# Patient Record
Sex: FEMALE | Race: WHITE | Employment: OTHER | URBAN - NONMETROPOLITAN AREA
[De-identification: names, ages, dates, MRNs, and addresses within clinical notes are randomized per-mention and may not be internally consistent; named-entity substitution may affect disease eponyms.]

---

## 2017-09-21 ENCOUNTER — OFFICE VISIT (OUTPATIENT)
Dept: FAMILY MEDICINE CLINIC | Age: 67
End: 2017-09-21
Payer: MEDICARE

## 2017-09-21 VITALS
BODY MASS INDEX: 36.19 KG/M2 | HEART RATE: 64 BPM | HEIGHT: 64 IN | DIASTOLIC BLOOD PRESSURE: 67 MMHG | RESPIRATION RATE: 16 BRPM | WEIGHT: 212 LBS | TEMPERATURE: 97.9 F | SYSTOLIC BLOOD PRESSURE: 118 MMHG

## 2017-09-21 DIAGNOSIS — E78.2 MIXED HYPERLIPIDEMIA: Chronic | ICD-10-CM

## 2017-09-21 DIAGNOSIS — E11.9 TYPE 2 DIABETES MELLITUS WITHOUT COMPLICATION, WITHOUT LONG-TERM CURRENT USE OF INSULIN (HCC): ICD-10-CM

## 2017-09-21 DIAGNOSIS — Z78.9 STATIN INTOLERANCE: Chronic | ICD-10-CM

## 2017-09-21 DIAGNOSIS — Z12.31 ENCOUNTER FOR SCREENING MAMMOGRAM FOR MALIGNANT NEOPLASM OF BREAST: ICD-10-CM

## 2017-09-21 DIAGNOSIS — I10 ESSENTIAL HYPERTENSION: Primary | Chronic | ICD-10-CM

## 2017-09-21 DIAGNOSIS — D69.6 THROMBOCYTOPENIA (HCC): ICD-10-CM

## 2017-09-21 DIAGNOSIS — D32.9 MENINGIOMA (HCC): ICD-10-CM

## 2017-09-21 LAB
GLUCOSE BLD-MCNC: 138 MG/DL
HBA1C MFR BLD: 6.9 %

## 2017-09-21 PROCEDURE — 83036 HEMOGLOBIN GLYCOSYLATED A1C: CPT | Performed by: FAMILY MEDICINE

## 2017-09-21 PROCEDURE — 99214 OFFICE O/P EST MOD 30 MIN: CPT | Performed by: FAMILY MEDICINE

## 2017-09-21 PROCEDURE — 82962 GLUCOSE BLOOD TEST: CPT | Performed by: FAMILY MEDICINE

## 2017-09-21 ASSESSMENT — PATIENT HEALTH QUESTIONNAIRE - PHQ9
2. FEELING DOWN, DEPRESSED OR HOPELESS: 0
1. LITTLE INTEREST OR PLEASURE IN DOING THINGS: 0
SUM OF ALL RESPONSES TO PHQ QUESTIONS 1-9: 0
SUM OF ALL RESPONSES TO PHQ9 QUESTIONS 1 & 2: 0

## 2017-09-22 LAB
ABSOLUTE BASO #: 0.1 K/UL (ref 0–0.1)
ABSOLUTE EOS #: 0 K/UL (ref 0.1–0.4)
ABSOLUTE LYMPH #: 1.2 K/UL (ref 0.8–5.2)
ABSOLUTE MONO #: 0.3 K/UL (ref 0.1–0.9)
ABSOLUTE NEUT #: 2.9 K/UL (ref 1.3–9.1)
ALBUMIN SERPL-MCNC: 4.3 G/DL (ref 3.2–5.3)
ALK PHOSPHATASE: 65 IU/L (ref 35–121)
ALT SERPL-CCNC: 42 IU/L (ref 5–59)
ANION GAP SERPL CALCULATED.3IONS-SCNC: 15 MMOL/L
AST SERPL-CCNC: 30 IU/L (ref 10–42)
BASOPHILS RELATIVE PERCENT: 1.3 %
BILIRUB SERPL-MCNC: 0.6 MG/DL (ref 0.2–1.3)
BUN BLDV-MCNC: 20 MG/DL (ref 10–20)
CALCIUM SERPL-MCNC: 10.2 MG/DL (ref 8.7–10.8)
CHLORIDE BLD-SCNC: 97 MMOL/L (ref 95–111)
CHOLESTEROL, TOTAL: 221 MG/DL
CHOLESTEROL/HDL RATIO: 5.5
CHOLESTEROL/HDL RATIO: NORMAL
CHOLESTEROL: 221 MG/DL
CO2: 31 MMOL/L (ref 21–32)
CREAT SERPL-MCNC: 0.9 MG/DL (ref 0.5–1.3)
CREATINE, URINE: 95.6 MG/DL
CREATININE, URINE: 95.6
EGFR AFRICAN AMERICAN: 75
EGFR IF NONAFRICAN AMERICAN: 62
EOSINOPHILS RELATIVE PERCENT: 0.7 %
GLUCOSE: 133 MG/DL (ref 70–100)
HCT VFR BLD CALC: 44.5 % (ref 36–48)
HDLC SERPL-MCNC: 40 MG/DL (ref 35–70)
HDLC SERPL-MCNC: 40 MG/DL (ref 40–60)
HEMOGLOBIN: 15.4 G/DL (ref 12–16)
LDL CHOLESTEROL CALCULATED: 144 MG/DL
LDL CHOLESTEROL CALCULATED: 144 MG/DL (ref 0–160)
LDL/HDL RATIO: 3.6
LYMPHOCYTE %: 25.7 %
MCH RBC QN AUTO: 31.9 PG (ref 27–34)
MCHC RBC AUTO-ENTMCNC: 34.6 G/DL (ref 31–36)
MCV RBC AUTO: 92.1 FL (ref 80–100)
MICROALBUMIN/CREAT 24H UR: <0.7 MG/DL
MICROALBUMIN/CREAT 24H UR: <0.7 MG/G{CREAT}
MICROALBUMIN/CREAT UR-RTO: NORMAL
MONOCYTES # BLD: 6.5 %
NEUTROPHILS RELATIVE PERCENT: 65.6 %
PDW BLD-RTO: 11.8 % (ref 10.8–14.8)
PLATELETS: 110 K/UL (ref 150–450)
POTASSIUM SERPL-SCNC: 3.8 MMOL/L (ref 3.5–5.4)
RBC: 4.83 M/UL (ref 4–5.5)
SODIUM BLD-SCNC: 139 MMOL/L (ref 134–147)
TOTAL PROTEIN: 7.3 G/DL (ref 5.8–8)
TRIGL SERPL-MCNC: 184 MG/DL
TRIGL SERPL-MCNC: 184 MG/DL
TSH SERPL DL<=0.05 MIU/L-ACNC: 1.28 UIU/ML (ref 0.4–4.4)
VLDLC SERPL CALC-MCNC: 37 MG/DL
VLDLC SERPL CALC-MCNC: 37 MG/DL
WBC: 4.5 K/UL (ref 3.7–10.8)

## 2017-09-25 RX ORDER — FLUTICASONE PROPIONATE 50 MCG
2 SPRAY, SUSPENSION (ML) NASAL DAILY
Qty: 3 BOTTLE | Refills: 3 | Status: SHIPPED | OUTPATIENT
Start: 2017-09-25 | End: 2018-10-11

## 2017-09-25 RX ORDER — LOSARTAN POTASSIUM AND HYDROCHLOROTHIAZIDE 25; 100 MG/1; MG/1
1 TABLET ORAL DAILY
Qty: 90 TABLET | Refills: 3 | Status: SHIPPED | OUTPATIENT
Start: 2017-09-25 | End: 2018-10-11 | Stop reason: SDUPTHER

## 2017-09-25 RX ORDER — CLONIDINE HYDROCHLORIDE 0.2 MG/1
0.2 TABLET ORAL 2 TIMES DAILY
Qty: 180 TABLET | Refills: 3 | Status: SHIPPED | OUTPATIENT
Start: 2017-09-25 | End: 2018-10-11 | Stop reason: SDUPTHER

## 2017-09-25 RX ORDER — TRIAMCINOLONE ACETONIDE 1 MG/G
CREAM TOPICAL
Qty: 3 TUBE | Refills: 3 | Status: SHIPPED | OUTPATIENT
Start: 2017-09-25 | End: 2019-10-15 | Stop reason: SDUPTHER

## 2017-09-26 ENCOUNTER — TELEPHONE (OUTPATIENT)
Dept: FAMILY MEDICINE CLINIC | Age: 67
End: 2017-09-26

## 2017-09-29 ENCOUNTER — HOSPITAL ENCOUNTER (OUTPATIENT)
Dept: WOMENS IMAGING | Age: 67
Discharge: HOME | End: 2017-09-29
Payer: MEDICARE

## 2017-09-29 DIAGNOSIS — Z12.31 ENCOUNTER FOR SCREENING MAMMOGRAM FOR MALIGNANT NEOPLASM OF BREAST: ICD-10-CM

## 2017-09-29 PROCEDURE — G0202 SCR MAMMO BI INCL CAD: HCPCS

## 2017-10-02 ENCOUNTER — TELEPHONE (OUTPATIENT)
Dept: FAMILY MEDICINE CLINIC | Age: 67
End: 2017-10-02

## 2018-04-12 ENCOUNTER — OFFICE VISIT (OUTPATIENT)
Dept: FAMILY MEDICINE CLINIC | Age: 68
End: 2018-04-12
Payer: MEDICARE

## 2018-04-12 VITALS
TEMPERATURE: 98.3 F | BODY MASS INDEX: 36.98 KG/M2 | HEART RATE: 84 BPM | DIASTOLIC BLOOD PRESSURE: 76 MMHG | RESPIRATION RATE: 20 BRPM | WEIGHT: 216.6 LBS | HEIGHT: 64 IN | SYSTOLIC BLOOD PRESSURE: 134 MMHG

## 2018-04-12 DIAGNOSIS — D69.6 THROMBOCYTOPENIA (HCC): ICD-10-CM

## 2018-04-12 DIAGNOSIS — E11.9 TYPE 2 DIABETES MELLITUS WITHOUT COMPLICATION, WITHOUT LONG-TERM CURRENT USE OF INSULIN (HCC): Primary | Chronic | ICD-10-CM

## 2018-04-12 DIAGNOSIS — E78.2 MIXED HYPERLIPIDEMIA: Chronic | ICD-10-CM

## 2018-04-12 DIAGNOSIS — I10 ESSENTIAL HYPERTENSION: Chronic | ICD-10-CM

## 2018-04-12 DIAGNOSIS — D32.9 MENINGIOMA (HCC): ICD-10-CM

## 2018-04-12 DIAGNOSIS — M15.9 PRIMARY OSTEOARTHRITIS INVOLVING MULTIPLE JOINTS: ICD-10-CM

## 2018-04-12 DIAGNOSIS — Z78.9 STATIN INTOLERANCE: Chronic | ICD-10-CM

## 2018-04-12 LAB — HBA1C MFR BLD: 6.8 %

## 2018-04-12 PROCEDURE — 4040F PNEUMOC VAC/ADMIN/RCVD: CPT | Performed by: FAMILY MEDICINE

## 2018-04-12 PROCEDURE — 1036F TOBACCO NON-USER: CPT | Performed by: FAMILY MEDICINE

## 2018-04-12 PROCEDURE — 1090F PRES/ABSN URINE INCON ASSESS: CPT | Performed by: FAMILY MEDICINE

## 2018-04-12 PROCEDURE — G8427 DOCREV CUR MEDS BY ELIG CLIN: HCPCS | Performed by: FAMILY MEDICINE

## 2018-04-12 PROCEDURE — 3014F SCREEN MAMMO DOC REV: CPT | Performed by: FAMILY MEDICINE

## 2018-04-12 PROCEDURE — 2022F DILAT RTA XM EVC RTNOPTHY: CPT | Performed by: FAMILY MEDICINE

## 2018-04-12 PROCEDURE — 83036 HEMOGLOBIN GLYCOSYLATED A1C: CPT | Performed by: FAMILY MEDICINE

## 2018-04-12 PROCEDURE — 3044F HG A1C LEVEL LT 7.0%: CPT | Performed by: FAMILY MEDICINE

## 2018-04-12 PROCEDURE — 99214 OFFICE O/P EST MOD 30 MIN: CPT | Performed by: FAMILY MEDICINE

## 2018-04-12 PROCEDURE — G8400 PT W/DXA NO RESULTS DOC: HCPCS | Performed by: FAMILY MEDICINE

## 2018-04-12 PROCEDURE — G8417 CALC BMI ABV UP PARAM F/U: HCPCS | Performed by: FAMILY MEDICINE

## 2018-04-12 PROCEDURE — 3017F COLORECTAL CA SCREEN DOC REV: CPT | Performed by: FAMILY MEDICINE

## 2018-04-12 PROCEDURE — 1123F ACP DISCUSS/DSCN MKR DOCD: CPT | Performed by: FAMILY MEDICINE

## 2018-09-17 ENCOUNTER — TELEPHONE (OUTPATIENT)
Dept: FAMILY MEDICINE CLINIC | Age: 68
End: 2018-09-17

## 2018-09-17 DIAGNOSIS — E11.9 TYPE 2 DIABETES MELLITUS WITHOUT COMPLICATION, WITHOUT LONG-TERM CURRENT USE OF INSULIN (HCC): Primary | Chronic | ICD-10-CM

## 2018-09-17 NOTE — TELEPHONE ENCOUNTER
Per HIPAA, left detailed message for fasting labs. New lab orders along with a letter was mailed to pt.

## 2018-09-17 NOTE — LETTER
[address]  [address] [address]  Phone: [phone]  Fax: [phone]          September 17, 2018    [facility] [address] [address]      Dear Gene Presume:    Jessika Kevin is requesting fasting labs to be completed prior to your follow up appointment scheduled on 10/11/18. New lab orders enclosed. If you have any questions or concerns, please don't hesitate to call.     Sincerely,        Erika GOLDSMITH CMA([facility])

## 2018-09-25 ENCOUNTER — CARE COORDINATION (OUTPATIENT)
Dept: CARE COORDINATION | Age: 68
End: 2018-09-25

## 2018-10-10 NOTE — PROGRESS NOTES
(CATAPRES) 0.2 MG tablet Take 1 tablet by mouth 2 times daily 180 tablet 3    triamcinolone (KENALOG) 0.1 % cream Apply topically 2 times daily to affected area sparingly. 3 Tube 3    cetirizine (ZYRTEC) 10 MG tablet Take 10 mg by mouth daily      Multiple Vitamins-Minerals (MULTIVITAMIN ADULT PO) Take 1 tablet by mouth daily      Red Yeast Rice 600 MG CAPS Take 2 capsules by mouth 2 times daily      Omega-3 Fatty Acids (FISH OIL) 1000 MG CAPS Take 2,000 mg by mouth daily      Coenzyme Q10 10 MG CAPS Take 1 capsule by mouth daily       No current facility-administered medications for this visit. Orders Placed This Encounter   Medications    losartan-hydrochlorothiazide (HYZAAR) 100-25 MG per tablet     Sig: Take 1 tablet by mouth daily     Dispense:  90 tablet     Refill:  3    Calcium Carb-Cholecalciferol (CALCIUM + D3) 600-200 MG-UNIT TABS tablet     Sig: Take 1 tablet by mouth 2 times daily     Dispense:  180 tablet     Refill:  3    omeprazole (PRILOSEC) 20 MG delayed release capsule     Sig: Take 1 capsule by mouth daily     Dispense:  90 capsule     Refill:  3    cloNIDine (CATAPRES) 0.2 MG tablet     Sig: Take 1 tablet by mouth 2 times daily     Dispense:  180 tablet     Refill:  3         All medications reviewed and reconciled, including OTC and herbal medications. Updated list given to patient. Patient Active Problem List    Diagnosis Date Noted    DM2 [facility]diabetes mellitus[facility] type 2) [facility]     Statin intolerance     GERD (gastroesophageal reflux disease)     Osteoarthritis     Allergic rhinitis     Angiolipoma of right kidney     Eczema     Hyperlipidemia     Obesity (BMI 30-39. 9)     Post-menopausal     Thrombocytopenia ([address]     Mixed hearing loss 09/17/2013    Meningioma ([facility] [address] 12/26/2012     Noted MRI 2012. Never did f/u MRI. Told she didn't need to. Has 0 sxs. Denies HA, vision changes, lateralizing numbness or weakness.        Hypertension 12/10/2012       Past Medical History:   Diagnosis Date    Allergic rhinitis     Angiolipoma of right kidney     DM2 (diabetes mellitus, type 2) (HCC)     Eczema     GERD (gastroesophageal reflux disease)     Hyperlipidemia     Hypertension     Meningioma ([address] 2012    Noted MRI . Never did f/u MRI. Told she didn't need to. Has 0 sxs. Denies HA, vision changes, lateralizing numbness or weakness.  Mixed hearing loss 2013    Obesity (BMI 30-39. 9)     Osteoarthritis     Post-menopausal     Statin intolerance        Past Surgical History:   Procedure Laterality Date     SECTION  1972     CHOLECYSTECTOMY  2004    COLONOSCOPY  2006    MYRINGOTOMY AND TYMPANOSTOMY TUBE PLACEMENT  2012    Dr Aris Garza GASTROINTESTINAL ENDOSCOPY  2006       Allergies   Allergen Reactions    Biaxin [Clarithromycin] Nausea Only    Doxycycline Other (See Comments)     GI side effects    Keflex [Cephalexin] Other (See Comments)     Tongue Swelling    Norvasc [Amlodipine Besylate] Other (See Comments)     Nasuea, hot flashes    Statins Other (See Comments)     Myalgias all    Zetia [Ezetimibe] Other (See Comments)     Chest pain    Zocor [Simvastatin] Other (See Comments)     Myalgias       Social History     Social History    Marital status:      Spouse name: N/A    Number of children: N/A    Years of education: N/A     Occupational History    Not on file. Social History Main Topics    Smoking status: Never Smoker    Smokeless tobacco: Never Used    Alcohol use No    Drug use: No    Sexual activity: Not on file     Other Topics Concern    Not on file     Social History Narrative    No narrative on file       Family History   Problem Relation Age of Onset    Heart Disease Mother    Dasia Arreaga Mother     Osteoporosis Mother     Other Father         Did not know her father.      Other Maternal Grandmother         blood clot, no clear hx surrounding    Heart 146 mEq/L Final    Potassium 10/10/2018 4.0  3.5 - 5.4 mEq/L Final    Chloride 10/10/2018 97  95 - 107 mEq/L Final    CO2 10/10/2018 29  19 - 31 mEq/L Final    Anion Gap 10/10/2018 16  10 - 19 mEq/L Final    Total Bilirubin 10/10/2018 0.3  <1.3 mg/dL Final    Alk Phosphatase 10/10/2018 67  30 - 146 U/L Final    AST 10/10/2018 24  9 - 40 U/L Final    ALT 10/10/2018 30  5 - 40 U/L Final    Total Protein 10/10/2018 7.3  6.1 - 8.3 g/dL Final    Alb 10/10/2018 4.4  3.5 - 5.2 g/dL Final    WBC 10/10/2018 6.3  3.7 - 10.8 K/ul Final    RBC 10/10/2018 4.38  4.00 - 5.50 M/ul Final    Hemoglobin 10/10/2018 14.4  12.0 - 16.0 g/dL Final    Hematocrit 10/10/2018 40.7  36.0 - 48.0 % Final    MCV 10/10/2018 92.9  80 - 100 fl Final    MCH 10/10/2018 32.9  27.0 - 34.0 pg Final    MCHC 10/10/2018 35.4  31.0 - 36.0 g/dl Final    RDW 10/10/2018 12.3  10.8 - 14.8 % Final    Platelets 85/14/0470 105* 150 - 450 K/ul Final    Absolute Neut # 10/10/2018 4.1  1.3 - 9.1 K/ul Final    Neutrophils % 10/10/2018 64.4  % Final    Absolute Lymph # 10/10/2018 1.7  0.8 - 5.2 K/ul Final    Lymphocyte % 10/10/2018 26.8  % Final    Absolute Mono # 10/10/2018 0.3  0.1 - 0.9 K/ul Final    Monocytes 10/10/2018 5.4  % Final    Absolute Eos # 10/10/2018 0.1  0.1 - 0.4 K/ul Final    Eosinophils % 10/10/2018 1.7  % Final    Absolute Baso # 10/10/2018 0.1  0.0 - 0.1 K/ul Final    Basophils % 10/10/2018 1.1  % Final    TSH 10/10/2018 2.41  0.40 - 4.10 uIU/mL Final       ASSESSMENT & PLAN  1. Type 2 diabetes mellitus without complication, without long-term current use of insulin (HCC)    At goal, con't diet control  con't diet changes  f/u 6 months    - POCT glycosylated hemoglobin (Hb A1C)  -  DIABETES FOOT EXAM    2. Essential hypertension    At goal  con't meds  Labs UTD    - losartan-hydrochlorothiazide (HYZAAR) 100-25 MG per tablet; Take 1 tablet by mouth daily  Dispense: 90 tablet;  Refill: 3  - cloNIDine (CATAPRES) 0.2 MG

## 2018-10-11 ENCOUNTER — OFFICE VISIT (OUTPATIENT)
Dept: FAMILY MEDICINE CLINIC | Age: 68
End: 2018-10-11
Payer: MEDICARE

## 2018-10-11 VITALS
HEIGHT: 64 IN | TEMPERATURE: 98 F | DIASTOLIC BLOOD PRESSURE: 79 MMHG | BODY MASS INDEX: 37.15 KG/M2 | SYSTOLIC BLOOD PRESSURE: 126 MMHG | HEART RATE: 71 BPM | RESPIRATION RATE: 18 BRPM | WEIGHT: 217.6 LBS

## 2018-10-11 DIAGNOSIS — Z78.9 STATIN INTOLERANCE: Chronic | ICD-10-CM

## 2018-10-11 DIAGNOSIS — E11.9 TYPE 2 DIABETES MELLITUS WITHOUT COMPLICATION, WITHOUT LONG-TERM CURRENT USE OF INSULIN (HCC): Primary | Chronic | ICD-10-CM

## 2018-10-11 DIAGNOSIS — K21.9 GASTROESOPHAGEAL REFLUX DISEASE, ESOPHAGITIS PRESENCE NOT SPECIFIED: Chronic | ICD-10-CM

## 2018-10-11 DIAGNOSIS — D69.6 THROMBOCYTOPENIA (HCC): ICD-10-CM

## 2018-10-11 DIAGNOSIS — Z12.31 ENCOUNTER FOR SCREENING MAMMOGRAM FOR MALIGNANT NEOPLASM OF BREAST: ICD-10-CM

## 2018-10-11 DIAGNOSIS — I10 ESSENTIAL HYPERTENSION: Chronic | ICD-10-CM

## 2018-10-11 DIAGNOSIS — E78.2 MIXED HYPERLIPIDEMIA: Chronic | ICD-10-CM

## 2018-10-11 LAB
ABSOLUTE BASO #: 0.1 K/UL (ref 0–0.1)
ABSOLUTE EOS #: 0.1 K/UL (ref 0.1–0.4)
ABSOLUTE LYMPH #: 1.7 K/UL (ref 0.8–5.2)
ABSOLUTE MONO #: 0.3 K/UL (ref 0.1–0.9)
ABSOLUTE NEUT #: 4.1 K/UL (ref 1.3–9.1)
ALBUMIN SERPL-MCNC: 4.4 G/DL (ref 3.5–5.2)
ALK PHOSPHATASE: 67 U/L (ref 30–146)
ALT SERPL-CCNC: 30 U/L (ref 5–40)
ANION GAP SERPL CALCULATED.3IONS-SCNC: 16 MEQ/L (ref 10–19)
AST SERPL-CCNC: 24 U/L (ref 9–40)
BASOPHILS RELATIVE PERCENT: 1.1 %
BILIRUB SERPL-MCNC: 0.3 MG/DL
BUN BLDV-MCNC: 18 MG/DL (ref 8–23)
CALCIUM SERPL-MCNC: 9.8 MG/DL (ref 8.5–10.5)
CHLORIDE BLD-SCNC: 97 MEQ/L (ref 95–107)
CHOLESTEROL/HDL RATIO: 7.5
CHOLESTEROL: 254 MG/DL
CO2: 29 MEQ/L (ref 19–31)
CREAT SERPL-MCNC: 0.8 MG/DL (ref 0.6–1.3)
CREATINE, URINE: 109.9 MG/DL (ref 28–217)
EGFR AFRICAN AMERICAN: 87.2 ML/MIN/1.73 M2
EGFR IF NONAFRICAN AMERICAN: 75.2 ML/MIN/1.73 M2
EOSINOPHILS RELATIVE PERCENT: 1.7 %
GLUCOSE: 150 MG/DL (ref 70–99)
HBA1C MFR BLD: 7 %
HCT VFR BLD CALC: 40.7 % (ref 36–48)
HDLC SERPL-MCNC: 33.9 MG/DL
HEMOGLOBIN: 14.4 G/DL (ref 12–16)
LDL CHOLESTEROL CALCULATED: 173 MG/DL
LDL/HDL RATIO: 5.1
LYMPHOCYTE %: 26.8 %
MCH RBC QN AUTO: 32.9 PG (ref 27–34)
MCHC RBC AUTO-ENTMCNC: 35.4 G/DL (ref 31–36)
MCV RBC AUTO: 92.9 FL (ref 80–100)
MICROALBUMIN/CREAT 24H UR: 4.5 MG/DL
MICROALBUMIN/CREAT UR-RTO: 41 MG/G
MONOCYTES # BLD: 5.4 %
NEUTROPHILS RELATIVE PERCENT: 64.4 %
PDW BLD-RTO: 12.3 % (ref 10.8–14.8)
PLATELETS: 105 K/UL (ref 150–450)
POTASSIUM SERPL-SCNC: 4 MEQ/L (ref 3.5–5.4)
RBC: 4.38 M/UL (ref 4–5.5)
SODIUM BLD-SCNC: 142 MEQ/L (ref 135–146)
TOTAL PROTEIN: 7.3 G/DL (ref 6.1–8.3)
TRIGL SERPL-MCNC: 234 MG/DL
TSH SERPL DL<=0.05 MIU/L-ACNC: 2.41 UIU/ML (ref 0.4–4.1)
VLDLC SERPL CALC-MCNC: 47 MG/DL
WBC: 6.3 K/UL (ref 3.7–10.8)

## 2018-10-11 PROCEDURE — 1123F ACP DISCUSS/DSCN MKR DOCD: CPT | Performed by: FAMILY MEDICINE

## 2018-10-11 PROCEDURE — G8427 DOCREV CUR MEDS BY ELIG CLIN: HCPCS | Performed by: FAMILY MEDICINE

## 2018-10-11 PROCEDURE — 2022F DILAT RTA XM EVC RTNOPTHY: CPT | Performed by: FAMILY MEDICINE

## 2018-10-11 PROCEDURE — 1101F PT FALLS ASSESS-DOCD LE1/YR: CPT | Performed by: FAMILY MEDICINE

## 2018-10-11 PROCEDURE — G8484 FLU IMMUNIZE NO ADMIN: HCPCS | Performed by: FAMILY MEDICINE

## 2018-10-11 PROCEDURE — 3017F COLORECTAL CA SCREEN DOC REV: CPT | Performed by: FAMILY MEDICINE

## 2018-10-11 PROCEDURE — 99214 OFFICE O/P EST MOD 30 MIN: CPT | Performed by: FAMILY MEDICINE

## 2018-10-11 PROCEDURE — G8400 PT W/DXA NO RESULTS DOC: HCPCS | Performed by: FAMILY MEDICINE

## 2018-10-11 PROCEDURE — 4040F PNEUMOC VAC/ADMIN/RCVD: CPT | Performed by: FAMILY MEDICINE

## 2018-10-11 PROCEDURE — G8417 CALC BMI ABV UP PARAM F/U: HCPCS | Performed by: FAMILY MEDICINE

## 2018-10-11 PROCEDURE — 83036 HEMOGLOBIN GLYCOSYLATED A1C: CPT | Performed by: FAMILY MEDICINE

## 2018-10-11 PROCEDURE — 1090F PRES/ABSN URINE INCON ASSESS: CPT | Performed by: FAMILY MEDICINE

## 2018-10-11 PROCEDURE — 1036F TOBACCO NON-USER: CPT | Performed by: FAMILY MEDICINE

## 2018-10-11 PROCEDURE — 3045F PR MOST RECENT HEMOGLOBIN A1C LEVEL 7.0-9.0%: CPT | Performed by: FAMILY MEDICINE

## 2018-10-11 RX ORDER — CLONIDINE HYDROCHLORIDE 0.2 MG/1
0.2 TABLET ORAL 2 TIMES DAILY
Qty: 180 TABLET | Refills: 3 | Status: SHIPPED | OUTPATIENT
Start: 2018-10-11 | End: 2019-10-15 | Stop reason: SDUPTHER

## 2018-10-11 RX ORDER — LOSARTAN POTASSIUM AND HYDROCHLOROTHIAZIDE 25; 100 MG/1; MG/1
1 TABLET ORAL DAILY
Qty: 90 TABLET | Refills: 3 | Status: SHIPPED | OUTPATIENT
Start: 2018-10-11 | End: 2019-10-15 | Stop reason: SDUPTHER

## 2018-10-11 RX ORDER — OMEPRAZOLE 20 MG/1
20 CAPSULE, DELAYED RELEASE ORAL DAILY
Qty: 90 CAPSULE | Refills: 3 | Status: SHIPPED | OUTPATIENT
Start: 2018-10-11 | End: 2019-10-15 | Stop reason: SDUPTHER

## 2018-10-11 ASSESSMENT — PATIENT HEALTH QUESTIONNAIRE - PHQ9
1. LITTLE INTEREST OR PLEASURE IN DOING THINGS: 0
2. FEELING DOWN, DEPRESSED OR HOPELESS: 0
SUM OF ALL RESPONSES TO PHQ QUESTIONS 1-9: 0
SUM OF ALL RESPONSES TO PHQ9 QUESTIONS 1 & 2: 0
SUM OF ALL RESPONSES TO PHQ QUESTIONS 1-9: 0

## 2019-01-21 ENCOUNTER — TELEPHONE (OUTPATIENT)
Dept: FAMILY MEDICINE CLINIC | Age: 69
End: 2019-01-21

## 2019-02-06 ENCOUNTER — TELEPHONE (OUTPATIENT)
Dept: FAMILY MEDICINE CLINIC | Age: 69
End: 2019-02-06

## 2019-04-08 LAB
CHOLESTEROL/HDL RATIO: 6.6 (ref 1–5)
CHOLESTEROL: 245 MG/DL (ref 150–200)
HDLC SERPL-MCNC: 37 MG/DL
LDL CHOLESTEROL CALCULATED: 169 MG/DL
LDL/HDL RATIO: 4.6
TRIGL SERPL-MCNC: 197 MG/DL (ref 27–150)
VLDLC SERPL CALC-MCNC: 39 MG/DL (ref 0–30)

## 2019-04-09 ENCOUNTER — TELEPHONE (OUTPATIENT)
Dept: FAMILY MEDICINE CLINIC | Age: 69
End: 2019-04-09

## 2019-04-09 ENCOUNTER — HOSPITAL ENCOUNTER (OUTPATIENT)
Dept: WOMENS IMAGING | Age: 69
Discharge: HOME | End: 2019-04-09
Payer: MEDICARE

## 2019-04-09 DIAGNOSIS — Z12.31 ENCOUNTER FOR SCREENING MAMMOGRAM FOR MALIGNANT NEOPLASM OF BREAST: ICD-10-CM

## 2019-04-09 PROCEDURE — 77063 BREAST TOMOSYNTHESIS BI: CPT

## 2019-04-09 NOTE — TELEPHONE ENCOUNTER
----- Message from Lilian Hancock, DO sent at 4/9/2019  4:43 PM EDT -----  Please let pt know that mammogram is normal, repeat 1 to 2 years. Let me know if questions, thanks!

## 2019-04-09 NOTE — TELEPHONE ENCOUNTER
----- Message from Mine Galloway, DO sent at 4/9/2019  6:02 AM EDT -----  Please let pt know that lipids are about the same, no better, no worse. Will discuss at f/u visit on 4/11. Let me know if questions, thanks!

## 2019-04-09 NOTE — TELEPHONE ENCOUNTER
Left detailed message per HIPAA. Informed patient to call office with any questions or concerns that she may have. DISCHARGE

## 2019-04-11 ENCOUNTER — OFFICE VISIT (OUTPATIENT)
Dept: FAMILY MEDICINE CLINIC | Age: 69
End: 2019-04-11
Payer: MEDICARE

## 2019-04-11 ENCOUNTER — NURSE ONLY (OUTPATIENT)
Dept: LAB | Age: 69
End: 2019-04-11

## 2019-04-11 VITALS
SYSTOLIC BLOOD PRESSURE: 130 MMHG | WEIGHT: 213 LBS | BODY MASS INDEX: 39.2 KG/M2 | TEMPERATURE: 98.3 F | RESPIRATION RATE: 18 BRPM | HEIGHT: 62 IN | DIASTOLIC BLOOD PRESSURE: 72 MMHG | HEART RATE: 80 BPM

## 2019-04-11 DIAGNOSIS — E11.9 TYPE 2 DIABETES MELLITUS WITHOUT COMPLICATION, WITHOUT LONG-TERM CURRENT USE OF INSULIN (HCC): Primary | ICD-10-CM

## 2019-04-11 DIAGNOSIS — Z78.9 STATIN INTOLERANCE: ICD-10-CM

## 2019-04-11 DIAGNOSIS — Z12.11 SCREENING FOR COLON CANCER: ICD-10-CM

## 2019-04-11 DIAGNOSIS — D69.6 THROMBOCYTOPENIA (HCC): ICD-10-CM

## 2019-04-11 DIAGNOSIS — E78.2 MIXED HYPERLIPIDEMIA: ICD-10-CM

## 2019-04-11 DIAGNOSIS — E11.9 TYPE 2 DIABETES MELLITUS WITHOUT COMPLICATION, WITHOUT LONG-TERM CURRENT USE OF INSULIN (HCC): ICD-10-CM

## 2019-04-11 DIAGNOSIS — K21.9 GASTROESOPHAGEAL REFLUX DISEASE, ESOPHAGITIS PRESENCE NOT SPECIFIED: ICD-10-CM

## 2019-04-11 DIAGNOSIS — I10 ESSENTIAL HYPERTENSION: ICD-10-CM

## 2019-04-11 DIAGNOSIS — Z23 NEED FOR VACCINATION: ICD-10-CM

## 2019-04-11 DIAGNOSIS — R80.9 MICROALBUMINURIA: ICD-10-CM

## 2019-04-11 LAB
CREATININE, URINE: 21.6 MG/DL
HBA1C MFR BLD: 6.9 %
MICROALBUMIN UR-MCNC: < 1.2 MG/DL
MICROALBUMIN/CREAT UR-RTO: 56 MG/G (ref 0–30)

## 2019-04-11 PROCEDURE — 4040F PNEUMOC VAC/ADMIN/RCVD: CPT | Performed by: FAMILY MEDICINE

## 2019-04-11 PROCEDURE — G8417 CALC BMI ABV UP PARAM F/U: HCPCS | Performed by: FAMILY MEDICINE

## 2019-04-11 PROCEDURE — 3044F HG A1C LEVEL LT 7.0%: CPT | Performed by: FAMILY MEDICINE

## 2019-04-11 PROCEDURE — 83036 HEMOGLOBIN GLYCOSYLATED A1C: CPT | Performed by: FAMILY MEDICINE

## 2019-04-11 PROCEDURE — G8400 PT W/DXA NO RESULTS DOC: HCPCS | Performed by: FAMILY MEDICINE

## 2019-04-11 PROCEDURE — 1123F ACP DISCUSS/DSCN MKR DOCD: CPT | Performed by: FAMILY MEDICINE

## 2019-04-11 PROCEDURE — 3017F COLORECTAL CA SCREEN DOC REV: CPT | Performed by: FAMILY MEDICINE

## 2019-04-11 PROCEDURE — G8427 DOCREV CUR MEDS BY ELIG CLIN: HCPCS | Performed by: FAMILY MEDICINE

## 2019-04-11 PROCEDURE — 2022F DILAT RTA XM EVC RTNOPTHY: CPT | Performed by: FAMILY MEDICINE

## 2019-04-11 PROCEDURE — 1090F PRES/ABSN URINE INCON ASSESS: CPT | Performed by: FAMILY MEDICINE

## 2019-04-11 PROCEDURE — 99214 OFFICE O/P EST MOD 30 MIN: CPT | Performed by: FAMILY MEDICINE

## 2019-04-11 PROCEDURE — 1036F TOBACCO NON-USER: CPT | Performed by: FAMILY MEDICINE

## 2019-04-11 ASSESSMENT — PATIENT HEALTH QUESTIONNAIRE - PHQ9
1. LITTLE INTEREST OR PLEASURE IN DOING THINGS: 0
2. FEELING DOWN, DEPRESSED OR HOPELESS: 0
SUM OF ALL RESPONSES TO PHQ QUESTIONS 1-9: 0
SUM OF ALL RESPONSES TO PHQ QUESTIONS 1-9: 0
SUM OF ALL RESPONSES TO PHQ9 QUESTIONS 1 & 2: 0

## 2019-04-11 NOTE — PROGRESS NOTES
10/10/2019    Potassium monitoring  10/10/2019    Creatinine monitoring  10/10/2019    Diabetic foot exam  10/11/2019    Lipid screen  04/08/2020    Breast cancer screen  04/09/2021

## 2019-04-11 NOTE — PROGRESS NOTES
Chief Complaint   Patient presents with    Follow-up     Chronic issues as noted below       History obtained from the patient. SUBJECTIVE:  Omari Waters is a 78 y.o. female that presents today for     -01. DM2 PRIOR VISIT: USed to be on metformin for preDM. Stopped a while ago. Labs done last year showed DM2. She was to f/u with me and she never came in. Here a year later. Labs still c/w DM. Controlled. No polyphagia, uria or dypsia. UPDATE PRIOR VISIT: diet controlled. a1c good. Working on wt loss etc.      UPDATE LAST VISIT: diet controlled yet. a1c in range. wts about the same    UPDATE TODAY: a1c stable. Diet controlled. wts about the same. + microal in OCT. Wanted to repeat today, but she couldn't void, order given    Wt Readings from Last 3 Encounters:   04/11/19 213 lb (96.6 kg)   10/11/18 217 lb 9.6 oz (98.7 kg)   04/12/18 216 lb 9.6 oz (98.2 kg)     Lab Results   Component Value Date    LABA1C 6.9 04/11/2019    LABA1C 7.0 10/11/2018    LABA1C 6.8 04/12/2018    LABA1C 6.9 09/21/2017    LABA1C 6.5 11/04/2016       -02. HTN:    HPI:    Taking meds as prescribed ?: yes  Tolerating well ?: yes  Side Effects ?: deneis  BP at home ?: <140/90  Working on TLCS ?: yes  Chest Pain/SOB/Palpitations? denies    BP Readings from Last 3 Encounters:   04/11/19 130/72   10/11/18 126/79   04/12/18 134/76       -03. HLD LAST VISIT: should be on statin, however intolerant of zocor as the last statin she took. States took other statins years ago, can't remember names, but they call caused myalgias. She refuses further statins. Lipids a little worse. Diet not as good. Plans to get this improved. UPDATE TODAY: no change in above. Making diet changes. Refuses statins d/t side effects with all of them       -04. Low platelets: chronic, no clear etiology and mild. Thought maybe d/t naproxen. Labs stable on f/u. Denies fevers, chills, night sweats or wt loss.       -05.  GERD:    HPI:    Taking meds as prescribed ?: yes  Tolerating well ?: yes  Side Effects ?: denies  Dysphagia ?: denies  Odynophagia ?: denies  Melena ?: denies  Working on TLCS ?: yes      Age/Gender Health Maintenance    Lipid -   Lab Results   Component Value Date    CHOL 245 (H) 04/08/2019    CHOL 254 (H) 10/10/2018    CHOL 221 09/22/2017     Lab Results   Component Value Date    TRIG 197 (H) 04/08/2019    TRIG 234 (H) 10/10/2018    TRIG 184 09/22/2017     Lab Results   Component Value Date    HDL 37 (L) 04/08/2019    HDL 33.9 (L) 10/10/2018    HDL 40 09/22/2017     Lab Results   Component Value Date    LDLCALC 169 (H) 04/08/2019    LDLCALC 173 (H) 10/10/2018    LDLCALC 144 09/22/2017       Colon Cancer Screening - Somewhere within the last 10 years, was not Sep[facility]. Awaiting records/due, ordered today (APR 2019)  Lung Cancer Screening (Age 54 to [de-identified] with 30 pack year hx, current smoker or quit within past 15 years) - never smoker. Tetanus - declines SEPT 2016/SEPT 2017/OCT 2018/APR 2019  Influenza Vaccine - Declines SEPT 2016/SEPT 2017/OCT 2018  Pneumonia Vaccine - Declines SEPT 2016/SEPT 2017/OCT 2018/APR 2019  Zostavax - Declines SEPT 2016/SEPT 2017/OCT 2018/APR 2019    Breast Cancer Screening - NEG FINA APR 2019  Osteoporosis Screening - declines SEPT 2016, wants to discuss again in 1 year.  We discussed today and declines SEPT 2017/OCT 2018/APR 2019    Diabetes Health Maintenance    A1C -   Lab Results   Component Value Date    LABA1C 6.9 04/11/2019    LABA1C 7.0 10/11/2018    LABA1C 6.8 04/12/2018    LABA1C 6.9 09/21/2017    LABA1C 6.5 11/04/2016       ACE/ARB - YES hyzaar  Eye - due, pt reminded  Foot - WNL OCT 2018  Microal/Cr - + OCT 2018  eGFR -   Component      Latest Ref Rng & Units 10/10/2018           7:50 AM   EGFR IF NonAfrican American      >59 mL/min/1.73 m2 75.2       Statin - statin intolerance/allergy      Current Outpatient Medications   Medication Sig Dispense Refill    losartan-hydrochlorothiazide (HYZAAR) 100-25 MG per tablet Take 1 tablet by mouth daily 90 tablet 3    Calcium Carb-Cholecalciferol (CALCIUM + D3) 600-200 MG-UNIT TABS tablet Take 1 tablet by mouth 2 times daily 180 tablet 3    omeprazole (PRILOSEC) 20 MG delayed release capsule Take 1 capsule by mouth daily 90 capsule 3    cloNIDine (CATAPRES) 0.2 MG tablet Take 1 tablet by mouth 2 times daily 180 tablet 3    triamcinolone (KENALOG) 0.1 % cream Apply topically 2 times daily to affected area sparingly. 3 Tube 3    cetirizine (ZYRTEC) 10 MG tablet Take 10 mg by mouth daily      Multiple Vitamins-Minerals (MULTIVITAMIN ADULT PO) Take 1 tablet by mouth daily      Red Yeast Rice 600 MG CAPS Take 2 capsules by mouth 2 times daily      Omega-3 Fatty Acids (FISH OIL) 1000 MG CAPS Take 2,000 mg by mouth daily      Coenzyme Q10 10 MG CAPS Take 1 capsule by mouth daily       No current facility-administered medications for this visit. No orders of the defined types were placed in this encounter. All medications reviewed and reconciled, including OTC and herbal medications. Updated list given to patient. Patient Active Problem List    Diagnosis Date Noted    DM2 (diabetes mellitus, type 2) ([facility] [address]     Statin intolerance     GERD (gastroesophageal reflux disease)     Osteoarthritis     Allergic rhinitis     Angiolipoma of right kidney     Eczema     Hyperlipidemia     Obesity (BMI 30-39. 9)     Post-menopausal     Thrombocytopenia ([address]     Mixed hearing loss 09/17/2013    [facility] 12/26/2012     Noted MRI 2012. Never did f/u MRI. Told she didn't need to. Has 0 sxs. Denies HA, vision changes, lateralizing numbness or weakness.  Hypertension 12/10/2012       Past Medical History:   Diagnosis Date    Allergic rhinitis     Angiolipoma of right kidney     DM2 (diabetes mellitus, type 2) ([address]     Eczema     GERD (gastroesophageal reflux disease)     Hyperlipidemia     Hypertension     Meningioma ([facility] [address] 12/26/2012    Noted MRI 2012.  Never did f/u MRI. Told she didn't need to. Has 0 sxs. Denies HA, vision changes, lateralizing numbness or weakness.  Mixed hearing loss 2013    Obesity (BMI 30-39. 9)     Osteoarthritis     Post-menopausal     Statin intolerance        Past Surgical History:   Procedure Laterality Date     SECTION  1972 1975    CHOLECYSTECTOMY  2004    COLONOSCOPY  2006    MYRINGOTOMY AND TYMPANOSTOMY TUBE PLACEMENT  2012    Dr Mortimer Pandy UPPER GASTROINTESTINAL ENDOSCOPY  2006       Allergies   Allergen Reactions    Biaxin [Clarithromycin] Nausea Only    Doxycycline Other (See Comments)     GI side effects    Keflex [Cephalexin] Other (See Comments)     Tongue Swelling    Norvasc [Amlodipine Besylate] Other (See Comments)     Nasuea, hot flashes    Statins Other (See Comments)     Myalgias all    Zetia [Ezetimibe] Other (See Comments)     Chest pain    Zocor [Simvastatin] Other (See Comments)     Myalgias       Social History     Socioeconomic History    Marital status:      Spouse name: Not on file    Number of children: Not on file    Years of education: Not on file    Highest education level: Not on file   Occupational History    Not on file   Social Needs    Financial resource strain: Not on file    Food insecurity:     Worry: Not on file     Inability: Not on file    Transportation needs:     Medical: Not on file     Non-medical: Not on file   Tobacco Use    Smoking status: Never Smoker    Smokeless tobacco: Never Used   Substance and Sexual Activity    Alcohol use: No    Drug use: No    Sexual activity: Not on file   Lifestyle    Physical activity:     Days per week: Not on file     Minutes per session: Not on file    Stress: Not on file   Relationships    Social connections:     Talks on phone: Not on file     Gets together: Not on file     Attends Taoist service: Not on file     Active member of club or organization: Not on file     Attends meetings of clubs or organizations: Not on file     Relationship status: Not on file    Intimate partner violence:     Fear of current or ex partner: Not on file     Emotionally abused: Not on file     Physically abused: Not on file     Forced sexual activity: Not on file   Other Topics Concern    Not on file   Social History Narrative    Not on file       Family History   Problem Relation Age of Onset    Heart Disease Mother    Ted Cee Mother     Osteoporosis Mother     Other Father         Did not know her father.  Other Maternal Grandmother         blood clot, no clear hx surrounding    Heart Attack Maternal Grandfather     Heart Attack Maternal Uncle 62         I have reviewed the patient's past medical history, past surgical history, allergies, medications, social and family history and I have made updates where appropriate. Review of Systems  Positive responses are highlighted in bold    Constitutional:  Fever, Chills, Night Sweats, Fatigue, Unexpected changes in weight  HENT:  Ear pain, Tinnitus, Nosebleeds, Trouble swallowing, Hearing loss, Sore throat  Cardiovascular:  Chest Pain, Palpitations, Orthopnea, Paroxysmal Nocturnal Dyspnea  Respiratory:  Cough, Wheezing, Shortness of breath, Chest tightness, Apnea  Gastrointestinal:  Nausea, Vomiting, Diarrhea, Constipation, Heartburn, Blood in stool  Genitourinary:  Difficulty or painful urination, Flank pain, Change in frequency, Urgency  Skin:  Color change, Rash, Itching, Wound  Musculoskeletal:  Joint pain, Back pain, Gait problems, Joint swelling, Myalgias  Neurological:  Dizziness, Headaches, Presyncope, Numbness, Seizures, Tremors  Endocrine:  Heat Intolerance, Cold Intolerance, Polydipsia, Polyphagia, Polyuria      PHYSICAL EXAM:  Vitals:    04/11/19 1400   BP: 130/72   Pulse: 80   Resp: 18   Temp: 98.3 °F (36.8 °C)   TempSrc: Oral   Weight: 213 lb (96.6 kg)   Height: 5' 2.25\" (1.581 m)     Body mass index is 38.65 kg/m².   Pain Score:   0 - No pain    VS Reviewed  General Appearance: A&O x 3, No acute distress,well developed and well- nourished  Head: normocephalic and atraumatic  Eyes: pupils equal, round, and reactive to light, extraocular eye movements intact, conjunctivae and eye lids without erythema  ENT: external ear and ear canal clear bilaterally, TMs intact and regular, nose without deformity, nasal mucosa and turbinates normal without polyps, oropharynx normal, dentition is normal for age  Neck: supple and non-tender without mass, no thyromegaly or thyroid nodules, no cervical lymphadenopathy  Pulmonary/Chest: clear to auscultation bilaterally- no wheezes, rales or rhonchi, normal air movement, no respiratory distress or retractions  Cardiovascular: S1 and S2 auscultated w/ RRR. No murmurs, rubs, clicks, or gallops, distal pulses intact. Abdomen: soft, non-tender, non-distended, bowl sounds physiologic,  no rebound or guarding, no masses or hernias noted. Liver and spleen without enlargement. Extremities: no cyanosis, clubbing or edema of the lower extremities. +2 PT/DP bilaterally. Musculoskeletal: No joint swelling or gross deformity   Skin: warm and dry, no rash or erythema      Results for POC orders placed in visit on 04/11/19   POCT glycosylated hemoglobin (Hb A1C)   Result Value Ref Range    Hemoglobin A1C 6.9 %       ASSESSMENT & PLAN  1. Type 2 diabetes mellitus without complication, without long-term current use of insulin (HCC)    At goal, con't diet control  con't diet changes  f/u 6 months    - POCT glycosylated hemoglobin (Hb A1C)  - Microalbumin / Creatinine Urine Ratio; Future    2. Microalbuminuria    Repeat microalb ordered  Await results    3. Essential hypertension    At goal  con't meds  Labs UTD    4. Mixed hyperlipidemia    Stable  Statin intolerant  Plans to work on diet and wt loss  Will repeat labs in 6 months to trend    5. Statin intolerance      6.  Thrombocytopenia (HCC)    Stable  Mild  Check yearly  W/u if sig low again    7. Gastroesophageal reflux disease, esophagitis presence not specified    Doing well  con't omeprazole    8. Screening for colon cancer    - [facility]) - Marylene Landry, MD, Gastroenterology, [facility] [email or web address]    9. Need for vaccination    Declines imms, aware of risks of not being immunized. Discussed again today      DISPOSITION    Return in about 6 months (around 10/11/2019) for Follow-up Diabetes, follow-up on chronic medical conditions, sooner as needed. Swati released without restrictions. Future Appointments   Date Time Provider Andrei Pedraza   10/15/2019  2:20 PM Charly Medina DO [facility] [address] received counseling on the following healthy behaviors: nutrition, exercise and medication adherence    Patient given educational materials on: See Attached    I have instructed Swati to complete a self tracking handout on Blood Pressures  and instructed them to bring it with them to her next appointment. Barriers to learning and self management: none    Discussed use, benefit, and side effects of prescribed medications. Barriers to medication compliance addressed. All patient questions answered. Pt voiced understanding.        Electronically signed by Charly Medina DO on 4/11/2019 at 2:39 PM

## 2019-04-11 NOTE — PATIENT INSTRUCTIONS
Patient Education        Type 2 Diabetes: Care Instructions  Your Care Instructions    Type 2 diabetes is a disease that develops when the body's tissues cannot use insulin properly. Over time, the pancreas cannot make enough insulin. Insulin is a hormone that helps the body's cells use sugar (glucose) for energy. It also helps the body store extra sugar in muscle, fat, and liver cells. Without insulin, the sugar cannot get into the cells to do its work. It stays in the blood instead. This can cause high blood sugar levels. A person has diabetes when the blood sugar stays too high too much of the time. Over time, diabetes can lead to diseases of the heart, blood vessels, nerves, kidneys, and eyes. You may be able to control your blood sugar by losing weight, eating a healthy diet, and getting daily exercise. You may also have to take insulin or other diabetes medicine. Follow-up care is a key part of your treatment and safety. Be sure to make and go to all appointments. Call your doctor if you are having problems. It's also a good idea to know your test results and keep a list of the medicines you take. How can you care for yourself at home? · Keep your blood sugar at a target level (which you set with your doctor). ? Eat a good diet that spreads carbohydrate throughout the day. Carbohydrate--the body's main source of fuel--affects blood sugar more than any other nutrient. Carbohydrate is in fruits, vegetables, milk, and yogurt. It also is in breads, cereals, vegetables such as potatoes and corn, and sugary foods such as candy and cakes. ? Aim for 30 minutes of exercise on most, preferably all, days of the week. Walking is a good choice. You also may want to do other activities, such as running, swimming, cycling, or playing tennis or team sports. If your doctor says it's okay, do muscle-strengthening exercises at least 2 times a week. ? Take your medicines exactly as prescribed.  Call your doctor if you think you are having a problem with your medicine. You will get more details on the specific medicines your doctor prescribes. · Check your blood sugar as often as your doctor recommends. It is important to keep track of any symptoms you have, such as low blood sugar. Also tell your doctor if you have any changes in your activities, diet, or insulin use. · Talk to your doctor before you start taking aspirin every day. Aspirin can help certain people lower their risk of a heart attack or stroke. But taking aspirin isn't right for everyone, because it can cause serious bleeding. · Do not smoke. If you need help quitting, talk to your doctor about stop-smoking programs and medicines. These can increase your chances of quitting for good. · Keep your cholesterol and blood pressure at normal levels. You may need to take one or more medicines to reach your goals. Take them exactly as directed. Do not stop or change a medicine without talking to your doctor first.  When should you call for help? Call 911 anytime you think you may need emergency care. For example, call if:    · You passed out (lost consciousness), or you suddenly become very sleepy or confused. (You may have very low blood sugar.)    Call your doctor now or seek immediate medical care if:    · Your blood sugar is 300 mg/dL or is higher than the level your doctor has set for you.     · You have symptoms of low blood sugar, such as:  ? Sweating. ? Feeling nervous, shaky, and weak. ? Extreme hunger and slight nausea. ? Dizziness and headache.  ? Blurred vision. ? Confusion.    Watch closely for changes in your health, and be sure to contact your doctor if:    · You often have problems controlling your blood sugar.     · You have symptoms of long-term diabetes problems, such as:  ? New vision changes. ? New pain, numbness, or tingling in your hands or feet. ? Skin problems. Where can you learn more? Go to https://chmeeeb.[facility]. org and sign in to your [employer] account. Enter C553 in the [employer] box to learn more about \"Type 2 Diabetes: Care Instructions. \"     If you do not have an account, please click on the \"Sign Up Now\" link. Current as of: July 25, 2018  Content Version: 11.9  © 6788-4008 [employer]. Care instructions adapted under license by [facility] ([facility]). If you have questions about a medical condition or this instruction, always ask your healthcare professional. [address] any warranty or liability for your use of this information.

## 2019-04-12 ENCOUNTER — TELEPHONE (OUTPATIENT)
Dept: FAMILY MEDICINE CLINIC | Age: 69
End: 2019-04-12

## 2019-04-12 NOTE — TELEPHONE ENCOUNTER
----- Message from Trini Bell DO sent at 4/12/2019  6:11 AM EDT -----  Please let pt know that labs show a very small amnt of protein yet. Since the rest of her labs are ok, we will con't to monitor this to ensure it doesn't worsen. Overall it's mild and stable. No changes needed at this time. Let me know if questions, thanks!

## 2019-04-29 ENCOUNTER — TELEPHONE (OUTPATIENT)
Dept: FAMILY MEDICINE CLINIC | Age: 69
End: 2019-04-29

## 2019-04-29 NOTE — TELEPHONE ENCOUNTER
Patient states she was in to see Dr Rubi Ortega on 4/11/19 and he referred her to Dr Mee Whitmore, however, she has still not heard anything about that appointment. She is asking for someone to please call her.

## 2019-05-14 ENCOUNTER — TELEPHONE (OUTPATIENT)
Dept: FAMILY MEDICINE CLINIC | Age: 69
End: 2019-05-14

## 2019-05-14 NOTE — TELEPHONE ENCOUNTER
Kenneth Kugel Dr [facility] [place] office sent the referral back. He is leaving the patient's insurance network. Referral was canceled. Patient notified of the above and will call our office once she finds out from her insurance who she can go to for the screening colonoscopy.

## 2019-05-16 NOTE — TELEPHONE ENCOUNTER
Has this been addressed? ? If so, please note and \"sign encounter\" so it does not remain open. Thank you!

## 2019-05-16 NOTE — TELEPHONE ENCOUNTER
Sherryle Siren           5/14/19 11:56 AM   Note      HARSH -      Dr Burkett Finders office sent the referral back. He is leaving the patient's insurance network.     Referral was canceled.     Patient notified of the above and will call our office once she finds out from her insurance who she can go to for the screening colonoscopy.

## 2019-08-08 ENCOUNTER — TELEPHONE (OUTPATIENT)
Dept: FAMILY MEDICINE CLINIC | Age: 69
End: 2019-08-08

## 2019-08-08 DIAGNOSIS — E11.9 TYPE 2 DIABETES MELLITUS WITHOUT COMPLICATION, WITHOUT LONG-TERM CURRENT USE OF INSULIN (HCC): Primary | Chronic | ICD-10-CM

## 2019-08-08 NOTE — TELEPHONE ENCOUNTER
Left detailed message on trish sorto per ramiro. Mailed fasting labs to pt's home address. HIV/REVIEW OF SYSTEMS/PHYSICAL EXAM/VITAL SIGNS( Pullset)/PAST MEDICAL/SURGICAL/SOCIAL HISTORY/HISTORY OF PRESENT ILLNESS

## 2019-10-14 LAB
ABSOLUTE BASO #: 0.1 X10E9/L (ref 0–0.9)
ABSOLUTE EOS #: 0.1 X10E9/L (ref 0–0.4)
ABSOLUTE LYMPH #: 1.3 X10E9/L (ref 1–3.5)
ABSOLUTE MONO #: 0.4 X10E9/L (ref 0–0.9)
ABSOLUTE NEUT #: 4.4 X10E9/L (ref 1.5–6.6)
ALBUMIN SERPL-MCNC: 4.6 G/DL (ref 3.2–5.3)
ALK PHOSPHATASE: 70 U/L (ref 39–130)
ALT SERPL-CCNC: 34 U/L (ref 0–31)
ANION GAP SERPL CALCULATED.3IONS-SCNC: 10 MMOL/L (ref 4–12)
AST SERPL-CCNC: 25 U/L (ref 0–41)
BASOPHILS RELATIVE PERCENT: 1.1 %
BILIRUB SERPL-MCNC: 0.6 MG/DL (ref 0.3–1.2)
BUN BLDV-MCNC: 15 MG/DL (ref 5–27)
CALCIUM SERPL-MCNC: 9.9 MG/DL (ref 8.5–10.5)
CHLORIDE BLD-SCNC: 96 MMOL/L (ref 98–109)
CHOLESTEROL/HDL RATIO: 6.2 (ref 1–5)
CHOLESTEROL: 235 MG/DL (ref 150–200)
CO2: 33 MMOL/L (ref 22–32)
CREAT SERPL-MCNC: 0.84 MG/DL (ref 0.4–1)
CREATINE, URINE: 62.03 MG/DL
EGFR AFRICAN AMERICAN: >60 ML/MIN/1.73SQ.M
EGFR IF NONAFRICAN AMERICAN: >60 ML/MIN/1.73SQ.M
EOSINOPHILS RELATIVE PERCENT: 2.4 %
GLUCOSE: 156 MG/DL (ref 65–99)
HCT VFR BLD CALC: 44.8 % (ref 35–47)
HDLC SERPL-MCNC: 38 MG/DL
HEMOGLOBIN: 15.4 G/DL (ref 11.7–16.1)
LDL CHOLESTEROL CALCULATED: 153 MG/DL
LYMPHOCYTE %: 20.1 %
MCH RBC QN AUTO: 32.2 PG (ref 27–35)
MCHC RBC AUTO-ENTMCNC: 34.3 G/DL (ref 31–36)
MCV RBC AUTO: 94 FL (ref 81–101)
MICROALBUMIN/CREAT 24H UR: <0.7 MG/DL (ref 0–1.9)
MICROALBUMIN/CREAT UR-RTO: <3.2 MG/G CREAT (ref 0–30)
MONOCYTES # BLD: 5.9 %
NEUTROPHILS RELATIVE PERCENT: 70.5 %
PDW BLD-RTO: 12.8 % (ref 11.5–14.7)
PLATELETS: 73 X10E9/L (ref 150–450)
PMV BLD AUTO: 13.7 FL (ref 7–12)
POTASSIUM SERPL-SCNC: 3.8 MMOL/L (ref 3.5–5)
RBC: 4.76 X10E12/L (ref 3.55–5.2)
SODIUM BLD-SCNC: 139 MMOL/L (ref 134–146)
TOTAL PROTEIN: 7.4 G/DL (ref 6–8)
TRIGL SERPL-MCNC: 220 MG/DL (ref 27–150)
TSH SERPL DL<=0.05 MIU/L-ACNC: 1.59 UIU/ML (ref 0.49–4.67)
VLDLC SERPL CALC-MCNC: 44 MG/DL (ref 0–30)
WBC: 6.3 X10E9/L (ref 4–11.8)

## 2019-10-15 ENCOUNTER — OFFICE VISIT (OUTPATIENT)
Dept: FAMILY MEDICINE CLINIC | Age: 69
End: 2019-10-15
Payer: MEDICARE

## 2019-10-15 VITALS
WEIGHT: 216.4 LBS | SYSTOLIC BLOOD PRESSURE: 136 MMHG | RESPIRATION RATE: 12 BRPM | BODY MASS INDEX: 38.34 KG/M2 | TEMPERATURE: 98.1 F | DIASTOLIC BLOOD PRESSURE: 74 MMHG | HEART RATE: 88 BPM | HEIGHT: 63 IN

## 2019-10-15 DIAGNOSIS — E78.2 MIXED HYPERLIPIDEMIA: ICD-10-CM

## 2019-10-15 DIAGNOSIS — K21.9 GASTROESOPHAGEAL REFLUX DISEASE, ESOPHAGITIS PRESENCE NOT SPECIFIED: Chronic | ICD-10-CM

## 2019-10-15 DIAGNOSIS — E11.9 TYPE 2 DIABETES MELLITUS WITHOUT COMPLICATION, WITHOUT LONG-TERM CURRENT USE OF INSULIN (HCC): Primary | ICD-10-CM

## 2019-10-15 DIAGNOSIS — D69.6 THROMBOCYTOPENIA (HCC): ICD-10-CM

## 2019-10-15 DIAGNOSIS — Z78.9 STATIN INTOLERANCE: ICD-10-CM

## 2019-10-15 DIAGNOSIS — I10 ESSENTIAL HYPERTENSION: ICD-10-CM

## 2019-10-15 DIAGNOSIS — M85.89 OSTEOPENIA OF MULTIPLE SITES: Primary | ICD-10-CM

## 2019-10-15 DIAGNOSIS — I10 ESSENTIAL HYPERTENSION: Chronic | ICD-10-CM

## 2019-10-15 DIAGNOSIS — K21.9 GASTROESOPHAGEAL REFLUX DISEASE, ESOPHAGITIS PRESENCE NOT SPECIFIED: ICD-10-CM

## 2019-10-15 DIAGNOSIS — E66.9 OBESITY (BMI 30-39.9): Chronic | ICD-10-CM

## 2019-10-15 DIAGNOSIS — L30.9 DERMATITIS: ICD-10-CM

## 2019-10-15 LAB — HBA1C MFR BLD: 7.1 %

## 2019-10-15 PROCEDURE — G8400 PT W/DXA NO RESULTS DOC: HCPCS | Performed by: FAMILY MEDICINE

## 2019-10-15 PROCEDURE — 99214 OFFICE O/P EST MOD 30 MIN: CPT | Performed by: FAMILY MEDICINE

## 2019-10-15 PROCEDURE — G8484 FLU IMMUNIZE NO ADMIN: HCPCS | Performed by: FAMILY MEDICINE

## 2019-10-15 PROCEDURE — 2022F DILAT RTA XM EVC RTNOPTHY: CPT | Performed by: FAMILY MEDICINE

## 2019-10-15 PROCEDURE — 1090F PRES/ABSN URINE INCON ASSESS: CPT | Performed by: FAMILY MEDICINE

## 2019-10-15 PROCEDURE — 3017F COLORECTAL CA SCREEN DOC REV: CPT | Performed by: FAMILY MEDICINE

## 2019-10-15 PROCEDURE — 1036F TOBACCO NON-USER: CPT | Performed by: FAMILY MEDICINE

## 2019-10-15 PROCEDURE — G8417 CALC BMI ABV UP PARAM F/U: HCPCS | Performed by: FAMILY MEDICINE

## 2019-10-15 PROCEDURE — 4040F PNEUMOC VAC/ADMIN/RCVD: CPT | Performed by: FAMILY MEDICINE

## 2019-10-15 PROCEDURE — 1123F ACP DISCUSS/DSCN MKR DOCD: CPT | Performed by: FAMILY MEDICINE

## 2019-10-15 PROCEDURE — G8427 DOCREV CUR MEDS BY ELIG CLIN: HCPCS | Performed by: FAMILY MEDICINE

## 2019-10-15 PROCEDURE — 83036 HEMOGLOBIN GLYCOSYLATED A1C: CPT | Performed by: FAMILY MEDICINE

## 2019-10-15 RX ORDER — CLONIDINE HYDROCHLORIDE 0.2 MG/1
0.2 TABLET ORAL 2 TIMES DAILY
Qty: 180 TABLET | Refills: 3 | Status: SHIPPED | OUTPATIENT
Start: 2019-10-15 | End: 2020-10-26 | Stop reason: SDUPTHER

## 2019-10-15 RX ORDER — TRIAMCINOLONE ACETONIDE 1 MG/G
CREAM TOPICAL
Qty: 3 TUBE | Refills: 3 | Status: CANCELLED | OUTPATIENT
Start: 2019-10-15

## 2019-10-15 RX ORDER — CLONIDINE HYDROCHLORIDE 0.2 MG/1
0.2 TABLET ORAL 2 TIMES DAILY
Qty: 180 TABLET | Refills: 3 | Status: CANCELLED | OUTPATIENT
Start: 2019-10-15

## 2019-10-15 RX ORDER — LOSARTAN POTASSIUM AND HYDROCHLOROTHIAZIDE 25; 100 MG/1; MG/1
1 TABLET ORAL DAILY
Qty: 90 TABLET | Refills: 3 | Status: CANCELLED | OUTPATIENT
Start: 2019-10-15

## 2019-10-15 RX ORDER — OMEPRAZOLE 20 MG/1
20 CAPSULE, DELAYED RELEASE ORAL DAILY
Qty: 90 CAPSULE | Refills: 3 | Status: CANCELLED | OUTPATIENT
Start: 2019-10-15

## 2019-10-15 RX ORDER — LOSARTAN POTASSIUM AND HYDROCHLOROTHIAZIDE 25; 100 MG/1; MG/1
1 TABLET ORAL DAILY
Qty: 90 TABLET | Refills: 3 | Status: SHIPPED | OUTPATIENT
Start: 2019-10-15 | End: 2020-10-26 | Stop reason: SDUPTHER

## 2019-10-15 RX ORDER — TRIAMCINOLONE ACETONIDE 1 MG/G
CREAM TOPICAL
Qty: 3 TUBE | Refills: 3 | Status: SHIPPED | OUTPATIENT
Start: 2019-10-15 | End: 2020-10-26 | Stop reason: SDUPTHER

## 2019-10-15 RX ORDER — OMEPRAZOLE 20 MG/1
20 CAPSULE, DELAYED RELEASE ORAL DAILY
Qty: 90 CAPSULE | Refills: 3 | Status: SHIPPED | OUTPATIENT
Start: 2019-10-15 | End: 2020-10-26 | Stop reason: SDUPTHER

## 2019-10-16 ENCOUNTER — TELEPHONE (OUTPATIENT)
Dept: FAMILY MEDICINE CLINIC | Age: 69
End: 2019-10-16

## 2019-11-19 ENCOUNTER — TELEPHONE (OUTPATIENT)
Dept: FAMILY MEDICINE CLINIC | Age: 69
End: 2019-11-19

## 2019-11-19 DIAGNOSIS — D69.6 THROMBOCYTOPENIA (HCC): Primary | ICD-10-CM

## 2019-11-19 LAB
ABSOLUTE BASO #: 0.1 X10E9/L (ref 0–0.9)
ABSOLUTE EOS #: 0.1 X10E9/L (ref 0–0.4)
ABSOLUTE LYMPH #: 1.2 X10E9/L (ref 1–3.5)
ABSOLUTE MONO #: 0.4 X10E9/L (ref 0–0.9)
ABSOLUTE NEUT #: 4.7 X10E9/L (ref 1.5–6.6)
BASOPHILS RELATIVE PERCENT: 1.1 %
EOSINOPHILS RELATIVE PERCENT: 2 %
HCT VFR BLD CALC: 43.5 % (ref 35–47)
HEMOGLOBIN: 15 G/DL (ref 11.7–16.1)
LYMPHOCYTE %: 18.6 %
MCH RBC QN AUTO: 32.6 PG (ref 27–35)
MCHC RBC AUTO-ENTMCNC: 34.5 G/DL (ref 31–36)
MCV RBC AUTO: 95 FL (ref 81–101)
MONOCYTES # BLD: 6.3 %
NEUTROPHILS RELATIVE PERCENT: 72 %
PDW BLD-RTO: 12.8 % (ref 11.5–14.7)
PLATELETS: 89 X10E9/L (ref 150–450)
PMV BLD AUTO: 12.6 FL (ref 7–12)
RBC: 4.6 X10E12/L (ref 3.55–5.2)
WBC: 6.6 X10E9/L (ref 4–11.8)

## 2019-12-20 ENCOUNTER — TELEPHONE (OUTPATIENT)
Dept: FAMILY MEDICINE CLINIC | Age: 69
End: 2019-12-20

## 2019-12-20 DIAGNOSIS — D69.6 THROMBOCYTOPENIA (HCC): Primary | ICD-10-CM

## 2019-12-20 LAB
ABSOLUTE BASO #: 0 X10E9/L (ref 0–0.9)
ABSOLUTE EOS #: 0.2 X10E9/L (ref 0–0.4)
ABSOLUTE LYMPH #: 1.8 X10E9/L (ref 1–3.5)
ABSOLUTE MONO #: 0.4 X10E9/L (ref 0–0.9)
ABSOLUTE NEUT #: 4.7 X10E9/L (ref 1.5–6.6)
BASOPHILS RELATIVE PERCENT: 0.2 %
EOSINOPHILS RELATIVE PERCENT: 2.1 %
HCT VFR BLD CALC: 44.1 % (ref 35–47)
HEMOGLOBIN: 15 G/DL (ref 11.7–16.1)
LYMPHOCYTE %: 25.3 %
MCH RBC QN AUTO: 32 PG (ref 27–35)
MCHC RBC AUTO-ENTMCNC: 34 G/DL (ref 31–36)
MCV RBC AUTO: 94 FL (ref 81–101)
MONOCYTES # BLD: 5.1 %
NEUTROPHILS RELATIVE PERCENT: 67.3 %
PDW BLD-RTO: 12.8 % (ref 11.5–14.7)
PLATELETS: 92 X10E9/L (ref 150–450)
PMV BLD AUTO: 13 FL (ref 7–12)
RBC: 4.68 X10E12/L (ref 3.55–5.2)
WBC: 7 X10E9/L (ref 4–11.8)

## 2020-08-25 ENCOUNTER — TELEPHONE (OUTPATIENT)
Dept: FAMILY MEDICINE CLINIC | Age: 70
End: 2020-08-25

## 2020-08-25 NOTE — TELEPHONE ENCOUNTER
Pt would like to know if there is any blood work that needs completed prior to appt.   Future Appointments   Date Time Provider Andrei Pedraza   10/20/2020 10:20 AM [id number] [facility]

## 2020-08-25 NOTE — TELEPHONE ENCOUNTER
Fasting     Diagnosis Orders   1. Essential hypertension  CBC Auto Differential    Comprehensive Metabolic Panel    Lipid Panel    Microalbumin / Creatinine Urine Ratio    TSH with Reflex   2.  Type 2 diabetes mellitus without complication, without long-term current use of insulin (HCC)  CBC Auto Differential    Comprehensive Metabolic Panel    Lipid Panel    Microalbumin / Creatinine Urine Ratio    TSH with Reflex

## 2020-10-25 LAB
ABSOLUTE BASO #: 0 X10E9/L (ref 0–0.2)
ABSOLUTE EOS #: 0.1 X10E9/L (ref 0–0.4)
ABSOLUTE LYMPH #: 1.3 X10E9/L (ref 1–3.5)
ABSOLUTE MONO #: 0 X10E9/L (ref 0–0.9)
ABSOLUTE NEUT #: 4.4 X10E9/L (ref 1.5–6.6)
ALBUMIN SERPL-MCNC: 4.3 G/DL (ref 3.2–5.3)
ALK PHOSPHATASE: 56 U/L (ref 39–130)
ALT SERPL-CCNC: 23 U/L (ref 0–31)
ANION GAP SERPL CALCULATED.3IONS-SCNC: 10 MMOL/L (ref 5–15)
AST SERPL-CCNC: 24 U/L (ref 0–41)
BASOPHILS RELATIVE PERCENT: 0.6 %
BILIRUB SERPL-MCNC: 0.4 MG/DL (ref 0.3–1.2)
BUN BLDV-MCNC: 12 MG/DL (ref 5–27)
CALCIUM SERPL-MCNC: 9.9 MG/DL (ref 8.5–10.5)
CHLORIDE BLD-SCNC: 103 MMOL/L (ref 98–109)
CHOLESTEROL/HDL RATIO: 5.1 (ref 1–5)
CHOLESTEROL: 215 MG/DL (ref 150–200)
CO2: 29 MMOL/L (ref 22–32)
CREAT SERPL-MCNC: 0.82 MG/DL (ref 0.4–1)
CREATINE, URINE: 148.36 MG/DL
EGFR AFRICAN AMERICAN: >60 ML/MIN/1.73SQ.M
EGFR IF NONAFRICAN AMERICAN: >60 ML/MIN/1.73SQ.M
EOSINOPHILS RELATIVE PERCENT: 1.2 %
GLUCOSE: 146 MG/DL (ref 65–99)
HCT VFR BLD CALC: 43.7 % (ref 35–47)
HDLC SERPL-MCNC: 42 MG/DL
HEMOGLOBIN: 14.7 G/DL (ref 11.7–15.5)
LDL CHOLESTEROL CALCULATED: 137 MG/DL
LDL/HDL RATIO: 3.3
LYMPHOCYTE %: 22.5 %
MCH RBC QN AUTO: 32.8 PG (ref 27–34)
MCHC RBC AUTO-ENTMCNC: 33.7 G/DL (ref 32–36)
MCV RBC AUTO: 97 FL (ref 80–100)
MICROALBUMIN/CREAT 24H UR: 2.3 MG/DL (ref 0–1.9)
MICROALBUMIN/CREAT UR-RTO: 15.5 MG/G CREAT (ref 0–30)
MONOCYTES # BLD: 0.4 %
NEUTROPHILS RELATIVE PERCENT: 75.3 %
PDW BLD-RTO: 13.6 % (ref 11.5–15)
PLATELETS: 69 X10E9/L (ref 150–450)
PMV BLD AUTO: 14.6 FL (ref 7–12)
POTASSIUM SERPL-SCNC: 3.8 MMOL/L (ref 3.5–5)
RBC # BLD: ABNORMAL 10*6/UL
RBC: 4.49 X10E12/L (ref 3.8–5.2)
SODIUM BLD-SCNC: 142 MMOL/L (ref 134–146)
TOTAL PROTEIN: 7.4 G/DL (ref 6–8)
TRIGL SERPL-MCNC: 182 MG/DL (ref 27–150)
TSH SERPL DL<=0.05 MIU/L-ACNC: 1.14 UIU/ML (ref 0.49–4.67)
VLDLC SERPL CALC-MCNC: 36 MG/DL (ref 0–30)
WBC: 5.9 X10E9/L (ref 4–11)

## 2020-10-25 NOTE — PROGRESS NOTES
Chief Complaint   Patient presents with    Medicare AWV    Follow-up     DM and chronic issues       History obtained from the patient. SUBJECTIVE:  Marley Kingsley is a 69 y.o. female that presents today for       -DM2 PRIOR VISIT: USed to be on metformin for preDM. Stopped a while ago. Labs done last year showed DM2. She was to f/u with me and she never came in. Here a year later. Labs still c/w DM. Controlled. No polyphagia, uria or dypsia. UPDATE PRIOR VISIT: diet controlled. a1c good. Working on wt loss etc.      UPDATE PRIOR VISIT: diet controlled yet. a1c in range. wts about the same    UPDATE PRIOR VISIT: a1c stable. Diet controlled. wts about the same. + microal in OCT. Wanted to repeat today, but she couldn't void, order given    UPDATE LAST VISIT:   a1c stable  Diet controlled  Wts about the same  Repeat neg microal, + before     UPDATE TODAY:   Diet controlled  a1c at goal   wts the same    Wt Readings from Last 3 Encounters:   10/26/20 217 lb (98.4 kg)   10/15/19 216 lb 6.4 oz (98.2 kg)   04/11/19 213 lb (96.6 kg)     Lab Results   Component Value Date    LABA1C 7.0 10/26/2020    LABA1C 7.1 10/15/2019    LABA1C 6.9 04/11/2019    LABA1C 7.0 10/11/2018    LABA1C 6.8 04/12/2018    LABA1C 6.9 09/21/2017    LABA1C 6.5 11/04/2016       -HTN:    HPI:    Taking meds as prescribed ?: yes  Tolerating well ?: yes  Side Effects ?: deneis  BP at home ?: <140/90  Working on TLCS ?: yes  Chest Pain/SOB/Palpitations? denies    BP Readings from Last 3 Encounters:   10/26/20 136/80   10/15/19 136/74   04/11/19 130/72         -HLD PRIOR VISIT: should be on statin, however intolerant of zocor as the last statin she took. States took other statins years ago, can't remember names, but they call caused myalgias. She refuses further statins. Lipids a little worse. Diet not as good. Plans to get this improved. UPDATE TODAY: no change in above. Making diet changes.  Refuses statins d/t side effects with all of them       -Low platelets PRIOR VISIT: chronic, no clear etiology and mild. Thought maybe d/t naproxen. Labs stable on f/u. Denies fevers, chills, night sweats or wt loss. UPDATE LAST VISIT:   Platelets a little lower than before  No bleeding or hematochezia. No petechia   No fevers, chills, night sweats or wt loss    UPDATE TODAY:   platelets lower than a year ago  Had ordered f/u labs last year to get done, she never did  No bleeding, bruising or hemarthrosis  Denies fevers, chills or nights sweats       -GERD:    HPI:    Taking meds as prescribed ?: yes  Tolerating well ?: yes  Side Effects ?: denies  Dysphagia ?: denies  Odynophagia ?: denies  Melena ?: denies  Working on TLCS ?: yes      -Colon Ca screening:  Due for colo  Wanted to see Attila Peraza, not covered  Declines getting done right now d/t covid  No s/s colon dz    Age/Gender Health Maintenance    Lipid -   Lab Results   Component Value Date    CHOL 215 (H) 10/23/2020    CHOL 235 (H) 10/14/2019    CHOL 245 (H) 04/08/2019     Lab Results   Component Value Date    TRIG 182 (H) 10/23/2020    TRIG 220 (H) 10/14/2019    TRIG 197 (H) 04/08/2019     Lab Results   Component Value Date    HDL 42 10/23/2020    HDL 38 (L) 10/14/2019    HDL 37 (L) 04/08/2019     Lab Results   Component Value Date    LDLCALC 137 (H) 10/23/2020    LDLCALC 153 (H) 10/14/2019    LDLCALC 169 (H) 04/08/2019       Colon Cancer Screening - Somewhere within the last 10 years, was not [facility]. Awaiting records/due, ordered today (APR 2019)/pt declines OCT 2020  Lung Cancer Screening (Age 54 to [de-identified] with 30 pack year hx, current smoker or quit within past 15 years) - never smoker.      Tetanus - declines SEPT 2016/SEPT 2017/OCT 2018/APR 2019/OCT 2019/OCT 2020  Influenza Vaccine - Declines SEPT 2016/SEPT 2017/OCT 2018/OCT 2019/OCT 2020  Pneumonia Vaccine - Declines SEPT 2016/SEPT 2017/OCT 2018/APR 2019/OCT 2019/OCT 2020  Shingrix - to get at pharmacy per medicare rules    Breast Cancer Screening - NEG FINA APR 2019, ordered OCT 2020  Osteoporosis Screening - declines SEPT 2016, wants to discuss again in 1 year. We discussed today and declines SEPT 2017/OCT 2018/APR 2019/OCT 2019/OCT 2020    Diabetes Health Maintenance    A1C -   Lab Results   Component Value Date    LABA1C 7.0 10/26/2020    LABA1C 7.1 10/15/2019    LABA1C 6.9 04/11/2019    LABA1C 7.0 10/11/2018    LABA1C 6.8 04/12/2018    LABA1C 6.9 09/21/2017    LABA1C 6.5 11/04/2016       ACE/ARB - YES hyzaar  Eye - due, pt reminded  Foot - WNL OCT 2020  Microal/Cr - + OCT 2018/APR 2019  NEG OCT 2019    Lab Results   Component Value Date    LABMICR < 1.20 04/11/2019    LABCREA 21.6 04/11/2019    MACRR 56 (H) 04/11/2019     Lab Results   Component Value Date    CREATINEUR 148.36 10/23/2020    MICROALBUR 2.3 (H) 10/23/2020    MALBCR 15.5 10/23/2020         eGFR -   No results found for: GFRESTIMATE  Lab Results   Component Value Date    LABGLOM 83 11/04/2016     No results found for: [facility]  Lab Results   Component Value Date    EGFRNONAA >60 10/23/2020     Lab Results   Component Value Date    EGFRAA >60 10/23/2020       Component      Latest Ref Rng & Units 10/14/2019           7:47 AM   EGFR IF NonAfrican American      >59 ml/min/1.73sq.m >60        Statin - statin intolerance/allergy      Current Outpatient Medications   Medication Sig Dispense Refill    Calcium Carb-Cholecalciferol (CALCIUM + D3) 600-200 MG-UNIT TABS tablet Take 1 tablet by mouth 2 times daily 180 tablet 3    losartan-hydrochlorothiazide (HYZAAR) 100-25 MG per tablet Take 1 tablet by mouth daily 90 tablet 3    omeprazole (PRILOSEC) 20 MG delayed release capsule Take 1 capsule by mouth daily 90 capsule 3    cloNIDine (CATAPRES) 0.2 MG tablet Take 1 tablet by mouth 2 times daily 180 tablet 3    triamcinolone (KENALOG) 0.1 % cream Apply topically 2 times daily to affected area sparingly.  3 Tube 3    cetirizine (ZYRTEC) 10 MG tablet Take 10 mg by mouth daily      Multiple Vitamins-Minerals (MULTIVITAMIN ADULT PO) Take 1 tablet by mouth daily      Red Yeast Rice 600 MG CAPS Take 2 capsules by mouth 2 times daily      Omega-3 Fatty Acids (FISH OIL) 1000 MG CAPS Take 2,000 mg by mouth daily      Coenzyme Q10 10 MG CAPS Take 1 capsule by mouth daily       No current facility-administered medications for this visit. No orders of the defined types were placed in this encounter. All medications reviewed and reconciled, including OTC and herbal medications. Updated list given to patient. Patient Active Problem List    Diagnosis Date Noted    DM2 [facility]diabetes mellitus[facility] 2) [facility]     Statin intolerance     GERD (gastroesophageal reflux disease)     Osteoarthritis     Allergic rhinitis     Angiolipoma of right kidney     Eczema     Hyperlipidemia     Obesity (BMI 30-39. 9)     Post-menopausal     Thrombocytopenia ([facility]     Mixed hearing loss 2013    [facility]     Noted MRI . Never did f/u MRI. Told she didn't need to. Has 0 sxs. Denies HA, vision changes, lateralizing numbness or weakness.  Hypertension 12/10/2012       Past Medical History:   Diagnosis Date    Allergic rhinitis     Angiolipoma of right kidney     DM2 (diabetes mellitus, type 2) ([facility]     Eczema     GERD (gastroesophageal reflux disease)     Hyperlipidemia     Hypertension     [facility]    Noted MRI . Never did f/u MRI. Told she didn't need to. Has 0 sxs. Denies HA, vision changes, lateralizing numbness or weakness.  Mixed hearing loss 2013    Obesity (BMI 30-39. 9)     Osteoarthritis     Post-menopausal     Statin intolerance        Past Surgical History:   Procedure Laterality Date     SECTION  1972     CHOLECYSTECTOMY  2004    COLONOSCOPY  2006    MYRINGOTOMY AND TYMPANOSTOMY TUBE PLACEMENT  2012    Dr Dustin Blake GASTROINTESTINAL ENDOSCOPY  2006       Allergies Allergen Reactions    Biaxin [Clarithromycin] Nausea Only    Doxycycline Other (See Comments)     GI side effects    Keflex [Cephalexin] Other (See Comments)     Tongue Swelling    Norvasc [Amlodipine Besylate] Other (See Comments)     Nasuea, hot flashes    Statins Other (See Comments)     Myalgias all    Zetia [Ezetimibe] Other (See Comments)     Chest pain    Zocor [Simvastatin] Other (See Comments)     Myalgias       Social History     Tobacco Use    Smoking status: Never Smoker    Smokeless tobacco: Never Used   Substance Use Topics    Alcohol use: No       Family History   Problem Relation Age of Onset    Heart Disease Mother     Lung Cancer Mother     Osteoporosis Mother     Other Father         Did not know her father.  Other Maternal Grandmother         blood clot, no clear hx surrounding    Heart Attack Maternal Grandfather     Heart Attack Maternal Uncle 62         I have reviewed the patient's past medical history, past surgical history, allergies, medications, social and family history and I have made updates where appropriate.       Review of Systems  Positive responses are highlighted in bold    Constitutional:  Fever, Chills, Night Sweats, Fatigue, Unexpected changes in weight  HENT:  Ear pain, Tinnitus, Nosebleeds, Trouble swallowing, Hearing loss, Sore throat  Cardiovascular:  Chest Pain, Palpitations, Orthopnea, Paroxysmal Nocturnal Dyspnea  Respiratory:  Cough, Wheezing, Shortness of breath, Chest tightness, Apnea  Gastrointestinal:  Nausea, Vomiting, Diarrhea, Constipation, Heartburn, Blood in stool  Genitourinary:  Difficulty or painful urination, Flank pain, Change in frequency, Urgency  Skin:  Color change, Rash, Itching, Wound  Musculoskeletal:  Joint pain, Back pain, Gait problems, Joint swelling, Myalgias  Neurological:  Dizziness, Headaches, Presyncope, Numbness, Seizures, Tremors  Endocrine:  Heat Intolerance, Cold Intolerance, Polydipsia, Polyphagia, Polyuria      PHYSICAL EXAM:  Vitals:    10/26/20 1421 10/26/20 1449   BP: (!) 146/80 136/80   Pulse: 87    Resp: 14    Temp: 98.2 °F (36.8 °C)    TempSrc: Oral    SpO2: 98%    Weight: 217 lb (98.4 kg)    Height: 5' 3\" (1.6 m)      Body mass index is 38.44 kg/m². Pain Score:   0 - No pain    VS Reviewed  General Appearance: A&O x 3, No acute distress,well developed and well- nourished  Head: normocephalic and atraumatic  Eyes: pupils equal, round, and reactive to light, extraocular eye movements intact, conjunctivae and eye lids without erythema  ENT: external ear and ear canal clear bilaterally, TMs intact and regular, nose without deformity, nasal mucosa and turbinates normal without polyps, oropharynx normal, dentition is normal for age  Neck: supple and non-tender without mass, no thyromegaly or thyroid nodules, no cervical lymphadenopathy  Pulmonary/Chest: clear to auscultation bilaterally- no wheezes, rales or rhonchi, normal air movement, no respiratory distress or retractions  Cardiovascular: S1 and S2 auscultated w/ RRR. No murmurs, rubs, clicks, or gallops, distal pulses intact. Abdomen: soft, non-tender, non-distended, bowl sounds physiologic,  no rebound or guarding, no masses or hernias noted. Liver and spleen without enlargement. Extremities: no cyanosis, clubbing or edema of the lower extremities. +2 PT/DP bilaterally. Musculoskeletal: No joint swelling or gross deformity   Skin: warm and dry, no rash or erythema  Foot: Bilat 2+DP/PT bilaterally. Skin warm, dry and intact. Sensation normal throughout to touch and with monofilament.        Results for POC orders placed in visit on 10/26/20   POCT glycosylated hemoglobin (Hb A1C)   Result Value Ref Range    Hemoglobin A1C 7.0 (H) 4.3 - 5.7 %       Lab Results   Component Value Date    WBC 5.9 10/23/2020    HGB 14.7 10/23/2020    HCT 43.7 10/23/2020    MCV 97 10/23/2020    PLT 69 (L) 10/23/2020       Results for Manuela Rodas (MRN [id number]) as of 10/25/2020 08:10   Ref. Range 11/4/2016 08:10 9/21/2017 09:48 10/10/2018 07:50 10/14/2019 07:47 11/18/2019 14:33 12/19/2019 13:25 10/23/2020 09:30   WBC Latest Ref Range: 4.0 - 11.0 X10E9/L 5.7 4.5 6.3 6.3 6.6 7.0 5.9   RBC Latest Ref Range: 3.80 - 5.20 X10E12/L 4.89 4.83 4.38 4.76 4.60 4.68 4.49   Hemoglobin Quant Latest Ref Range: 11.7 - 15.5 g/dL 15.5 15.4 14.4 15.4 15.0 15.0 14.7   Hematocrit Latest Ref Range: 35 - 47 % 45.0 44.5 40.7 44.8 43.5 44.1 43.7   MCV Latest Ref Range: 80 - 100 fL 92.1 92.1 92.9 94 95 94 97   MCH Latest Ref Range: 27 - 34 pg 31.7 (H) 31.9 32.9 32.2 32.6 32.0 32.8   MCHC Latest Ref Range: 32 - 36 g/dL 34.4 34.6 35.4 34.3 34.5 34.0 33.7   MPV Latest Ref Range: 7 - 12 fL 11.3 (H)   13.7 (H) 12.6 (H) 13.0 (H) 14.6 (H)   RDW Latest Ref Range: 11.5 - 15.0 % 12.7 11.8 12.3 12.8 12.8 12.8 13.6   Platelet Count Latest Ref Range: 150 - 450 X10E9/L 100 (L) 110 (L) 105 (L) 73 (L) 89 (L) 92 (L) 69 (L)       ASSESSMENT & PLAN  1. Type 2 diabetes mellitus without complication, without long-term current use of insulin (HCC)    At goal  con't diet control  F/u 6 months    - POCT glycosylated hemoglobin (Hb A1C)  -  DIABETES FOOT EXAM    2. Obesity (BMI 30-39. 9)    Discussed wt loss strategies    3. Essential hypertension    At goal  con't meds  Labs UTD    4. Mixed hyperlipidemia    Stable  Statin intolerant  Plans to work on diet and wt loss  Labs in a year    5. Statin intolerance      6. Thrombocytopenia ([facility]    Worse over time  Had ordered f/u labs last year, but she forgot to get done  Will repeat labs in 2 wks, if no better, will refer to heme  No s/s systemic issues    - CBC Auto Differential; Future    7. Gastroesophageal reflux disease, unspecified whether esophagitis present    Doing well  con't omeprazole    8. Encounter for screening mammogram for malignant neoplasm of breast    - FIAN JENNY DIGITAL SCREEN BILATERAL; Future    9.  Screening for colon cancer    Discussed colon cancer screening with patient today. The benefits and risks of having testing performed were discussed with patient. After a long and detailed discussion with the patient, the patient does not want to proceed with colon cancer screening at this time. Pt is aware of the risks of this decision, including earlier death and are accepting of this risk. Will discuss again at f/u visit      DISPOSITION    Return in 6 months (on 2021) for Follow-up Diabetes, follow-up on chronic medical conditions, sooner as needed. Swati released without restrictions. PATIENT COUNSELING    Swati received counseling on the following healthy behaviors: nutrition, exercise and medication adherence    Patient given educational materials on: See Attached    I have instructed Swati to complete a self tracking handout on Blood Pressures  and instructed them to bring it with them to her next appointment. Barriers to learning and self management: none    Discussed use, benefit, and side effects of prescribed medications. Barriers to medication compliance addressed. All patient questions answered. Pt voiced understanding. Electronically signed by Lady Tito DO on 10/26/2020 at 3:17 PM        Medicare Annual Wellness Visit  Name: Adolph Frazier Date: 10/26/2020   MRN: [id number] Sex: Female   Age: 69 y.o. Ethnicity: Non-/Non    : 1950 Race: White      Swati MALLORY Susanasarina is here for [employer] and Follow-up (DM and chronic issues)    Screenings for behavioral, psychosocial and functional/safety risks, and cognitive dysfunction are all negative except as indicated below. These results, as well as other patient data from the [address] form, are documented in Flowsheets linked to this Encounter.     Allergies   Allergen Reactions    Biaxin [Clarithromycin] Nausea Only    Doxycycline Other (See Comments)     GI side effects    Keflex [Cephalexin] Other (See Comments) Tongue Swelling    Norvasc [Amlodipine Besylate] Other (See Comments)     Nasuea, hot flashes    Statins Other (See Comments)     Myalgias all    Zetia [Ezetimibe] Other (See Comments)     Chest pain    Zocor [Simvastatin] Other (See Comments)     Myalgias         Prior to Visit Medications    Medication Sig Taking? Authorizing Provider   Calcium Carb-Cholecalciferol (CALCIUM + D3) 600-200 MG-UNIT TABS tablet Take 1 tablet by mouth 2 times daily Yes Chelsey Mojica, DO   losartan-hydrochlorothiazide (HYZAAR) 100-25 MG per tablet Take 1 tablet by mouth daily Yes Chelsey Mojica, DO   omeprazole (PRILOSEC) 20 MG delayed release capsule Take 1 capsule by mouth daily Yes Chelsey Mojica, DO   cloNIDine (CATAPRES) 0.2 MG tablet Take 1 tablet by mouth 2 times daily Yes Jacky Christy, DO   triamcinolone (KENALOG) 0.1 % cream Apply topically 2 times daily to affected area sparingly. Yes Chelsey Mojica DO   cetirizine (ZYRTEC) 10 MG tablet Take 10 mg by mouth daily Yes Historical Provider, MD   Multiple Vitamins-Minerals (MULTIVITAMIN ADULT PO) Take 1 tablet by mouth daily Yes Historical Provider, MD   Red Yeast Rice 600 MG CAPS Take 2 capsules by mouth 2 times daily Yes Historical Provider, MD   Omega-3 Fatty Acids (FISH OIL) 1000 MG CAPS Take 2,000 mg by mouth daily Yes Historical Provider, MD   Coenzyme Q10 10 MG CAPS Take 1 capsule by mouth daily Yes Historical Provider, MD         Past Medical History:   Diagnosis Date    Allergic rhinitis     Angiolipoma of right kidney     [facility] mellitus[facility] 2) [facility]     Eczema     GERD (gastroesophageal reflux disease)     Hyperlipidemia     Hypertension     [facility]    Noted MRI 2012. Never did f/u MRI. Told she didn't need to. Has 0 sxs. Denies HA, vision changes, lateralizing numbness or weakness.  Mixed hearing loss 9/17/2013    Obesity (BMI 30-39. 9)     Osteoarthritis     Post-menopausal     Statin intolerance Past Surgical History:   Procedure Laterality Date   [address]    CHOLECYSTECTOMY  2004    COLONOSCOPY  05/2006    MYRINGOTOMY AND TYMPANOSTOMY TUBE PLACEMENT  12/07/2012    Dr Chanel Cade    TUBAL LIGATION      UPPER GASTROINTESTINAL ENDOSCOPY  05/2006         Family History   Problem Relation Age of Onset    Heart Disease Mother     Lung Cancer Mother     Osteoporosis Mother     Other Father         Did not know her father.  Other Maternal Grandmother         blood clot, no clear hx surrounding    Heart Attack Maternal Grandfather     Heart Attack Maternal Uncle 62       CareTeam (Including outside providers/suppliers regularly involved in providing care):   Patient Care Team:  Ivan Beavers DO as PCP - General (Family Medicine)  Ivan Beavers DO as PCP - [facility] Empaneled Provider    Wt Readings from Last 3 Encounters:   10/26/20 217 lb (98.4 kg)   10/15/19 216 lb 6.4 oz (98.2 kg)   04/11/19 213 lb (96.6 kg)     Vitals:    10/26/20 1421 10/26/20 1449   BP: (!) 146/80 136/80   Pulse: 87    Resp: 14    Temp: 98.2 °F (36.8 °C)    TempSrc: Oral    SpO2: 98%    Weight: 217 lb (98.4 kg)    Height: 5' 3\" (1.6 m)      Body mass index is 38.44 kg/m². Based upon direct observation of the patient, evaluation of cognition reveals recent and remote memory intact. Patient's complete Health Risk Assessment and screening values have been reviewed and are found in Flowsheets. The following problems were reviewed today and where indicated follow up appointments were made and/or referrals ordered.     Positive Risk Factor Screenings with Interventions:     Health Habits/Nutrition:  Health Habits/Nutrition  Do you exercise for at least 20 minutes 2-3 times per week?: Yes  Have you lost any weight without trying in the past 3 months?: No  Do you eat fewer than 2 meals per day?: No  Have you seen a dentist within the past year?: (!) No  Body mass index: (!) 38.44  Health Habits/Nutrition Interventions:  · Nutritional issues:  educational materials for healthy, well-balanced diet provided  · Dental exam overdue:  patient encouraged to make appointment with his/her dentist    Hearing/Vision:  No exam data present  Hearing/Vision  Do you or your family notice any trouble with your hearing?: (!) Yes  Do you have difficulty driving, watching TV, or doing any of your daily activities because of your eyesight?: No  Have you had an eye exam within the past year?: (!) No  Hearing/Vision Interventions:  · Hearing concerns:  patient declines any further evaluation/treatment for hearing issues  · Vision concerns:  patient encouraged to make appointment with his/her eye specialist    Safety:  Safety  Do you have working smoke detectors?: Yes  Have all throw rugs been removed or fastened?: (!) No  Do you have non-slip mats or surfaces in all bathtubs/showers?: Yes  Do all of your stairways have a railing or banister?: Yes  Are your doorways, halls and stairs free of clutter?: Yes  Do you always fasten your seatbelt when you are in a car?: Yes  Safety Interventions:  · Home safety tips provided    Personalized Preventive Plan   Current Health Maintenance Status  Immunization History   Administered Date(s) Administered    Td, unspecified formulation 10/27/2000        Health Maintenance   Topic Date Due    Diabetic retinal exam  03/15/1959    DTaP/Tdap/Td vaccine (1 - Tdap) 03/15/1968    Shingles Vaccine (1 of 2) 03/15/1999    DEXA (modify frequency per FRAX score)  03/15/2004    Pneumococcal 65+ years Vaccine (1 of 1 - PPSV23) 03/15/2014    Colon cancer screen colonoscopy  05/05/2016    Annual Wellness Visit (AWV)  05/29/2019    A1C test (Diabetic or Prediabetic)  04/15/2020    Flu vaccine (1) 09/01/2020    Diabetic foot exam  10/15/2020    Breast cancer screen  04/09/2021    Diabetic microalbuminuria test  10/23/2021    Lipid screen  10/23/2021    Potassium monitoring  10/23/2021  Creatinine monitoring  10/23/2021    Hepatitis A vaccine  Aged Out    Hib vaccine  Aged Out    Meningococcal (ACWY) vaccine  Aged Out    Hepatitis C screen  Discontinued     Recommendations for Preventive Services Due: see orders and patient instructions/AVS.  . Recommended screening schedule for the next 5-10 years is provided to the patient in written form: see Patient Instructions/AVS.    Chuy Cherry was seen today for medicare awv and health maintenance. Diagnoses and all orders for this visit:      Routine general medical examination at a health care facility      Care plan reviewed with and given to patient.        Electronically signed by Sonia Chapin DO on 10/26/2020 at 3:17 PM

## 2020-10-26 ENCOUNTER — OFFICE VISIT (OUTPATIENT)
Dept: FAMILY MEDICINE CLINIC | Age: 70
End: 2020-10-26
Payer: MEDICARE

## 2020-10-26 VITALS
DIASTOLIC BLOOD PRESSURE: 80 MMHG | BODY MASS INDEX: 38.45 KG/M2 | HEIGHT: 63 IN | WEIGHT: 217 LBS | HEART RATE: 87 BPM | RESPIRATION RATE: 14 BRPM | SYSTOLIC BLOOD PRESSURE: 136 MMHG | OXYGEN SATURATION: 98 % | TEMPERATURE: 98.2 F

## 2020-10-26 LAB — HBA1C MFR BLD: 7 % (ref 4.3–5.7)

## 2020-10-26 PROCEDURE — 1123F ACP DISCUSS/DSCN MKR DOCD: CPT | Performed by: FAMILY MEDICINE

## 2020-10-26 PROCEDURE — G0438 PPPS, INITIAL VISIT: HCPCS | Performed by: FAMILY MEDICINE

## 2020-10-26 PROCEDURE — 4040F PNEUMOC VAC/ADMIN/RCVD: CPT | Performed by: FAMILY MEDICINE

## 2020-10-26 PROCEDURE — 3051F HG A1C>EQUAL 7.0%<8.0%: CPT | Performed by: FAMILY MEDICINE

## 2020-10-26 PROCEDURE — 3017F COLORECTAL CA SCREEN DOC REV: CPT | Performed by: FAMILY MEDICINE

## 2020-10-26 PROCEDURE — G8484 FLU IMMUNIZE NO ADMIN: HCPCS | Performed by: FAMILY MEDICINE

## 2020-10-26 RX ORDER — OMEPRAZOLE 20 MG/1
20 CAPSULE, DELAYED RELEASE ORAL DAILY
Qty: 90 CAPSULE | Refills: 3 | Status: SHIPPED | OUTPATIENT
Start: 2020-10-26 | End: 2020-10-26 | Stop reason: SDUPTHER

## 2020-10-26 RX ORDER — LOSARTAN POTASSIUM AND HYDROCHLOROTHIAZIDE 25; 100 MG/1; MG/1
1 TABLET ORAL DAILY
Qty: 90 TABLET | Refills: 3 | Status: SHIPPED | OUTPATIENT
Start: 2020-10-26 | End: 2021-07-09

## 2020-10-26 RX ORDER — TRIAMCINOLONE ACETONIDE 1 MG/G
CREAM TOPICAL
Qty: 3 TUBE | Refills: 3 | Status: SHIPPED | OUTPATIENT
Start: 2020-10-26 | End: 2021-11-16

## 2020-10-26 RX ORDER — TRIAMCINOLONE ACETONIDE 1 MG/G
CREAM TOPICAL
Qty: 3 TUBE | Refills: 3 | Status: SHIPPED | OUTPATIENT
Start: 2020-10-26 | End: 2020-10-26 | Stop reason: SDUPTHER

## 2020-10-26 RX ORDER — LOSARTAN POTASSIUM AND HYDROCHLOROTHIAZIDE 25; 100 MG/1; MG/1
1 TABLET ORAL DAILY
Qty: 90 TABLET | Refills: 3 | Status: SHIPPED | OUTPATIENT
Start: 2020-10-26 | End: 2020-10-26 | Stop reason: SDUPTHER

## 2020-10-26 RX ORDER — CLONIDINE HYDROCHLORIDE 0.2 MG/1
0.2 TABLET ORAL 2 TIMES DAILY
Qty: 180 TABLET | Refills: 3 | Status: SHIPPED | OUTPATIENT
Start: 2020-10-26 | End: 2020-10-26 | Stop reason: SDUPTHER

## 2020-10-26 RX ORDER — CLONIDINE HYDROCHLORIDE 0.2 MG/1
0.2 TABLET ORAL 2 TIMES DAILY
Qty: 180 TABLET | Refills: 3 | Status: SHIPPED | OUTPATIENT
Start: 2020-10-26 | End: 2021-08-19

## 2020-10-26 RX ORDER — OMEPRAZOLE 20 MG/1
20 CAPSULE, DELAYED RELEASE ORAL DAILY
Qty: 90 CAPSULE | Refills: 3 | Status: SHIPPED | OUTPATIENT
Start: 2020-10-26 | End: 2021-08-19

## 2020-10-26 ASSESSMENT — PATIENT HEALTH QUESTIONNAIRE - PHQ9
SUM OF ALL RESPONSES TO PHQ QUESTIONS 1-9: 0
SUM OF ALL RESPONSES TO PHQ9 QUESTIONS 1 & 2: 0
1. LITTLE INTEREST OR PLEASURE IN DOING THINGS: 0
SUM OF ALL RESPONSES TO PHQ QUESTIONS 1-9: 0
SUM OF ALL RESPONSES TO PHQ QUESTIONS 1-9: 0
2. FEELING DOWN, DEPRESSED OR HOPELESS: 0

## 2020-10-26 ASSESSMENT — LIFESTYLE VARIABLES: HOW OFTEN DO YOU HAVE A DRINK CONTAINING ALCOHOL: 0

## 2020-10-26 NOTE — TELEPHONE ENCOUNTER
Patient's  price called and said that Swati's prescription were sent to [facility] by mistake, that they need to go to [facility]. He said their maintenance prescriptions go to [facility] as 90 day prescriptions and [facility] is just their local pharmacy for emergency or short term meds.   Please advise    Swati Tran called requesting a refill on the following medications:  Requested Prescriptions      No prescriptions requested or ordered in this encounter     Pharmacy verified:  .[email or web address]  [facility]    Date of last visit: 10/26/2020  Date of next visit (if applicable): 12/24/9556

## 2020-10-26 NOTE — PATIENT INSTRUCTIONS
LAB INSTRUCTIONS:    Please complete labs in 2 week(s). Non-fasting is fine    The clinic will call you within 1 week of collection. If you have not heard from us within that amount of time, please call us at [phone]. Personalized Preventive Plan for Tony Morocho - 10/26/2020  Medicare offers a range of preventive health benefits. Some of the tests and screenings are paid in full while other may be subject to a deductible, co-insurance, and/or copay. Some of these benefits include a comprehensive review of your medical history including lifestyle, illnesses that may run in your family, and various assessments and screenings as appropriate. After reviewing your medical record and screening and assessments performed today your provider may have ordered immunizations, labs, imaging, and/or referrals for you. A list of these orders (if applicable) as well as your Preventive Care list are included within your After Visit Summary for your review. Other Preventive Recommendations:    · A preventive eye exam performed by an eye specialist is recommended every 1-2 years to screen for glaucoma; cataracts, macular degeneration, and other eye disorders. · A preventive dental visit is recommended every 6 months. · Try to get at least 150 minutes of exercise per week or 10,000 steps per day on a pedometer . · Order or download the FREE \"Exercise & Physical Activity: Your Everyday Guide\" from [employer]. Call 3-629.926.5205 or search [employer] online. · You need 7423-8199 mg of calcium and 8644-0463 IU of vitamin D per day. It is possible to meet your calcium requirement with diet alone, but a vitamin D supplement is usually necessary to meet this goal.  · When exposed to the sun, use a sunscreen that protects against both UVA and UVB radiation with an SPF of 30 or greater.  Reapply every 2 to 3 hours or after sweating, drying off with a towel, or swimming. · Always wear a seat belt when traveling in a car. Always wear a helmet when riding a bicycle or motorcycle.

## 2020-11-10 ENCOUNTER — TELEPHONE (OUTPATIENT)
Dept: FAMILY MEDICINE CLINIC | Age: 70
End: 2020-11-10

## 2020-11-10 LAB
ABSOLUTE BASO #: 0.1 X10E9/L (ref 0–0.2)
ABSOLUTE EOS #: 0.1 X10E9/L (ref 0–0.4)
ABSOLUTE LYMPH #: 1.8 X10E9/L (ref 1–3.5)
ABSOLUTE MONO #: 0.4 X10E9/L (ref 0–0.9)
ABSOLUTE NEUT #: 5.6 X10E9/L (ref 1.5–6.6)
BASOPHILS RELATIVE PERCENT: 0.7 %
EOSINOPHILS RELATIVE PERCENT: 1.8 %
HCT VFR BLD CALC: 43.2 % (ref 35–47)
HEMOGLOBIN: 14.7 G/DL (ref 11.7–15.5)
LYMPHOCYTE %: 22 %
MCH RBC QN AUTO: 32.1 PG (ref 27–34)
MCHC RBC AUTO-ENTMCNC: 34 G/DL (ref 32–36)
MCV RBC AUTO: 95 FL (ref 80–100)
MONOCYTES # BLD: 5.3 %
NEUTROPHILS RELATIVE PERCENT: 70.2 %
PDW BLD-RTO: 13.4 % (ref 11.5–15)
PLATELETS: 71 X10E9/L (ref 150–450)
PMV BLD AUTO: 13.2 FL (ref 7–12)
RBC: 4.57 X10E12/L (ref 3.8–5.2)
WBC: 8 X10E9/L (ref 4–11)

## 2020-11-10 NOTE — TELEPHONE ENCOUNTER
----- Message from Ruby Forman DO sent at 11/10/2020  6:28 AM EST -----  Please let pt know that cbc shows platelets that are still a bit lower then they've been. Otherwise it looks good. Because this is lower than her typical, I would like to get her set up with a hematologist, a blood specialist, to f/u on this and make sure we're not missing anything else. I've placed the referral. Let me know if questions, thanks!

## 2020-11-11 NOTE — TELEPHONE ENCOUNTER
[id number] Romelia Chance  Will place referral now and she can schedule her apt for then     Diagnosis Orders   1.  Thrombocytopenia [facility])  Dany Franklin MD, Hematology & Oncology, [facility]

## 2021-02-23 ENCOUNTER — NURSE ONLY (OUTPATIENT)
Dept: LAB | Age: 71
End: 2021-02-23

## 2021-02-23 DIAGNOSIS — D69.6 THROMBOCYTOPENIA (HCC): ICD-10-CM

## 2021-02-23 LAB
BASOPHILS # BLD: 0.7 %
BASOPHILS ABSOLUTE: 0.1 THOU/MM3 (ref 0–0.1)
EOSINOPHIL # BLD: 1 %
EOSINOPHILS ABSOLUTE: 0.1 THOU/MM3 (ref 0–0.4)
ERYTHROCYTE [DISTWIDTH] IN BLOOD BY AUTOMATED COUNT: 12.5 % (ref 11.5–14.5)
ERYTHROCYTE [DISTWIDTH] IN BLOOD BY AUTOMATED COUNT: 45.2 FL (ref 35–45)
HCT VFR BLD CALC: 47.2 % (ref 37–47)
HEMOGLOBIN: 15.3 GM/DL (ref 12–16)
IMMATURE GRANS (ABS): 0.03 THOU/MM3 (ref 0–0.07)
IMMATURE GRANULOCYTES: 0.3 %
LYMPHOCYTES # BLD: 19.7 %
LYMPHOCYTES ABSOLUTE: 1.9 THOU/MM3 (ref 1–4.8)
MCH RBC QN AUTO: 32.1 PG (ref 26–33)
MCHC RBC AUTO-ENTMCNC: 32.4 GM/DL (ref 32.2–35.5)
MCV RBC AUTO: 99.2 FL (ref 81–99)
MONOCYTES # BLD: 5.3 %
MONOCYTES ABSOLUTE: 0.5 THOU/MM3 (ref 0.4–1.3)
NUCLEATED RED BLOOD CELLS: 0 /100 WBC
PLATELET # BLD: 93 THOU/MM3 (ref 130–400)
PLATELET ESTIMATE: ABNORMAL
PMV BLD AUTO: 14.5 FL (ref 9.4–12.4)
RBC # BLD: 4.76 MILL/MM3 (ref 4.2–5.4)
SCAN OF BLOOD SMEAR: NORMAL
SEG NEUTROPHILS: 73 %
SEGMENTED NEUTROPHILS ABSOLUTE COUNT: 6.9 THOU/MM3 (ref 1.8–7.7)
WBC # BLD: 9.4 THOU/MM3 (ref 4.8–10.8)

## 2021-02-24 ENCOUNTER — TELEPHONE (OUTPATIENT)
Dept: FAMILY MEDICINE CLINIC | Age: 71
End: 2021-02-24

## 2021-02-24 DIAGNOSIS — D69.6 THROMBOCYTOPENIA (HCC): Primary | ICD-10-CM

## 2021-02-24 NOTE — TELEPHONE ENCOUNTER
----- Message from Marly Freitas, DO sent at 2/23/2021  8:44 PM EST -----  Please let pt know that platelets are low, but better than prior. Is she ready for heme consult? Let me know, thanks!

## 2021-02-24 NOTE — TELEPHONE ENCOUNTER
Diagnosis Orders   1.  Thrombocytopenia [facility])  Alfonso Foley MD, Oncology, [facility] [address]

## 2021-03-08 ENCOUNTER — NURSE ONLY (OUTPATIENT)
Dept: LAB | Age: 71
End: 2021-03-08

## 2021-03-08 ENCOUNTER — OFFICE VISIT (OUTPATIENT)
Dept: ONCOLOGY | Age: 71
End: 2021-03-08
Payer: MEDICARE

## 2021-03-08 ENCOUNTER — HOSPITAL ENCOUNTER (OUTPATIENT)
Dept: INFUSION THERAPY | Age: 71
Discharge: HOME | End: 2021-03-08
Payer: MEDICARE

## 2021-03-08 VITALS
HEIGHT: 63 IN | WEIGHT: 210.6 LBS | SYSTOLIC BLOOD PRESSURE: 183 MMHG | TEMPERATURE: 97.8 F | DIASTOLIC BLOOD PRESSURE: 87 MMHG | HEART RATE: 104 BPM | BODY MASS INDEX: 37.32 KG/M2 | OXYGEN SATURATION: 94 % | RESPIRATION RATE: 18 BRPM

## 2021-03-08 DIAGNOSIS — D69.6 THROMBOCYTOPENIA (HCC): ICD-10-CM

## 2021-03-08 DIAGNOSIS — D69.6 THROMBOCYTOPENIA (HCC): Primary | ICD-10-CM

## 2021-03-08 LAB
BASOPHILS # BLD: 1 %
BASOPHILS ABSOLUTE: 0.1 THOU/MM3 (ref 0–0.1)
EOSINOPHIL # BLD: 1.3 %
EOSINOPHILS ABSOLUTE: 0.1 THOU/MM3 (ref 0–0.4)
ERYTHROCYTE [DISTWIDTH] IN BLOOD BY AUTOMATED COUNT: 12.6 % (ref 11.5–14.5)
ERYTHROCYTE [DISTWIDTH] IN BLOOD BY AUTOMATED COUNT: 45.1 FL (ref 35–45)
FOLATE: > 20 NG/ML (ref 4.8–24.2)
HCT VFR BLD CALC: 46.5 % (ref 37–47)
HEMOGLOBIN: 15.1 GM/DL (ref 12–16)
IMMATURE GRANS (ABS): 0.02 THOU/MM3 (ref 0–0.07)
IMMATURE GRANULOCYTES: 0.3 %
LYMPHOCYTES # BLD: 17.8 %
LYMPHOCYTES ABSOLUTE: 1.3 THOU/MM3 (ref 1–4.8)
MCH RBC QN AUTO: 32 PG (ref 26–33)
MCHC RBC AUTO-ENTMCNC: 32.5 GM/DL (ref 32.2–35.5)
MCV RBC AUTO: 98.5 FL (ref 81–99)
MONOCYTES # BLD: 5.5 %
MONOCYTES ABSOLUTE: 0.4 THOU/MM3 (ref 0.4–1.3)
NUCLEATED RED BLOOD CELLS: 0 /100 WBC
PLATELET # BLD: 89 THOU/MM3 (ref 130–400)
PMV BLD AUTO: 14.2 FL (ref 9.4–12.4)
RBC # BLD: 4.72 MILL/MM3 (ref 4.2–5.4)
SEG NEUTROPHILS: 74.1 %
SEGMENTED NEUTROPHILS ABSOLUTE COUNT: 5.3 THOU/MM3 (ref 1.8–7.7)
VITAMIN B-12: > 2000 PG/ML (ref 211–911)
WBC # BLD: 7.1 THOU/MM3 (ref 4.8–10.8)

## 2021-03-08 PROCEDURE — G8417 CALC BMI ABV UP PARAM F/U: HCPCS | Performed by: PHYSICIAN ASSISTANT

## 2021-03-08 PROCEDURE — G8427 DOCREV CUR MEDS BY ELIG CLIN: HCPCS | Performed by: PHYSICIAN ASSISTANT

## 2021-03-08 PROCEDURE — 4040F PNEUMOC VAC/ADMIN/RCVD: CPT | Performed by: PHYSICIAN ASSISTANT

## 2021-03-08 PROCEDURE — 1123F ACP DISCUSS/DSCN MKR DOCD: CPT | Performed by: PHYSICIAN ASSISTANT

## 2021-03-08 PROCEDURE — 1036F TOBACCO NON-USER: CPT | Performed by: PHYSICIAN ASSISTANT

## 2021-03-08 PROCEDURE — 99211 OFF/OP EST MAY X REQ PHY/QHP: CPT

## 2021-03-08 PROCEDURE — 3017F COLORECTAL CA SCREEN DOC REV: CPT | Performed by: PHYSICIAN ASSISTANT

## 2021-03-08 PROCEDURE — G8400 PT W/DXA NO RESULTS DOC: HCPCS | Performed by: PHYSICIAN ASSISTANT

## 2021-03-08 PROCEDURE — G8484 FLU IMMUNIZE NO ADMIN: HCPCS | Performed by: PHYSICIAN ASSISTANT

## 2021-03-08 PROCEDURE — 1090F PRES/ABSN URINE INCON ASSESS: CPT | Performed by: PHYSICIAN ASSISTANT

## 2021-03-08 PROCEDURE — 99204 OFFICE O/P NEW MOD 45 MIN: CPT | Performed by: PHYSICIAN ASSISTANT

## 2021-03-08 RX ORDER — SOY ISOFLAVONE 40 MG
4 TABLET ORAL DAILY
COMMUNITY
End: 2021-12-06

## 2021-03-08 NOTE — PATIENT INSTRUCTIONS
1. CBC, folate and vitamin B12 today  2. Will call pt with results  3. Return to clinic in 3 months  4.  Labs on RTC

## 2021-03-08 NOTE — PROGRESS NOTES
[facility] [address] [facility] [address] [facility] [address]  [address] [address]  Dept: [phone]  Dept Fax: [phone]: [phone]      Visit Date:3/8/2021     Mel Samayoa is a 69 y.o. female who presents today for:   Chief Complaint   Patient presents with    New Patient     Thrombocytopenia        HPI:   Mel Samayoa is a 69 y.o. female referred to Hematology/Oncology clinic for evaluation of thrombocytopenia per her PCP, Dr. Morey Schwab. The patient has a history of chronic thrombocytopenia which as gradually worsened over the last year. Per chart review, she has had persistent thrombocytopenia since 2014 per EMR with platelets decreasing from 105,000 to 69,000 on 10/23/2020. Her most recent CBC completed on 2/23/2021 showed platelet count 91,018. The patient affirms history of thrombocytopenia, but has not had formal work-up in the past.  She denies drinking alcohol. She denies history of autoimmune or liver dysfunction. She denies history of chronic infection such as hepatitis C or HIV. She denies any recent history of COVID-19 infection. The patient states in June 2020 she had a viral illness that lasted for approximately 7 days. She describes this as GI upset with diarrhea. At that time the patient decided to go gluten-free and has had improvement in symptoms since. She has never been diagnosed with celiac disease she admits to fatigue intermittently. She denies any B type symptoms; no recurrent fever, persistent infection, lymphadenopathy, night sweats, poor appetite, early satiety or unintentional weight loss. States she bruises easily at times but denies any abnormal bleeding. Denies epistaxis, hemoptysis, hematemesis, melena, hematochezia, hematuria or vaginal bleeding. Her past medical history includes hypertension, diabetes, GERD, hyperlipidemia.       Past Medical History:   Diagnosis Date    Allergic rhinitis     Angiolipoma of right kidney     DM2 02/23/2021    HCT 47.2 (H) 02/23/2021    MCV 99.2 (H) 02/23/2021    PLT 93 (L) 02/23/2021     BMP:   Lab Results   Component Value Date     10/23/2020    K 3.8 10/23/2020     10/23/2020    CO2 29 10/23/2020    BUN 12 10/23/2020    CREATININE 0.82 10/23/2020    GLUCOSE 146 10/23/2020    CALCIUM 9.9 10/23/2020      LFT:   Lab Results   Component Value Date    ALT 23 10/23/2020    AST 24 10/23/2020    ALKPHOS 56 10/23/2020    BILITOT 0.4 10/23/2020         Assessment and Plan:   1. Thrombocytopenia  Chronic since at least 2014 per EMR with platelet count ranging 80,000 to 110,000. On 11/9/2020 platelet count 63,304 and improved to 93,000 on 2/23/21. Unclear etiology. Cr, calcium, LFT, TSH within normal limits. WBC count, Hgb/hct within normal limits. Denies alcohol use. No history of liver disorder, autoimmune disorder, HIV, Hep C. No immunosuppressive medications. Discussed with patient she may have a mild ITP, and would not treat unless platelet count less than 50,000. Recommend to continue to monitor   -will obtain CBC, B12 and folate today to rule out nutritional deficiency   -Return to clinic in 3 months   -CBC on RTC       All patient questions answered. Pt voiced understanding. Patient agreed with treatment plan. Follow up as directed. Patient instructed to call for questions or concerns.      Electronically signed by   Carine Wesley PA-C

## 2021-03-09 ENCOUNTER — TELEPHONE (OUTPATIENT)
Dept: ONCOLOGY | Age: 71
End: 2021-03-09

## 2021-03-09 NOTE — TELEPHONE ENCOUNTER
Patient called and said she had a missed call from [facility] in regards to her blood work drawn yesterday. Please call her back with results at [phone].

## 2021-04-14 ENCOUNTER — HOSPITAL ENCOUNTER (OUTPATIENT)
Dept: WOMENS IMAGING | Age: 71
Discharge: HOME | End: 2021-04-14
Payer: MEDICARE

## 2021-04-14 DIAGNOSIS — Z12.31 ENCOUNTER FOR SCREENING MAMMOGRAM FOR MALIGNANT NEOPLASM OF BREAST: ICD-10-CM

## 2021-04-14 PROCEDURE — 77063 BREAST TOMOSYNTHESIS BI: CPT

## 2021-04-15 ENCOUNTER — TELEPHONE (OUTPATIENT)
Dept: FAMILY MEDICINE CLINIC | Age: 71
End: 2021-04-15

## 2021-04-25 NOTE — PROGRESS NOTES
Chief Complaint   Patient presents with    Follow-up     6 month f/u DM chronic conditions       History obtained from the patient. SUBJECTIVE:  Damon Butler is a 66 y.o. female that presents today for       -DM2 PRIOR VISIT: USed to be on metformin for preDM. Stopped a while ago. Labs done last year showed DM2. She was to f/u with me and she never came in. Here a year later. Labs still c/w DM. Controlled. No polyphagia, uria or dypsia. UPDATE PRIOR VISIT: diet controlled. a1c good. Working on wt loss etc.      UPDATE PRIOR VISIT: diet controlled yet. a1c in range. wts about the same    UPDATE PRIOR VISIT: a1c stable. Diet controlled. wts about the same. + microal in OCT. Wanted to repeat today, but she couldn't void, order given    UPDATE PRIOR VISIT:   a1c stable  Diet controlled  Wts about the same  Repeat neg microal, + before     UPDATE TODAY:   Diet controlled  a1c at goal   wts the same    Wt Readings from Last 3 Encounters:   04/26/21 214 lb 6.4 oz (97.3 kg)   03/08/21 210 lb 9.6 oz (95.5 kg)   10/26/20 217 lb (98.4 kg)     Lab Results   Component Value Date    LABA1C 6.6 04/26/2021    LABA1C 7.0 10/26/2020    LABA1C 7.1 10/15/2019    LABA1C 6.9 04/11/2019    LABA1C 7.0 10/11/2018    LABA1C 6.8 04/12/2018    LABA1C 6.9 09/21/2017    LABA1C 6.5 11/04/2016       -HTN:    HPI:    Taking meds as prescribed ?: yes  Tolerating well ?: yes  Side Effects ?: deneis  BP at home ?: <140/90  Working on TLCS ?: yes  Chest Pain/SOB/Palpitations? denies    BP Readings from Last 3 Encounters:   04/26/21 134/80   03/08/21 (!) 183/87   10/26/20 136/80         -HLD PRIOR VISIT: should be on statin, however intolerant of zocor as the last statin she took. States took other statins years ago, can't remember names, but they call caused myalgias. She refuses further statins. Lipids a little worse. Diet not as good. Plans to get this improved. UPDATE TODAY: no change in above. Making diet changes.  Refuses statins d/t side effects with all of them       -Low platelets PRIOR VISIT: chronic, no clear etiology and mild. Thought maybe d/t naproxen. Labs stable on f/u. Denies fevers, chills, night sweats or wt loss. UPDATE PRIOR VISIT:   Platelets a little lower than before  No bleeding or hematochezia. No petechia   No fevers, chills, night sweats or wt loss    UPDATE LAST VISIT:   platelets lower than a year ago  Had ordered f/u labs last year to get done, she never did  No bleeding, bruising or hemarthrosis  Denies fevers, chills or nights sweats     UPDATE TODAY:   Saw heme  Neg w/u  They will monitor  May have mild ITP       -GERD:    HPI:    Taking meds as prescribed ?: yes  Tolerating well ?: yes  Side Effects ?: denies  Dysphagia ?: denies  Odynophagia ?: denies  Melena ?: denies  Working on TLCS ?: yes      -Colon Ca screening:  Due for colo  Wanted to see Lainey Colander, not covered  Declines getting done right now d/t covid  No s/s colon dz  Discussed cologuard, still wants to wait. Age/Gender Health Maintenance    Lipid -   Lab Results   Component Value Date    CHOL 215 (H) 10/23/2020    CHOL 235 (H) 10/14/2019    CHOL 245 (H) 04/08/2019     Lab Results   Component Value Date    TRIG 182 (H) 10/23/2020    TRIG 220 (H) 10/14/2019    TRIG 197 (H) 04/08/2019     Lab Results   Component Value Date    HDL 42 10/23/2020    HDL 38 (L) 10/14/2019    HDL 37 (L) 04/08/2019     Lab Results   Component Value Date    LDLCALC 137 (H) 10/23/2020    LDLCALC 153 (H) 10/14/2019    LDLCALC 169 (H) 04/08/2019       Colon Cancer Screening - Somewhere within the last 10 years, was not [facility]. Awaiting records/due, ordered today (APR 2019)/pt declines OCT 2020/APR 2021  Lung Cancer Screening (Age 54 to [de-identified] with 30 pack year hx, current smoker or quit within past 15 years) - never smoker.      Tetanus - declines SEPT 2016/SEPT 2017/OCT 2018/APR 2019/OCT 2019/OCT 2020/APR 2021  Influenza Vaccine - Declines SEPT 2016/SEPT 2017/OCT tablet 3    omeprazole (PRILOSEC) 20 MG delayed release capsule Take 1 capsule by mouth daily 90 capsule 3    cloNIDine (CATAPRES) 0.2 MG tablet Take 1 tablet by mouth 2 times daily 180 tablet 3    triamcinolone (KENALOG) 0.1 % cream Apply topically 2 times daily to affected area sparingly. 3 Tube 3    cetirizine (ZYRTEC) 10 MG tablet Take 10 mg by mouth daily      Multiple Vitamins-Minerals (MULTIVITAMIN ADULT PO) Take 1 tablet by mouth daily      Red Yeast Rice 600 MG CAPS Take 2 capsules by mouth 2 times daily      Omega-3 Fatty Acids (FISH OIL) 1000 MG CAPS Take 2,000 mg by mouth daily      Coenzyme Q10 10 MG CAPS Take 1 capsule by mouth daily       No current facility-administered medications for this visit. No orders of the defined types were placed in this encounter. All medications reviewed and reconciled, including OTC and herbal medications. Updated list given to patient. Patient Active Problem List    Diagnosis Date Noted    DM[facility]diabetes mellitus[facility] 2) [facility]     Statin intolerance     GERD (gastroesophageal reflux disease)     Osteoarthritis     Allergic rhinitis     Angiolipoma of right kidney     Eczema     Hyperlipidemia     Obesity (BMI 30-39. 9)     Post-menopausal     Thrombocytopenia ([address]     Mixed hearing loss 09/17/2013    [facility] 12/26/2012     Noted MRI 2012. Never did f/u MRI. Told she didn't need to. Has 0 sxs. Denies HA, vision changes, lateralizing numbness or weakness.  Hypertension 12/10/2012       Past Medical History:   Diagnosis Date    Allergic rhinitis     Angiolipoma of right kidney     DM2 (diabetes mellitus, type 2) ([facility]     Eczema     GERD (gastroesophageal reflux disease)     Hyperlipidemia     Hypertension     [facility] 12/26/2012    Noted MRI 2012. Never did f/u MRI. Told she didn't need to. Has 0 sxs. Denies HA, vision changes, lateralizing numbness or weakness.      Mixed hearing loss 9/17/2013    Obesity (BMI 30-39. 9)     Osteoarthritis     Post-menopausal     Statin intolerance        Past Surgical History:   Procedure Laterality Date     SECTION  1972     CHOLECYSTECTOMY  2004    COLONOSCOPY  2006    MYRINGOTOMY AND TYMPANOSTOMY TUBE PLACEMENT  2012    Dr Ivory Pressley UPPER GASTROINTESTINAL ENDOSCOPY  2006       Allergies   Allergen Reactions    Biaxin [Clarithromycin] Nausea Only    Doxycycline Other (See Comments)     GI side effects    Keflex [Cephalexin] Other (See Comments)     Tongue Swelling    Norvasc [Amlodipine Besylate] Other (See Comments)     Nasuea, hot flashes    Statins Other (See Comments)     Myalgias all    Zetia [Ezetimibe] Other (See Comments)     Chest pain    Zocor [Simvastatin] Other (See Comments)     Myalgias       Social History     Tobacco Use    Smoking status: Never Smoker    Smokeless tobacco: Never Used   Substance Use Topics    Alcohol use: No       Family History   Problem Relation Age of Onset    Heart Disease Mother     Lung Cancer Mother     Osteoporosis Mother     Other Father         Did not know her father.  Other Maternal Grandmother         blood clot, no clear hx surrounding    Heart Attack Maternal Grandfather     Heart Attack Maternal Uncle 62    Breast Cancer Maternal Aunt 50    Breast Cancer Maternal Cousin 40         I have reviewed the patient's past medical history, past surgical history, allergies, medications, social and family history and I have made updates where appropriate.       Review of Systems  Positive responses are highlighted in bold    Constitutional:  Fever, Chills, Night Sweats, Fatigue, Unexpected changes in weight  HENT:  Ear pain, Tinnitus, Nosebleeds, Trouble swallowing, Hearing loss, Sore throat  Cardiovascular:  Chest Pain, Palpitations, Orthopnea, Paroxysmal Nocturnal Dyspnea  Respiratory:  Cough, Wheezing, Shortness of breath, Chest tightness, Apnea  Gastrointestinal:  Nausea, Vomiting, Diarrhea, Constipation, Heartburn, Blood in stool  Genitourinary:  Difficulty or painful urination, Flank pain, Change in frequency, Urgency  Skin:  Color change, Rash, Itching, Wound  Musculoskeletal:  Joint pain, Back pain, Gait problems, Joint swelling, Myalgias  Neurological:  Dizziness, Headaches, Presyncope, Numbness, Seizures, Tremors  Endocrine:  Heat Intolerance, Cold Intolerance, Polydipsia, Polyphagia, Polyuria      PHYSICAL EXAM:  Vitals:    04/26/21 1435 04/26/21 1457   BP: (!) 152/86 134/80   Pulse: 74    Resp: 10    Temp: 98.1 °F (36.7 °C)    TempSrc: Oral    SpO2: 92%    Weight: 214 lb 6.4 oz (97.3 kg)    Height: 5' 2.6\" (1.59 m)      Body mass index is 38.47 kg/m². Pain Score:   0 - No pain    VS Reviewed  General Appearance: A&O x 3, No acute distress,well developed and well- nourished  Head: normocephalic and atraumatic  Eyes: pupils equal, round, and reactive to light, extraocular eye movements intact, conjunctivae and eye lids without erythema  ENT: external ear and ear canal clear bilaterally, TMs intact and regular, nose without deformity, nasal mucosa and turbinates normal without polyps, oropharynx normal, dentition is normal for age  Neck: supple and non-tender without mass, no thyromegaly or thyroid nodules, no cervical lymphadenopathy  Pulmonary/Chest: clear to auscultation bilaterally- no wheezes, rales or rhonchi, normal air movement, no respiratory distress or retractions  Cardiovascular: S1 and S2 auscultated w/ RRR. No murmurs, rubs, clicks, or gallops, distal pulses intact. Abdomen: soft, non-tender, non-distended, bowl sounds physiologic,  no rebound or guarding, no masses or hernias noted. Liver and spleen without enlargement. Extremities: no cyanosis, clubbing or edema of the lower extremities. +2 PT/DP bilaterally.    Musculoskeletal: No joint swelling or gross deformity   Skin: warm and dry, no rash or erythema      Results for POC orders placed in visit on 04/26/21   POCT glycosylated hemoglobin (Hb A1C)   Result Value Ref Range    Hemoglobin A1C 6.6 (H) 4.3 - 5.7 %       ASSESSMENT & PLAN  1. Type 2 diabetes mellitus without complication, without long-term current use of insulin (HCC)    At goal  con't diet control  F/u 6 months    2. Obesity (BMI 30-39. 9)    Discussed wt loss strategies    3. Essential hypertension    At goal  con't meds  Labs UTD    4. Mixed hyperlipidemia    Stable  Statin intolerant  Plans to work on diet and wt loss  Labs in 6 months    5. Statin intolerance      6. Thrombocytopenia ([address]    Worse over time  Stable of late  Following with heme, appreciate their help    7. Gastroesophageal reflux disease, unspecified whether esophagitis present    Doing well  con't omeprazole    8. Screening for colon cancer    She wants to hold off yet. Is considering colo vs cologuard    Discussed colon cancer screening with patient today. The benefits and risks of having testing performed were discussed with patient. After a long and detailed discussion with the patient, the patient does not want to proceed with colon cancer screening at this time. Pt is aware of the risks of this decision, including earlier death and are accepting of this risk. Will call her in 6 months to see if wants to pursue either colo or cologuard. DISPOSITION    Return in about 6 months (around 11/2/2021) for AWV, Follow-up Diabetes, follow-up on chronic medical conditions, sooner as needed. Swati released without restrictions.     Future Appointments   Date Time Provider Andrei Pedraza   6/3/2021  3:00 PM Keven Lawson [facility] Oncology [facility] [address] [facility]   10/28/2021  3:40 PM [facility] counseling on the following healthy behaviors: nutrition, exercise and medication adherence    Patient given educational materials on: See Attached    I have instructed Swati to complete a self tracking handout on Blood Pressures  and instructed them to bring it with them to her next appointment. Barriers to learning and self management: none    Discussed use, benefit, and side effects of prescribed medications. Barriers to medication compliance addressed. All patient questions answered. Pt voiced understanding.        Electronically signed by Carrington Kemp DO on 4/26/2021 at 4:25 PM

## 2021-04-26 ENCOUNTER — OFFICE VISIT (OUTPATIENT)
Dept: FAMILY MEDICINE CLINIC | Age: 71
End: 2021-04-26
Payer: MEDICARE

## 2021-04-26 VITALS
WEIGHT: 214.4 LBS | DIASTOLIC BLOOD PRESSURE: 80 MMHG | RESPIRATION RATE: 10 BRPM | OXYGEN SATURATION: 92 % | HEIGHT: 63 IN | BODY MASS INDEX: 37.99 KG/M2 | HEART RATE: 74 BPM | TEMPERATURE: 98.1 F | SYSTOLIC BLOOD PRESSURE: 134 MMHG

## 2021-04-26 DIAGNOSIS — K21.9 GASTROESOPHAGEAL REFLUX DISEASE, UNSPECIFIED WHETHER ESOPHAGITIS PRESENT: ICD-10-CM

## 2021-04-26 DIAGNOSIS — D69.6 THROMBOCYTOPENIA (HCC): ICD-10-CM

## 2021-04-26 DIAGNOSIS — Z78.9 STATIN INTOLERANCE: ICD-10-CM

## 2021-04-26 DIAGNOSIS — I10 ESSENTIAL HYPERTENSION: ICD-10-CM

## 2021-04-26 DIAGNOSIS — E78.2 MIXED HYPERLIPIDEMIA: ICD-10-CM

## 2021-04-26 DIAGNOSIS — E11.9 TYPE 2 DIABETES MELLITUS WITHOUT COMPLICATION, WITHOUT LONG-TERM CURRENT USE OF INSULIN (HCC): Primary | ICD-10-CM

## 2021-04-26 DIAGNOSIS — E66.9 OBESITY (BMI 30-39.9): ICD-10-CM

## 2021-04-26 DIAGNOSIS — Z12.11 SCREENING FOR COLON CANCER: ICD-10-CM

## 2021-04-26 LAB — HBA1C MFR BLD: 6.6 % (ref 4.3–5.7)

## 2021-04-26 PROCEDURE — 2022F DILAT RTA XM EVC RTNOPTHY: CPT | Performed by: FAMILY MEDICINE

## 2021-04-26 PROCEDURE — 3044F HG A1C LEVEL LT 7.0%: CPT | Performed by: FAMILY MEDICINE

## 2021-04-26 PROCEDURE — G8417 CALC BMI ABV UP PARAM F/U: HCPCS | Performed by: FAMILY MEDICINE

## 2021-04-26 PROCEDURE — 4040F PNEUMOC VAC/ADMIN/RCVD: CPT | Performed by: FAMILY MEDICINE

## 2021-04-26 PROCEDURE — G8427 DOCREV CUR MEDS BY ELIG CLIN: HCPCS | Performed by: FAMILY MEDICINE

## 2021-04-26 PROCEDURE — 99214 OFFICE O/P EST MOD 30 MIN: CPT | Performed by: FAMILY MEDICINE

## 2021-04-26 PROCEDURE — 1090F PRES/ABSN URINE INCON ASSESS: CPT | Performed by: FAMILY MEDICINE

## 2021-04-26 PROCEDURE — 3017F COLORECTAL CA SCREEN DOC REV: CPT | Performed by: FAMILY MEDICINE

## 2021-04-26 PROCEDURE — 1036F TOBACCO NON-USER: CPT | Performed by: FAMILY MEDICINE

## 2021-04-26 PROCEDURE — G8400 PT W/DXA NO RESULTS DOC: HCPCS | Performed by: FAMILY MEDICINE

## 2021-04-26 PROCEDURE — 1123F ACP DISCUSS/DSCN MKR DOCD: CPT | Performed by: FAMILY MEDICINE

## 2021-04-26 ASSESSMENT — PATIENT HEALTH QUESTIONNAIRE - PHQ9
SUM OF ALL RESPONSES TO PHQ9 QUESTIONS 1 & 2: 0
2. FEELING DOWN, DEPRESSED OR HOPELESS: 0
1. LITTLE INTEREST OR PLEASURE IN DOING THINGS: 0

## 2021-04-26 NOTE — PATIENT INSTRUCTIONS
Patient Education        Type 2 Diabetes: Care Instructions  Your Care Instructions     Type 2 diabetes is a disease that develops when the body's tissues cannot use insulin properly. Over time, the pancreas cannot make enough insulin. Insulin is a hormone that helps the body's cells use sugar (glucose) for energy. It also helps the body store extra sugar in muscle, fat, and liver cells. Without insulin, the sugar cannot get into the cells to do its work. It stays in the blood instead. This can cause high blood sugar levels. A person has diabetes when the blood sugar stays too high too much of the time. Over time, diabetes can lead to diseases of the heart, blood vessels, nerves, kidneys, and eyes. You may be able to control your blood sugar by losing weight, eating a healthy diet, and getting daily exercise. You may also have to take insulin or other diabetes medicine. Follow-up care is a key part of your treatment and safety. Be sure to make and go to all appointments. Call your doctor if you are having problems. It's also a good idea to know your test results and keep a list of the medicines you take. How can you care for yourself at home? · Keep your blood sugar at a target level (which you set with your doctor). ? Carbohydrate--the body's main source of fuel--affects blood sugar more than any other nutrient. Carbohydrate is in fruits, vegetables, milk, and yogurt. It also is in breads, cereals, vegetables such as potatoes and corn, and sugary foods such as candy and cakes. Follow your meal plan to know how much carbohydrate to eat at each meal and snack. ? Aim for 30 minutes of exercise on most, preferably all, days of the week. Walking is a good choice. You also may want to do other activities, such as running, swimming, cycling, or playing tennis or team sports. Try to do muscle-strengthening exercises at least 2 times a week. ? Take your medicines exactly as prescribed.  Call your doctor if you think you are having a problem with your medicine. You will get more details on the specific medicines your doctor prescribes. · Check your blood sugar as often as your doctor recommends. It is important to keep track of any symptoms you have, such as low blood sugar. Also tell your doctor if you have any changes in your activities, diet, or insulin use. · Talk to your doctor before you start taking aspirin every day. Aspirin can help certain people lower their risk of a heart attack or stroke. But taking aspirin isn't right for everyone, because it can cause serious bleeding. · Do not smoke. If you need help quitting, talk to your doctor about stop-smoking programs and medicines. These can increase your chances of quitting for good. · Keep your cholesterol and blood pressure at normal levels. You may need to take one or more medicines to reach your goals. Take them exactly as directed. Do not stop or change a medicine without talking to your doctor first.  When should you call for help? Call 911 anytime you think you may need emergency care. For example, call if:    · You passed out (lost consciousness), or you suddenly become very sleepy or confused. (You may have very low blood sugar.)   Call your doctor now or seek immediate medical care if:    · Your blood sugar is 300 mg/dL or is higher than the level your doctor has set for you.     · You have symptoms of low blood sugar, such as:  ? Sweating. ? Feeling nervous, shaky, and weak. ? Extreme hunger and slight nausea. ? Dizziness and headache.  ? Blurred vision. ? Confusion. Watch closely for changes in your health, and be sure to contact your doctor if:    · You often have problems controlling your blood sugar.     · You have symptoms of long-term diabetes problems, such as:  ? New vision changes. ? New pain, numbness, or tingling in your hands or feet. ? Skin problems. Where can you learn more? Go to https://chmeeeb.health-[employer] Edge. org and sign in to your [employer] account. Enter C553 in the [employer] box to learn more about \"Type 2 Diabetes: Care Instructions. \"     If you do not have an account, please click on the \"Sign Up Now\" link. Current as of: August 31, 2020               Content Version: 12.8  © 1830-8361 [employer]. Care instructions adapted under license by [facility] ([facility]). If you have questions about a medical condition or this instruction, always ask your healthcare professional. [address] any warranty or liability for your use of this information.

## 2021-07-09 DIAGNOSIS — I10 ESSENTIAL HYPERTENSION: Chronic | ICD-10-CM

## 2021-07-09 RX ORDER — LOSARTAN POTASSIUM AND HYDROCHLOROTHIAZIDE 25; 100 MG/1; MG/1
TABLET ORAL
Qty: 90 TABLET | Refills: 3 | Status: SHIPPED | OUTPATIENT
Start: 2021-07-09 | End: 2021-12-02

## 2021-07-09 NOTE — TELEPHONE ENCOUNTER
This medication refill is regarding a electronic request by [employer]. Requested Prescriptions     Pending Prescriptions Disp Refills    losartan-hydroCHLOROthiazide (HYZAAR) 100-25 MG per tablet [Pharmacy Med Name: LOSARTAN POTASSIUM/HYDROCHLOROTHIAZIDE 100-25 MG Tablet] 90 tablet 3     Sig: TAKE 1 TABLET EVERY DAY       Date of last visit: 4/26/2021   Date of next visit: 10/28/2021  Date of last refill: 10/26/2020  90/3      Rx verified, ordered and set to EP.

## 2021-08-19 DIAGNOSIS — I10 ESSENTIAL HYPERTENSION: Chronic | ICD-10-CM

## 2021-08-19 DIAGNOSIS — K21.9 GASTROESOPHAGEAL REFLUX DISEASE: ICD-10-CM

## 2021-08-19 RX ORDER — CLONIDINE HYDROCHLORIDE 0.2 MG/1
TABLET ORAL
Qty: 180 TABLET | Refills: 3 | Status: SHIPPED | OUTPATIENT
Start: 2021-08-19 | End: 2021-12-02

## 2021-08-19 RX ORDER — OMEPRAZOLE 20 MG/1
CAPSULE, DELAYED RELEASE ORAL
Qty: 90 CAPSULE | Refills: 3 | Status: SHIPPED | OUTPATIENT
Start: 2021-08-19 | End: 2021-12-02

## 2021-08-19 NOTE — TELEPHONE ENCOUNTER
Recent Visits  Date Type Provider Dept   04/26/21 Office Visit Kemal Zafar, DO [facility]   10/26/20 Office Visit Kemal Zafar, [address] recent visits within past 540 days with a meds authorizing provider and meeting all other requirements  Future Appointments  Date Type Provider Dept   10/28/21 Appointment Kemal Zafar, [address] [facility]   Showing future appointments within next 150 days with a meds authorizing provider and meeting all other requirements      Future Appointments   Date Time Provider Andrei Pedraza   10/28/2021  3:40 PM Kemal Zafar, [address] [place] [address]

## 2021-09-21 ENCOUNTER — TELEPHONE (OUTPATIENT)
Dept: FAMILY MEDICINE CLINIC | Age: 71
End: 2021-09-21

## 2021-09-21 NOTE — TELEPHONE ENCOUNTER
----- Message from Elisabeth Pisano sent at 9/21/2021  3:09 PM EDT -----  Subject: Message to Provider    QUESTIONS  Information for Provider? tanya mari called due to a miss call from   the office. ---------------------------------------------------------------------------  --------------  Janet LEBLANC  What is the best way for the office to contact you? OK to leave message on   voicemail  Preferred Call Back Phone Number? [phone]  ---------------------------------------------------------------------------  --------------  SCRIPT ANSWERS  Relationship to Patient? Other  Representative Name? Maryt Ng  Is the Representative on the appropriate HIPAA document in Epic?  Yes

## 2021-09-21 NOTE — TELEPHONE ENCOUNTER
Patient states that she thought that she had already informed Dr Angel Arthur that she is not going to have either of these done

## 2021-09-21 NOTE — TELEPHONE ENCOUNTER
Sorry, I thought we had left it as she didn't want to do it right then, but wanted to think about it. I'd told her we'd call her in 6 months to f/u with her to see what she wanted to do  My apologies  Let me know if questions, thanks!

## 2021-09-21 NOTE — TELEPHONE ENCOUNTER
----- Message from Deny Martínez DO sent at 9/19/2021  3:55 PM EDT -----  Please call pt and see if she'd decided on what she'd like to do, if anything, for colon cancer screening, either colonoscopy or cologuard. Let me know, thanks!

## 2021-09-24 DIAGNOSIS — E11.9 TYPE 2 DIABETES MELLITUS WITHOUT COMPLICATION, WITHOUT LONG-TERM CURRENT USE OF INSULIN (HCC): Primary | ICD-10-CM

## 2021-10-27 ENCOUNTER — NURSE ONLY (OUTPATIENT)
Dept: LAB | Age: 71
End: 2021-10-27

## 2021-10-27 DIAGNOSIS — E11.9 TYPE 2 DIABETES MELLITUS WITHOUT COMPLICATION, WITHOUT LONG-TERM CURRENT USE OF INSULIN (HCC): ICD-10-CM

## 2021-10-27 LAB
ALBUMIN SERPL-MCNC: 4.5 G/DL (ref 3.5–5.1)
ALP BLD-CCNC: 73 U/L (ref 38–126)
ALT SERPL-CCNC: 21 U/L (ref 11–66)
ANION GAP SERPL CALCULATED.3IONS-SCNC: 14 MEQ/L (ref 8–16)
ANISOCYTOSIS: PRESENT
AST SERPL-CCNC: 45 U/L (ref 5–40)
BASOPHILIC STIPPLING: ABNORMAL
BASOPHILS # BLD: 1.2 %
BASOPHILS ABSOLUTE: 0.1 THOU/MM3 (ref 0–0.1)
BILIRUB SERPL-MCNC: 0.5 MG/DL (ref 0.3–1.2)
BUN BLDV-MCNC: 23 MG/DL (ref 7–22)
CALCIUM SERPL-MCNC: 10 MG/DL (ref 8.5–10.5)
CHLORIDE BLD-SCNC: 95 MEQ/L (ref 98–111)
CHOLESTEROL, TOTAL: 188 MG/DL (ref 100–199)
CO2: 27 MEQ/L (ref 23–33)
CREAT SERPL-MCNC: 1 MG/DL (ref 0.4–1.2)
CREATININE, URINE: 175.3 MG/DL
EOSINOPHIL # BLD: 1.6 %
EOSINOPHILS ABSOLUTE: 0.1 THOU/MM3 (ref 0–0.4)
ERYTHROCYTE [DISTWIDTH] IN BLOOD BY AUTOMATED COUNT: 13.5 % (ref 11.5–14.5)
ERYTHROCYTE [DISTWIDTH] IN BLOOD BY AUTOMATED COUNT: 51.4 FL (ref 35–45)
GFR SERPL CREATININE-BSD FRML MDRD: 54 ML/MIN/1.73M2
GLUCOSE BLD-MCNC: 133 MG/DL (ref 70–108)
HCT VFR BLD CALC: 44.8 % (ref 37–47)
HDLC SERPL-MCNC: 43 MG/DL
HEMOGLOBIN: 14 GM/DL (ref 12–16)
IMMATURE GRANS (ABS): 0.06 THOU/MM3 (ref 0–0.07)
IMMATURE GRANULOCYTES: 0.7 %
LDL CHOLESTEROL CALCULATED: 112 MG/DL
LYMPHOCYTES # BLD: 19.8 %
LYMPHOCYTES ABSOLUTE: 1.8 THOU/MM3 (ref 1–4.8)
MCH RBC QN AUTO: 32.6 PG (ref 26–33)
MCHC RBC AUTO-ENTMCNC: 31.3 GM/DL (ref 32.2–35.5)
MCV RBC AUTO: 104.4 FL (ref 81–99)
MICROALBUMIN UR-MCNC: 2.2 MG/DL
MICROALBUMIN/CREAT UR-RTO: 13 MG/G (ref 0–30)
MONOCYTES # BLD: 7.1 %
MONOCYTES ABSOLUTE: 0.6 THOU/MM3 (ref 0.4–1.3)
NUCLEATED RED BLOOD CELLS: 0 /100 WBC
PLATELET # BLD: 82 THOU/MM3 (ref 130–400)
PLATELET ESTIMATE: ADEQUATE
PMV BLD AUTO: 14.3 FL (ref 9.4–12.4)
POTASSIUM SERPL-SCNC: 4.1 MEQ/L (ref 3.5–5.2)
RBC # BLD: 4.29 MILL/MM3 (ref 4.2–5.4)
SCAN OF BLOOD SMEAR: NORMAL
SEG NEUTROPHILS: 69.6 %
SEGMENTED NEUTROPHILS ABSOLUTE COUNT: 6.3 THOU/MM3 (ref 1.8–7.7)
SODIUM BLD-SCNC: 136 MEQ/L (ref 135–145)
TOTAL PROTEIN: 7 G/DL (ref 6.1–8)
TRIGL SERPL-MCNC: 163 MG/DL (ref 0–199)
TSH SERPL DL<=0.05 MIU/L-ACNC: 1.76 UIU/ML (ref 0.4–4.2)
WBC # BLD: 9 THOU/MM3 (ref 4.8–10.8)

## 2021-10-28 ENCOUNTER — OFFICE VISIT (OUTPATIENT)
Dept: FAMILY MEDICINE CLINIC | Age: 71
End: 2021-10-28
Payer: MEDICARE

## 2021-10-28 VITALS
BODY MASS INDEX: 38.45 KG/M2 | WEIGHT: 217 LBS | HEART RATE: 68 BPM | TEMPERATURE: 98.2 F | RESPIRATION RATE: 12 BRPM | DIASTOLIC BLOOD PRESSURE: 78 MMHG | SYSTOLIC BLOOD PRESSURE: 136 MMHG | HEIGHT: 63 IN

## 2021-10-28 DIAGNOSIS — D69.6 THROMBOCYTOPENIA (HCC): ICD-10-CM

## 2021-10-28 DIAGNOSIS — K21.9 GASTROESOPHAGEAL REFLUX DISEASE, UNSPECIFIED WHETHER ESOPHAGITIS PRESENT: ICD-10-CM

## 2021-10-28 DIAGNOSIS — Z00.00 ROUTINE GENERAL MEDICAL EXAMINATION AT A HEALTH CARE FACILITY: Primary | ICD-10-CM

## 2021-10-28 DIAGNOSIS — Z12.11 SCREENING FOR COLON CANCER: ICD-10-CM

## 2021-10-28 DIAGNOSIS — E11.9 TYPE 2 DIABETES MELLITUS WITHOUT COMPLICATION, WITHOUT LONG-TERM CURRENT USE OF INSULIN (HCC): ICD-10-CM

## 2021-10-28 DIAGNOSIS — E66.9 OBESITY (BMI 30-39.9): ICD-10-CM

## 2021-10-28 DIAGNOSIS — Z78.9 STATIN INTOLERANCE: ICD-10-CM

## 2021-10-28 DIAGNOSIS — E78.2 MIXED HYPERLIPIDEMIA: ICD-10-CM

## 2021-10-28 DIAGNOSIS — I10 ESSENTIAL HYPERTENSION: ICD-10-CM

## 2021-10-28 LAB — HBA1C MFR BLD: 6.1 % (ref 4.3–5.7)

## 2021-10-28 PROCEDURE — 1123F ACP DISCUSS/DSCN MKR DOCD: CPT | Performed by: FAMILY MEDICINE

## 2021-10-28 PROCEDURE — G8484 FLU IMMUNIZE NO ADMIN: HCPCS | Performed by: FAMILY MEDICINE

## 2021-10-28 PROCEDURE — 4040F PNEUMOC VAC/ADMIN/RCVD: CPT | Performed by: FAMILY MEDICINE

## 2021-10-28 PROCEDURE — G0439 PPPS, SUBSEQ VISIT: HCPCS | Performed by: FAMILY MEDICINE

## 2021-10-28 PROCEDURE — 3017F COLORECTAL CA SCREEN DOC REV: CPT | Performed by: FAMILY MEDICINE

## 2021-10-28 PROCEDURE — 3044F HG A1C LEVEL LT 7.0%: CPT | Performed by: FAMILY MEDICINE

## 2021-10-28 ASSESSMENT — PATIENT HEALTH QUESTIONNAIRE - PHQ9
SUM OF ALL RESPONSES TO PHQ QUESTIONS 1-9: 0
SUM OF ALL RESPONSES TO PHQ QUESTIONS 1-9: 0
SUM OF ALL RESPONSES TO PHQ9 QUESTIONS 1 & 2: 0
SUM OF ALL RESPONSES TO PHQ QUESTIONS 1-9: 0
2. FEELING DOWN, DEPRESSED OR HOPELESS: 0
1. LITTLE INTEREST OR PLEASURE IN DOING THINGS: 0

## 2021-10-28 ASSESSMENT — LIFESTYLE VARIABLES: HOW OFTEN DO YOU HAVE A DRINK CONTAINING ALCOHOL: 0

## 2021-10-28 NOTE — PROGRESS NOTES
Chief Complaint   Patient presents with    Medicare AWV    Follow-up     Chronic issues/DM2       History obtained from the patient. SUBJECTIVE:  Gilbert Jackson is a 66 y.o. female that presents today for     -DM2 PRIOR VISIT: USed to be on metformin for preDM. Stopped a while ago. Labs done last year showed DM2. She was to f/u with me and she never came in. Here a year later. Labs still c/w DM. Controlled. No polyphagia, uria or dypsia. UPDATE PRIOR VISIT: diet controlled. a1c good. Working on wt loss etc.      UPDATE PRIOR VISIT: diet controlled yet. a1c in range. wts about the same    UPDATE PRIOR VISIT: a1c stable. Diet controlled. wts about the same. + microal in OCT. Wanted to repeat today, but she couldn't void, order given    UPDATE PRIOR VISIT:   a1c stable  Diet controlled  Wts about the same  Repeat neg microal, + before     UPDATE TODAY:   Diet controlled  a1c at goal   wts the same    Wt Readings from Last 3 Encounters:   10/28/21 217 lb (98.4 kg)   04/26/21 214 lb 6.4 oz (97.3 kg)   03/08/21 210 lb 9.6 oz (95.5 kg)     Lab Results   Component Value Date    LABA1C 6.1 10/28/2021    LABA1C 6.6 04/26/2021    LABA1C 7.0 10/26/2020    LABA1C 7.1 10/15/2019    LABA1C 6.9 04/11/2019    LABA1C 7.0 10/11/2018    LABA1C 6.8 04/12/2018    LABA1C 6.9 09/21/2017    LABA1C 6.5 11/04/2016       -HTN:    HPI:    Taking meds as prescribed ?: yes  Tolerating well ?: yes  Side Effects ?: deneis  BP at home ?: <140/90  Working on TLCS ?: yes  Chest Pain/SOB/Palpitations? denies    BP Readings from Last 3 Encounters:   10/28/21 136/78   04/26/21 134/80   03/08/21 (!) 183/87         -HLD PRIOR VISIT: should be on statin, however intolerant of zocor as the last statin she took. States took other statins years ago, can't remember names, but they call caused myalgias. She refuses further statins. Lipids a little worse. Diet not as good. Plans to get this improved. UPDATE TODAY: no change in above.  Making diet changes. Refuses statins d/t side effects with all of them       -Low platelets PRIOR VISIT: chronic, no clear etiology and mild. Thought maybe d/t naproxen. Labs stable on f/u. Denies fevers, chills, night sweats or wt loss. UPDATE PRIOR VISIT:   Platelets a little lower than before  No bleeding or hematochezia. No petechia   No fevers, chills, night sweats or wt loss    UPDATE PRIOR VISIT:   platelets lower than a year ago  Had ordered f/u labs last year to get done, she never did  No bleeding, bruising or hemarthrosis  Denies fevers, chills or nights sweats     UPDATE TODAY:   Saw heme once  Neg w/u  She did not f/u  Wants me to monitor  May have mild ITP   No intervention, per heme, unless Plt <50K      -GERD:    HPI:    Taking meds as prescribed ?: yes  Tolerating well ?: yes  Side Effects ?: denies  Dysphagia ?: denies  Odynophagia ?: denies  Melena ?: denies  Working on TLCS ?: yes      -Colon Ca screening:  Due for colo  Wanted to see Hammad Piedra, not covered  Declines getting done right now d/t covid  No s/s colon dz  Discussed cologuard, still wants to wait. Declines any and all colorectal cancer screening again today      Age/Gender Health Maintenance    Lipid -   Lab Results   Component Value Date    CHOL 188 10/27/2021    CHOL 215 (H) 10/23/2020    CHOL 235 (H) 10/14/2019     Lab Results   Component Value Date    TRIG 163 10/27/2021    TRIG 182 (H) 10/23/2020    TRIG 220 (H) 10/14/2019     Lab Results   Component Value Date    HDL 43 10/27/2021    HDL 42 10/23/2020    HDL 38 (L) 10/14/2019     Lab Results   Component Value Date    LDLCALC 112 10/27/2021    LDLCALC 137 (H) 10/23/2020    LDLCALC 153 (H) 10/14/2019       Colon Cancer Screening - Somewhere within the last 10 years, was not [facility]. Awaiting records/due, ordered today (APR 2019)/pt declines OCT 2020/APR 2021/OCT 2021  Lung Cancer Screening (Age 54 to [de-identified] with 30 pack year hx, current smoker or quit within past 15 years) - never smoker. Tetanus - to get at pharmacy per medicare rules  Influenza Vaccine - Declines SEPT 2016/SEPT 2017/OCT 2018/OCT 2019/OCT 2020/OCT 2021  Pneumonia Vaccine - Declines SEPT 2016/SEPT 2017/OCT 2018/APR 2019/OCT 2019/OCT 2020/APR 2021  Shingrix - to get at pharmacy per medicare rules    Breast Cancer Screening - NEG APR 2021  Osteoporosis Screening - declines SEPT 2016, wants to discuss again in 1 year.  We discussed today and declines SEPT 2017/OCT 2018/APR 2019/OCT 2019/OCT 2020/APR 2021/OCT 2021    Diabetes Health Maintenance    A1C -   Lab Results   Component Value Date    LABA1C 6.1 10/28/2021    LABA1C 6.6 04/26/2021    LABA1C 7.0 10/26/2020    LABA1C 7.1 10/15/2019    LABA1C 6.9 04/11/2019    LABA1C 7.0 10/11/2018    LABA1C 6.8 04/12/2018    LABA1C 6.9 09/21/2017    LABA1C 6.5 11/04/2016       ACE/ARB - YES hyzaar  Eye - due, pt reminded  Foot - WNL OCT 2021  Microal/Cr - + OCT 2018/APR 2019  NEG OCT 2019    Lab Results   Component Value Date    LABMICR 2.20 10/27/2021    LABCREA 175.3 10/27/2021    MACRR 13 10/27/2021     Lab Results   Component Value Date    CREATINEUR 148.36 10/23/2020    MICROALBUR 2.3 (H) 10/23/2020    MALBCR 15.5 10/23/2020         eGFR -   No results found for: GFRESTIMATE  Lab Results   Component Value Date    LABGLOM 54 10/27/2021     No results found for: [facility]  Lab Results   Component Value Date    EGFRNONAA >60 10/23/2020     Lab Results   Component Value Date    EGFRAA >60 10/23/2020       Component      Latest Ref Rng & Units 10/14/2019           7:47 AM   EGFR IF NonAfrican American      >59 ml/min/1.73sq.m >60        Statin - statin intolerance/allergy      Current Outpatient Medications   Medication Sig Dispense Refill    omeprazole (PRILOSEC) 20 MG delayed release capsule TAKE 1 CAPSULE EVERY DAY 90 capsule 3    cloNIDine (CATAPRES) 0.2 MG tablet TAKE 1 TABLET TWICE DAILY 180 tablet 3    losartan-hydroCHLOROthiazide (HYZAAR) 100-25 MG per tablet TAKE 1 TABLET EVERY DAY 90 tablet 3    OLIVE LEAF PO Take 4 tablets by mouth daily      Methylsulfonylmethane (MSM) 1000 MG CAPS Take 4 tablets by mouth daily      ELDERBERRY PO Take 2 tablets by mouth daily      Calcium Carb-Cholecalciferol (CALCIUM + D3) 600-200 MG-UNIT TABS tablet Take 1 tablet by mouth 2 times daily 180 tablet 3    triamcinolone (KENALOG) 0.1 % cream Apply topically 2 times daily to affected area sparingly. 3 Tube 3    cetirizine (ZYRTEC) 10 MG tablet Take 10 mg by mouth daily      Multiple Vitamins-Minerals (MULTIVITAMIN ADULT PO) Take 1 tablet by mouth daily      Red Yeast Rice 600 MG CAPS Take 2 capsules by mouth 2 times daily      Omega-3 Fatty Acids (FISH OIL) 1000 MG CAPS Take 2,000 mg by mouth daily      Coenzyme Q10 10 MG CAPS Take 1 capsule by mouth daily       No current facility-administered medications for this visit. No orders of the defined types were placed in this encounter. All medications reviewed and reconciled, including OTC and herbal medications. Updated list given to patient. Patient Active Problem List    Diagnosis Date Noted    DM2 (diabetes mellitus, type 2) ([address]     Statin intolerance     GERD (gastroesophageal reflux disease)     Osteoarthritis     Allergic rhinitis     Angiolipoma of right kidney     Eczema     Hyperlipidemia     Obesity (BMI 30-39. 9)     Post-menopausal     Thrombocytopenia ([address]     Mixed hearing loss 09/17/2013    Meningi[address] ([facility] 12/26/2012     Noted MRI 2012. Never did f/u MRI. Told she didn't need to. Has 0 sxs. Denies HA, vision changes, lateralizing numbness or weakness.  Hypertension 12/10/2012       Past Medical History:   Diagnosis Date    Allergic rhinitis     Angiolipoma of right kidney     DM2 (diabetes mellitus, type 2) ([address]     Eczema     GERD (gastroesophageal reflux disease)     Hyperlipidemia     Hypertension     Meningioma ([address] 12/26/2012    Noted MRI 2012. Never did f/u MRI.  Told she didn't need to. Has 0 sxs. Denies HA, vision changes, lateralizing numbness or weakness.  Mixed hearing loss 2013    Obesity (BMI 30-39. 9)     Osteoarthritis     Post-menopausal     Statin intolerance        Past Surgical History:   Procedure Laterality Date     SECTION  1972     CHOLECYSTECTOMY  2004    COLONOSCOPY  2006    MYRINGOTOMY AND TYMPANOSTOMY TUBE PLACEMENT  2012    Dr Zehra Keane UPPER GASTROINTESTINAL ENDOSCOPY  2006       Allergies   Allergen Reactions    Biaxin [Clarithromycin] Nausea Only    Doxycycline Other (See Comments)     GI side effects    Keflex [Cephalexin] Other (See Comments)     Tongue Swelling    Norvasc [Amlodipine Besylate] Other (See Comments)     Nasuea, hot flashes    Statins Other (See Comments)     Myalgias all    Zetia [Ezetimibe] Other (See Comments)     Chest pain    Zocor [Simvastatin] Other (See Comments)     Myalgias       Social History     Tobacco Use    Smoking status: Never Smoker    Smokeless tobacco: Never Used   Substance Use Topics    Alcohol use: No       Family History   Problem Relation Age of Onset    Heart Disease Mother     Lung Cancer Mother     Osteoporosis Mother     Other Father         Did not know her father.  Other Maternal Grandmother         blood clot, no clear hx surrounding    Heart Attack Maternal Grandfather     Heart Attack Maternal Uncle 62    Breast Cancer Maternal Aunt 50    Breast Cancer Maternal Cousin 40         I have reviewed the patient's past medical history, past surgical history, allergies, medications, social and family history and I have made updates where appropriate.       Review of Systems  Positive responses are highlighted in bold    Constitutional:  Fever, Chills, Night Sweats, Fatigue, Unexpected changes in weight  HENT:  Ear pain, Tinnitus, Nosebleeds, Trouble swallowing, Hearing loss, Sore throat  Cardiovascular:  Chest Pain, Palpitations, Orthopnea, Paroxysmal Nocturnal Dyspnea  Respiratory:  Cough, Wheezing, Shortness of breath, Chest tightness, Apnea  Gastrointestinal:  Nausea, Vomiting, Diarrhea, Constipation, Heartburn, Blood in stool  Genitourinary:  Difficulty or painful urination, Flank pain, Change in frequency, Urgency  Skin:  Color change, Rash, Itching, Wound  Musculoskeletal:  Joint pain, Back pain, Gait problems, Joint swelling, Myalgias  Neurological:  Dizziness, Headaches, Presyncope, Numbness, Seizures, Tremors  Endocrine:  Heat Intolerance, Cold Intolerance, Polydipsia, Polyphagia, Polyuria      PHYSICAL EXAM:  Vitals:    10/28/21 1538   BP: 136/78   Pulse: 68   Resp: 12   Temp: 98.2 °F (36.8 °C)   TempSrc: Oral   Weight: 217 lb (98.4 kg)   Height: 5' 2.5\" (1.588 m)     Body mass index is 39.06 kg/m². Pain Score:   0 - No pain    VS Reviewed  General Appearance: A&O x 3, No acute distress,well developed and well- nourished  Head: normocephalic and atraumatic  Eyes: pupils equal, round, and reactive to light, extraocular eye movements intact, conjunctivae and eye lids without erythema  ENT: external ear and ear canal clear bilaterally, TMs intact and regular, nose without deformity, nasal mucosa and turbinates normal without polyps, oropharynx normal, dentition is normal for age  Neck: supple and non-tender without mass, no thyromegaly or thyroid nodules, no cervical lymphadenopathy  Pulmonary/Chest: clear to auscultation bilaterally- no wheezes, rales or rhonchi, normal air movement, no respiratory distress or retractions  Cardiovascular: S1 and S2 auscultated w/ RRR. No murmurs, rubs, clicks, or gallops, distal pulses intact. Abdomen: soft, non-tender, non-distended, bowl sounds physiologic,  no rebound or guarding, no masses or hernias noted. Liver and spleen without enlargement. Extremities: no cyanosis, clubbing or edema of the lower extremities. +2 PT/DP bilaterally.    Musculoskeletal: No joint swelling or gross deformity   Skin: warm and dry, no rash or erythema  Foot: Bilat 2+DP/PT bilaterally. Skin warm, dry and intact. Sensation normal throughout to touch and with monofilament. Results for POC orders placed in visit on 10/28/21   POCT glycosylated hemoglobin (Hb A1C)   Result Value Ref Range    Hemoglobin A1C 6.1 (H) 4.3 - 5.7 %       ASSESSMENT & PLAN  1. Type 2 diabetes mellitus without complication, without long-term current use of insulin (HCC)    At goal  con't diet control  F/u 6 months    - POCT glycosylated hemoglobin (Hb A1C)    2. Obesity (BMI 30-39. 9)    Discussed wt loss strategies    3. Essential hypertension    Stable  At goal  con't clonidine, hyzaar    4. Mixed hyperlipidemia    Improved from prior  con't diet changes  Declines statins, intolerant    5. Statin intolerance      6. Thrombocytopenia (HCC)    Saw heme x 1  Neg w/u  Felt may be mild ITP  No intervention unless plt <50K  Will repeat cbc 6 months, in call back cue    7. Gastroesophageal reflux disease, unspecified whether esophagitis present    Doing well  con't omeprazole    8. Screening for colon cancer    She wants to hold off yet. Is considering colo vs cologuard    Discussed colon cancer screening with patient today. The benefits and risks of having testing performed were discussed with patient. After a long and detailed discussion with the patient, the patient does not want to proceed with colon cancer screening at this time. Pt is aware of the risks of this decision, including earlier death and are accepting of this risk. DISPOSITION    Return in about 6 months (around 4/28/2022) for Follow-up Diabetes, follow-up on chronic medical conditions, sooner as needed. Swati released without restrictions.     Future Appointments   Date Time Provider Andrei Pedraza   4/28/2022  3:40 PM [address]     PATIENT COUNSELING    Barriers to learning and self management: none    Discussed use, benefit, and side effects of prescribed medications. Barriers to medication compliance addressed. All patient questions answered. Pt voiced understanding. Electronically signed by Hayden Viramontes DO on 10/28/2021 at 4:07 PM        Medicare Annual Wellness Visit  Name: Leonel Styles Date: 10/28/2021   MRN: [id number] Sex: Female   Age: 66 y.o. Ethnicity: Non- / Non    : 1950 Race: White (non-)      Jj Schultz is here for Medicare AWV and Follow-up (Chronic issues/DM2)    Screenings for behavioral, psychosocial and functional/safety risks, and cognitive dysfunction are all negative except as indicated below. These results, as well as other patient data from the [address] form, are documented in Flowsheets linked to this Encounter. Allergies   Allergen Reactions    Biaxin [Clarithromycin] Nausea Only    Doxycycline Other (See Comments)     GI side effects    Keflex [Cephalexin] Other (See Comments)     Tongue Swelling    Norvasc [Amlodipine Besylate] Other (See Comments)     Nasuea, hot flashes    Statins Other (See Comments)     Myalgias all    Zetia [Ezetimibe] Other (See Comments)     Chest pain    Zocor [Simvastatin] Other (See Comments)     Myalgias         Prior to Visit Medications    Medication Sig Taking?  Authorizing Provider   omeprazole (PRILOSEC) 20 MG delayed release capsule TAKE 1 CAPSULE EVERY DAY Yes Jacky Christy DO   cloNIDine (CATAPRES) 0.2 MG tablet TAKE 1 TABLET TWICE DAILY Yes Jacky Christy DO   losartan-hydroCHLOROthiazide (HYZAAR) 100-25 MG per tablet TAKE 1 TABLET EVERY DAY Yes Jacky Christy DO   OLIVE LEAF PO Take 4 tablets by mouth daily Yes Historical Provider, MD   Methylsulfonylmethane (MSM) 1000 MG CAPS Take 4 tablets by mouth daily Yes Historical Provider, MD   ELDERBERRY PO Take 2 tablets by mouth daily Yes Historical Provider, MD   Calcium Carb-Cholecalciferol (CALCIUM + D3) 600-200 MG-UNIT TABS tablet Take 1 tablet by mouth 2 times daily Yes Malcolm Helm, DO   triamcinolone (KENALOG) 0.1 % cream Apply topically 2 times daily to affected area sparingly. Yes Malcolm Helm, DO   cetirizine (ZYRTEC) 10 MG tablet Take 10 mg by mouth daily Yes Historical Provider, MD   Multiple Vitamins-Minerals (MULTIVITAMIN ADULT PO) Take 1 tablet by mouth daily Yes Historical Provider, MD   Red Yeast Rice 600 MG CAPS Take 2 capsules by mouth 2 times daily Yes Historical Provider, MD   Omega-3 Fatty Acids (FISH OIL) 1000 MG CAPS Take 2,000 mg by mouth daily Yes Historical Provider, MD   Coenzyme Q10 10 MG CAPS Take 1 capsule by mouth daily Yes Historical Provider, MD         Past Medical History:   Diagnosis Date    Allergic rhinitis     Angiolipoma of right kidney     DM2 (diabetes mellitus, type 2) ([facility]     Eczema     GERD (gastroesophageal reflux disease)     Hyperlipidemia     Hypertension     Meningioma ([facility] [address] [address] 12/26/2012    Noted MRI 2012. Never did f/u MRI. Told she didn't need to. Has 0 sxs. Denies HA, vision changes, lateralizing numbness or weakness.  Mixed hearing loss 9/17/2013    Obesity (BMI 30-39. 9)     Osteoarthritis     Post-menopausal     Statin intolerance        Past Surgical History:   Procedure Laterality Date   [address]    CHOLECYSTECTOMY  2004    COLONOSCOPY  05/2006    MYRINGOTOMY AND TYMPANOSTOMY TUBE PLACEMENT  12/07/2012    Dr Kristina Pak    TUBAL LIGATION      UPPER GASTROINTESTINAL ENDOSCOPY  05/2006         Family History   Problem Relation Age of Onset    Heart Disease Mother     Lung Cancer Mother     Osteoporosis Mother     Other Father         Did not know her father.      Other Maternal Grandmother         blood clot, no clear hx surrounding    Heart Attack Maternal Grandfather     Heart Attack Maternal Uncle 62    Breast Cancer Maternal Aunt 50    Breast Cancer Maternal Cousin 40       [facility] (Including outside providers/suppliers regularly involved in providing care):   Patient Care Team:  Wicho Frye DO as PCP - General (Family Medicine)  Wicho Frye DO as PCP - [facility]    Wt Readings from Last 3 Encounters:   10/28/21 217 lb (98.4 kg)   04/26/21 214 lb 6.4 oz (97.3 kg)   03/08/21 210 lb 9.6 oz (95.5 kg)     Vitals:    10/28/21 1538   BP: 136/78   Pulse: 68   Resp: 12   Temp: 98.2 °F (36.8 °C)   TempSrc: Oral   Weight: 217 lb (98.4 kg)   Height: 5' 2.5\" (1.588 m)     Body mass index is 39.06 kg/m². Based upon direct observation of the patient, evaluation of cognition reveals recent and remote memory intact. Patient's complete Health Risk Assessment and screening values have been reviewed and are found in Flowsheets. The following problems were reviewed today and where indicated follow up appointments were made and/or referrals ordered.     Positive Risk Factor Screenings with Interventions:           Health Habits/Nutrition:  Health Habits/Nutrition  Do you exercise for at least 20 minutes 2-3 times per week?: Yes  Have you lost any weight without trying in the past 3 months?: No  Do you eat only one meal per day?: No  Have you seen the dentist within the past year?: (!) No  Body mass index: (!) 39.05  Health Habits/Nutrition Interventions:  · Nutritional issues:  educational materials for healthy, well-balanced diet provided  · Dental exam overdue:  patient encouraged to make appointment with his/her dentist    Hearing/Vision:  No exam data present  Hearing/Vision  Do you or your family notice any trouble with your hearing that hasn't been managed with hearing aids?: (!) Yes  Do you have difficulty driving, watching TV, or doing any of your daily activities because of your eyesight?: No  Have you had an eye exam within the past year?: (!) No  Hearing/Vision Interventions:  · Hearing concerns:  patient declines any further evaluation/treatment for hearing issues  · Vision concerns:  patient encouraged to make appointment with his/her eye specialist      Personalized Preventive Plan   Current Health Maintenance Status  Immunization History   Administered Date(s) Administered    Td, unspecified formulation 10/27/2000        Health Maintenance   Topic Date Due    Diabetic retinal exam  Never done    COVID-19 Vaccine (1) Never done    Shingles Vaccine (1 of 2) Never done    DTaP/Tdap/Td vaccine (1 - Tdap) 10/28/2000    DEXA (modify frequency per FRAX score)  Never done    Pneumococcal 65+ years Vaccine (1 of 1 - PPSV23) Never done    Colon cancer screen colonoscopy  05/05/2016    Flu vaccine (1) Never done    Diabetic foot exam  10/26/2021    Annual Wellness Visit (AWV)  10/27/2021    A1C test (Diabetic or Prediabetic)  04/28/2022    Diabetic microalbuminuria test  10/27/2022    Lipid screen  10/27/2022    Potassium monitoring  10/27/2022    Creatinine monitoring  10/27/2022    Breast cancer screen  04/14/2023    Hepatitis A vaccine  Aged Out    Hib vaccine  Aged Out    Meningococcal (ACWY) vaccine  Aged Out    Hepatitis C screen  Discontinued     Recommendations for [employer] Due: see orders and patient instructions/AVS.  . Recommended screening schedule for the next 5-10 years is provided to the patient in written form: see Patient Instructions/AVS.    Denmarkgary Gonsalezann was seen today for medicare awv and follow-up. Diagnoses and all orders for this visit:      Routine general medical examination at a health care facility      Care plan reviewed with and given to patient.        Electronically signed by Wicho Frye DO on 10/28/2021 at 4:07 PM

## 2021-10-28 NOTE — PATIENT INSTRUCTIONS
Personalized Preventive Plan for [place] - 10/28/2021  Medicare offers a range of preventive health benefits. Some of the tests and screenings are paid in full while other may be subject to a deductible, co-insurance, and/or copay. Some of these benefits include a comprehensive review of your medical history including lifestyle, illnesses that may run in your family, and various assessments and screenings as appropriate. After reviewing your medical record and screening and assessments performed today your provider may have ordered immunizations, labs, imaging, and/or referrals for you. A list of these orders (if applicable) as well as your Preventive Care list are included within your After Visit Summary for your review. Other Preventive Recommendations:    · A preventive eye exam performed by an eye specialist is recommended every 1-2 years to screen for glaucoma; cataracts, macular degeneration, and other eye disorders. · A preventive dental visit is recommended every 6 months. · Try to get at least 150 minutes of exercise per week or 10,000 steps per day on a pedometer . · Order or download the FREE \"Exercise & Physical Activity: Your Everyday Guide\" from [employer]. Call 7-658.165.2994 or search [employer] online. · You need 2827-6939 mg of calcium and 3908-2432 IU of vitamin D per day. It is possible to meet your calcium requirement with diet alone, but a vitamin D supplement is usually necessary to meet this goal.  · When exposed to the sun, use a sunscreen that protects against both UVA and UVB radiation with an SPF of 30 or greater. Reapply every 2 to 3 hours or after sweating, drying off with a towel, or swimming. · Always wear a seat belt when traveling in a car. Always wear a helmet when riding a bicycle or motorcycle.

## 2021-11-05 ENCOUNTER — APPOINTMENT (OUTPATIENT)
Dept: INTERVENTIONAL RADIOLOGY/VASCULAR | Age: 71
DRG: 250 | End: 2021-11-05
Payer: MEDICARE

## 2021-11-05 ENCOUNTER — HOSPITAL ENCOUNTER (INPATIENT)
Age: 71
LOS: 2 days | Discharge: ANOTHER HOSPITAL | DRG: 250 | End: 2021-11-07
Attending: INTERNAL MEDICINE | Admitting: INTERNAL MEDICINE
Payer: MEDICARE

## 2021-11-05 ENCOUNTER — APPOINTMENT (OUTPATIENT)
Dept: CARDIAC CATH/INVASIVE PROCEDURES | Age: 71
DRG: 250 | End: 2021-11-05
Payer: MEDICARE

## 2021-11-05 ENCOUNTER — APPOINTMENT (OUTPATIENT)
Dept: GENERAL RADIOLOGY | Age: 71
DRG: 250 | End: 2021-11-05
Payer: MEDICARE

## 2021-11-05 DIAGNOSIS — I21.3 ACUTE ST ELEVATION MYOCARDIAL INFARCTION (STEMI), UNSPECIFIED ARTERY (HCC): ICD-10-CM

## 2021-11-05 DIAGNOSIS — I48.91 ATRIAL FIBRILLATION WITH RVR (HCC): Primary | ICD-10-CM

## 2021-11-05 DIAGNOSIS — R09.02 HYPOXIA: ICD-10-CM

## 2021-11-05 DIAGNOSIS — M54.50 ACUTE RIGHT-SIDED LOW BACK PAIN WITHOUT SCIATICA: ICD-10-CM

## 2021-11-05 DIAGNOSIS — M25.551 HIP PAIN, RIGHT: ICD-10-CM

## 2021-11-05 LAB
ACTIVATED CLOTTING TIME: 285 SECONDS (ref 1–150)
ALBUMIN SERPL-MCNC: 4.6 G/DL (ref 3.5–5.1)
ALP BLD-CCNC: 79 U/L (ref 38–126)
ALT SERPL-CCNC: 34 U/L (ref 11–66)
ANION GAP SERPL CALCULATED.3IONS-SCNC: 17 MEQ/L (ref 8–16)
AST SERPL-CCNC: 27 U/L (ref 5–40)
BASOPHILS # BLD: 0.6 %
BASOPHILS ABSOLUTE: 0 THOU/MM3 (ref 0–0.1)
BILIRUB SERPL-MCNC: 0.4 MG/DL (ref 0.3–1.2)
BILIRUBIN DIRECT: < 0.2 MG/DL (ref 0–0.3)
BUN BLDV-MCNC: 13 MG/DL (ref 7–22)
CALCIUM SERPL-MCNC: 9.6 MG/DL (ref 8.5–10.5)
CHLORIDE BLD-SCNC: 92 MEQ/L (ref 98–111)
CO2: 24 MEQ/L (ref 23–33)
CREAT SERPL-MCNC: 0.7 MG/DL (ref 0.4–1.2)
EOSINOPHIL # BLD: 0.2 %
EOSINOPHILS ABSOLUTE: 0 THOU/MM3 (ref 0–0.4)
ERYTHROCYTE [DISTWIDTH] IN BLOOD BY AUTOMATED COUNT: 13 % (ref 11.5–14.5)
ERYTHROCYTE [DISTWIDTH] IN BLOOD BY AUTOMATED COUNT: 48.6 FL (ref 35–45)
GFR SERPL CREATININE-BSD FRML MDRD: 82 ML/MIN/1.73M2
GLUCOSE BLD-MCNC: 151 MG/DL (ref 70–108)
GLUCOSE BLD-MCNC: 152 MG/DL (ref 70–108)
GLUCOSE BLD-MCNC: 212 MG/DL (ref 70–108)
HCT VFR BLD CALC: 44 % (ref 37–47)
HEMOGLOBIN: 14.1 GM/DL (ref 12–16)
HEPARIN UNFRACTIONATED: 0.52 U/ML (ref 0.3–0.7)
IMMATURE GRANS (ABS): 0.05 THOU/MM3 (ref 0–0.07)
IMMATURE GRANULOCYTES: 0.6 %
INR BLD: 1.1 (ref 0.85–1.13)
LV EF: 28 %
LV EF: 35 %
LVEF MODALITY: NORMAL
LVEF MODALITY: NORMAL
LYMPHOCYTES # BLD: 10.7 %
LYMPHOCYTES ABSOLUTE: 0.9 THOU/MM3 (ref 1–4.8)
MCH RBC QN AUTO: 32.3 PG (ref 26–33)
MCHC RBC AUTO-ENTMCNC: 32 GM/DL (ref 32.2–35.5)
MCV RBC AUTO: 100.9 FL (ref 81–99)
MONOCYTES # BLD: 3.7 %
MONOCYTES ABSOLUTE: 0.3 THOU/MM3 (ref 0.4–1.3)
MRSA SCREEN RT-PCR: NEGATIVE
NUCLEATED RED BLOOD CELLS: 0 /100 WBC
OSMOLALITY CALCULATION: 272.8 MOSMOL/KG (ref 275–300)
PLATELET # BLD: 87 THOU/MM3 (ref 130–400)
PLATELET ESTIMATE: ABNORMAL
PMV BLD AUTO: 14.6 FL (ref 9.4–12.4)
POTASSIUM SERPL-SCNC: 4.2 MEQ/L (ref 3.5–5.2)
RBC # BLD: 4.36 MILL/MM3 (ref 4.2–5.4)
SCAN OF BLOOD SMEAR: NORMAL
SEG NEUTROPHILS: 84.2 %
SEGMENTED NEUTROPHILS ABSOLUTE COUNT: 6.8 THOU/MM3 (ref 1.8–7.7)
SODIUM BLD-SCNC: 133 MEQ/L (ref 135–145)
TOTAL PROTEIN: 7.7 G/DL (ref 6.1–8)
TROPONIN T: 0.02 NG/ML
VANCOMYCIN RESISTANT ENTEROCOCCUS: NEGATIVE
WBC # BLD: 8.1 THOU/MM3 (ref 4.8–10.8)

## 2021-11-05 PROCEDURE — 93880 EXTRACRANIAL BILAT STUDY: CPT

## 2021-11-05 PROCEDURE — 96374 THER/PROPH/DIAG INJ IV PUSH: CPT

## 2021-11-05 PROCEDURE — 2500000003 HC RX 250 WO HCPCS

## 2021-11-05 PROCEDURE — 92920 PRQ TRLUML C ANGIOP 1ART&/BR: CPT | Performed by: INTERNAL MEDICINE

## 2021-11-05 PROCEDURE — C1761 HC CATH TRANSLUM INTRAVASCULAR LITHOTRIPSY CORONARY: HCPCS

## 2021-11-05 PROCEDURE — 92941 PRQ TRLML REVSC TOT OCCL AMI: CPT | Performed by: INTERNAL MEDICINE

## 2021-11-05 PROCEDURE — 93005 ELECTROCARDIOGRAM TRACING: CPT | Performed by: INTERNAL MEDICINE

## 2021-11-05 PROCEDURE — 6360000002 HC RX W HCPCS: Performed by: INTERNAL MEDICINE

## 2021-11-05 PROCEDURE — 71045 X-RAY EXAM CHEST 1 VIEW: CPT

## 2021-11-05 PROCEDURE — 82248 BILIRUBIN DIRECT: CPT

## 2021-11-05 PROCEDURE — 02703ZZ DILATION OF CORONARY ARTERY, ONE ARTERY, PERCUTANEOUS APPROACH: ICD-10-PCS | Performed by: INTERNAL MEDICINE

## 2021-11-05 PROCEDURE — B2111ZZ FLUOROSCOPY OF MULTIPLE CORONARY ARTERIES USING LOW OSMOLAR CONTRAST: ICD-10-PCS | Performed by: INTERNAL MEDICINE

## 2021-11-05 PROCEDURE — 85347 COAGULATION TIME ACTIVATED: CPT

## 2021-11-05 PROCEDURE — 93458 L HRT ARTERY/VENTRICLE ANGIO: CPT | Performed by: INTERNAL MEDICINE

## 2021-11-05 PROCEDURE — 72170 X-RAY EXAM OF PELVIS: CPT

## 2021-11-05 PROCEDURE — 2500000003 HC RX 250 WO HCPCS: Performed by: INTERNAL MEDICINE

## 2021-11-05 PROCEDURE — 85610 PROTHROMBIN TIME: CPT

## 2021-11-05 PROCEDURE — 4A023N7 MEASUREMENT OF CARDIAC SAMPLING AND PRESSURE, LEFT HEART, PERCUTANEOUS APPROACH: ICD-10-PCS | Performed by: INTERNAL MEDICINE

## 2021-11-05 PROCEDURE — 99223 1ST HOSP IP/OBS HIGH 75: CPT | Performed by: INTERNAL MEDICINE

## 2021-11-05 PROCEDURE — 6360000002 HC RX W HCPCS

## 2021-11-05 PROCEDURE — C1769 GUIDE WIRE: HCPCS

## 2021-11-05 PROCEDURE — C1725 CATH, TRANSLUMIN NON-LASER: HCPCS

## 2021-11-05 PROCEDURE — 80053 COMPREHEN METABOLIC PANEL: CPT

## 2021-11-05 PROCEDURE — 87641 MR-STAPH DNA AMP PROBE: CPT

## 2021-11-05 PROCEDURE — 85520 HEPARIN ASSAY: CPT

## 2021-11-05 PROCEDURE — 36415 COLL VENOUS BLD VENIPUNCTURE: CPT

## 2021-11-05 PROCEDURE — 87081 CULTURE SCREEN ONLY: CPT

## 2021-11-05 PROCEDURE — 87500 VANOMYCIN DNA AMP PROBE: CPT

## 2021-11-05 PROCEDURE — 85025 COMPLETE CBC W/AUTO DIFF WBC: CPT

## 2021-11-05 PROCEDURE — 6370000000 HC RX 637 (ALT 250 FOR IP): Performed by: EMERGENCY MEDICINE

## 2021-11-05 PROCEDURE — 84484 ASSAY OF TROPONIN QUANT: CPT

## 2021-11-05 PROCEDURE — 93005 ELECTROCARDIOGRAM TRACING: CPT | Performed by: PHYSICIAN ASSISTANT

## 2021-11-05 PROCEDURE — C1887 CATHETER, GUIDING: HCPCS

## 2021-11-05 PROCEDURE — B2151ZZ FLUOROSCOPY OF LEFT HEART USING LOW OSMOLAR CONTRAST: ICD-10-PCS | Performed by: INTERNAL MEDICINE

## 2021-11-05 PROCEDURE — 93971 EXTREMITY STUDY: CPT

## 2021-11-05 PROCEDURE — 2000000000 HC ICU R&B

## 2021-11-05 PROCEDURE — 93005 ELECTROCARDIOGRAM TRACING: CPT | Performed by: EMERGENCY MEDICINE

## 2021-11-05 PROCEDURE — 93306 TTE W/DOPPLER COMPLETE: CPT

## 2021-11-05 PROCEDURE — C1894 INTRO/SHEATH, NON-LASER: HCPCS

## 2021-11-05 PROCEDURE — 96375 TX/PRO/DX INJ NEW DRUG ADDON: CPT

## 2021-11-05 PROCEDURE — 82948 REAGENT STRIP/BLOOD GLUCOSE: CPT

## 2021-11-05 PROCEDURE — 6360000004 HC RX CONTRAST MEDICATION: Performed by: INTERNAL MEDICINE

## 2021-11-05 PROCEDURE — 99284 EMERGENCY DEPT VISIT MOD MDM: CPT

## 2021-11-05 PROCEDURE — APPSS180 APP SPLIT SHARED TIME > 60 MINUTES: Performed by: NURSE PRACTITIONER

## 2021-11-05 RX ORDER — METOPROLOL TARTRATE 5 MG/5ML
5 INJECTION INTRAVENOUS ONCE
Status: COMPLETED | OUTPATIENT
Start: 2021-11-05 | End: 2021-11-05

## 2021-11-05 RX ORDER — MORPHINE SULFATE 4 MG/ML
4 INJECTION, SOLUTION INTRAMUSCULAR; INTRAVENOUS ONCE
Status: DISCONTINUED | OUTPATIENT
Start: 2021-11-05 | End: 2021-11-07 | Stop reason: HOSPADM

## 2021-11-05 RX ORDER — MORPHINE SULFATE 4 MG/ML
4 INJECTION, SOLUTION INTRAMUSCULAR; INTRAVENOUS EVERY 4 HOURS PRN
Status: DISCONTINUED | OUTPATIENT
Start: 2021-11-05 | End: 2021-11-07 | Stop reason: HOSPADM

## 2021-11-05 RX ORDER — HEPARIN SODIUM 1000 [USP'U]/ML
19 INJECTION, SOLUTION INTRAVENOUS; SUBCUTANEOUS PRN
Status: DISCONTINUED | OUTPATIENT
Start: 2021-11-05 | End: 2021-11-07 | Stop reason: HOSPADM

## 2021-11-05 RX ORDER — PANTOPRAZOLE SODIUM 40 MG/1
40 TABLET, DELAYED RELEASE ORAL
Status: DISCONTINUED | OUTPATIENT
Start: 2021-11-06 | End: 2021-11-07 | Stop reason: HOSPADM

## 2021-11-05 RX ORDER — ACETAMINOPHEN 325 MG/1
650 TABLET ORAL EVERY 4 HOURS PRN
Status: DISCONTINUED | OUTPATIENT
Start: 2021-11-05 | End: 2021-11-07 | Stop reason: HOSPADM

## 2021-11-05 RX ORDER — HEPARIN SODIUM 1000 [USP'U]/ML
38 INJECTION, SOLUTION INTRAVENOUS; SUBCUTANEOUS PRN
Status: DISCONTINUED | OUTPATIENT
Start: 2021-11-05 | End: 2021-11-07 | Stop reason: HOSPADM

## 2021-11-05 RX ORDER — HEPARIN SODIUM 10000 [USP'U]/100ML
5-30 INJECTION, SOLUTION INTRAVENOUS CONTINUOUS
Status: DISCONTINUED | OUTPATIENT
Start: 2021-11-05 | End: 2021-11-07 | Stop reason: HOSPADM

## 2021-11-05 RX ORDER — ASPIRIN 81 MG/1
81 TABLET, CHEWABLE ORAL DAILY
Status: DISCONTINUED | OUTPATIENT
Start: 2021-11-06 | End: 2021-11-07 | Stop reason: HOSPADM

## 2021-11-05 RX ORDER — SODIUM CHLORIDE 9 MG/ML
25 INJECTION, SOLUTION INTRAVENOUS PRN
Status: DISCONTINUED | OUTPATIENT
Start: 2021-11-05 | End: 2021-11-07 | Stop reason: HOSPADM

## 2021-11-05 RX ORDER — DEXTROSE MONOHYDRATE 25 G/50ML
12.5 INJECTION, SOLUTION INTRAVENOUS PRN
Status: DISCONTINUED | OUTPATIENT
Start: 2021-11-05 | End: 2021-11-07 | Stop reason: HOSPADM

## 2021-11-05 RX ORDER — ONDANSETRON 2 MG/ML
4 INJECTION INTRAMUSCULAR; INTRAVENOUS EVERY 6 HOURS PRN
Status: DISCONTINUED | OUTPATIENT
Start: 2021-11-05 | End: 2021-11-07 | Stop reason: HOSPADM

## 2021-11-05 RX ORDER — METOPROLOL TARTRATE 5 MG/5ML
INJECTION INTRAVENOUS
Status: COMPLETED
Start: 2021-11-05 | End: 2021-11-05

## 2021-11-05 RX ORDER — DILTIAZEM HYDROCHLORIDE 5 MG/ML
INJECTION INTRAVENOUS
Status: COMPLETED
Start: 2021-11-05 | End: 2021-11-05

## 2021-11-05 RX ORDER — SODIUM CHLORIDE 0.9 % (FLUSH) 0.9 %
5-40 SYRINGE (ML) INJECTION PRN
Status: DISCONTINUED | OUTPATIENT
Start: 2021-11-05 | End: 2021-11-07 | Stop reason: HOSPADM

## 2021-11-05 RX ORDER — ONDANSETRON 2 MG/ML
4 INJECTION INTRAMUSCULAR; INTRAVENOUS ONCE
Status: DISCONTINUED | OUTPATIENT
Start: 2021-11-05 | End: 2021-11-07 | Stop reason: HOSPADM

## 2021-11-05 RX ORDER — ASPIRIN 81 MG/1
324 TABLET, CHEWABLE ORAL ONCE
Status: COMPLETED | OUTPATIENT
Start: 2021-11-05 | End: 2021-11-05

## 2021-11-05 RX ORDER — FENTANYL CITRATE 50 UG/ML
25 INJECTION, SOLUTION INTRAMUSCULAR; INTRAVENOUS
Status: DISCONTINUED | OUTPATIENT
Start: 2021-11-05 | End: 2021-11-07 | Stop reason: HOSPADM

## 2021-11-05 RX ORDER — DILTIAZEM HYDROCHLORIDE 5 MG/ML
5 INJECTION INTRAVENOUS ONCE
Status: COMPLETED | OUTPATIENT
Start: 2021-11-05 | End: 2021-11-05

## 2021-11-05 RX ORDER — NICOTINE POLACRILEX 4 MG
15 LOZENGE BUCCAL PRN
Status: DISCONTINUED | OUTPATIENT
Start: 2021-11-05 | End: 2021-11-07 | Stop reason: HOSPADM

## 2021-11-05 RX ORDER — ESMOLOL HYDROCHLORIDE 10 MG/ML
50-300 INJECTION, SOLUTION INTRAVENOUS CONTINUOUS
Status: DISCONTINUED | OUTPATIENT
Start: 2021-11-05 | End: 2021-11-05

## 2021-11-05 RX ORDER — SODIUM CHLORIDE 0.9 % (FLUSH) 0.9 %
5-40 SYRINGE (ML) INJECTION EVERY 12 HOURS SCHEDULED
Status: DISCONTINUED | OUTPATIENT
Start: 2021-11-05 | End: 2021-11-07 | Stop reason: HOSPADM

## 2021-11-05 RX ORDER — MORPHINE SULFATE 2 MG/ML
INJECTION, SOLUTION INTRAMUSCULAR; INTRAVENOUS
Status: COMPLETED
Start: 2021-11-05 | End: 2021-11-05

## 2021-11-05 RX ORDER — HEPARIN SODIUM 1000 [USP'U]/ML
38 INJECTION, SOLUTION INTRAVENOUS; SUBCUTANEOUS ONCE
Status: DISCONTINUED | OUTPATIENT
Start: 2021-11-05 | End: 2021-11-07 | Stop reason: HOSPADM

## 2021-11-05 RX ORDER — DEXTROSE MONOHYDRATE 50 MG/ML
100 INJECTION, SOLUTION INTRAVENOUS PRN
Status: DISCONTINUED | OUTPATIENT
Start: 2021-11-05 | End: 2021-11-07 | Stop reason: HOSPADM

## 2021-11-05 RX ORDER — MORPHINE SULFATE 2 MG/ML
2 INJECTION, SOLUTION INTRAMUSCULAR; INTRAVENOUS EVERY 4 HOURS PRN
Status: DISCONTINUED | OUTPATIENT
Start: 2021-11-05 | End: 2021-11-05

## 2021-11-05 RX ADMIN — MORPHINE SULFATE 2 MG: 2 INJECTION, SOLUTION INTRAMUSCULAR; INTRAVENOUS at 13:49

## 2021-11-05 RX ADMIN — AMIODARONE HYDROCHLORIDE 0.5 MG/MIN: 1.8 INJECTION, SOLUTION INTRAVENOUS at 21:27

## 2021-11-05 RX ADMIN — HEPARIN SODIUM 9.44 UNITS/KG/HR: 10000 INJECTION, SOLUTION INTRAVENOUS at 12:06

## 2021-11-05 ASSESSMENT — PAIN DESCRIPTION - ORIENTATION
ORIENTATION: RIGHT
ORIENTATION: MID;LOWER

## 2021-11-05 ASSESSMENT — PAIN - FUNCTIONAL ASSESSMENT
PAIN_FUNCTIONAL_ASSESSMENT: PREVENTS OR INTERFERES SOME ACTIVE ACTIVITIES AND ADLS
PAIN_FUNCTIONAL_ASSESSMENT: PREVENTS OR INTERFERES SOME ACTIVE ACTIVITIES AND ADLS

## 2021-11-05 ASSESSMENT — PAIN SCALES - GENERAL
PAINLEVEL_OUTOF10: 5
PAINLEVEL_OUTOF10: 10
PAINLEVEL_OUTOF10: 5
PAINLEVEL_OUTOF10: 10
PAINLEVEL_OUTOF10: 3
PAINLEVEL_OUTOF10: 5

## 2021-11-05 ASSESSMENT — ENCOUNTER SYMPTOMS
BACK PAIN: 0
TROUBLE SWALLOWING: 0
SHORTNESS OF BREATH: 0
VOMITING: 0
SORE THROAT: 0
CHEST TIGHTNESS: 0
WHEEZING: 0
ABDOMINAL PAIN: 0
CONSTIPATION: 0
DIARRHEA: 0
BACK PAIN: 1
COLOR CHANGE: 0
NAUSEA: 1
NAUSEA: 0
RHINORRHEA: 0
COUGH: 0
PHOTOPHOBIA: 0

## 2021-11-05 ASSESSMENT — PAIN DESCRIPTION - PAIN TYPE
TYPE: ACUTE PAIN
TYPE: CHRONIC PAIN

## 2021-11-05 ASSESSMENT — PAIN DESCRIPTION - FREQUENCY
FREQUENCY: CONTINUOUS
FREQUENCY: CONTINUOUS
FREQUENCY: INTERMITTENT
FREQUENCY: CONTINUOUS

## 2021-11-05 ASSESSMENT — PAIN DESCRIPTION - PROGRESSION
CLINICAL_PROGRESSION: GRADUALLY WORSENING
CLINICAL_PROGRESSION: GRADUALLY IMPROVING

## 2021-11-05 ASSESSMENT — PAIN DESCRIPTION - DESCRIPTORS
DESCRIPTORS: SHARP
DESCRIPTORS: ACHING;CONSTANT;DISCOMFORT
DESCRIPTORS: CONSTANT
DESCRIPTORS: CONSTANT

## 2021-11-05 ASSESSMENT — PAIN DESCRIPTION - ONSET
ONSET: ON-GOING

## 2021-11-05 ASSESSMENT — PAIN DESCRIPTION - LOCATION
LOCATION: BACK
LOCATION: BACK;HIP
LOCATION: BACK;HIP
LOCATION: BACK;ABDOMEN

## 2021-11-05 NOTE — ED NOTES
Abnormal EKG; Dr. William Sims and NESSA Grande at bedside to assess.       Valeri Carroll RN  11/05/21 0454

## 2021-11-05 NOTE — H&P
[facility]  Cardiology Consult        Patient:  Galina Valerio  YOB: 1950  MRN: [id number]     Acct: [de-identified]    PCP: Garland Santoro DO    Date of Admission: 11/5/2021      Reason for Admission:        History Of Present Illness:    66 y.o. pleasant female with history of hypertension, diabetes, hyperlipidemia who presented to the hospital with complaints of hip pain and nausea. As per patient she has been feeling fatigued and short of breath for over 7 days. She did follow-up with her PCP and had a physical done which was apparently normal.  Underwent labs which were normal.  Today she presented to the ED due to severe hip pain which was 10 out of 10. She does not report any chest pain or shortness of breath. In the ER she was noted to be in A. fib with RVR at a rate of 160 bpm with electrocardiogram showing signs of inferior ST elevation with lateral ST depressions. STEMI was called. Patient denies any anginal symptoms but does look pale and mildly short of breath. We decided to proceed with ischemic evaluation due to electrocardiogram findings. She received IV heparin in the ER as well as IV metoprolol to decrease the heart rate. Past Medical History:          Diagnosis Date    Allergic rhinitis     Angiolipoma of right kidney     DM2 (diabetes mellitus, type 2) (HCC)     Eczema     GERD (gastroesophageal reflux disease)     Hyperlipidemia     Hypertension     Meningioma ([address] 12/26/2012    Noted MRI 2012. Never did f/u MRI. Told she didn't need to. Has 0 sxs. Denies HA, vision changes, lateralizing numbness or weakness.  Mixed hearing loss 9/17/2013    Obesity (BMI 30-39. 9)     Osteoarthritis     Post-menopausal     Statin intolerance        Past Surgical History:          Procedure Laterality Date   [address]    CHOLECYSTECTOMY  2004    COLONOSCOPY  05/2006    MYRINGOTOMY AND TYMPANOSTOMY TUBE PLACEMENT 12/07/2012    Dr Hurd Mo ENDOSCOPY  05/2006       Medications Prior to Admission:      Prior to Admission medications    Medication Sig Start Date End Date Taking? Authorizing Provider   omeprazole (PRILOSEC) 20 MG delayed release capsule TAKE 1 CAPSULE EVERY DAY 8/19/21  Yes Adrian Guidry DO   cloNIDine (CATAPRES) 0.2 MG tablet TAKE 1 TABLET TWICE DAILY 8/19/21  Yes Adrian Guidry DO   losartan-hydroCHLOROthiazide (HYZAAR) 100-25 MG per tablet TAKE 1 TABLET EVERY DAY 7/9/21  Yes Adrian Guidry DO   Methylsulfonylmethane (MSM) 1000 MG CAPS Take 4 tablets by mouth daily   Yes Historical Provider, MD   Calcium Carb-Cholecalciferol (CALCIUM + D3) 600-200 MG-UNIT TABS tablet Take 1 tablet by mouth 2 times daily 10/26/20  Yes Jacky Christy DO   triamcinolone (KENALOG) 0.1 % cream Apply topically 2 times daily to affected area sparingly.  10/26/20  Yes Adrian Guidry DO   cetirizine (ZYRTEC) 10 MG tablet Take 10 mg by mouth daily   Yes Historical Provider, MD   Multiple Vitamins-Minerals (MULTIVITAMIN ADULT PO) Take 1 tablet by mouth daily   Yes Historical Provider, MD   Omega-3 Fatty Acids (FISH OIL) 1000 MG CAPS Take 2,000 mg by mouth daily   Yes Historical Provider, MD   Coenzyme Q10 10 MG CAPS Take 1 capsule by mouth daily   Yes Historical Provider, MD   OLIVE LEAF PO Take 4 tablets by mouth daily    Historical Provider, MD   ELDERBERRY PO Take 2 tablets by mouth daily    Historical Provider, MD   Red Yeast Rice 600 MG CAPS Take 2 capsules by mouth 2 times daily    Historical Provider, MD       Current Facility-Administered Medications   Medication Dose Route Frequency Provider Last Rate Last Admin    morphine injection 4 mg  4 mg IntraVENous Once Nadia Lanes, PA-C        ondansetron [facility]) injection 4 mg  4 mg IntraVENous Once Nadia Lanes, PA-C         Current Outpatient Medications   Medication Sig Dispense Refill    omeprazole (PRILOSEC) 20 MG delayed release capsule TAKE 1 CAPSULE EVERY DAY 90 capsule 3    cloNIDine (CATAPRES) 0.2 MG tablet TAKE 1 TABLET TWICE DAILY 180 tablet 3    losartan-hydroCHLOROthiazide (HYZAAR) 100-25 MG per tablet TAKE 1 TABLET EVERY DAY 90 tablet 3    Methylsulfonylmethane (MSM) 1000 MG CAPS Take 4 tablets by mouth daily      Calcium Carb-Cholecalciferol (CALCIUM + D3) 600-200 MG-UNIT TABS tablet Take 1 tablet by mouth 2 times daily 180 tablet 3    triamcinolone (KENALOG) 0.1 % cream Apply topically 2 times daily to affected area sparingly. 3 Tube 3    cetirizine (ZYRTEC) 10 MG tablet Take 10 mg by mouth daily      Multiple Vitamins-Minerals (MULTIVITAMIN ADULT PO) Take 1 tablet by mouth daily      Omega-3 Fatty Acids (FISH OIL) 1000 MG CAPS Take 2,000 mg by mouth daily      Coenzyme Q10 10 MG CAPS Take 1 capsule by mouth daily      OLIVE LEAF PO Take 4 tablets by mouth daily      ELDERBERRY PO Take 2 tablets by mouth daily      Red Yeast Rice 600 MG CAPS Take 2 capsules by mouth 2 times daily         Allergies:  Biaxin [clarithromycin], Doxycycline, Keflex [cephalexin], Norvasc [amlodipine besylate], Statins, Zetia [ezetimibe], and Zocor [simvastatin]    Social History:    TOBACCO:   reports that she has never smoked. She has never used smokeless tobacco.  ETOH:   reports no history of alcohol use. Family History:        Problem Relation Age of Onset    Heart Disease Mother     Lung Cancer Mother     Osteoporosis Mother     Other Father         Did not know her father.  Other Maternal Grandmother         blood clot, no clear hx surrounding    Heart Attack Maternal Grandfather     Heart Attack Maternal Uncle 62    Breast Cancer Maternal Aunt 50    Breast Cancer Maternal Cousin 44         Review of Systems -   General ROS: negative  Psychological ROS: negative  Hematological and Lymphatic ROS: No history of blood clots or bleeding disorder.    Respiratory ROS: no cough, shortness of breath, or wheezing  Cardiovascular ROS: As per HPI  Gastrointestinal ROS: negative  Genito-Urinary ROS: no dysuria, trouble voiding, or hematuria  Musculoskeletal ROS: negative  Neurological ROS: no TIA or stroke symptoms  Dermatological ROS: negative    All others reviewed and are negative. /78   Pulse 147   Temp 98.3 °F (36.8 °C) (Oral)   Resp 24   Wt 215 lb (97.5 kg)   SpO2 97%   BMI 38.70 kg/m²       Physical Examination:   General appearance - alert,pale  Mental status - alert, oriented to person, place, and time  Neck - supple, no significant adenopathy, no JVD, or carotid bruits  Chest - clear to auscultation, no wheezes, rales or rhonchi, symmetric air entry  Heart - tachy, irregular rhythm, normal S1, S2, no murmurs, rubs, clicks or gallops  Abdomen - soft, nontender, nondistended, no masses or organomegaly  Neurological - alert, oriented, normal speech, no focal findings or movement disorder noted  Musculoskeletal - no joint tenderness, deformity or swelling  Extremities - peripheral pulses normal, no pedal edema, no clubbing or cyanosis  Skin - normal coloration and turgor, no rashes, no suspicious skin lesions noted      LABS:    No results for input(s): CKTOTAL, CKMB, CKMBINDEX, TROPONINT in the last 72 hours.   CBC:   Lab Results   Component Value Date    WBC 8.1 11/05/2021    RBC 4.36 11/05/2021    RBC 4.57 11/09/2020    HGB 14.1 11/05/2021    HCT 44.0 11/05/2021    .9 11/05/2021    MCH 32.3 11/05/2021    MCHC 32.0 11/05/2021    RDW 13.4 11/09/2020    PLT 82 10/27/2021    MPV 14.3 10/27/2021     BMP:    Lab Results   Component Value Date     11/05/2021    K 4.2 11/05/2021    CL 92 11/05/2021    CO2 24 11/05/2021    BUN 13 11/05/2021    LABALBU 4.5 10/27/2021    CREATININE 0.7 11/05/2021    CALCIUM 9.6 11/05/2021    LABGLOM 82 11/05/2021    GLUCOSE 212 11/05/2021    GLUCOSE 146 10/23/2020     Hepatic Function Panel:    Lab Results   Component Value Date ALKPHOS 73 10/27/2021    ALT 21 10/27/2021    AST 45 10/27/2021    PROT 7.0 10/27/2021    BILITOT 0.5 10/27/2021    LABALBU 4.5 10/27/2021     Magnesium:  No results found for: MG  Warfarin PT/INR:  No components found for: PTPATWAR, PTINRWAR  HgBA1c:    Lab Results   Component Value Date    LABA1C 6.1 10/28/2021    LABA1C 7.1 10/15/2019     FLP:    Lab Results   Component Value Date    TRIG 163 10/27/2021    HDL 43 10/27/2021    LDLCALC 112 10/27/2021    LABVLDL 36 10/23/2020     TSH:    Lab Results   Component Value Date    TSH 1.760 10/27/2021     BNP: No components found for: PRO-BNP      Assessment/Plan:    Patient Active Problem List   Diagnosis    Hypertension    Meningioma (HCC)    Mixed hearing loss    GERD (gastroesophageal reflux disease)    Osteoarthritis    Allergic rhinitis    Angiolipoma of right kidney    Eczema    Hyperlipidemia    Obesity (BMI 30-39. 9)    Post-menopausal    Thrombocytopenia (HCC)    DM2 (diabetes mellitus, type 2) (HCC)    Statin intolerance     Afib with RVR  ST Elevations/abnormal EKG  Nausea  HTN  HLD  DM    Telemetry  IV heparin  IV cardizem/BB  Aspirin  2D Echo  Consider DCCV if BP drops  The indication, risks and benefits of the procedure and possible therapeutic consequences and alternatives were discussed with the patient. The patient was given the opportunity to ask questions and to have them answered to his/her satisfaction. Risks of the procedure include but are not limited to the following: Bleeding, hematoma including retroperitoneal hematoma, infection, pain and discomfort, injury to the aorta and other blood vessels, rhythm disturbance, low blood pressure, myocardial infarction, stroke, kidney damage/failure, myocardial perforation, allergic reactions to sedatives/contrast material, loss of pulse/vascular compromise requiring surgery, aneurysm/pseudoaneurysm formation, possible loss of a limb/hand/leg, death.  Alternatives to and omission of the suggested procedure were discussed. The patient had no further questions and wished to proceed; the consent form was signed. Please do note hesitate to contact me for any further questions. Thank you for the opportunity to be involved in this patient's care.     Code Status: No Order    Electronically signed by Arpita Tracey MD on 11/5/2021 at 11:23 AM

## 2021-11-05 NOTE — PROGRESS NOTES
Cardiovascular Surgery Progress Note      66year old female, no previous history of CAD, presents with NSTEMI in context of 3v CAD and new onset atrial fibrillation  Scheduled for CABG x 3 / maze as 2nd case Monday, 11/8  TTE pending  Full consult note to follow

## 2021-11-05 NOTE — ED PROVIDER NOTES
Attending Supervising Physicians Attestation Statement  I was present with the physician assistant during the history and exam. I discussed the findings and plans with the physician assistant and agree as documented in her note      Patient arrived to the emergency department today and initially reported right hip pain which has been worsening over the past week, denied recent fall or trauma. She reports that she \"spit up a little bit\" which she states happens occasionally due to history of celiac disease. She also has felt quite fatigued for the last week. She denies chest pain, abdominal pain, palpitations, syncope or near syncope. She denies past history of atrial fibrillation, history of coronary artery disease, MI, history of stenting, cardiac catheterization or stress test.  She reports history of hypertension, diabetes, hyperlipidemia and states mother had a history of coronary artery disease. Denies tobacco use. EKG was brought to me, concerning for A. fib RVR in the 160s and some inferior changes and lateral, initial EKG also had some artifact present so requested stat repeat EKG. Second EKG more clearly shows inferior ST elevation and lateral ST depressions therefore STEMI alert called at that time. Patient was seen emergently in the emergency department by Dr. Reji Davila, decision made to proceed with emergent cardiac catheterization. Patient received 324 of aspirin, IV metoprolol and heparin and was transported to the Cath Lab. Plan was discussed with patient and her  at bedside who voiced understanding and agreement.     Critical Care  Performed by: Deirdre Renteria MD  Authorized by: Fela Montes MD     Critical care provider statement:     Critical care time (minutes):  25    Critical care was necessary to treat or prevent imminent or life-threatening deterioration of the following conditions:  Cardiac failure    Critical care was time spent personally by me on the following activities: Development of treatment plan with patient or surrogate, discussions with consultants, evaluation of patient's response to treatment, obtaining history from patient or surrogate, examination of patient, ordering and performing treatments and interventions, pulse oximetry, re-evaluation of patient's condition and review of old charts    I assumed direction of critical care for this patient from another provider in my specialty: no    Comments:      STEMI alert, a fib with RVR new onset, no previous cardiac history, taken to cath lab emergently        Electronically signed by Yogi Mandujano MD on 11/5/21 at 11:47 AM EDT          Maryuri Rayo MD  11/05/21 1536

## 2021-11-05 NOTE — ED TRIAGE NOTES
Pt to ED via private vehicle w/rprts of R hip and lower back pain, intermittent nausea and dry heaving. Rprts hx of celiac disease. Pt presents to ED alert and oriented x4. Breathing easy and unlabored on RA; 88%. Rprts SOB w/exertion. Placed on 2L for improved oxygen level of 97%. Pt placed on cardiac monitor and in gown.

## 2021-11-05 NOTE — CONSULTS
CRITICAL CARE CONSULT NOTE      Patient:  William Martinez    Unit/Bed:4D-15/015-A  YOB: 1950  MRN: [id number]   PCP: Aleisa Denny DO  Date of Admission: 11/5/2021  Chief Complaint:- Hip pain    Assessment and Plan:      1. Acute hypoxic respiratory failure: Patient requiring 2 L nasal cannula. No oxygen at baseline. Monitor. Wean as tolerated. 2. STEMI: Patient underwent emergent catheterization which showed multivessel disease. Patient to undergo open heart surgery. Heparin drip continues. 3. CAD: Status post cardiac cath with need for open heart surgery. 4. New onset atrial fibrillation: On heparin drip. Amiodarone started. Continues to have uncontrolled rate. Cardiology has been updated. They will monitor. 5. Hypertension: Currently on esmolol drip. Cardiology managing. 6. Acute on chronic bilateral hip: Patient states that she does have chronic pain but is increased at this time. She states at home she does take pain medication and lays on a magnetic pad to decrease her pain. She states she has not seen a doctor for this in the past.  Hip x-rays pending. 7. Chronic thrombocytopenia: Appears chronic in nature. Patient denies any history. Monitor closely. 8. Diabetes mellitus: Appears uncontrolled. Patient states that she manages via diet. Her glucose was 212. A1c was obtained in the outpatient setting which did show A1c of 6.1 on 10/28. I did explain the patient that she would likely need insulin while hospitalized. I explained the indication for for insulin while patient is preparing for surgery. She voiced understanding. INITIAL H AND P AND ICU COURSE:    Manuela Dias is a 76-year-old white female who presented to [facility] on 11/5/2020 once with complaints of hip pain and nausea. She has a past medical history of lifetime non-smoker, hypertension, GERD, obesity, diabetes mellitus type 2.   Per report patient states that she had been fatigued and short of breath over the last 7 days. She states that she went to her PCP and her physical was normal.  She underwent labs which were normal.  Today she presented to the emergency department with severe hip pain 10 out of 10. She does state that she has no chest pain. In the ER she was noted to be in A. fib with RVR with a rate of 160, EKG showed ST elevation and lateral ST depression. STEMI was called. She had no symptoms of chest pain. She was taken for emergent cath. It showed multivessel disease. She did present to the ICU after cardiac cath. Dr. Krista Soto was consulted for cardiac surgery. Past Medical History: Per HPI  Family History: Mother: Heart disease lung cancer, osteoporosis  Social History: Lifetime non-smoker, denies alcohol use, denies drug use    Review of Systems   Constitutional: Negative for fatigue and fever. HENT: Negative for sore throat and trouble swallowing. Eyes: Negative for photophobia and visual disturbance. Respiratory: Negative for chest tightness, shortness of breath and wheezing. Cardiovascular: Negative for chest pain and leg swelling. Gastrointestinal: Negative for abdominal pain, nausea and vomiting. Endocrine: Negative for polydipsia and polyphagia. Genitourinary: Negative for decreased urine volume and flank pain. Musculoskeletal: Negative for back pain and neck pain. Skin: Negative for color change, pallor and rash. Allergic/Immunologic: Negative for food allergies. Neurological: Negative for dizziness, weakness and headaches. Hematological: Negative for adenopathy. Psychiatric/Behavioral: Negative for agitation and confusion. The patient is not nervous/anxious.         Scheduled Meds:   morphine  4 mg IntraVENous Once    ondansetron  4 mg IntraVENous Once    sodium chloride flush  5-40 mL IntraVENous 2 times per day    [START ON 11/6/2021] aspirin  81 mg Oral Daily    heparin (porcine)  38 Units/kg IntraVENous Once 5:05 PM

## 2021-11-05 NOTE — ED PROVIDER NOTES
[facility] EMERGENCY DEPT      CHIEF COMPLAINT       Chief Complaint   Patient presents with    Back Pain    Nausea       Nurses Notes reviewed and I agree except as noted in the HPI. HISTORY OF PRESENT ILLNESS    Swati Sanots is a 66 y.o. female who presents for Right LBP and Nausea. She reports right sided \"excruciating\" buttock pain that radiates to her right hip. She also reports feeling nauseous and \"spit up\" this morning X 1. The pain started a week ago and has been getting worse. She denies any radiation to the right leg or left sided LBP. She notes she always has LBP and leg pain bilaterally but this pain feels different and is worse than her usual. Pain is worse with movement. She notes fatigue for the past week. No hx of cardiac conditions. FHx of MI in her mother. Pertinent PMHx includes hyperlipidemia, hypertension, and DM. Nonsmoker. She denies chest pain, palpitations, tachycardia, SOB, cough, dizziness, HA, diaphoresis, abdominal pain, fever, chills, constipation, diarrhea,, numbness, and tingling. REVIEW OF SYSTEMS     Review of Systems   Constitutional: Positive for fatigue. Negative for chills, diaphoresis and fever. HENT: Negative for congestion, postnasal drip and rhinorrhea. Eyes: Negative for photophobia. Respiratory: Negative for cough, chest tightness and shortness of breath. Cardiovascular: Negative for chest pain and palpitations. Gastrointestinal: Positive for nausea. Negative for abdominal pain, constipation, diarrhea and vomiting. No incontinence of bowel    Genitourinary: Negative for difficulty urinating, flank pain and pelvic pain. No incontinence of bladder   Musculoskeletal: Positive for back pain. Negative for arthralgias. Skin: Negative for rash. Neurological: Negative for dizziness, weakness, light-headedness, numbness and headaches. Psychiatric/Behavioral: Negative for confusion.         PAST MEDICAL HISTORY    has a past medical history of Allergic rhinitis, Angiolipoma of right kidney, DM2 (diabetes mellitus, type 2) ([facility] [address] Eczema, GERD (gastroesophageal reflux disease), Hyperlipidemia, Hypertension, Meningioma (HCC), Mixed hearing loss, Obesity (BMI 30-39.9), Osteoarthritis, Post-menopausal, and Statin intolerance. SURGICAL HISTORY      has a past surgical history that includes Cholecystectomy ();  section ([id number]); Myringotomy Tympanostomy Tube Placement (2012); Upper gastrointestinal endoscopy (2006); Colonoscopy (2006); and Tubal ligation. CURRENT MEDICATIONS       Current Discharge Medication List      CONTINUE these medications which have NOT CHANGED    Details   omeprazole (PRILOSEC) 20 MG delayed release capsule TAKE 1 CAPSULE EVERY DAY  Qty: 90 capsule, Refills: 3    Associated Diagnoses: Gastroesophageal reflux disease      cloNIDine (CATAPRES) 0.2 MG tablet TAKE 1 TABLET TWICE DAILY  Qty: 180 tablet, Refills: 3    Associated Diagnoses: Essential hypertension      losartan-hydroCHLOROthiazide (HYZAAR) 100-25 MG per tablet TAKE 1 TABLET EVERY DAY  Qty: 90 tablet, Refills: 3    Associated Diagnoses: Essential hypertension      Methylsulfonylmethane (MSM) 1000 MG CAPS Take 4 tablets by mouth daily      Calcium Carb-Cholecalciferol (CALCIUM + D3) 600-200 MG-UNIT TABS tablet Take 1 tablet by mouth 2 times daily  Qty: 180 tablet, Refills: 3    Associated Diagnoses: Osteopenia of multiple sites      triamcinolone (KENALOG) 0.1 % cream Apply topically 2 times daily to affected area sparingly.   Qty: 3 Tube, Refills: 3    Associated Diagnoses: Dermatitis      cetirizine (ZYRTEC) 10 MG tablet Take 10 mg by mouth daily      Multiple Vitamins-Minerals (MULTIVITAMIN ADULT PO) Take 1 tablet by mouth daily      Omega-3 Fatty Acids (FISH OIL) 1000 MG CAPS Take 2,000 mg by mouth daily      Coenzyme Q10 10 MG CAPS Take 1 capsule by mouth daily      OLIVE LEAF PO Take 4 tablets by mouth daily      ELDERBERRY PO Take 2 tablets by mouth daily      Red Yeast Rice 600 MG CAPS Take 2 capsules by mouth 2 times daily             ALLERGIES     is allergic to biaxin [clarithromycin], doxycycline, keflex [cephalexin], norvasc [amlodipine besylate], statins, zetia [ezetimibe], and zocor [simvastatin]. FAMILY HISTORY     She indicated that her mother is . She indicated that the status of her father is unknown. She indicated that her sister is alive. She indicated that the status of her maternal grandmother is unknown. She indicated that the status of her maternal grandfather is unknown. She indicated that her maternal aunt is . She indicated that her maternal uncle is . She indicated that her maternal cousin is . family history includes Breast Cancer (age of onset: 40) in her maternal cousin; Breast Cancer (age of onset: 50) in her maternal aunt; Heart Attack in her maternal grandfather; Heart Attack (age of onset: 62) in her maternal uncle; Heart Disease in her mother; Coralyn Peace in her mother; Osteoporosis in her mother; Other in her father and maternal grandmother. SOCIAL HISTORY    reports that she has never smoked. She has never used smokeless tobacco. She reports that she does not drink alcohol and does not use drugs. PHYSICAL EXAM     INITIAL VITALS:  height is 5' 6\" (1.676 m) and weight is 226 lb 10.1 oz (102.8 kg). Her rectal temperature is 98 °F (36.7 °C). Her blood pressure is 133/101 (abnormal) and her pulse is 108. Her respiration is 24 and oxygen saturation is 92%. Physical Exam  Constitutional:       Appearance: She is well-developed. She is morbidly obese. She is ill-appearing. Comments: Pallor   HENT:      Head: Normocephalic and atraumatic. Right Ear: Hearing normal.      Left Ear: Hearing normal.      Nose: Nose normal. No rhinorrhea. Mouth/Throat:      Lips: Pink. Eyes:      General: Lids are normal.      Extraocular Movements: Extraocular movements intact. Conjunctiva/sclera: Conjunctivae normal.   Neck:      Trachea: Trachea normal.   Cardiovascular:      Rate and Rhythm: Tachycardia present. Rhythm irregularly irregular. Pulses: Normal pulses. Radial pulses are 2+ on the right side and 2+ on the left side. Dorsalis pedis pulses are 2+ on the right side and 2+ on the left side. Pulmonary:      Effort: Tachypnea present. Breath sounds: Normal air entry. Comments: Increased respiratory effort. Using supplemental oxygen via nasal canula. Abdominal:      General: Abdomen is protuberant. There is no distension. Tenderness: There is no abdominal tenderness. There is no guarding or rebound. Comments: No pulsatile mass   Musculoskeletal:      Cervical back: Normal range of motion and neck supple. Lumbar back: Tenderness (Right SI joint/buttock tenderness) present. No bony tenderness. Back:       Right hip: Decreased range of motion. Right lower le+ Edema present. Left lower le+ Edema present. Comments: Well perfused; movement normal as observed. Skin:     General: Skin is warm and dry. Coloration: Skin is pale. Findings: No rash. Neurological:      General: No focal deficit present. Mental Status: She is alert. Mental status is at baseline. Sensory: Sensation is intact. Motor: Motor function is intact.    Psychiatric:         Mood and Affect: Mood normal.         Speech: Speech normal.         Behavior: Behavior normal.         DIFFERENTIAL DIAGNOSIS:   Including but not limited to: Degenerative disc disease, bursitis, lumbar radiculopathy, AAA, MI, new onset A. fib    DIAGNOSTIC RESULTS     EKG: All EKG's are interpreted [facility] who either signs or Co-signs this chart in the absence of a cardiologist.  Ventricular Rate  P    QRS Duration ms 92 P    Q-T Interval ms 286 P    QTc Calculation (Bazett) ms 456 P    R Axis degrees 1 P    T Axis degrees 150 P    Narrative & Impression    Atrial fibrillation with rapid ventricular response  Low voltage QRS, consider pulmonary disease, pericardial effusion, or normal variant  Inferior infarct Possibly acute  Cannot rule out Anterior infarct New  ** ** ACUTE MI / STEMI ** **  Consider right ventricular involvement in acute inferior infarct  Abnormal ECG  When compared with ECG of 05-NOV-2021 10:27,  Acute Anterior infarct is now Present  No significant change was found         RADIOLOGY: non-plain film images(s) such as CT,Ultrasound and MRI are read by the radiologist.  Plain radiographic images are visualized and preliminarily interpreted by the emergency physician unless otherwise stated below.   XR CHEST PORTABLE    (Results Pending)       LABS:   Labs Reviewed   CBC WITH AUTO DIFFERENTIAL - Abnormal; Notable for the following components:       Result Value    .9 (*)     MCHC 32.0 (*)     RDW-SD 48.6 (*)     Platelets 87 (*)     MPV 14.6 (*)     Lymphocytes Absolute 0.9 (*)     Monocytes Absolute 0.3 (*)     All other components within normal limits   BASIC METABOLIC PANEL - Abnormal; Notable for the following components:    Sodium 133 (*)     Chloride 92 (*)     Glucose 212 (*)     All other components within normal limits   TROPONIN - Abnormal; Notable for the following components:    Troponin T 0.022 (*)     All other components within normal limits   OSMOLALITY - Abnormal; Notable for the following components:    Osmolality Calc 272.8 (*)     All other components within normal limits   ANION GAP - Abnormal; Notable for the following components:    Anion Gap 17.0 (*)     All other components within normal limits   GLOMERULAR FILTRATION RATE, ESTIMATED - Abnormal; Notable for the following components:    Est, Glom Filt Rate 82 (*)     All other components within normal limits   POCT ACTIVATED CLOTTING TIME - Abnormal; Notable for the following components:    Activated Clotting Time 285 (*)     All other components within normal limits   CULTURE, MRSA, SCREENING   VRE SCREEN BY PCR   HEPATIC FUNCTION PANEL   SCAN OF BLOOD SMEAR   CBC   APTT   PROTIME-INR   HEPARIN LEVEL/ANTI-XA   MRSA BY PCR       EMERGENCY DEPARTMENT COURSE:   Vitals:    Vitals:    11/05/21 1021 11/05/21 1035 11/05/21 1230   BP:  100/78 (!) 133/101   Pulse: 158 147 108   Resp:  24 24   Temp:  98.3 °F (36.8 °C) 98 °F (36.7 °C)   TempSrc:  Oral Rectal   SpO2: (!) 88% 97% 92%   Weight:  215 lb (97.5 kg) 226 lb 10.1 oz (102.8 kg)   Height:   5' 6\" (1.676 m)       Patient was seen in the emergency department during the global pandemic, when there was surge capacity and regional healthcare crisis. MDM:  The patient was seen by me in the emergency room for right sided buttock/hip pain and nausea. Physical exam revealed tachycardia, normal pulses, tachypnea, pallor, and increased respiratory effort. Vital signs reviewed and noted tachycardia. Old records were reviewed. Appropriate laboratory and imaging studies were ordered and reviewed upon completion. Pertinent findings: EKG showed STEMI in the inferior wall. Interventions: ASA and SL nitroglycerin given. Interventional cardiology consulted. Heparin, Metoprolol and Diltiazem given. Patient was taken to Cath Lab. Results were discussed with the patient and spouse as well as desire for immediate cardiac catheterization and they were amenable. Dr. Andi Yan was in the department and took the patient immediately to the Cath Lab. CRITICAL CARE:   During my workup of this patient, it did become clear to me the critical nature of this patient's illness which did require my constant attention, and a critical care time 30 minutes was given    CONSULTS:  Dr. Marlena Pace (interventional cardiology)    PROCEDURES:  None    FINAL IMPRESSION      1. Atrial fibrillation with RVR ([facility] [address]    2. Acute ST elevation myocardial infarction (STEMI), unspecified artery ([facility] [address]    3.  Hip pain, right    4. Acute right-sided low back pain without sciatica    5. Hypoxia          DISPOSITION/PLAN     1. Atrial fibrillation with RVR ([facility] [address]    2. Acute ST elevation myocardial infarction (STEMI), unspecified artery ([facility] [address]    3. Hip pain, right    4. Acute right-sided low back pain without sciatica    5.  Hypoxia    To Cath Lab-admission      (Please note that portions of this note were completed with a voice recognition program.  Efforts were made to edit the dictations but occasionally words are mis-transcribed.)    Carloz Hitchcock PA-C 11/05/21 1:13 PM    KAYLA Flores PA-C  11/05/21 [id number]

## 2021-11-05 NOTE — OP NOTE
[id number] [id number]  Sedation/Analgesia Post Sedation Record        Pt Name: Cecy Peres  MRN: [id number]  YOB: 1950  Procedure Performed By: Juany Islas MD MD, [address]  Primary Care Physician: Ron Reyes,     POST-PROCEDURE    Sedation/Anesthesia:  Local Anesthesia and IV Conscious Sedation with continuous O2 monitoring    Estimated Blood Loss: 10 cc     Specimens Removed:  [x]None []Other:      Disposition of Specimen:  []Pathology []Other        Complications:   [x]None Immediate []Other:       Procedure performed: Left heart cath, angioplasty of proximal RCA    Post Procedure Diagnosis/Findings: Severely calcified multivessel CAD       Recommendations:    Needs CTS evaluation for possible CABG  Attempted PCA of proximal RCA but due to severe calcification the procedure was aborted   Needs good rate control  IV Heparin  Monitor symptoms and hemodynamics  Discussed case extensively with family              Juany Islas MD MD, [address]  Electronically signed 11/5/2021 at 12:55 PM

## 2021-11-06 LAB
ANION GAP SERPL CALCULATED.3IONS-SCNC: 12 MEQ/L (ref 8–16)
BUN BLDV-MCNC: 17 MG/DL (ref 7–22)
CALCIUM SERPL-MCNC: 9.1 MG/DL (ref 8.5–10.5)
CHLORIDE BLD-SCNC: 95 MEQ/L (ref 98–111)
CO2: 27 MEQ/L (ref 23–33)
CREAT SERPL-MCNC: 0.6 MG/DL (ref 0.4–1.2)
EKG ATRIAL RATE: 72 BPM
EKG P AXIS: 39 DEGREES
EKG P-R INTERVAL: 174 MS
EKG Q-T INTERVAL: 252 MS
EKG Q-T INTERVAL: 286 MS
EKG Q-T INTERVAL: 364 MS
EKG QRS DURATION: 82 MS
EKG QRS DURATION: 92 MS
EKG QRS DURATION: 92 MS
EKG QTC CALCULATION (BAZETT): 398 MS
EKG QTC CALCULATION (BAZETT): 404 MS
EKG QTC CALCULATION (BAZETT): 456 MS
EKG R AXIS: -14 DEGREES
EKG R AXIS: 1 DEGREES
EKG R AXIS: 38 DEGREES
EKG T AXIS: 146 DEGREES
EKG T AXIS: 150 DEGREES
EKG T AXIS: 150 DEGREES
EKG VENTRICULAR RATE: 153 BPM
EKG VENTRICULAR RATE: 155 BPM
EKG VENTRICULAR RATE: 72 BPM
ERYTHROCYTE [DISTWIDTH] IN BLOOD BY AUTOMATED COUNT: 13.2 % (ref 11.5–14.5)
ERYTHROCYTE [DISTWIDTH] IN BLOOD BY AUTOMATED COUNT: 47.2 FL (ref 35–45)
GFR SERPL CREATININE-BSD FRML MDRD: > 90 ML/MIN/1.73M2
GLUCOSE BLD-MCNC: 135 MG/DL (ref 70–108)
GLUCOSE BLD-MCNC: 146 MG/DL (ref 70–108)
GLUCOSE BLD-MCNC: 163 MG/DL (ref 70–108)
GLUCOSE BLD-MCNC: 168 MG/DL (ref 70–108)
GLUCOSE BLD-MCNC: 173 MG/DL (ref 70–108)
HCT VFR BLD CALC: 37.7 % (ref 37–47)
HEMOGLOBIN: 12.5 GM/DL (ref 12–16)
HEPARIN UNFRACTIONATED: 0.06 U/ML (ref 0.3–0.7)
HEPARIN UNFRACTIONATED: 0.34 U/ML (ref 0.3–0.7)
HEPARIN UNFRACTIONATED: 0.54 U/ML (ref 0.3–0.7)
HEPARIN UNFRACTIONATED: 0.86 U/ML (ref 0.3–0.7)
MCH RBC QN AUTO: 32.9 PG (ref 26–33)
MCHC RBC AUTO-ENTMCNC: 33.2 GM/DL (ref 32.2–35.5)
MCV RBC AUTO: 99.2 FL (ref 81–99)
PLATELET # BLD: 78 THOU/MM3 (ref 130–400)
PMV BLD AUTO: 13.8 FL (ref 9.4–12.4)
POTASSIUM SERPL-SCNC: 4.3 MEQ/L (ref 3.5–5.2)
RBC # BLD: 3.8 MILL/MM3 (ref 4.2–5.4)
SARS-COV-2, NAAT: NOT  DETECTED
SODIUM BLD-SCNC: 134 MEQ/L (ref 135–145)
WBC # BLD: 8.9 THOU/MM3 (ref 4.8–10.8)

## 2021-11-06 PROCEDURE — 80048 BASIC METABOLIC PNL TOTAL CA: CPT

## 2021-11-06 PROCEDURE — 6370000000 HC RX 637 (ALT 250 FOR IP): Performed by: NURSE PRACTITIONER

## 2021-11-06 PROCEDURE — 36415 COLL VENOUS BLD VENIPUNCTURE: CPT

## 2021-11-06 PROCEDURE — 2500000003 HC RX 250 WO HCPCS: Performed by: INTERNAL MEDICINE

## 2021-11-06 PROCEDURE — 99291 CRITICAL CARE FIRST HOUR: CPT | Performed by: SURGERY

## 2021-11-06 PROCEDURE — 82948 REAGENT STRIP/BLOOD GLUCOSE: CPT

## 2021-11-06 PROCEDURE — 87635 SARS-COV-2 COVID-19 AMP PRB: CPT

## 2021-11-06 PROCEDURE — 36592 COLLECT BLOOD FROM PICC: CPT

## 2021-11-06 PROCEDURE — 37799 UNLISTED PX VASCULAR SURGERY: CPT

## 2021-11-06 PROCEDURE — 6370000000 HC RX 637 (ALT 250 FOR IP): Performed by: INTERNAL MEDICINE

## 2021-11-06 PROCEDURE — 6360000002 HC RX W HCPCS: Performed by: INTERNAL MEDICINE

## 2021-11-06 PROCEDURE — 2000000000 HC ICU R&B

## 2021-11-06 PROCEDURE — 99223 1ST HOSP IP/OBS HIGH 75: CPT | Performed by: THORACIC SURGERY (CARDIOTHORACIC VASCULAR SURGERY)

## 2021-11-06 PROCEDURE — 85520 HEPARIN ASSAY: CPT

## 2021-11-06 PROCEDURE — 2500000003 HC RX 250 WO HCPCS: Performed by: SURGERY

## 2021-11-06 PROCEDURE — 2580000003 HC RX 258: Performed by: INTERNAL MEDICINE

## 2021-11-06 PROCEDURE — 85027 COMPLETE CBC AUTOMATED: CPT

## 2021-11-06 RX ORDER — METOPROLOL TARTRATE 5 MG/5ML
5 INJECTION INTRAVENOUS EVERY 6 HOURS
Status: DISCONTINUED | OUTPATIENT
Start: 2021-11-06 | End: 2021-11-07 | Stop reason: HOSPADM

## 2021-11-06 RX ADMIN — HEPARIN SODIUM 12 UNITS/KG/HR: 10000 INJECTION, SOLUTION INTRAVENOUS at 11:40

## 2021-11-06 RX ADMIN — HEPARIN SODIUM 3910 UNITS: 1000 INJECTION INTRAVENOUS; SUBCUTANEOUS at 01:20

## 2021-11-06 RX ADMIN — AMIODARONE HYDROCHLORIDE 0.5 MG/MIN: 1.8 INJECTION, SOLUTION INTRAVENOUS at 21:13

## 2021-11-06 RX ADMIN — PANTOPRAZOLE SODIUM 40 MG: 40 TABLET, DELAYED RELEASE ORAL at 05:12

## 2021-11-06 RX ADMIN — METOPROLOL TARTRATE 5 MG: 5 INJECTION INTRAVENOUS at 20:46

## 2021-11-06 RX ADMIN — ASPIRIN 81 MG CHEWABLE TABLET 81 MG: 81 TABLET CHEWABLE at 08:57

## 2021-11-06 RX ADMIN — SODIUM CHLORIDE, PRESERVATIVE FREE 10 ML: 5 INJECTION INTRAVENOUS at 08:22

## 2021-11-06 RX ADMIN — AMIODARONE HYDROCHLORIDE 0.5 MG/MIN: 1.8 INJECTION, SOLUTION INTRAVENOUS at 08:38

## 2021-11-06 RX ADMIN — ACETAMINOPHEN 650 MG: 325 TABLET ORAL at 00:58

## 2021-11-06 ASSESSMENT — ENCOUNTER SYMPTOMS
COUGH: 0
BACK PAIN: 0
SHORTNESS OF BREATH: 0
ABDOMINAL DISTENTION: 0
CHEST TIGHTNESS: 0
EYE DISCHARGE: 0
EYE REDNESS: 0
ABDOMINAL PAIN: 0
BACK PAIN: 1
COLOR CHANGE: 0
NAUSEA: 0
SHORTNESS OF BREATH: 1

## 2021-11-06 ASSESSMENT — PAIN DESCRIPTION - FREQUENCY
FREQUENCY: CONTINUOUS
FREQUENCY: CONTINUOUS

## 2021-11-06 ASSESSMENT — PAIN DESCRIPTION - PROGRESSION
CLINICAL_PROGRESSION: NOT CHANGED
CLINICAL_PROGRESSION: NOT CHANGED

## 2021-11-06 ASSESSMENT — PAIN SCALES - GENERAL
PAINLEVEL_OUTOF10: 5
PAINLEVEL_OUTOF10: 5
PAINLEVEL_OUTOF10: 0

## 2021-11-06 ASSESSMENT — PAIN DESCRIPTION - PAIN TYPE
TYPE: CHRONIC PAIN
TYPE: CHRONIC PAIN

## 2021-11-06 ASSESSMENT — PAIN DESCRIPTION - LOCATION
LOCATION: BACK
LOCATION: BACK

## 2021-11-06 ASSESSMENT — PAIN DESCRIPTION - ONSET
ONSET: ON-GOING
ONSET: ON-GOING

## 2021-11-06 ASSESSMENT — PAIN DESCRIPTION - DESCRIPTORS
DESCRIPTORS: ACHING;CONSTANT;DISCOMFORT
DESCRIPTORS: ACHING;CONSTANT;DISCOMFORT

## 2021-11-06 ASSESSMENT — PAIN - FUNCTIONAL ASSESSMENT
PAIN_FUNCTIONAL_ASSESSMENT: ACTIVITIES ARE NOT PREVENTED
PAIN_FUNCTIONAL_ASSESSMENT: ACTIVITIES ARE NOT PREVENTED

## 2021-11-06 ASSESSMENT — PAIN DESCRIPTION - ORIENTATION
ORIENTATION: MID;LOWER
ORIENTATION: LOWER;MID

## 2021-11-06 NOTE — PROGRESS NOTES
of bleeding. 10. GERD, history of: Continue PPI. CC:  STEMI  HPI: Sadie Rush is a 67 y/o female never smoker with past medical history of diabetes mellitus type 2, hyperlipidemia, hypertension, GERD, obesity. Patient presented to [facility] [place], operated by [facility] on 11/5/2021 for complaints of hip pain and nausea. She had endorsed fatigue and dyspnea over the preceding 7 days. She was evaluated by her PCP and was told her physical was normal.  She underwent lab work at that time as well, and was told that these were normal as well. In the emergency department she complained of 10 out of 10 hip pain. She denied any chest pain. Telemetry revealed A. fib with RVR with a rate of 160.  12-lead ECG was attained showing inferior ST elevation and lateral ST depressions. STEMI was called. She was taken for emergent cath. It did show multivessel disease. Cardiothoracic surgery was consulted to consider open heart surgery. For her A. fib, patient was started on amiodarone. Patient presented to ICU post cath. For further details, please see assessment and plan. ROS: Review of Systems   Constitutional: Negative for chills, diaphoresis and fever. HENT: Negative for congestion and hearing loss. Eyes: Negative for redness and visual disturbance. Respiratory: Negative for cough, chest tightness and shortness of breath. Cardiovascular: Negative for chest pain and leg swelling. Gastrointestinal: Negative for abdominal distention, abdominal pain and nausea. Genitourinary: Negative for difficulty urinating and dysuria. Musculoskeletal: Negative for arthralgias, back pain, myalgias and neck pain. Skin: Negative for color change and pallor. Neurological: Negative for dizziness, facial asymmetry, weakness, light-headedness and headaches. Psychiatric/Behavioral: Negative for agitation and confusion. The patient is not nervous/anxious. PMH:  Per HPI  SHX: Never smoker.   Denies alcohol or substance use. FHX: Mother: Heart disease, lung cancer, osteoporosis  Allergies: Clarithromycin, doxycycline, Keflex, Norvasc, statins, Zetia, Zocor. Medications:     sodium chloride      heparin (PORCINE) Infusion 12 Units/kg/hr (11/06/21 1140)    amiodarone 0.5 mg/min (11/06/21 0838)    dextrose        morphine  4 mg IntraVENous Once    ondansetron  4 mg IntraVENous Once    sodium chloride flush  5-40 mL IntraVENous 2 times per day    aspirin  81 mg Oral Daily    heparin (porcine)  38 Units/kg IntraVENous Once    pantoprazole  40 mg Oral QAM AC    insulin lispro  0-6 Units SubCUTAneous TID WC    insulin lispro  0-3 Units SubCUTAneous Nightly       Vital Signs:   T: 98.1: P: 76 RR: 20 B/P: 137/71: FiO2: 5 L nasal cannula: O2 Sat: 100%: I/O: 1738/450 (+1288) GCS: 15  Body mass index is 36.24 kg/m². Estela Gip General:   Acute on chronically ill adult female. Appears stated age. HEENT:  normocephalic and atraumatic. No scleral icterus. PERR  Neck: supple. No Thyromegaly. Lungs: clear to auscultation. Unlabored. Normal rate, pattern. No retractions  Cardiac: RRR. S1/S2. No JVD. Abdomen: soft. Nontender. Extremities:  No clubbing, cyanosis, or edema x 4. Vasculature: capillary refill < 3 seconds. Palpable dorsalis pedis pulses. Skin:  warm and dry. Psych:  Alert and oriented x3. Affect appropriate  Lymph:  No supraclavicular adenopathy. Neurologic:  No focal deficit. No seizures. Data: (All radiographs, tracings, PFTs, and imaging are personally viewed and interpreted unless otherwise noted).  Telemetry: Normal sinus rhythm. Heart rate 76. No ectopy.  ECHO 11/5/21 report: Technically difficult examination. Left ventricular size is normal and systolic function is severely reduced. Ejection fraction was estimated at 25-30%. LV wall thickness is within normal limits. There was severe global hypokinesis of the left ventricle. Mild to Moderate mitral regurgitation is present.  Mild tricuspid regurgitation visualized. Right ventricular systolic pressure of 11-69 mm Hg consistent with mild pulmonary hypertension.  Sodium 134 potassium 4.3 chloride 95 CO2 27 BUN 17 creatinine 0.6 glucose 163 calcium 9.1   WBC 8.9 hemoglobin 12.5 hematocrit 37.7 platelets 78   Xray pelvis 11/5/21: No pelvic fracture.  Chest x-ray 11/5/2021 report: Cardiomegaly and increased pulmonary vascularity. Abnormal densities especially at both lung bases suggestive of infiltrates and possible effusions. Case discussed with Dr. Mita Gonzalez. Electronically signed by Edward Sauceda, ORAL-JAKE                                       Patient seen by me. Case discussed with NP.       CC time 35 minutes. Time was discontiguous. Time does not include procedures. Time does include my direct assessment of the patient and coordination of care. After cardiothoracic surgery evaluated the patient decision made not to perform CABG here as they feel she is high risk and will be better suited at a tertiary center due to low EF and hypokinesis. Patient's blood pressure elevated plan yesterday was for esmolol drip this was done as she is start Lopressor and monitor closely.   Transfer initiated to [facility]

## 2021-11-06 NOTE — CARE COORDINATION
11/6/21, 9:22 AM EDT  DISCHARGE PLANNING EVALUATION:    Christie Tran       Admitted: 11/5/2021/ [address] day: 1   Location: -15/015-A Reason for admit: STEM[facility]ST [facility] [I21.3]   PMH:  has a past medical history of Allergic rhinitis, Angiolipoma of right kidney, Celiac disease, DM2 (diabetes mellitus, type 2) ([facility] Eczema, GERD (gastroesophageal reflux disease), Hyperlipidemia, Hypertension, Meningioma ([facility] Mixed hearing loss, Obesity (BMI 30-39.9), Osteoarthritis, Post-menopausal, and Statin intolerance. Procedure:   11/5 CXR: Cardiomegaly and increased pulmonary vascularity; Abnormal densities especially at both lung bases suggestive of infiltrates and possible effusions  11/5 Cardiac Cath: angioplasty of proximal RCA and findings of severely calcified multivessel CAD  11/5 Bilateral carotid dopplers and Vein mapping  11/5 XR Pelvis: No pelvic fracture  11/5 Echo with EF 25-30%; severe global hypokinesis of LV; findings consistent with mild pulmonary hypertension    Barriers to Discharge: Presented with c/o hip pain and nausea. Reports feeling fatigued and SOB for over 7 days and had seen her PCP for that with workup and labs reportedly normal. In ED went into Afib  with EKG shower STEMI. Taken to cath lab; had angioplasty of RCA and found to have severely calcified multivessel CAD. CVS consulted; plan for possible transfer to tertiary center for [address]. Intensivist consulted for right hip and back pain. Admitted to ICU. Afebrile. NSR. Sats 96% on 5L O2. Ox4. Dietitian consulted. Dietary ileus prevention protocol. Telemetry, right arterial sheath, IS, external urinary cathter. Heparin drip, Amio @ 0.5 mg/min, asa, SSI, protonix. Na+ 134, trop 0.022, plt 87 - now 78.      PCP: Ladarius Durham DO  Readmission Risk Score: 10.6 ( )%    Patient Goals/Plan/Treatment Preferences: Spoke with Swati; states she lives at home with her  and did not use any DME or have any HH services PTA. She drives, cares for herself independently, and has a PCP. Plan pending clinical course; possible transfer to tertiary center for surgery. Transportation/Food Security/Housekeeping Addressed:  No issues identified.

## 2021-11-06 NOTE — CONSULTS
Cardiothoracic Surgery History & Physical       Patient:  Robbin Ovalle  YOB: 1950    MRN: [id number]     Acct: [de-identified]    PCP: Alie Smith DO    Date of Admission: 2021    Chief Complaint:    Chief Complaint   Patient presents with    Back Pain    Nausea       History Of Present Illness:  66 y.o. diabetic female admitted with right hip pain and nausea without chest pain. EKG in ED showed inferior wall STEMI, presumably incited by new onset atrial fibrillation with RVR, leading to LHC showing 3v CAD. Of note, proximal RCA lesion calcified, PTCA unsuccessful. Referred for CABG. Initial review of LHC showed high-grade fairly proximal 3v CAD, with good distal targets. Initial plan for CABG/maze. However, TTE surprisingly showed LVEF 20% with apical akinesis, and moderate-severe MR, with immobile posterior leaflet (likely calcified). Patient converted to SR with amiodarone, stable, no complaints. Past Medical History:          Diagnosis Date    Allergic rhinitis     Angiolipoma of right kidney     Celiac disease     DM2 (diabetes mellitus, type 2) ([facility]     Eczema     GERD (gastroesophageal reflux disease)     Hyperlipidemia     Hypertension     Meningioma ([facility] 2012    Noted MRI . Never did f/u MRI. Told she didn't need to. Has 0 sxs. Denies HA, vision changes, lateralizing numbness or weakness.  Mixed hearing loss 2013    Obesity (BMI 30-39. 9)     Osteoarthritis     Post-menopausal     Statin intolerance        Past Surgical History:          Procedure Laterality Date     SECTION  1972 1975    CHOLECYSTECTOMY  2004    COLONOSCOPY  2006    MYRINGOTOMY AND TYMPANOSTOMY TUBE PLACEMENT  2012    Dr Danita Salazar GASTROINTESTINAL ENDOSCOPY  2006       Medications Prior to Admission:      Prior to Admission medications    Medication Sig Start Date End Date Taking?  Authorizing Provider   omeprazole (PRILOSEC) 20 MG delayed release capsule TAKE 1 CAPSULE EVERY DAY 8/19/21  Yes Paulo Bunch DO   cloNIDine (CATAPRES) 0.2 MG tablet TAKE 1 TABLET TWICE DAILY 8/19/21  Yes Paulo Bunch DO   losartan-hydroCHLOROthiazide (HYZAAR) 100-25 MG per tablet TAKE 1 TABLET EVERY DAY 7/9/21  Yes Jacky Christy DO   OLIVE LEAF PO Take 4 tablets by mouth daily   Yes Historical Provider, MD   Methylsulfonylmethane (MSM) 1000 MG CAPS Take 4 tablets by mouth daily   Yes Historical Provider, MD   ELDERBERRY PO Take 2 tablets by mouth daily   Yes Historical Provider, MD   Calcium Carb-Cholecalciferol (CALCIUM + D3) 600-200 MG-UNIT TABS tablet Take 1 tablet by mouth 2 times daily 10/26/20  Yes Jacky Christy DO   triamcinolone (KENALOG) 0.1 % cream Apply topically 2 times daily to affected area sparingly. 10/26/20  Yes Paulo Bunch DO   cetirizine (ZYRTEC) 10 MG tablet Take 10 mg by mouth daily   Yes Historical Provider, MD   Multiple Vitamins-Minerals (MULTIVITAMIN ADULT PO) Take 1 tablet by mouth daily   Yes Historical Provider, MD   Red Yeast Rice 600 MG CAPS Take 2 capsules by mouth 2 times daily   Yes Historical Provider, MD   Omega-3 Fatty Acids (FISH OIL) 1000 MG CAPS Take 2,000 mg by mouth daily   Yes Historical Provider, MD   Coenzyme Q10 10 MG CAPS Take 1 capsule by mouth daily   Yes Historical Provider, MD       Allergies:  Biaxin [clarithromycin], Doxycycline, Keflex [cephalexin], Norvasc [amlodipine besylate], Statins, Zetia [ezetimibe], and Zocor [simvastatin]    Social History:      TOBACCO:   reports that she has never smoked. She has never used smokeless tobacco.  ETOH:   reports no history of alcohol use. Social History     Substance and Sexual Activity   Drug Use No         Family History:          Problem Relation Age of Onset    Heart Disease Mother     Lung Cancer Mother     Osteoporosis Mother     Other Father         Did not know her father.      Other Maternal Grandmother blood clot, no clear hx surrounding    Heart Attack Maternal Grandfather     Heart Attack Maternal Uncle 62    Breast Cancer Maternal Aunt 50    Breast Cancer Maternal Cousin 40       REVIEW OFSYSTEMS:      Review of Systems   Constitutional: Positive for activity change and fatigue. HENT: Negative for dental problem. Eyes: Negative for discharge. Respiratory: Positive for shortness of breath. Negative for chest tightness. Cardiovascular: Negative for chest pain and palpitations. Gastrointestinal: Negative for abdominal pain. Endocrine: Negative for cold intolerance. Genitourinary: Negative for difficulty urinating. Musculoskeletal: Positive for arthralgias and back pain. Skin: Negative for color change. Allergic/Immunologic: Negative for environmental allergies. Neurological: Negative for dizziness. Hematological: Negative for adenopathy. Psychiatric/Behavioral: Negative for agitation. PHYSICALEXAM:    /71   Pulse 67   Temp 97.8 °F (36.6 °C) (Oral)   Resp 19   Ht 5' 6\" (1.676 m)   Wt 224 lb 8 oz (101.8 kg)   SpO2 96%   BMI 36.24 kg/m²     General appearance:  No apparent distress, appears stated age and cooperative. HEENT:  Normal cephalic, atraumatic withoutobvious deformity. Pupils equal, round, and reactive to light. Extra ocular muscles intact. Conjunctivae/corneas clear. Neck: Supple, with full range of motion. No jugular venous distention. Trachea midline. Respiratory:  Normal respiratory effort. Bibasilar cracklesCardiovascular:  Regular rate and rhythm, 3/5 systolic decrescendo murmur  Abdomen: Soft,non-tender, non-distended with normal bowel sounds. Musculoskeletal:  No clubbing, cyanosis or edemabilaterally. Full range of motion without deformity. Skin: Skin color, texture, turgor normal.  No rashes orlesions. Neurologic:  Neurovascularly intact without any focal sensory/motor deficits.  Cranial nerves: II-XIIintact, grossly non-focal.  Psychiatric:  Alert and oriented, thought content appropriate, normal insight  Capillary Refill: Brisk,< 3 seconds   PeripheralPulses: +2 palpable, equal bilaterally       Labs:    Recent Labs     11/05/21  1040 11/06/21  0700   WBC 8.1 8.9   HGB 14.1 12.5   HCT 44.0 37.7   PLT 87* 78*     Recent Labs     11/05/21  1040 11/06/21  0700   * 134*   K 4.2 4.3   CL 92* 95*   CO2 24 27   BUN 13 17   CREATININE 0.7 0.6   CALCIUM 9.6 9.1     Recent Labs     11/05/21  1040   AST 27   ALT 34   BILIDIR <0.2   BILITOT 0.4   ALKPHOS 79     Recent Labs     11/05/21  1715   INR 1.10     No results for input(s): Ira Comber in the last 72 hours. Urinalysis:      Lab Results   Component Value Date    NITRU NEGATIVE 08/27/2015    WBCUA 0-2 08/27/2015    BACTERIA FEW 08/27/2015    RBCUA 0-2 08/27/2015    BLOODU NEGATIVE 08/27/2015    SPECGRAV 1.021 08/25/2014    GLUCOSEU NEGATIVE 08/27/2015       ECHO: Results for orders placed during the hospital encounter of 11/05/21    ECHO Complete 2D W Doppler W Color    Narrative  Transthoracic Echocardiography Report (TTE)    Demographics    Patient Name   Jasper Phelps Gender              Female  G    MR #           [id number]        Race                    Ethnicity    Account #      [de-identified]        Room Number         0015    Accession      [id number]       Date of Study       11/05/2021  Number    Date of Birth  1950       Referring Physician Olga Cedeno MD    Age            66 year(s)       Sonographer         Bhumi William  RVT    Interpreting        Florentin Chambers MD  Physician    Procedure    Type of Study    TTE procedure:ECHOCARDIOGRAM COMPLETE 2D W DOPPLER W COLOR. Procedure Date  Date: 11/05/2021 Start: 03:23 PM    Study Location: Bedside  Technical Quality: Limited visualization due to body habitus. Indications:Preop cardiac evaluation and STEMI.     Additional Medical History:STEMI, hyperlipidemia, limits. There was severe global hypokinesis of the left ventricle. Right Atrium  Right atrial size was normal.    Right Ventricle  The right ventricular size appears normal with normal systolic function  and wall thickness. Right ventricular systolic pressure of 49-50 mm Hg consistent with mild  pulmonary hypertension. Pericardial Effusion  The pericardium appears normal with no evidence of a pericardial effusion. Pleural Effusion  No evidence of pleural effusion. Aorta / Great Vessels  -Aortic root dimension within normal limits. -IVC size is within normal limits with normal respiratory phasic changes.     M-Mode/2D Measurements & Calculations    LV Diastolic   LV Systolic Dimension:    AV Cusp Separation: 1.3 cmLA  Dimension: 5   4.5 cm                    Dimension: 4.3 cmAO Root  cm             LV Volume Diastolic:      Dimension: 3.4 cmLA Area: 22.9  LV FS:10 %     151.3 ml                  cm^2  LV PW          LV Volume Systolic: 850  Diastolic: 0.9 ml  cm             LV EDV/LV EDV Index:  Septum         151.3 ml/76 m^2LV ESV/LV  RV Diastolic Dimension: 3.2 cm  Diastolic: 1   ESV Index: 281 ml/55 m^2  cm             EF Calculated: 28.6 %     LA/Aorta: 1.26    LV Length: 8.2 cm         LA volume/Index: 74.7 ml /38m^2    LV Area  Diastolic:  37.1 cm^2  LV Area  Systolic: 43.5  cm^2    Doppler Measurements & Calculations    MV Peak E-Wave: 124 cm/s       AV Peak Velocity:   LVOT Peak Velocity:  MV Peak A-Wave: 43.8 cm/s      105 cm/s            61.8 cm/s  MV E/A Ratio: 2.83             AV Peak Gradient:   LVOT Peak Gradient: 2  MV Peak Gradient: 6.15 mmHg    4.41 mmHg           mmHg    MV Deceleration Time: 183 msec                     TV Peak E-Wave: 67.3  MV P1/2t: 54 msec                                  cm/s  MVA by PHT:4.07 cm^2                               TV Peak A-Wave: 47.6  MV Area (PISA): 0.2 cm^2       IVRT: 106 msec      cm/s  MV E' Septal Velocity: 4.9  cm/s TV Peak Gradient: 1.81  MV A' Septal Velocity: 7.5     AV DVI (Vmax):0.59  mmHg  cm/s                                               TR Velocity:280 cm/s  MV E' Lateral Velocity: 8.7                        TR Gradient:31.36 mmHg  cm/s  MV A' Lateral Velocity: 6.7  cm/s  E/E' septal: 25.31                                 OK ED Velocity: 115  E/E' lateral: 14.25                                cm/s  MR Velocity: 415 cm/s  MV BHAVNA PISA: 0.36 cm^2  Alias Velocity: 36.9 cm/sPISA  Radius: 0.8 cm  MV ERO Volumetric: 0.2 cm^2  PISA area: 4.02 cm^2MR flow  rate: 148.34 ml/s    [email or web address] [id number]%3d%3d      Radiology:     CT chest: I have reviewed the CT chest images with the following interpretation: pending  Left heart cath:  I have reviewed the left heart catheterization images with the following interpretation: as per HPI      Code Status: Full Code      ASSESSMENT:    Active Hospital Problems    Diagnosis Date Noted    STEMI (ST elevation myocardial infarction) ([facility] [I21.3] 11/05/2021    Atrial fibrillation with RVR ([facility] [I48.91]        PLAN:  66year old diabetic female, s/p STEMI in context of 3v CAD and AF RVR, with severe mitral regurgitation and profound LV dysfunction. While the individual components of the appropriate open heart procedure (CABG, MVV/MVR, maze) are technically straightforward, in the context of a severe cardiomyopathy with apical akinesis this becomes a high-risk procedure. Patient would probably benefit from a viability study to assess LV recoverability. In any case, the risk for perioperative LV failure and for mechanical support is significant. Therefore, recommend transfer to tertiary center. These issues were discussed in detail with the patient, with whom the TTE images were reviewed.  With this in mind, she is unsure as to whether she will proceed with

## 2021-11-07 VITALS
TEMPERATURE: 98.4 F | HEART RATE: 73 BPM | SYSTOLIC BLOOD PRESSURE: 142 MMHG | WEIGHT: 224.5 LBS | DIASTOLIC BLOOD PRESSURE: 106 MMHG | RESPIRATION RATE: 29 BRPM | HEIGHT: 66 IN | BODY MASS INDEX: 36.08 KG/M2 | OXYGEN SATURATION: 93 %

## 2021-11-07 LAB
ERYTHROCYTE [DISTWIDTH] IN BLOOD BY AUTOMATED COUNT: 13.2 % (ref 11.5–14.5)
ERYTHROCYTE [DISTWIDTH] IN BLOOD BY AUTOMATED COUNT: 47.8 FL (ref 35–45)
HCT VFR BLD CALC: 38 % (ref 37–47)
HEMOGLOBIN: 12.3 GM/DL (ref 12–16)
MCH RBC QN AUTO: 32.2 PG (ref 26–33)
MCHC RBC AUTO-ENTMCNC: 32.4 GM/DL (ref 32.2–35.5)
MCV RBC AUTO: 99.5 FL (ref 81–99)
MRSA SCREEN: NORMAL
PLATELET # BLD: 78 THOU/MM3 (ref 130–400)
PMV BLD AUTO: 14.1 FL (ref 9.4–12.4)
POC ACTIVATED CLOTTING TIME KAOLIN: 142 SECONDS (ref 1–150)
RBC # BLD: 3.82 MILL/MM3 (ref 4.2–5.4)
WBC # BLD: 9.3 THOU/MM3 (ref 4.8–10.8)

## 2021-11-07 PROCEDURE — 36415 COLL VENOUS BLD VENIPUNCTURE: CPT

## 2021-11-07 PROCEDURE — 2500000003 HC RX 250 WO HCPCS: Performed by: SURGERY

## 2021-11-07 PROCEDURE — 6370000000 HC RX 637 (ALT 250 FOR IP): Performed by: NURSE PRACTITIONER

## 2021-11-07 PROCEDURE — 85347 COAGULATION TIME ACTIVATED: CPT

## 2021-11-07 PROCEDURE — 85027 COMPLETE CBC AUTOMATED: CPT

## 2021-11-07 PROCEDURE — 99233 SBSQ HOSP IP/OBS HIGH 50: CPT | Performed by: INTERNAL MEDICINE

## 2021-11-07 RX ADMIN — METOPROLOL TARTRATE 5 MG: 5 INJECTION INTRAVENOUS at 02:10

## 2021-11-07 RX ADMIN — PANTOPRAZOLE SODIUM 40 MG: 40 TABLET, DELAYED RELEASE ORAL at 06:30

## 2021-11-07 ASSESSMENT — PAIN SCALES - GENERAL
PAINLEVEL_OUTOF10: 0

## 2021-11-07 NOTE — PROGRESS NOTES
Order for ACT due Pt has bed and transfer in place of 0700 this am for [facility]. . It was brought to my attention that the bed the patient is being transferred to doesn't take arterial lines and was asked to remove before transfer. ACT completed 142, Ok verbal order to remove arterial sheath.    0625 arterial sheath removed, manual pressure held for 25 min. Pt still actively bleeding from site with release of pressure. Vast band applied and 10 CC of air placed. No bleeding noted at this time. Report called to [facility], spoke with odilia .   patient being placed on unit J71 bed 9

## 2021-11-07 NOTE — SIGNIFICANT EVENT
Transferred to [facility] via [facility]. Stable condition on depart. Cardiology is primary service. CC sign off. Electronically signed by Rubina Hoover CNP on 11/7/2021 at 7:54 AM

## 2021-11-07 NOTE — PROGRESS NOTES
Pt awake, alert and oriented x 3 . Denies pain, shortness of breath . Skin warm and dry. Color pink . vasc band intact right radial site - no redness, swelling, bleeding noted. Lacp here for transport to [address] [place] [address] .[id number] to [address] [place] [address] per lacp .

## 2021-11-08 ENCOUNTER — TELEPHONE (OUTPATIENT)
Dept: FAMILY MEDICINE CLINIC | Age: 71
End: 2021-11-08

## 2021-11-08 NOTE — TELEPHONE ENCOUNTER
----- Message from Simone Lyme sent at 11/5/2021  2:25 PM EDT -----  Subject: Message to Provider    QUESTIONS  Information for Provider? Ailin from [facility] called. Patient   is in ICU due to chest pain. Diagnosed with Cardiac catherization. Patient   wanted to let the doctor know. Please reach out to patient.  ---------------------------------------------------------------------------  --------------  CALL BACK INFO  What is the best way for the office to contact you? OK to leave message on   voicemail  Preferred Call Back Phone Number? [phone]  ---------------------------------------------------------------------------  --------------  SCRIPT ANSWERS  Relationship to Patient? Third Party  Representative Name?  Ailin

## 2021-11-08 NOTE — TELEPHONE ENCOUNTER
I am aware, I've been following a long in the chart  Let me know if there's anything she needs from us. I've been thinking about her  Let me know if questions, thanks!

## 2021-11-09 NOTE — DISCHARGE SUMMARY
Physician Discharge Summary   [facility]  Discharge Summary      Patient:  Angie Flores  YOB: 1950    MRN: [id number]   Acct: [de-identified]    Admit date: 11/5/2021    Discharge date and time: 9:29 AM, 11/7/21    Admission Diagnoses:   Patient Active Problem List    Diagnosis Date Noted    STEMI (ST elevation myocardial infarction) ([facility] 11/05/2021    Atrial fibrillation with RVR ([facility]     DM2 (diabetes mellitus, type 2) (HCC)     Statin intolerance     GERD (gastroesophageal reflux disease)     Osteoarthritis     Allergic rhinitis     Angiolipoma of right kidney     Eczema     Hyperlipidemia     Obesity (BMI 30-39. 9)     Post-menopausal     Thrombocytopenia ([facility] [address]     Mixed hearing loss 09/17/2013    Meningioma ([facility] 12/26/2012    Hypertension 12/10/2012       Discharge Diagnoses:    Patient Active Problem List    Diagnosis Date Noted    STEMI (ST elevation myocardial infarction) ([facility] 11/05/2021    Atrial fibrillation with RVR (HCC)     DM2 (diabetes mellitus, type 2) (HCC)     Statin intolerance     GERD (gastroesophageal reflux disease)     Osteoarthritis     Allergic rhinitis     Angiolipoma of right kidney     Eczema     Hyperlipidemia     Obesity (BMI 30-39. 9)     Post-menopausal     Thrombocytopenia ([facility]     Mixed hearing loss 09/17/2013    Meningioma ([facility] 12/26/2012    Hypertension 12/10/2012       Discharge MD : Paco London MD, MD, [facility], [facility], [facility]    Consults: critical care      Procedures:   Echocardiography:       Stress test:       Cardiac Cath:    No results for input(s): CKTOTAL, CKMB, CKMBINDEX, TROPONINI in the last 72 hours.     Lab Results   Component Value Date    WBC 9.3 11/07/2021    RBC 3.82 11/07/2021    RBC 4.57 11/09/2020    HGB 12.3 11/07/2021    HCT 38.0 11/07/2021    MCV 99.5 11/07/2021    MCH 32.2 11/07/2021    MCHC 32.4 11/07/2021    RDW 13.4 11/09/2020    PLT 78 11/07/2021    MPV 14.1 11/07/2021       Lab Results   Component Value Date  11/06/2021    K 4.3 11/06/2021    CL 95 11/06/2021    CO2 27 11/06/2021    BUN 17 11/06/2021    LABALBU 4.6 11/05/2021    CREATININE 0.6 11/06/2021    CALCIUM 9.1 11/06/2021    LABGLOM >90 11/06/2021    GLUCOSE 163 11/06/2021    GLUCOSE 146 10/23/2020       Lab Results   Component Value Date    LABA1C 6.1 10/28/2021    LABA1C 7.1 10/15/2019       Lab Results   Component Value Date    TRIG 163 10/27/2021    HDL 43 10/27/2021    LDLCALC 112 10/27/2021    LABVLDL 36 10/23/2020       Lab Results   Component Value Date    TSH 1.760 10/27/2021      Hospital admission:  66 y.o. pleasant female with history of hypertension, diabetes, hyperlipidemia who presented to the hospital with complaints of hip pain and nausea. As per patient she has been feeling fatigued and short of breath for over 7 days. She did follow-up with her PCP and had a physical done which was apparently normal.  Underwent labs which were normal.  Today she presented to the ED due to severe hip pain which was 10 out of 10. She does not report any chest pain or shortness of breath. In the ER she was noted to be in A. fib with RVR at a rate of 160 bpm with electrocardiogram showing signs of inferior ST elevation with lateral ST depressions. STEMI was called. Patient denies any anginal symptoms but does look pale and mildly short of breath. We decided to proceed with ischemic evaluation due to electrocardiogram findings. She received IV heparin in the ER as well as IV metoprolol to decrease the heart rate.     Hospital Course:    Left heart cath:   Findings: Severely calcified multivessel CAD                                        Recommendations:    Needs CTS evaluation for possible CABG  Attempted PCA of proximal RCA but due to severe calcification the procedure was aborted   Needs good rate control  IV Heparin  Monitor symptoms and hemodynamics  Discussed case extensively with family    Cardiothoracic surgery consult:  66year old diabetic TAKE 1 CAPSULE EVERY DAY, Disp-90 capsule, R-3Normal      cloNIDine (CATAPRES) 0.2 MG tablet TAKE 1 TABLET TWICE DAILY, Disp-180 tablet, R-3Normal      losartan-hydroCHLOROthiazide (HYZAAR) 100-25 MG per tablet TAKE 1 TABLET EVERY DAY, Disp-90 tablet, R-3Normal      OLIVE LEAF PO Take 4 tablets by mouth dailyHistorical Med      Methylsulfonylmethane (MSM) 1000 MG CAPS Take 4 tablets by mouth dailyHistorical Med      ELDERBERRY PO Take 2 tablets by mouth dailyHistorical Med      Calcium Carb-Cholecalciferol (CALCIUM + D3) 600-200 MG-UNIT TABS tablet Take 1 tablet by mouth 2 times daily, Disp-180 tablet,R-3Normal      triamcinolone (KENALOG) 0.1 % cream Apply topically 2 times daily to affected area sparingly. , Disp-3 Tube,R-3, Normal      cetirizine (ZYRTEC) 10 MG tablet Take 10 mg by mouth daily      Multiple Vitamins-Minerals (MULTIVITAMIN ADULT PO) Take 1 tablet by mouth daily      Red Yeast Rice 600 MG CAPS Take 2 capsules by mouth 2 times daily      Omega-3 Fatty Acids (FISH OIL) 1000 MG CAPS Take 2,000 mg by mouth daily      Coenzyme Q10 10 MG CAPS Take 1 capsule by mouth daily           Transferred to [facility] for evaluation of CABG/MVR     Cesar Fernandez MD MD, Galo Armendariz  Electronically signed 11/9/2021 at 9:29 AM

## 2021-11-16 DIAGNOSIS — L30.9 DERMATITIS: ICD-10-CM

## 2021-11-16 RX ORDER — TRIAMCINOLONE ACETONIDE 1 MG/G
CREAM TOPICAL
Qty: 3 EACH | Refills: 3 | Status: SHIPPED | OUTPATIENT
Start: 2021-11-16 | End: 2022-01-14

## 2021-11-17 ENCOUNTER — TELEPHONE (OUTPATIENT)
Dept: FAMILY MEDICINE CLINIC | Age: 71
End: 2021-11-17

## 2021-11-17 NOTE — TELEPHONE ENCOUNTER
----- Message from Jonathandarcy Hendrickson sent at 11/17/2021 11:27 AM EST -----  Subject: Message to Provider    QUESTIONS  Information for Provider? Pt son called in and said he needs his [place]   paperwork filled out since his mother is in [facility] [employer] [facility]. He said   pages 2,3,4,and 5 need to be filled out and faxed over to [phone],   its on the paperwork as well. He needs to get this sent over so he can get   paid. If there are any questions please reach out to [facility] please.  ---------------------------------------------------------------------------  --------------  [address]  What is the best way for the office to contact you? OK to leave message on   voicemail  Preferred Call Back Phone Number? [phone]  ---------------------------------------------------------------------------  --------------  SCRIPT ANSWERS  Relationship to Patient? Other  Representative Name? Rik Duran  Is the Representative on the appropriate HIPAA document in Epic?  Yes

## 2021-11-22 LAB — INR BLD: 1

## 2021-11-23 LAB — INR BLD: 1

## 2021-11-24 LAB — INR BLD: 1

## 2021-11-25 LAB — INR BLD: 1.1

## 2021-12-02 ENCOUNTER — OFFICE VISIT (OUTPATIENT)
Dept: FAMILY MEDICINE CLINIC | Age: 71
End: 2021-12-02
Payer: MEDICARE

## 2021-12-02 ENCOUNTER — TELEPHONE (OUTPATIENT)
Dept: PHARMACY | Age: 71
End: 2021-12-02

## 2021-12-02 VITALS
OXYGEN SATURATION: 95 % | WEIGHT: 219 LBS | TEMPERATURE: 98.4 F | DIASTOLIC BLOOD PRESSURE: 58 MMHG | BODY MASS INDEX: 35.35 KG/M2 | RESPIRATION RATE: 16 BRPM | HEART RATE: 88 BPM | SYSTOLIC BLOOD PRESSURE: 122 MMHG

## 2021-12-02 DIAGNOSIS — I10 ESSENTIAL HYPERTENSION: ICD-10-CM

## 2021-12-02 DIAGNOSIS — I25.5 ISCHEMIC CARDIOMYOPATHY: ICD-10-CM

## 2021-12-02 DIAGNOSIS — D49.7 FOLLICULAR NEOPLASM OF THYROID: ICD-10-CM

## 2021-12-02 DIAGNOSIS — Z98.890 S/P MVR (MITRAL VALVE REPAIR): ICD-10-CM

## 2021-12-02 DIAGNOSIS — I48.91 ATRIAL FIBRILLATION, UNSPECIFIED TYPE (HCC): ICD-10-CM

## 2021-12-02 DIAGNOSIS — I50.20 HEART FAILURE WITH REDUCED EJECTION FRACTION (HCC): ICD-10-CM

## 2021-12-02 DIAGNOSIS — E78.2 MIXED HYPERLIPIDEMIA: ICD-10-CM

## 2021-12-02 DIAGNOSIS — K21.9 GASTROESOPHAGEAL REFLUX DISEASE, UNSPECIFIED WHETHER ESOPHAGITIS PRESENT: ICD-10-CM

## 2021-12-02 DIAGNOSIS — Z95.1 S/P CABG (CORONARY ARTERY BYPASS GRAFT): ICD-10-CM

## 2021-12-02 DIAGNOSIS — I21.3 ST ELEVATION MYOCARDIAL INFARCTION (STEMI), UNSPECIFIED ARTERY (HCC): Primary | ICD-10-CM

## 2021-12-02 DIAGNOSIS — Z98.890 S/P TRICUSPID VALVE REPAIR: ICD-10-CM

## 2021-12-02 DIAGNOSIS — D32.9 MENINGIOMA (HCC): ICD-10-CM

## 2021-12-02 DIAGNOSIS — Z98.890 S/P MAZE OPERATION FOR ATRIAL FIBRILLATION: ICD-10-CM

## 2021-12-02 DIAGNOSIS — Z86.79 S/P MAZE OPERATION FOR ATRIAL FIBRILLATION: ICD-10-CM

## 2021-12-02 DIAGNOSIS — D69.6 THROMBOCYTOPENIA (HCC): ICD-10-CM

## 2021-12-02 DIAGNOSIS — E11.9 TYPE 2 DIABETES MELLITUS WITHOUT COMPLICATION, WITHOUT LONG-TERM CURRENT USE OF INSULIN (HCC): ICD-10-CM

## 2021-12-02 LAB
INTERNATIONAL NORMALIZATION RATIO, POC: 2.9
PROTHROMBIN TIME, POC: NORMAL

## 2021-12-02 PROCEDURE — G8484 FLU IMMUNIZE NO ADMIN: HCPCS | Performed by: FAMILY MEDICINE

## 2021-12-02 PROCEDURE — 1036F TOBACCO NON-USER: CPT | Performed by: FAMILY MEDICINE

## 2021-12-02 PROCEDURE — 4040F PNEUMOC VAC/ADMIN/RCVD: CPT | Performed by: FAMILY MEDICINE

## 2021-12-02 PROCEDURE — 99215 OFFICE O/P EST HI 40 MIN: CPT | Performed by: FAMILY MEDICINE

## 2021-12-02 PROCEDURE — 85610 PROTHROMBIN TIME: CPT | Performed by: FAMILY MEDICINE

## 2021-12-02 PROCEDURE — G8400 PT W/DXA NO RESULTS DOC: HCPCS | Performed by: FAMILY MEDICINE

## 2021-12-02 PROCEDURE — 1123F ACP DISCUSS/DSCN MKR DOCD: CPT | Performed by: FAMILY MEDICINE

## 2021-12-02 PROCEDURE — 1111F DSCHRG MED/CURRENT MED MERGE: CPT | Performed by: FAMILY MEDICINE

## 2021-12-02 PROCEDURE — 3044F HG A1C LEVEL LT 7.0%: CPT | Performed by: FAMILY MEDICINE

## 2021-12-02 PROCEDURE — 3017F COLORECTAL CA SCREEN DOC REV: CPT | Performed by: FAMILY MEDICINE

## 2021-12-02 PROCEDURE — G8427 DOCREV CUR MEDS BY ELIG CLIN: HCPCS | Performed by: FAMILY MEDICINE

## 2021-12-02 PROCEDURE — 1090F PRES/ABSN URINE INCON ASSESS: CPT | Performed by: FAMILY MEDICINE

## 2021-12-02 PROCEDURE — 2022F DILAT RTA XM EVC RTNOPTHY: CPT | Performed by: FAMILY MEDICINE

## 2021-12-02 PROCEDURE — G8417 CALC BMI ABV UP PARAM F/U: HCPCS | Performed by: FAMILY MEDICINE

## 2021-12-02 NOTE — PROGRESS NOTES
Chief Complaint   Patient presents with   [facility], [facility], NSETMI    Other     Referral to  cardiologist - Coumadin  clinic        History obtained from the patient. SUBJECTIVE:  Del Han is a 66 y.o. female that presents today for     -Patient [facility] from [facility] to 11/7 for STEMI etc.   D/c summary: \"Hospital Course:     Left heart cath:   Findings: Severely calcified multivessel CAD                                        Recommendations:    Needs CTS evaluation for possible CABG  Attempted PCA of proximal RCA but due to severe calcification the procedure was aborted   Needs good rate control  IV Heparin  Monitor symptoms and hemodynamics  Discussed case extensively with family     Cardiothoracic surgery consult:  66year old diabetic female, s/p STEMI in context of 3v CAD and AF RVR, with severe mitral regurgitation and profound LV dysfunction. While the individual components of the appropriate open heart procedure (CABG, MVV/MVR, maze) are technically straightforward, in the context of a severe cardiomyopathy with apical akinesis this becomes a high-risk procedure. Patient would probably benefit from a viability study to assess LV recoverability. In any case, the risk for perioperative LV failure and for mechanical support is significant. Therefore, recommend transfer to tertiary center. These issues were discussed in detail with the patient, with whom the TTE images were reviewed. With this in mind, she is unsure as to whether she will proceed with open heart surgery. \"      -Pt was then transferred to [facility] on 11/7.       -Patient admitted to [facility] from 11/7 to 11/25 for issues as noted below. D/c summary:  \"Admission Diagnosis: Acute on chronic systolic (congestive) heart failure (HCC) [I50.23]  Discharge Diagnosis: Acute on chronic systolic (congestive) heart failure ([address] [I50.23]    Reason for Hospitalization:  This is a 66year old female with HTN, morbid obesity, HLD, T2DM, and YASHIRA who is transferred to [facility] for evaluation of de brad ischemic cardiomyopathy in context of AFRVR with ACS.     She presented to [facility] on 11/5/2021 with severe bilateral acute on chronic hip pain. She was found to be in [facility] with rates in the 160s and ischemic changes diffusely on the ECG. She was hypoxic requiring 5L O2 by NC. She was given IV metoprolol which did not affect her rate much. She was taken to cath lab for evaluation of these ischemic changes and was discovered to have three vessel CAD. Echo showed LVEF 25-30%. She was managed in the ICU and given IV metoprolol, IV diltiazem, IV esmolol, and IV amiodarone for her atrial fibrillation; with amiodarone she converted to sinus rhythm (not clear if pt ever underwent LAWSON). She was anticoagulated with heparin gtt. CTS was consulted at [facility] for CABG evaluation. However given the reduced LV systolic function and possible need for mechanical support in the perioperative period, CTS recommended transfer to a tertiary center for evaluation; she was accepted for transfer.     On arrival pt is comfortable, does not complain of hip pain, continues to be on nasal cannula. She arrives on a heparin and amiodarone drip. Operations during Hospitalization:   Day of Surgery:11/18/2021   Surgeon: Keila Caba MD  S/P SURGERY: CABG(LAD-LIMA, OM-SVG, RCA-SVG), MVr(#29 Hsu Ring), MAZE, TVr(Lashawn stitch), LAAC(#35 clip)    Hospital Course:  * How was the Reason for Hospitalization Addressed:   Indication for Surgery: CAD, MR, TR, Paroxysmal Atrial Fibrillation, Primary Stroke Prevention  Preop LVEF: 30% RVF: Mild CARDS: Anel CATH: MVD, needs CABG EKG: AF w/ RVR  Postop LVEF: Moderate RVF: Mild  PMH/PSH: HTN, chronic thrombocytopenia, chronic bilateral hip pain, GERD, diabetes, hyperlipidemia   Preoperative Hospital Course: Presented to outside hospital with chief complaint of hip pain and nausea.  In the ED patient denied any report of chest pain or shortness of breath however she was noted to be in atrial fibrillation with RVR rate of 160 bpm on twelve-lead ECG and signs of inferior ST elevation with lateral segment ST depressions. Due to the findings ED proceeded to proceed with ischemic evaluation. Patient underwent emergent left heart catheterization which found three-vessel coronary artery disease. She had a proximal RCA calcified lesion in which a PTCA was attempted however unsuccessful. Airway Difficulty: Grade I - Glidescope (planned for study) Pacing Wires: No: V cut 11/24    Chronological List of Surgeries and Major Events (Diagnosis): (Surgeries in bold characters)  11/18/2021: CABG(LAD-LIMA, OM-SVG, RCA-SVG), MVr(#29 Hsu Ring), TVr(Lashawn stitch), MAZE, LAAC(#35 clip)  11/20/2021: AF RVR    A/P of Major Active Problems (excluding routine care and common problems):   Cardiac AF RVR - Currently in SR, pAF. Transition to oral amiodarone. Starting low-dose BB. Optimize electrolytes. Coumadin - Daily Dose. Acute Systolic HF - Hemodynamically stable off inotropic support. Diuresis/BB. Reassess best-practices prior to discharge. Hypervolemia - Diuresis. Transferred to floor 11/21    To Do or to Watch:   POD#7 No tubes/wires. NSR---AF--NSR. On room air. Prevena dressing removed 11/24  - s/p CABG: ASA, BB, statin Atorvastatin 80mg that was started pre-op (pt. with hx myalgias w/ statins)  - s/p MVr, TVr: ASA, BB. No surg path. Post-op echo completed 11/24: EF 25%, trace MR (grads 15/4), trace TR, trivial posterior pericardial effusion.  - admitted w/ AF, now s/p MAZE/ANTOINETTE clip: Pt. with recurrent A-fib post-op. Amiodarone drip restarted 11/21 for A-fib RVR and transitioned to po amio taper 11/22. Continues to alternate between AF and NSR. Continue BB; increase as tolerated. Coumadin started 11/21. INR 1.0. Daily dose while inpatient. PCP Dr. Derek Najera will refer pt. to local Coumadin clinic.  Please fax DC summary to [phone] and the same  Repeat neg microal, + before     UPDATE TODAY:   Diet controlled  a1c at goal   wts the same    Wt Readings from Last 3 Encounters:   12/02/21 219 lb (99.3 kg)   11/06/21 224 lb 8 oz (101.8 kg)   10/28/21 217 lb (98.4 kg)     Lab Results   Component Value Date    LABA1C 6.1 10/28/2021    LABA1C 6.6 04/26/2021    LABA1C 7.0 10/26/2020    LABA1C 7.1 10/15/2019    LABA1C 6.9 04/11/2019    LABA1C 7.0 10/11/2018    LABA1C 6.8 04/12/2018    LABA1C 6.9 09/21/2017    LABA1C 6.5 11/04/2016       -HTN:    HPI:      Taking meds as prescribed ?: yes  Tolerating well ?: yes  Side Effects ?: deneis  BP at home ?: <140/90  Working on TLCS ?: yes  Chest Pain/SOB/Palpitations? denies    BP Readings from Last 3 Encounters:   12/02/21 (!) 122/58   11/07/21 (!) 142/106   10/28/21 136/78         -HLD PRIOR VISIT: should be on statin, however intolerant of zocor as the last statin she took. States took other statins years ago, can't remember names, but they call caused myalgias. She refuses further statins. Lipids a little worse. Diet not as good. Plans to get this improved. UPDATE TODAY:   On lipitor now  Tolerating thus far  Previous hx of not tolerating statins      -Low platelets PRIOR VISIT: chronic, no clear etiology and mild. Thought maybe d/t naproxen. Labs stable on f/u. Denies fevers, chills, night sweats or wt loss. UPDATE PRIOR VISIT:   Platelets a little lower than before  No bleeding or hematochezia.    No petechia   No fevers, chills, night sweats or wt loss    UPDATE PRIOR VISIT:   platelets lower than a year ago  Had ordered f/u labs last year to get done, she never did  No bleeding, bruising or hemarthrosis  Denies fevers, chills or nights sweats     UPDATE TODAY:   Saw heme once  Neg w/u  She did not f/u  Wanted me to monitor  May have mild ITP   No intervention, per heme, unless Plt <50K  [facility] has pt setup for f/u with heme again now      -GERD:    HPI:  Changed from omep to panto in hospital    Taking meds as prescribed ?: yes  Tolerating well ?: yes  Side Effects ?: denies  Dysphagia ?: denies  Odynophagia ?: denies  Melena ?: denies  Working on TLCS ?: yes      -Meningioma: noted MRI 2012. Never did f/u MRI. Told she didn't need to. Has 0 sxs. Denies HA, vision changes, lateralizing numbness or weakness. Age/Gender Health Maintenance    Lipid -   Lab Results   Component Value Date    CHOL 188 10/27/2021    CHOL 215 (H) 10/23/2020    CHOL 235 (H) 10/14/2019     Lab Results   Component Value Date    TRIG 163 10/27/2021    TRIG 182 (H) 10/23/2020    TRIG 220 (H) 10/14/2019     Lab Results   Component Value Date    HDL 43 10/27/2021    HDL 42 10/23/2020    HDL 38 (L) 10/14/2019     Lab Results   Component Value Date    LDLCALC 112 10/27/2021    LDLCALC 137 (H) 10/23/2020    LDLCALC 153 (H) 10/14/2019       Colon Cancer Screening - Somewhere within the last 10 years, was not Sepide[facility]. Awaiting records/due, ordered today (APR 2019)/pt declines OCT 2020/APR 2021/OCT 2021  Lung Cancer Screening (Age 54 to [de-identified] with 30 pack year hx, current smoker or quit within past 15 years) - never smoker. Tetanus - to get at pharmacy per medicare rules  Influenza Vaccine - Declines SEPT 2016/SEPT 2017/OCT 2018/OCT 2019/OCT 2020/OCT 2021  Pneumonia Vaccine - Declines SEPT 2016/SEPT 2017/OCT 2018/APR 2019/OCT 2019/OCT 2020/APR 2021  Shingrix - to get at pharmacy per medicare rules    Breast Cancer Screening - NEG APR 2021  Osteoporosis Screening - declines SEPT 2016, wants to discuss again in 1 year.  We discussed today and declines SEPT 2017/OCT 2018/APR 2019/OCT 2019/OCT 2020/APR 2021/OCT 2021    Diabetes Health Maintenance    A1C -   Lab Results   Component Value Date    LABA1C 6.1 10/28/2021    LABA1C 6.6 04/26/2021    LABA1C 7.0 10/26/2020    LABA1C 7.1 10/15/2019    LABA1C 6.9 04/11/2019    LABA1C 7.0 10/11/2018    LABA1C 6.8 04/12/2018    LABA1C 6.9 09/21/2017    LABA1C 6.5 11/04/2016       ACE/ARB - YES hyzaar  Eye - due, pt reminded  Foot - WNL OCT 2021  Microal/Cr - + OCT 2018/APR 2019  NEG OCT 2019    Lab Results   Component Value Date    LABMICR 2.20 10/27/2021    LABCREA 175.3 10/27/2021    MACRR 13 10/27/2021     Lab Results   Component Value Date    CREATINEUR 148.36 10/23/2020    MICROALBUR 2.3 (H) 10/23/2020    MALBCR 15.5 10/23/2020         eGFR -   No results found for: GFRESTIMATE  Lab Results   Component Value Date    LABGLOM >90 11/06/2021     No results found for: [facility]  Lab Results   Component Value Date    EGFRNONAA >60 10/23/2020     Lab Results   Component Value Date    EGFRAA >60 10/23/2020       Component      Latest Ref Rng & Units 10/14/2019           7:47 AM   EGFR IF NonAfrican American      >59 ml/min/1.73sq.m >60        Statin - statin intolerance/allergy      Current Outpatient Medications   Medication Sig Dispense Refill    acetaminophen (TYLENOL) 325 MG tablet Take 325-650 mg by mouth every 6 hours as needed      amiodarone (CORDARONE) 200 MG tablet Take 2 tablets by mouth once daily for 7 days, THEN 1 tablet once daily.  aspirin 81 MG chewable tablet Take 81 mg by mouth daily      atorvastatin (LIPITOR) 80 MG tablet Take 1 tablet by mouth every evening      furosemide (LASIX) 40 MG tablet Take 1 tablet by mouth twice daily for 5 days, THEN 1 tablet once daily for 10 days.  losartan (COZAAR) 25 MG tablet Take 0.5 tablets by mouth daily      metoprolol succinate (TOPROL XL) 50 MG extended release tablet Take 1.5 tablets by mouth 2 times daily      pantoprazole (PROTONIX) 20 MG tablet Take 1 tablet by mouth DAILY (6 AM) for 14 days.  polyethylene glycol (GLYCOLAX) 17 GM/SCOOP powder Take 17 g by mouth daily      potassium chloride (KLOR-CON M) 10 MEQ extended release tablet Take 1 tablet by mouth twice daily for 5 days, THEN 1 tablet once daily for 10 days.       senna-docusate (PERICOLACE) 8.6-50 MG per tablet Take 1 tablet by mouth 2 times daily      warfarin (COUMADIN) 2 MG infarction) ([facility] 2021    DM2 (diabetes mellitus, type 2) (HCC)     Statin intolerance     GERD (gastroesophageal reflux disease)     Osteoarthritis     Allergic rhinitis     Angiolipoma of right kidney     Eczema     Hyperlipidemia     Obesity (BMI 30-39. 9)     Post-menopausal     Thrombocytopenia ([address]     Mixed hearing loss 2013    [facility]     Noted MRI . Never did f/u MRI. Told she didn't need to. Has 0 sxs. Denies HA, vision changes, lateralizing numbness or weakness.  Hypertension 12/10/2012       Past Medical History:   Diagnosis Date    Allergic rhinitis     Angiolipoma of right kidney     ASHD (arteriosclerotic heart disease)     Atrial fibrillation (HCC)     Celiac disease     DM2 (diabetes mellitus, type 2) (HCC)     Eczema     GERD (gastroesophageal reflux disease)     Heart failure with reduced ejection fraction (HCC)     History of ST elevation myocardial infarction (STEMI)     Hyperlipidemia     Hypertension     Ischemic cardiomyopathy     [facility]oma ([facility] 2012    Noted MRI . Never did f/u MRI. Told she didn't need to. Has 0 sxs. Denies HA, vision changes, lateralizing numbness or weakness.  Mixed hearing loss 2013    Obesity (BMI 30-39. 9)     Osteoarthritis     Post-menopausal     S/P CABG (coronary artery bypass graft)     S/P Maze operation for atrial fibrillation     S/P MVR (mitral valve repair)     S/P tricuspid valve repair        Past Surgical History:   Procedure Laterality Date     SECTION  1972    CHOLECYSTECTOMY  2004    COLONOSCOPY  2006    CORON[facility] GRAFT  [facility]    [facility]    MITRAL VALVE REPAIR  2021    [facility]    MYRINGOTOMY AND TYMPANOSTOMY TUBE PLACEMENT  2012    Dr Shu Cabrales    OTHER SURGICAL HISTORY  2021    MAZE Procedure during CABG and MVR/TVR [facility]    TRICUSPID VALVE SURGERY  2021    TV Repair 2021 at [address] GASTROINTESTINAL ENDOSCOPY  05/2006       Allergies   Allergen Reactions    Biaxin [Clarithromycin] Nausea Only    Doxycycline Other (See Comments)     GI side effects    Keflex [Cephalexin] Other (See Comments)     Tongue Swelling    Norvasc [Amlodipine Besylate] Other (See Comments)     Nasuea, hot flashes    Statins Other (See Comments)     Myalgias all    Zetia [Ezetimibe] Other (See Comments)     Chest pain    Zocor [Simvastatin] Other (See Comments)     Myalgias       Social History     Tobacco Use    Smoking status: Never Smoker    Smokeless tobacco: Never Used   Substance Use Topics    Alcohol use: No       Family History   Problem Relation Age of Onset    Heart Disease Mother     Lung Cancer Mother     Osteoporosis Mother     Other Father         Did not know her father.  Other Maternal Grandmother         blood clot, no clear hx surrounding    Heart Attack Maternal Grandfather     Heart Attack Maternal Uncle 62    Breast Cancer Maternal Aunt 50    Breast Cancer Maternal Cousin 40         I have reviewed the patient's past medical history, past surgical history, allergies, medications, social and family history and I have made updates where appropriate.       Review of Systems  Positive responses are highlighted in bold    Constitutional:  Fever, Chills, Night Sweats, Fatigue, Unexpected changes in weight  HENT:  Ear pain, Tinnitus, Nosebleeds, Trouble swallowing, Hearing loss, Sore throat  Cardiovascular:  Chest Pain, Palpitations, Orthopnea, Paroxysmal Nocturnal Dyspnea  Respiratory:  Cough, Wheezing, Shortness of breath, Chest tightness, Apnea  Gastrointestinal:  Nausea, Vomiting, Diarrhea, Constipation, Heartburn, Blood in stool  Genitourinary:  Difficulty or painful urination, Flank pain, Change in frequency, Urgency  Skin:  Color change, Rash, Itching, Wound  Musculoskeletal:  Joint pain, Back pain, Gait problems, Joint swelling, Myalgias  Neurological:  Dizziness, Headaches, Presyncope, Numbness, Seizures, Tremors  Endocrine:  Heat Intolerance, Cold Intolerance, Polydipsia, Polyphagia, Polyuria      PHYSICAL EXAM:  Vitals:    12/02/21 0908   BP: (!) 122/58   Pulse: 88   Resp: 16   Temp: 98.4 °F (36.9 °C)   TempSrc: Oral   SpO2: 95%   Weight: 219 lb (99.3 kg)     Body mass index is 35.35 kg/m². Pain Score:   2 (chest wall)    VS Reviewed  General Appearance: A&O x 3, No acute distress,well developed and well- nourished  Head: normocephalic and atraumatic  Eyes: pupils equal, round, and reactive to light, extraocular eye movements intact, conjunctivae and eye lids without erythema  ENT: external ear and ear canal clear bilaterally, TMs intact and regular, nose without deformity, nasal mucosa and turbinates normal without polyps, oropharynx normal, dentition is normal for age  Neck: supple and non-tender without mass, no thyromegaly or thyroid nodules, no cervical lymphadenopathy  Pulmonary/Chest: clear to auscultation bilaterally- no wheezes, rales or rhonchi, normal air movement, no respiratory distress or retractions. Chest wall incision healing well, clean, dry and intact. Cardiovascular: S1 and S2 auscultated w/ RRR. No murmurs, rubs, clicks, or gallops, distal pulses intact. Abdomen: soft, non-tender, non-distended, bowl sounds physiologic,  no rebound or guarding, no masses or hernias noted. Liver and spleen without enlargement. Extremities: no cyanosis, clubbing of the lower extremities. +2 PT/DP bilaterally. 1+ bilat LE edema. Musculoskeletal: No joint swelling or gross deformity   Skin: warm and dry, no rash or erythema  Foot: Bilat 2+DP/PT bilaterally. Skin warm, dry and intact. Sensation normal throughout to touch and with monofilament.      Results for POC orders placed in visit on 12/02/21   POCT INR   Result Value Ref Range    INR 2.9     Protime         CBC 11/25/2021   Ref Range & Units 11/25/21 0417   WBC 3.70 - 11.00 k/uL 9.15    RBC 3.90 - 5.20 m/uL 3.22 Low  Hemoglobin 11.5 - 15.5 g/dL 9.7 Low     Hematocrit 36.0 - 46.0 % 31.6 Low     MCV 80.0 - 100.0 fL 98.1    MCH 26.0 - 34.0 pG 30.1    MCHC 30.5 - 36.0 g/dL 30.7    RDW-CV 11.5 - 15.0 % 21.2 High     Platelet Count 682 - 400 k/uL 148 Low     MPV 9.0 - 12.7 fL 11.4    Absolute nRBC <0.01 k/uL 0.10 High         CMP 25 NOV 2021  Ref Range & Units 11/25/21 0417    Protein, Total 6.3 - 8.0 g/dL 6.1 Low     Albumin 3.9 - 4.9 g/dL 3.3 Low     Calcium 8.5 - 10.2 mg/dL 8.8    Bilirubin, Total 0.2 - 1.3 mg/dL 1.1    Alkaline Phosphatase 34 - 123 U/L 57    AST 13 - 35 U/L 23    Glucose 74 - 99 mg/dL 126 High     Comment: The American Diabetes Association (ADA) provides guidance for cutoff values   for fasting glucose and random glucose. The ADA defines fasting as no caloric   intake for at least 8 hours. Fasting plasma glucose results between 100 to 125    mg/dL indicate increased risk for diabetes (prediabetes). Fasting plasma glucose results greater than or equal to 126 mg/dL meet the   criteria for diagnosis of diabetes. In the absence of unequivocal   hyperglycemia, results should be confirmed by repeat testing. In a patient   with classic symptoms of hyperglycemia or hyperglycemic crisis, random plasma   glucose results greater than or equal to 200 mg/dL meet the criteria for   diagnosis of diabetes. Reference: Standards of Medical Care in Diabetes 2016, American Diabetes   Association. Diabetes Care. 2016.39(Suppl 1). BUN 7 - 21 mg/dL 26 High     Creatinine 0.58 - 0.96 mg/dL 0.86    Sodium 136 - 144 mmol/L 136    Potassium 3.7 - 5.1 mmol/L 4.6    Chloride 97 - 105 mmol/L 96 Low     CO2 22 - 30 mmol/L 30    Anion Gap 9 - 18 mmol/L 10    ALT 7 - 38 U/L 17    eGFR-  >60    eGFR-All Other Races .  >60                   Specimen originated from [facility]     Specimen #: [id number]   Submitting Physician: Janki Goodman MD     SPECIMEN SUBMITTED   A: THYROID, RIGHT LOBE, FINE NEEDLE ASPIRATE ___________________________________________________________________     FINAL DIAGNOSIS   A. THYROID, RIGHT LOBE, FINE NEEDLE ASPIRATE   Suspicious for a follicular neoplasm. See comment. Comment:  The aspirate sample demonstrates groups of follicular cells with   architectural and cytologic atypia.  The differential diagnosis includes a   hyperplastic nodule, follicular neoplasm, niftp and follicular variant of   papillary thyroid carcinoma.  The case was reviewed in consultation with   Caroline Solis and Misty, who concur. Jairon Mckay M.D.                 (Electronic Signature)          ECHO 24 NOV 2021  CONCLUSIONS:   - Technically difficult exam due to post op, bandages/chest tubes/wound,   suboptimal positioning and body habitus. Subcostal images are not obtainable due   to bandages. - Exam indication: S/P CABG, MVr, TVr   - The left ventricle is normal in size. Left ventricular systolic function is   severely decreased. EF = 25 ± 5% (visual est.) Definity contrast used for   endocardial border detection. Left ventricular diastolic function was not   evaluated due to mitral valve surgery. - The right ventricle is normal in size. Right ventricular systolic function is   normal.   - Post mitral valve repair. Hsu Mitral Valve Annuloplasty Ring (size #29). There    is trace mitral valve regurgitation. The peak gradient is 15 mmHg and the mean   gradient is 4 mmHg. - Post tricuspid valve repair. Myrene Gu. There is trace tricuspid valve   regurgitation. The peak gradient is 2 mmHg and the mean gradient is 1 mmHg. - There is a trivial posterior pericardial effusion.  There is a left pleural   effusion.   - Exam was compared with the prior  echocardiographic exam performed on   11/8/2021. s/p CABG, MVr, TVr.     Electronically signed by Rika Easley MD on 11/24/2021 at 2:13:41 PM       ASSESSMENT & PLAN  1. ST elevation myocardial infarction (STEMI), unspecified artery [facility])    So far stable  con't asa, coumadin, Toprol, cozaar and lasix  Daily wts  Low salt diet  Life vest  Will get close f/u with local cardio, CTS and coumadin clinic  INR good today. Repeat Monday. If can't see at [address] Monday, will get INR here or at lab  F/u here 2 wks    Reviewed ER precautions, pt understands. - CBC Auto Differential; Future  - Basic Metabolic Panel; Future  - Manda Kapadia MD, Cardiology, [place]    2. S/P CABG (coronary artery bypass graft)    - CBC Auto Differential; Future  - Basic Metabolic Panel; Future  - Manda Kapadia MD, Cardiology, Natalie Hinton MD, Cardiothoracic Surgery, [facility] [place]    3. Heart failure with reduced ejection fraction (HCC)    - CBC Auto Differential; Future  - Basic Metabolic Panel; Future  - Manda Kapadia MD, Cardiology, [place]    4. Ischemic cardiomyopathy    - CBC Auto Differential; Future  - Basic Metabolic Panel; Future  - Manda Kapadia MD, Cardiology, [place]    5. Atrial fibrillation, unspecified type ([address]    S/p MAZE and atrial clip  Plan as above  Coumadin, toprol, amio    - CBC Auto Differential; Future  - Basic Metabolic Panel; Future  - POCT INR  - [facility] (Anticoagulation) - Shon Vu MD, Cardiology, [place]    6. S/P Maze operation for atrial fibrillation    - CBC Auto Differential; Future  - Basic Metabolic Panel; Future  - [facility] (Anticoagulation) - Shon Vu MD, Cardiology, Natalie Hinton MD, Cardiothoracic Surgery, [place] [employer] [facility] [place]    7. S/P MVR (mitral valve repair)    - CBC Auto Differential; Future  - Basic Metabolic Panel; Future  - Manda Kapadia MD, Cardiology, Natalie Hinton MD, Cardiothoracic Surgery, [place]    8. S/P tricuspid valve repair    - CBC Auto Differential; Future  - Basic Metabolic Panel;  Future  - Manda Kapadia MD, Cardiology, Natalie Hinton MD, Cardiothoracic Surgery, [place]    9. Follicular neoplasm of thyroid    Refer to ENT for definitive management    - CBC Auto Differential; Future  - Basic Metabolic Panel; Future  - Snehal Jacobson MD, Otolaryngology, [facility]    10. Type 2 diabetes mellitus without complication, without long-term current use of insulin (HCC)    Stable  Diet control  At goal    -  DIABETES FOOT EXAM  - CBC Auto Differential; Future  - Basic Metabolic Panel; Future    11. Essential hypertension    Stable  con't toprol and cozaar    - CBC Auto Differential; Future  - Basic Metabolic Panel; Future    12. Mixed hyperlipidemia    stable  con't lipitor    - CBC Auto Differential; Future  - Basic Metabolic Panel; Future    13. Thrombocytopenia (HCC)    Mild, stable  Cbc next wk  F/u heme    - CBC Auto Differential; Future  - Basic Metabolic Panel; Future    14. Gastroesophageal reflux disease, unspecified whether esophagitis present    Stable  con't panto for now, can change back to omeprazole at some point    - CBC Auto Differential; Future  - Basic Metabolic Panel; Future    15. Meningioma (HCC)    Stable over long time  Benign  Monitor for s/s   No sxs  Monitor for sxs  If occur, MRI. Pt aware    - CBC Auto Differential; Future  - Basic Metabolic Panel; Future      **40 min spent in prechart review of complicated hospital stay, in room and post charting**    DISPOSITION    Return in about 2 weeks (around 12/16/2021) for f/u heart attack, sooner as needed. Swati released without restrictions. Future Appointments   Date Time Provider Andrei Pedraza   12/16/2021  9:00 AM [address]   12/30/2021 10:40 AM Marilu Traylor, APRN - CNP N Oncology P - [facility]   4/28/2022  3:40 PM Marly Leger DO [facility]     PATIENT COUNSELING    Barriers to learning and self management: none    Discussed use, benefit, and side effects of prescribed medications.   Barriers to medication compliance addressed. All patient questions answered. Pt voiced understanding.        Electronically signed by Martha Rivero DO on 12/2/2021 at 9:37 AM

## 2021-12-02 NOTE — TELEPHONE ENCOUNTER
Referred to [facility] for Coumadin management by Dr. Paulo Louis for afib, S/P maze, goal INR 2.0-3.0, therapy duration unknown, initial referral 12/02/21. Patient was seen at Dr. Alfa Velázquez office today following admission to [facility]. Per his office note, patient started on Coumadin 11/21 while admitted to [facility]. They also started amiodarone infusion followed by PO. Dr Paulo Louis checked INR on 12/2/21 and it was 2.9. Patient was instructed to take Coumadin 2 mg daily and check INR on 12/6/21. Called patient and scheduled for 12/6/21. Also called Dr. Alfa Velázquez office and requested [facility] dosing to be faxed over to  Clinic at [phone].      Raudel Piper, PharmD, BCPS  12/2/2021  3:13 PM

## 2021-12-06 ENCOUNTER — TELEPHONE (OUTPATIENT)
Dept: CARDIOTHORACIC SURGERY | Age: 71
End: 2021-12-06

## 2021-12-06 ENCOUNTER — TELEPHONE (OUTPATIENT)
Dept: FAMILY MEDICINE CLINIC | Age: 71
End: 2021-12-06

## 2021-12-06 ENCOUNTER — HOSPITAL ENCOUNTER (OUTPATIENT)
Dept: PHARMACY | Age: 71
Setting detail: THERAPIES SERIES
Discharge: HOME | End: 2021-12-06
Payer: MEDICARE

## 2021-12-06 DIAGNOSIS — I10 ESSENTIAL HYPERTENSION: ICD-10-CM

## 2021-12-06 DIAGNOSIS — R09.89 OTHER SPECIFIED SYMPTOMS AND SIGNS INVOLVING THE CIRCULATORY AND RESPIRATORY SYSTEMS: ICD-10-CM

## 2021-12-06 DIAGNOSIS — Z98.890 S/P MAZE OPERATION FOR ATRIAL FIBRILLATION: ICD-10-CM

## 2021-12-06 DIAGNOSIS — Z86.79 S/P MAZE OPERATION FOR ATRIAL FIBRILLATION: ICD-10-CM

## 2021-12-06 DIAGNOSIS — Z98.890 S/P MVR (MITRAL VALVE REPAIR): ICD-10-CM

## 2021-12-06 DIAGNOSIS — Z95.1 S/P CABG (CORONARY ARTERY BYPASS GRAFT): ICD-10-CM

## 2021-12-06 DIAGNOSIS — Z79.01 ANTICOAGULATED ON COUMADIN: ICD-10-CM

## 2021-12-06 DIAGNOSIS — Z98.890 S/P TRICUSPID VALVE REPAIR: ICD-10-CM

## 2021-12-06 DIAGNOSIS — I25.5 ISCHEMIC CARDIOMYOPATHY: ICD-10-CM

## 2021-12-06 DIAGNOSIS — Z98.890 S/P MVR (MITRAL VALVE REPAIR): Primary | ICD-10-CM

## 2021-12-06 DIAGNOSIS — R53.81 PHYSICAL DECONDITIONING: ICD-10-CM

## 2021-12-06 DIAGNOSIS — I21.3 ST ELEVATION MYOCARDIAL INFARCTION (STEMI), UNSPECIFIED ARTERY (HCC): Primary | ICD-10-CM

## 2021-12-06 DIAGNOSIS — I48.91 ATRIAL FIBRILLATION, UNSPECIFIED TYPE (HCC): Primary | ICD-10-CM

## 2021-12-06 DIAGNOSIS — I50.20 HEART FAILURE WITH REDUCED EJECTION FRACTION (HCC): ICD-10-CM

## 2021-12-06 DIAGNOSIS — Z51.81 ENCOUNTER FOR THERAPEUTIC DRUG MONITORING: ICD-10-CM

## 2021-12-06 DIAGNOSIS — I48.91 ATRIAL FIBRILLATION, UNSPECIFIED TYPE (HCC): ICD-10-CM

## 2021-12-06 DIAGNOSIS — E11.9 TYPE 2 DIABETES MELLITUS WITHOUT COMPLICATION, WITHOUT LONG-TERM CURRENT USE OF INSULIN (HCC): ICD-10-CM

## 2021-12-06 LAB — POC INR: 4.2 (ref 0.8–1.2)

## 2021-12-06 PROCEDURE — 85610 PROTHROMBIN TIME: CPT

## 2021-12-06 PROCEDURE — 36416 COLLJ CAPILLARY BLOOD SPEC: CPT

## 2021-12-06 PROCEDURE — 99213 OFFICE O/P EST LOW 20 MIN: CPT

## 2021-12-06 NOTE — TELEPHONE ENCOUNTER
Patient has been informed and voiced understanding   Referral faxed to [facility] access hospital [facility] & also [phone]

## 2021-12-06 NOTE — TELEPHONE ENCOUNTER
Pt states pt needs home health b/c he cant help every day, it's wearing him out  No preference on [facility] provider    Pt has a life vest and needs 02 sat checked daily

## 2021-12-06 NOTE — PROGRESS NOTES
Medication Management [address] [facility]  [address] [place] [address]  [phone] (phone)  [phone] (fax)     Patient presents to the Anticoagulation clinic today for assessment and initial dosing of warfarin. Patient has a diagnosis of atrial fibrillation, s/p MAZE procedure and has been placed on Coumadin with a goal INR 2-3. Pt started Coumadin 11/21/21. Jose Olsen was given full education today in the clinic including written materials, supplemental oral instructions, and all questions were answered. Specifically, Gallito Gramajo was instructed to notfiy the Anticoagulation clinic immediately if there is any unusual bleeding, such as throwing or coughing up blood, bleeding from the nose, mouth, ears, or pink-red tinge in the urine or if the stools contain bright red blood or are black and tarry. In addition, Swati was informed to notify the clinic about any and all medicine changes, including prescriptions, over-the-counter or nonprescription medicines, vitamins, herbs, supplements, creams, rubs, eye or ear drops, and injections, whether to be used short- or long-term, within 24 hours. The patient was also instructed to let all other physicians and pharmacists know that warfarin was started as a double-check against drug interactions. The patient was further instructed on the effects of vitamin K containing foods on warfarin and the importance of consistency was stressed. Swati was also advised to avoid large amounts of alcohol, grapefruit juice or cranberry juice while on warfarin. HPI:    Medication changes: Patient was started on amiodarone while hospitalized. Tablet strength per patient: 2 mg   Patient reported dosing regimen over last 1 week: Updated track board from hospitalization. Patient has been taking 4 mg daily since discharge. Missed doses in the last 1-2 weeks: None  Extra doses in the last 1-2 weeks: None  Any problems with bleeding/bruising?  Patient has some bruising from recent hospitalization, but appears to be healing. Any recent falls? No   Any signs or symptoms of DVT/PE or stroke? Patient gets SOB on exertion following hospitalization/procedures  Alcohol use: None  Tobacco use: None  Diet changes as follows: Patient has been eating smaller portions   Green leafy intake: ~3-4 servings/week   Grapefruit/cranberry juice: Patient occasionally drinks cranberry juice. Counseled on interaction. Upcoming surgeries or procedures: None    Assessment  Lab Results   Component Value Date    INR 4.20 (H) 12/06/2021    INR 2.9 12/02/2021    INR 1.10 11/25/2021     INR supratherapeutic   Recent Labs     12/06/21  0754   INR 4.20*     Patient is new to Coumadin and is supratherapeutic today. Amiodarone interaction is likely contributing. Plan  HOLD Coumadin x 1 dose today 12/6/21, then decrease Coumadin from 4 mg daily to Coumadin 2 mg MWF and 4 mg TuThSaSu (21.4% decrease). Recheck INR 1 weeks. Patient reminded to call the Anticoagulation Clinic with any signs or symptoms of bleeding or with any medication changes. Patient given instructions utilizing the teach back method.  Printed patient teaching/instruction included with AVS.     For Pharmacy Admin Tracking Only     Intervention Detail: Dose Adjustment: 1, reason: Interaction, Therapy Optimization   Total # of Interventions Recommended: 1   Total # of Interventions Accepted: 1   Time Spent (min): [address] [facility], PharmD, BCPS  12/6/2021  9:07 AM

## 2021-12-06 NOTE — TELEPHONE ENCOUNTER
Diagnosis Orders   1. ST elevation myocardial infarction (STEMI), unspecified artery [facility])  [facility]   2. S/P CABG (coronary artery bypass graft)  [facility]   3. Heart failure with reduced ejection fraction [facility])  [address] [place] [address]   4. Ischemic cardiomyopathy  [address]   5. Atrial fibrillation, unspecified type [facility])  [facility]   6. S/P Maze operation for atrial fibrillation  [facility]   7. S/P MVR (mitral valve repair)  [facility]   8. S/P tricuspid valve repair  [place]. Type 2 diabetes mellitus without complication, without long-term current use of insulin ([facility] [address]  [address]   10. Essential hypertension  [place].  Physical deconditioning  [address]

## 2021-12-08 ENCOUNTER — HOSPITAL ENCOUNTER (OUTPATIENT)
Dept: GENERAL RADIOLOGY | Age: 71
Discharge: HOME | End: 2021-12-08
Payer: MEDICARE

## 2021-12-08 ENCOUNTER — TELEPHONE (OUTPATIENT)
Dept: FAMILY MEDICINE CLINIC | Age: 71
End: 2021-12-08

## 2021-12-08 ENCOUNTER — HOSPITAL ENCOUNTER (OUTPATIENT)
Age: 71
Discharge: HOME | End: 2021-12-08
Payer: MEDICARE

## 2021-12-08 DIAGNOSIS — E78.2 MIXED HYPERLIPIDEMIA: ICD-10-CM

## 2021-12-08 DIAGNOSIS — Z98.890 S/P MAZE OPERATION FOR ATRIAL FIBRILLATION: ICD-10-CM

## 2021-12-08 DIAGNOSIS — K21.9 GASTROESOPHAGEAL REFLUX DISEASE, UNSPECIFIED WHETHER ESOPHAGITIS PRESENT: ICD-10-CM

## 2021-12-08 DIAGNOSIS — D32.9 MENINGIOMA (HCC): ICD-10-CM

## 2021-12-08 DIAGNOSIS — Z95.1 S/P CABG (CORONARY ARTERY BYPASS GRAFT): ICD-10-CM

## 2021-12-08 DIAGNOSIS — Z98.890 S/P MVR (MITRAL VALVE REPAIR): ICD-10-CM

## 2021-12-08 DIAGNOSIS — I50.20 HEART FAILURE WITH REDUCED EJECTION FRACTION (HCC): ICD-10-CM

## 2021-12-08 DIAGNOSIS — D49.7 FOLLICULAR NEOPLASM OF THYROID: ICD-10-CM

## 2021-12-08 DIAGNOSIS — E11.9 TYPE 2 DIABETES MELLITUS WITHOUT COMPLICATION, WITHOUT LONG-TERM CURRENT USE OF INSULIN (HCC): ICD-10-CM

## 2021-12-08 DIAGNOSIS — Z86.79 S/P MAZE OPERATION FOR ATRIAL FIBRILLATION: ICD-10-CM

## 2021-12-08 DIAGNOSIS — I21.3 ST ELEVATION MYOCARDIAL INFARCTION (STEMI), UNSPECIFIED ARTERY (HCC): ICD-10-CM

## 2021-12-08 DIAGNOSIS — I10 ESSENTIAL HYPERTENSION: ICD-10-CM

## 2021-12-08 DIAGNOSIS — Z98.890 S/P TRICUSPID VALVE REPAIR: ICD-10-CM

## 2021-12-08 DIAGNOSIS — D69.6 THROMBOCYTOPENIA (HCC): ICD-10-CM

## 2021-12-08 DIAGNOSIS — I25.5 ISCHEMIC CARDIOMYOPATHY: ICD-10-CM

## 2021-12-08 DIAGNOSIS — I48.91 ATRIAL FIBRILLATION, UNSPECIFIED TYPE (HCC): ICD-10-CM

## 2021-12-08 LAB
ANION GAP SERPL CALCULATED.3IONS-SCNC: 9 MEQ/L (ref 8–16)
ANISOCYTOSIS: PRESENT
BASOPHILS # BLD: 0.7 %
BASOPHILS ABSOLUTE: 0 THOU/MM3 (ref 0–0.1)
BUN BLDV-MCNC: 23 MG/DL (ref 7–22)
CALCIUM SERPL-MCNC: 9.1 MG/DL (ref 8.5–10.5)
CHLORIDE BLD-SCNC: 102 MEQ/L (ref 98–111)
CO2: 35 MEQ/L (ref 23–33)
CREAT SERPL-MCNC: 0.7 MG/DL (ref 0.4–1.2)
EOSINOPHIL # BLD: 3.3 %
EOSINOPHILS ABSOLUTE: 0.1 THOU/MM3 (ref 0–0.4)
ERYTHROCYTE [DISTWIDTH] IN BLOOD BY AUTOMATED COUNT: 23.4 % (ref 11.5–14.5)
ERYTHROCYTE [DISTWIDTH] IN BLOOD BY AUTOMATED COUNT: 90.1 FL (ref 35–45)
GFR SERPL CREATININE-BSD FRML MDRD: 82 ML/MIN/1.73M2
GLUCOSE BLD-MCNC: 118 MG/DL (ref 70–108)
HCT VFR BLD CALC: 42.1 % (ref 37–47)
HEMOGLOBIN: 12.6 GM/DL (ref 12–16)
IMMATURE GRANS (ABS): 0.03 THOU/MM3 (ref 0–0.07)
IMMATURE GRANULOCYTES: 0.7 %
LYMPHOCYTES # BLD: 16.4 %
LYMPHOCYTES ABSOLUTE: 0.7 THOU/MM3 (ref 1–4.8)
MCH RBC QN AUTO: 31.6 PG (ref 26–33)
MCHC RBC AUTO-ENTMCNC: 29.9 GM/DL (ref 32.2–35.5)
MCV RBC AUTO: 105.5 FL (ref 81–99)
MONOCYTES # BLD: 5.2 %
MONOCYTES ABSOLUTE: 0.2 THOU/MM3 (ref 0.4–1.3)
NUCLEATED RED BLOOD CELLS: 0 /100 WBC
PLATELET # BLD: 116 THOU/MM3 (ref 130–400)
PMV BLD AUTO: 10.6 FL (ref 9.4–12.4)
POTASSIUM SERPL-SCNC: 3.9 MEQ/L (ref 3.5–5.2)
RBC # BLD: 3.99 MILL/MM3 (ref 4.2–5.4)
SEG NEUTROPHILS: 73.7 %
SEGMENTED NEUTROPHILS ABSOLUTE COUNT: 3.2 THOU/MM3 (ref 1.8–7.7)
SODIUM BLD-SCNC: 146 MEQ/L (ref 135–145)
WBC # BLD: 4.3 THOU/MM3 (ref 4.8–10.8)

## 2021-12-08 PROCEDURE — 36415 COLL VENOUS BLD VENIPUNCTURE: CPT

## 2021-12-08 PROCEDURE — 80048 BASIC METABOLIC PNL TOTAL CA: CPT

## 2021-12-08 PROCEDURE — 85025 COMPLETE CBC W/AUTO DIFF WBC: CPT

## 2021-12-08 PROCEDURE — 71046 X-RAY EXAM CHEST 2 VIEWS: CPT

## 2021-12-08 NOTE — TELEPHONE ENCOUNTER
----- Message from Wicho Frye, DO sent at 12/8/2021  4:20 PM EST -----  Please let pt know that cbc and BMP look good  No concerning findings at this point, which is great  Let me know if questions, thanks!

## 2021-12-10 ENCOUNTER — TELEPHONE (OUTPATIENT)
Dept: PHARMACY | Age: 71
End: 2021-12-10

## 2021-12-10 NOTE — TELEPHONE ENCOUNTER
Received call from [facility] with [facility], pt will be receiving services through [facility], will plan for 12/13 INR via [facility].

## 2021-12-13 ENCOUNTER — APPOINTMENT (OUTPATIENT)
Dept: PHARMACY | Age: 71
End: 2021-12-13
Payer: MEDICARE

## 2021-12-13 ENCOUNTER — TELEPHONE (OUTPATIENT)
Dept: PHARMACY | Age: 71
End: 2021-12-13

## 2021-12-13 NOTE — TELEPHONE ENCOUNTER
Spoke with Alyssa Stallings, RN at [facility]. No nursing assessment received. Maryuri Solorio states RN has not done visits with INRs before and will educate RN about filling out anticoag patient assessment. Maryuri Solorio read Rn's visit assessment to me over the phone. Vitals: 4142/84 - 68 - 18, temp 98. O2 sat 92%  No bleeding noted. +NORMAN  + pedal edema bilaterally. No answer at patient's home. Will need to check with Home health if we should be contacting someone else about her warfarin doses.

## 2021-12-14 ENCOUNTER — HOSPITAL ENCOUNTER (OUTPATIENT)
Dept: PHARMACY | Age: 71
Setting detail: THERAPIES SERIES
Discharge: HOME | End: 2021-12-14
Payer: MEDICARE

## 2021-12-14 ENCOUNTER — OFFICE VISIT (OUTPATIENT)
Dept: CARDIOTHORACIC SURGERY | Age: 71
End: 2021-12-14
Payer: MEDICARE

## 2021-12-14 ENCOUNTER — APPOINTMENT (OUTPATIENT)
Dept: PHARMACY | Age: 71
End: 2021-12-14
Payer: MEDICARE

## 2021-12-14 ENCOUNTER — TELEPHONE (OUTPATIENT)
Dept: FAMILY MEDICINE CLINIC | Age: 71
End: 2021-12-14

## 2021-12-14 VITALS
DIASTOLIC BLOOD PRESSURE: 72 MMHG | HEART RATE: 76 BPM | BODY MASS INDEX: 36.79 KG/M2 | WEIGHT: 220.8 LBS | SYSTOLIC BLOOD PRESSURE: 120 MMHG | HEIGHT: 65 IN

## 2021-12-14 DIAGNOSIS — I21.3 ST ELEVATION MYOCARDIAL INFARCTION (STEMI), UNSPECIFIED ARTERY (HCC): Primary | ICD-10-CM

## 2021-12-14 DIAGNOSIS — Z86.79 S/P MAZE OPERATION FOR ATRIAL FIBRILLATION: Primary | ICD-10-CM

## 2021-12-14 DIAGNOSIS — Z98.890 S/P MAZE OPERATION FOR ATRIAL FIBRILLATION: Primary | ICD-10-CM

## 2021-12-14 DIAGNOSIS — Z79.01 ANTICOAGULATED ON COUMADIN: ICD-10-CM

## 2021-12-14 DIAGNOSIS — I48.91 ATRIAL FIBRILLATION, UNSPECIFIED TYPE (HCC): ICD-10-CM

## 2021-12-14 DIAGNOSIS — Z51.81 ENCOUNTER FOR THERAPEUTIC DRUG MONITORING: ICD-10-CM

## 2021-12-14 PROCEDURE — 93000 ELECTROCARDIOGRAM COMPLETE: CPT | Performed by: THORACIC SURGERY (CARDIOTHORACIC VASCULAR SURGERY)

## 2021-12-14 PROCEDURE — 99024 POSTOP FOLLOW-UP VISIT: CPT | Performed by: THORACIC SURGERY (CARDIOTHORACIC VASCULAR SURGERY)

## 2021-12-14 PROCEDURE — 99211 OFF/OP EST MAY X REQ PHY/QHP: CPT | Performed by: PHARMACIST

## 2021-12-14 RX ORDER — FUROSEMIDE 80 MG
80 TABLET ORAL DAILY
Qty: 30 TABLET | Refills: 3 | Status: SHIPPED | OUTPATIENT
Start: 2021-12-14 | End: 2022-02-04 | Stop reason: SDUPTHER

## 2021-12-14 NOTE — TELEPHONE ENCOUNTER
Future Appointments   Date Time Provider Andrei Keila   12/16/2021  9:00 AM [address]   12/20/2021  4:00 PM PHILLY Youssef [facility]   12/30/2021 10:40 AM ORAL Cordova - CNP N Oncology [facility] Andreia Gross   1/14/2022  9:00 AM Aryan Hughes MD N SRPX Heart [facility]   1/17/2022  8:00 AM [facility]   4/28/2022  3:40 PM Anna Marie Gutierrez, DO [facility] Med [address] [place] [address] Street to cancel appt?

## 2021-12-14 NOTE — PROGRESS NOTES
Cardiovascular Surgery Office Note      Date: 12/14/2021    Review of Systems:  Glenna Coffman is a 66 y.o. female, originally presented to [facility] 11/5/21 as a STEMI in context of AF/RVR. LHC showed good technical targets, but TTE showed dilated CM w/ EF 20%, with apical akinesis. Transferred to [facility] as high-risk operative candidate/possible need for perioperative mechanical support. On 11/18, she underwent CABGx3, MVV, TVV, Maze. Post-discharge course fairly unremarkable, no LifeVest events. Patient reports peripheral edema slowly improving. Mild residual dyspnea. Physical Exam:  /72   Pulse 76   Ht 5' 5\" (1.651 m)   Wt 220 lb 12.8 oz (100.2 kg)   BMI 36.74 kg/m²   Physical Exam    Her wounds are healing well with no erythema or drainage. LEs 3+ edema  The sternum is stable to compression. The lungs are clear to auscultation, decreased breath sounds left base. The saphenectomy sites are healing well.     The chest xray 12/8 shows moderate left pleural effusion    EKG today NSR, no acute changes    Assessment/Plan:  Increase lasix to 80 qd  D/c warfarin, in case need for thoracentesis  Repeat BMP, INR & CXR 12/17, f/u left effusion  Decision at that point re: meds and thoracentesis  Routine follow up with Dr. Himanshu Maldonado       Electronically signed by Gail Soliz MD on 12/14/21 at 11:04 AM EST

## 2021-12-14 NOTE — TELEPHONE ENCOUNTER
The apt was to ensure she was continuing to do well from her MI, surgery etc  To ensure no issues with meds etc popped up etc  They had a lot of questions last visit and wanted to ensure nothing new came up  Would prefer her to keep apt if able  If doing well, and they feel she doesn't need the apt, ok to cancel  Keep f/u apt for APR, f/u sooner any changes  Let me know if questions, thanks!     Future Appointments   Date Time Provider Andrei Pedraza   12/16/2021  9:00 AM Anna Marie Gutierrez, [address] [place] [address]   12/20/2021  4:00 PM PHILLY Youssef [facility]   12/30/2021 10:40 AM ORAL Cordova - CNP N Oncology [address]   1/14/2022  9:00 AM Aryan Hughes MD N SRPX Heart [facility]   1/17/2022  8:00 AM Bertha Maynard MD N ENT [address]   4/28/2022  3:40 PM Anna Marie Gutierrez,  [address] [place] [address]

## 2021-12-14 NOTE — PROGRESS NOTES
Medication Management [address]  [phone] (phone)  [phone] (fax)        INR drawn by [facility]. Nursing assessment not received. See info in TC from yesterday. Anticoagulation encounter completed via telephone with Janine amaya, [phone]. Limited information obtained from monique. Ms. Grover Albright is a 66 y.o.  female with history of Afib. Monique verifies current dosing regimen and tablet strength. No missed or extra doses. Monique denies s/s bleeding/bruising/swelling/SOB/chest pain as far as he is aware. No blood in urine or stool. No changes in medication/OTC agents/Herbals. No Procedures scheduled in the future at this time. Assessment:  Lab Results   Component Value Date    INR 1.94 (H) 12/13/2021    INR 4.20 (H) 12/06/2021    INR 2.9 12/02/2021     INR subtherapeutic   Recent Labs     12/13/21  1220   INR 1.94*         Plan:  Continue Coumadin 2mg MWF and 4mg TThSaS. Recheck INR in 1 week(s) on 12/20/21. Called Janine amaya, for appt as pt and  will not answer the phone. Patient reminded to call the Anticoagulation Clinic with any signs or symptoms of bleeding or with any medication changes. Patient given instructions utilizing the teach back method. The following statement was review with patient regarding this virtual visit:  We want to confirm that, for purposes of billing, this is a virtual visit with your provider for which we will submit a claim for reimbursement with your insurance company. You may be responsible for any copays, coinsurance amounts or other amounts not covered by your insurance company. If you do not accept this, unfortunately we will not be able to schedule a virtual visit with the provider. Do you accept?   Yes    For Pharmacy Admin Tracking Only     Time Spent (min): 20

## 2021-12-15 NOTE — TELEPHONE ENCOUNTER
Cheyanne Vincent informed and verbalized understanding. He feels pt is doing well, has many appts with drs, she's running out of money, she  Is on lasix, urinating a lot   Many reasons why she cant come tomorrow.   appt canceled

## 2021-12-16 ENCOUNTER — TELEPHONE (OUTPATIENT)
Dept: CARDIOTHORACIC SURGERY | Age: 71
End: 2021-12-16

## 2021-12-16 NOTE — TELEPHONE ENCOUNTER
Swati's son called into office  He states that the home health nurse came to visit Swati and recommended that she also be taking potassium along with the furosemide that Dr Jenna Mills has prescribed on 12/14/21. Patient's son wants to know if this is something you would consider prescribing for her as well?

## 2021-12-17 ENCOUNTER — TELEPHONE (OUTPATIENT)
Dept: CARDIOTHORACIC SURGERY | Age: 71
End: 2021-12-17

## 2021-12-17 ENCOUNTER — HOSPITAL ENCOUNTER (OUTPATIENT)
Dept: GENERAL RADIOLOGY | Age: 71
Discharge: HOME | End: 2021-12-17
Payer: MEDICARE

## 2021-12-17 ENCOUNTER — HOSPITAL ENCOUNTER (OUTPATIENT)
Age: 71
Discharge: HOME | End: 2021-12-17
Payer: MEDICARE

## 2021-12-17 DIAGNOSIS — I21.3 ST ELEVATION MYOCARDIAL INFARCTION (STEMI), UNSPECIFIED ARTERY (HCC): ICD-10-CM

## 2021-12-17 DIAGNOSIS — J90 PLEURAL EFFUSION, LEFT: Primary | ICD-10-CM

## 2021-12-17 LAB
ANION GAP SERPL CALCULATED.3IONS-SCNC: 9 MEQ/L (ref 8–16)
BUN BLDV-MCNC: 27 MG/DL (ref 7–22)
CALCIUM SERPL-MCNC: 9.2 MG/DL (ref 8.5–10.5)
CHLORIDE BLD-SCNC: 99 MEQ/L (ref 98–111)
CO2: 36 MEQ/L (ref 23–33)
CREAT SERPL-MCNC: 0.8 MG/DL (ref 0.4–1.2)
GFR SERPL CREATININE-BSD FRML MDRD: 70 ML/MIN/1.73M2
GLUCOSE BLD-MCNC: 140 MG/DL (ref 70–108)
INR BLD: 1.04 (ref 0.85–1.13)
POTASSIUM SERPL-SCNC: 3.5 MEQ/L (ref 3.5–5.2)
SODIUM BLD-SCNC: 144 MEQ/L (ref 135–145)

## 2021-12-17 PROCEDURE — 71046 X-RAY EXAM CHEST 2 VIEWS: CPT

## 2021-12-17 PROCEDURE — 80048 BASIC METABOLIC PNL TOTAL CA: CPT

## 2021-12-17 PROCEDURE — 85610 PROTHROMBIN TIME: CPT

## 2021-12-17 PROCEDURE — 36415 COLL VENOUS BLD VENIPUNCTURE: CPT

## 2021-12-17 RX ORDER — POTASSIUM CHLORIDE 20 MEQ/1
20 TABLET, EXTENDED RELEASE ORAL 2 TIMES DAILY
Qty: 60 TABLET | Refills: 5 | Status: SHIPPED | OUTPATIENT
Start: 2021-12-17 | End: 2022-02-15

## 2021-12-17 NOTE — TELEPHONE ENCOUNTER
Per email from Dr Cleveland Hernandez thoracentesis  Spoke to IR scheduling but was informed to wait until Monday to call because the  was not in today  [facility] for patient's son Keiry Howell to inform

## 2021-12-20 ENCOUNTER — APPOINTMENT (OUTPATIENT)
Dept: PHARMACY | Age: 71
End: 2021-12-20
Payer: MEDICARE

## 2021-12-20 ENCOUNTER — TELEPHONE (OUTPATIENT)
Dept: PHARMACY | Age: 71
End: 2021-12-20

## 2021-12-20 NOTE — TELEPHONE ENCOUNTER
Patient is a home health patient and INR drawn 12/20/21 resulted 0.94. I spoke to patients son Denisse Dave and he said that per Dr. Chilo Renee, patient was to hold coumadin which she has been holding since Wednesday 12/15/21. Dr. Chilo Renee wants to hold coumadin due to possible thoracentesis. This thoracentesis however is not scheduled yet. I called Denisse Dave and he has not heard of a date and time yet either. I did leave a message with Dr. Dell Carpenter office to call us back with any updates so we can formulate a plan as well. Added to daily calendar to follow up with.

## 2021-12-21 ENCOUNTER — TELEPHONE (OUTPATIENT)
Dept: PHARMACY | Age: 71
End: 2021-12-21

## 2021-12-21 NOTE — TELEPHONE ENCOUNTER
I don't think will need a bridge, but I would have them reach out to cardiology at [facility] to see if they agree or would like her bridged. Thanks!

## 2021-12-21 NOTE — TELEPHONE ENCOUNTER
Received call from Dr. Leroy Lance office - patient to have thoracentesis on 12/23/21 at 9 am. Patient has been holding coumadin since 12/15/21due to potential thoracentesis by Dr. Todd Durbin. [id number] Score for Atrial Fibrillation Stroke Risk   Risk   Factors  Component Value   C CHF No 1   H HTN Yes 1   A2 Age >= 75 No,  (72 y.o.) 0   D DM Yes 1   S2 Prior Stroke/TIA No 0   V Vascular Disease Yes 1   A Age 74-69 Yes,  (72 y.o.) 1   Sc Sex female 1    [id number]  Score  6   Score last updated 12/21/21 11:26 AM EST      Reaching out to Dr. Yas Echavarria (referrign physician) if wishes for patient to be bridged with lovenox prior to and post procedure?

## 2021-12-21 NOTE — TELEPHONE ENCOUNTER
Spoke with Dr Damien Purvis, patient does not need bridged for this procedure. LMOM to inform Terese Manzano at coumadin clinic at ext. [phone].

## 2021-12-21 NOTE — TELEPHONE ENCOUNTER
Reached out to Dr. Suzie Alvarez office to check with need for bridging - will reach out to Dr. Bertrand Kaia, however he is on vacation so response may be delayed. Per Dami at Dr. Suzie Alvarez office - do not need to bridge. Called patient's grandson, Devyn Red with the following instructions for coumadin:    · Continue to hold coumadin 12/21-12/23  · Restart coumadin on 12/24. Coumadin 6 mg 12/24 and 12/25, then continue 2 mg MWF and 4 mg TuThSaSu. Devyn Red wanted to me to leave VM on home machine to ensure accuracy of instructions on home machine ([phone]). Patient's grandson voiced understanding. Home health to draw INR on 12/30/21.

## 2021-12-21 NOTE — TELEPHONE ENCOUNTER
Called radiology scheduling  Spoke with Bonnie Tovar patient information  Marly Cordero states they will look for order in Epic to schedule  Will check if scheduled later today and call Gaston Cadet

## 2021-12-21 NOTE — TELEPHONE ENCOUNTER
Patient is scheduled for thoracentesis on 12/23/21 at 9:00AM  She will need to arrive by 8:30AM to Main Radiology  Spoke to Lashay Neil at the coumadin clinic and informed her of date/time  Informed patient's son Devyn Red  He voiced understanding.

## 2021-12-22 DIAGNOSIS — D69.6 THROMBOCYTOPENIA (HCC): Primary | ICD-10-CM

## 2021-12-23 ENCOUNTER — HOSPITAL ENCOUNTER (OUTPATIENT)
Dept: GENERAL RADIOLOGY | Age: 71
Discharge: HOME | End: 2021-12-23
Payer: MEDICARE

## 2021-12-23 ENCOUNTER — HOSPITAL ENCOUNTER (OUTPATIENT)
Dept: ULTRASOUND IMAGING | Age: 71
Discharge: HOME | End: 2021-12-23
Payer: MEDICARE

## 2021-12-23 DIAGNOSIS — Z98.890 STATUS POST THORACENTESIS: ICD-10-CM

## 2021-12-23 DIAGNOSIS — J90 PLEURAL EFFUSION, LEFT: ICD-10-CM

## 2021-12-23 PROCEDURE — 71045 X-RAY EXAM CHEST 1 VIEW: CPT

## 2021-12-23 PROCEDURE — 32555 ASPIRATE PLEURA W/ IMAGING: CPT

## 2021-12-30 ENCOUNTER — HOSPITAL ENCOUNTER (OUTPATIENT)
Dept: INFUSION THERAPY | Age: 71
Discharge: HOME | End: 2021-12-30
Payer: MEDICARE

## 2021-12-30 ENCOUNTER — OFFICE VISIT (OUTPATIENT)
Dept: ONCOLOGY | Age: 71
End: 2021-12-30
Payer: MEDICARE

## 2021-12-30 ENCOUNTER — HOSPITAL ENCOUNTER (OUTPATIENT)
Dept: PHARMACY | Age: 71
Setting detail: THERAPIES SERIES
Discharge: HOME | End: 2021-12-30
Payer: MEDICARE

## 2021-12-30 VITALS
WEIGHT: 208 LBS | HEIGHT: 65 IN | BODY MASS INDEX: 34.66 KG/M2 | OXYGEN SATURATION: 95 % | SYSTOLIC BLOOD PRESSURE: 137 MMHG | TEMPERATURE: 97.6 F | RESPIRATION RATE: 18 BRPM | HEART RATE: 82 BPM | DIASTOLIC BLOOD PRESSURE: 63 MMHG

## 2021-12-30 DIAGNOSIS — D69.6 THROMBOCYTOPENIA (HCC): ICD-10-CM

## 2021-12-30 DIAGNOSIS — Z98.890 S/P MAZE OPERATION FOR ATRIAL FIBRILLATION: Primary | ICD-10-CM

## 2021-12-30 DIAGNOSIS — Z79.01 ANTICOAGULATED ON COUMADIN: ICD-10-CM

## 2021-12-30 DIAGNOSIS — D69.6 THROMBOCYTOPENIA (HCC): Primary | ICD-10-CM

## 2021-12-30 DIAGNOSIS — Z86.79 S/P MAZE OPERATION FOR ATRIAL FIBRILLATION: Primary | ICD-10-CM

## 2021-12-30 DIAGNOSIS — Z51.81 ENCOUNTER FOR THERAPEUTIC DRUG MONITORING: ICD-10-CM

## 2021-12-30 LAB
ABSOLUTE IMMATURE GRANULOCYTE: 0.01 THOU/MM3 (ref 0–0.07)
BASINOPHIL, AUTOMATED: 1 % (ref 0–3)
BASOPHILS ABSOLUTE: 0.1 THOU/MM3 (ref 0–0.1)
EOSINOPHILS ABSOLUTE: 0.2 THOU/MM3 (ref 0–0.4)
EOSINOPHILS RELATIVE PERCENT: 4 % (ref 0–4)
FOLATE: > 20 NG/ML (ref 4.8–24.2)
HCT VFR BLD CALC: 45.2 % (ref 37–47)
HEMOGLOBIN: 14.2 GM/DL (ref 12–16)
IMMATURE GRANULOCYTES: 0 %
LYMPHOCYTES # BLD: 20 % (ref 15–47)
LYMPHOCYTES ABSOLUTE: 1.1 THOU/MM3 (ref 1–4.8)
MCH RBC QN AUTO: 31.8 PG (ref 26–33)
MCHC RBC AUTO-ENTMCNC: 31.4 GM/DL (ref 32.2–35.5)
MCV RBC AUTO: 101 FL (ref 81–99)
MONOCYTES ABSOLUTE: 0.3 THOU/MM3 (ref 0.4–1.3)
MONOCYTES: 6 % (ref 0–12)
PDW BLD-RTO: 16.7 % (ref 11.5–14.5)
PLATELET # BLD: 126 THOU/MM3 (ref 130–400)
PMV BLD AUTO: 11 FL (ref 9.4–12.4)
RBC # BLD: 4.47 MILL/MM3 (ref 4.2–5.4)
SEG NEUTROPHILS: 69 % (ref 43–75)
SEGMENTED NEUTROPHILS ABSOLUTE COUNT: 3.9 THOU/MM3 (ref 1.8–7.7)
VITAMIN B-12: 1193 PG/ML (ref 211–911)
WBC # BLD: 5.6 THOU/MM3 (ref 4.8–10.8)

## 2021-12-30 PROCEDURE — 1090F PRES/ABSN URINE INCON ASSESS: CPT | Performed by: NURSE PRACTITIONER

## 2021-12-30 PROCEDURE — G8417 CALC BMI ABV UP PARAM F/U: HCPCS | Performed by: NURSE PRACTITIONER

## 2021-12-30 PROCEDURE — G8400 PT W/DXA NO RESULTS DOC: HCPCS | Performed by: NURSE PRACTITIONER

## 2021-12-30 PROCEDURE — 85025 COMPLETE CBC W/AUTO DIFF WBC: CPT

## 2021-12-30 PROCEDURE — 1036F TOBACCO NON-USER: CPT | Performed by: NURSE PRACTITIONER

## 2021-12-30 PROCEDURE — 82607 VITAMIN B-12: CPT

## 2021-12-30 PROCEDURE — G8484 FLU IMMUNIZE NO ADMIN: HCPCS | Performed by: NURSE PRACTITIONER

## 2021-12-30 PROCEDURE — 4040F PNEUMOC VAC/ADMIN/RCVD: CPT | Performed by: NURSE PRACTITIONER

## 2021-12-30 PROCEDURE — 36415 COLL VENOUS BLD VENIPUNCTURE: CPT

## 2021-12-30 PROCEDURE — 82746 ASSAY OF FOLIC ACID SERUM: CPT

## 2021-12-30 PROCEDURE — 99212 OFFICE O/P EST SF 10 MIN: CPT

## 2021-12-30 PROCEDURE — 99214 OFFICE O/P EST MOD 30 MIN: CPT | Performed by: NURSE PRACTITIONER

## 2021-12-30 PROCEDURE — G8427 DOCREV CUR MEDS BY ELIG CLIN: HCPCS | Performed by: NURSE PRACTITIONER

## 2021-12-30 PROCEDURE — 3017F COLORECTAL CA SCREEN DOC REV: CPT | Performed by: NURSE PRACTITIONER

## 2021-12-30 PROCEDURE — 1123F ACP DISCUSS/DSCN MKR DOCD: CPT | Performed by: NURSE PRACTITIONER

## 2021-12-30 PROCEDURE — 99211 OFF/OP EST MAY X REQ PHY/QHP: CPT

## 2021-12-30 RX ORDER — WARFARIN SODIUM 2 MG/1
2 TABLET ORAL
COMMUNITY
End: 2022-01-04 | Stop reason: SDUPTHER

## 2021-12-30 NOTE — PROGRESS NOTES
Medication Management [address]  [phone] (phone)  [phone] (fax)        INR drawn by [facility]. Nursing assessment reviewed and appreciated. Nursing assessment within the normal limits with the exception of the following:  none    Anticoagulation encounter completed via telephone with son Angelo Carrion and then daughter Camryn. Swati was with [facility], did not want to speak on telephone. Ms. Dexter Valdovinos is a 66 y.o.  female with history of atrial fib., S/P MAZE. Dosing regimen and tablet strength. Shanon Cunningham states set up her pills as directed. No missed or extra doses. Patient denies s/s bleeding/bruising/swelling/SOB/chest pain  No blood in urine or stool. No dietary changes. Educated in regards to consistency with diet. Son and daughter both state that Swati drinks Boost, unsure how much she drinks. Educated about consistency from week to week and they said would make sure to discuss with patient. No changes in medication/OTC agents/Herbals. No change in alcohol use or tobacco use. No change in activity level. Patient denies headaches/dizziness/lightheadedness/falls. No vomiting/diarrhea or acute illness. No Procedures scheduled in the future at this time. Assessment:  Lab Results   Component Value Date    INR 1.10 12/30/2021    INR 0.94 12/20/2021    INR 1.04 12/17/2021     INR subtherapeutic   Recent Labs     12/30/21  1445   INR 1.10     Patient has been back on warfarin for 6 days post holding for procedure. Maintenance regimen has not yet been determined. Plan:  Take Coumadin 4 mg daily for the next 4 days. Recheck INR in 4 days on 1/3/22. Patient reminded to call the Anticoagulation Clinic with any signs or symptoms of bleeding or with any medication changes. Patient given instructions utilizing the teach back method.     The following statement was review with patient regarding this virtual visit:  We want to confirm that, for purposes of billing, this is a virtual visit with your provider for which we will submit a claim for reimbursement with your insurance company. You may be responsible for any copays, coinsurance amounts or other amounts not covered by your insurance company. If you do not accept this, unfortunately we will not be able to schedule a virtual visit with the provider. Do you accept?   Yes    For Pharmacy Admin Tracking Only     Intervention Detail: Dose Adjustment: 1, reason: Therapy Optimization   Total # of Interventions Recommended: 1   Total # of Interventions Accepted: 1   Time Spent (min): 20

## 2021-12-30 NOTE — PROGRESS NOTES
[facility], [address] [facility],[address]  [address] [address]  Dept: [phone]  Dept Fax: [phone]: [phone]      Visit Date:12/30/2021     Saundra Fortune is a 66 y.o. female who presents today for:   Chief Complaint   Patient presents with    Follow-up     Thrombocytopenia        HPI:   Saundra Fortune is a 66 y.o. female referred to Hematology/Oncology clinic for evaluation of thrombocytopenia per her PCP, Dr. Anisa Lauren. The patient has a history of chronic thrombocytopenia which as gradually worsened over the last year. Per chart review, she has had persistent thrombocytopenia since 2014 per EMR with platelets decreasing from 105,000 to 69,000 on 10/23/2020. Her most recent CBC completed on 2/23/2021 showed platelet count 97,319. The patient affirms history of thrombocytopenia, but has not had formal work-up in the past.  She denies drinking alcohol. She denies history of autoimmune or liver dysfunction. She denies history of chronic infection such as hepatitis C or HIV. She denies any recent history of COVID-19 infection. The patient states in June 2020 she had a viral illness that lasted for approximately 7 days. She describes this as GI upset with diarrhea. At that time the patient decided to go gluten-free and has had improvement in symptoms since. She has never been diagnosed with celiac disease she admits to fatigue intermittently. She denies any B type symptoms; no recurrent fever, persistent infection, lymphadenopathy, night sweats, poor appetite, early satiety or unintentional weight loss. States she bruises easily at times but denies any abnormal bleeding. Denies epistaxis, hemoptysis, hematemesis, melena, hematochezia, hematuria or vaginal bleeding. Her past medical history includes hypertension, diabetes, GERD, hyperlipidemia.       Interval History 12/30/2021:   Pt presents to the office today for follow up and evaluation of thrombocytopenia. Pt reports heart palpitations at times, she had open heart surgery 6 weeks ago on 2021 and now wears a life vest.  She has bruising to right leg graft site with several healing incision sites. BLE edema noted, R > L. Pt denies fever/chills, s/s infections, H/A, dizziness, cough, SOB, CP, extremity redness/swelling/pain, s/s bleeding. Platelet count improved today at 126. PMH, SH, and FH:  I reviewed the patient's medication and allergy lists as noted on the electronic medical record. The PMH, SH, and FH were also reviewed as noted on the EMR. Past Medical History:   Diagnosis Date    Allergic rhinitis     Angiolipoma of right kidney     ASHD (arteriosclerotic heart disease)     Atrial fibrillation (HCC)     Celiac disease     DM2 (diabetes mellitus, type 2) (HCC)     Eczema     GERD (gastroesophageal reflux disease)     Heart failure with reduced ejection fraction (HCC)     History of ST elevation myocardial infarction (STEMI)     Hyperlipidemia     Hypertension     Ischemic cardiomyopathy     Meningioma ([facility] [address] 2012    Noted MRI . Never did f/u MRI. Told she didn't need to. Has 0 sxs. Denies HA, vision changes, lateralizing numbness or weakness.  Mixed hearing loss 2013    Obesity (BMI 30-39. 9)     Osteoarthritis     Post-menopausal     S/P CABG (coronary artery bypass graft)     S/P Maze operation for atrial fibrillation     S/P MVR (mitral valve repair)     S/P tricuspid valve repair       Past Surgical History:   Procedure Laterality Date     SECTION  1972    CHOLECYSTECTOMY  2004    COLONOSCOPY  2006    CORONARY ARTERY [facility]PASS GRAFT  2021    [facility]    MITRAL VALVE REPAIR  2021    [facility]    MYRINGOTOMY AND TYMPANOSTOMY TUBE PLACEMENT  2012    Dr Anoop Sutton OTHER SURGICAL HISTORY  2021    MAZE Procedure during CABG and MVR/TVR at [address] [place] [address]  2021    TV Repair 2021 at [facility] TUBAL LIGATION      UPPER GASTROINTESTINAL ENDOSCOPY  05/2006      Family History   Problem Relation Age of Onset    Heart Disease Mother     Lung Cancer Mother     Osteoporosis Mother     Other Father         Did not know her father.  Other Maternal Grandmother         blood clot, no clear hx surrounding    Heart Attack Maternal Grandfather     Heart Attack Maternal Uncle 62    Breast Cancer Maternal Aunt 50    Breast Cancer Maternal Cousin 40      Social History     Tobacco Use    Smoking status: Never Smoker    Smokeless tobacco: Never Used   Substance Use Topics    Alcohol use: No      Current Outpatient Medications   Medication Sig Dispense Refill    warfarin (COUMADIN) 2 MG tablet Take 2 mg by mouth      potassium chloride (KLOR-CON M) 20 MEQ extended release tablet Take 1 tablet by mouth 2 times daily 60 tablet 5    furosemide (LASIX) 80 MG tablet Take 1 tablet by mouth daily 30 tablet 3    acetaminophen (TYLENOL) 325 MG tablet Take 325-650 mg by mouth every 6 hours as needed      amiodarone (CORDARONE) 200 MG tablet Take 2 tablets by mouth once daily for 7 days, THEN 1 tablet once daily.  aspirin 81 MG chewable tablet Take 81 mg by mouth daily      atorvastatin (LIPITOR) 80 MG tablet Take 1 tablet by mouth every evening      losartan (COZAAR) 25 MG tablet Take 0.5 tablets by mouth daily      metoprolol succinate (TOPROL XL) 50 MG extended release tablet Take 1.5 tablets by mouth 2 times daily      triamcinolone (KENALOG) 0.1 % cream APPLY TOPICALLY 2 TIMES DAILY TO AFFECTED AREA SPARINGLY.  3 each 3    cetirizine (ZYRTEC) 10 MG tablet Take 10 mg by mouth daily as needed for Allergies       OLIVE LEAF PO Take 4 tablets by mouth daily  (Patient not taking: Reported on 12/6/2021)      ELDERBERRY PO Take 2 tablets by mouth daily  (Patient not taking: Reported on 12/6/2021)      Multiple Vitamins-Minerals (MULTIVITAMIN ADULT PO) Take 1 tablet by mouth daily  (Patient not taking: Reported on 12/30/2021)       No current facility-administered medications for this visit. Allergies   Allergen Reactions    Biaxin [Clarithromycin] Nausea Only    Doxycycline Other (See Comments)     GI side effects    Keflex [Cephalexin] Other (See Comments)     Tongue Swelling    Norvasc [Amlodipine Besylate] Other (See Comments)     Nasuea, hot flashes    Statins Other (See Comments)     Myalgias all    Zetia [Ezetimibe] Other (See Comments)     Chest pain    Zocor [Simvastatin] Other (See Comments)     Myalgias          Review of Systems:   Review of Systems   Pertinent review of systems noted in HPI, all other ROS negative. Objective:   Physical Exam   /63 (Site: Left Upper Arm, Position: Sitting, Cuff Size: Medium Adult)   Pulse 82   Temp 97.6 °F (36.4 °C) (Oral)   Resp 18   Ht 5' 5\" (1.651 m)   Wt 208 lb (94.3 kg)   SpO2 95%   BMI 34.61 kg/m²    General appearance: No apparent distress, calm and cooperative. HEENT: Pupils equal, round, and reactive to light. Conjunctivae/corneas clear. Oral mucosa moist.  Neck: Supple, with full range of motion. Trachea midline. Respiratory:  Normal respiratory effort. Clear to auscultation, bilaterally diminished. Cardiovascular:  RRR, S1/S2. Abdomen: Soft, non-tender, non-distended with active BS x 4. Musculoskeletal: No clubbing, cyanosis bilaterally. BLE edema noted, R > L. Pt able to ambulate in office. Skin: Skin color, texture, turgor normal.  Healing incisional sites to R leg from graft sites. Neurologic:  Neurovascularly intact without any focal sensory/motor deficits.  Cranial nerves: II-XII intact, grossly non-focal.  Psychiatric: Alert and oriented x 3, thought content appropriate, normal insight  Capillary Refill: Brisk,< 3 seconds   Peripheral Pulses: +2 palpable, equal bilaterally       Imaging Studies and Labs:   CBC:   Lab Results   Component Value Date    WBC 5.6 12/30/2021    HGB 14.2 12/30/2021    HCT 45.2 12/30/2021  (H) 12/30/2021     (L) 12/30/2021     BMP:   Lab Results   Component Value Date     12/17/2021    K 3.5 12/17/2021    CL 99 12/17/2021    CO2 36 12/17/2021    BUN 27 12/17/2021    CREATININE 0.8 12/17/2021    GLUCOSE 140 12/17/2021    GLUCOSE 146 10/23/2020    CALCIUM 9.2 12/17/2021      LFT:   Lab Results   Component Value Date    ALT 34 11/05/2021    AST 27 11/05/2021    ALKPHOS 79 11/05/2021    BILITOT 0.4 11/05/2021         Assessment and Plan:     1. Thrombocytopenia ([address]  History of thrombocytopenia since at least 2014 per chart review. Denies alcohol use, ho history of liver disease, Hepatitis C, HIV, or autoimmune disease. No immunosuppressive medication use. Possibly mild ITP, platelet count improved today at 126. Trend in 3 mos. Return in about 3 months (around 3/30/2022). All patient questions answered. Pt voiced understanding. Patient agreed with treatment plan. Follow up as directed. Patient instructed to call for questions or concerns. Electronically signed by   ORAL Lubin CNP     I spent a total of 30 minutes on the day of the visit.

## 2022-01-03 ENCOUNTER — APPOINTMENT (OUTPATIENT)
Dept: PHARMACY | Age: 72
End: 2022-01-03
Payer: MEDICARE

## 2022-01-03 ENCOUNTER — TELEPHONE (OUTPATIENT)
Dept: PHARMACY | Age: 72
End: 2022-01-03

## 2022-01-03 NOTE — TELEPHONE ENCOUNTER
[facility] was on our schedule for today however [facility] called and stated they would be drawing patient's INR tomorrow 1/4/21. Called and left a message for Gigi Mckeon to instruct what dose of Coumadin to give tonight. Instructed to give Coumadin 2mg x1 dose tonight and we will call with follow up instructions tomorrow after INR results.      Levi Jacobs PharmD 1/3/2022 3:05 PM

## 2022-01-04 ENCOUNTER — HOSPITAL ENCOUNTER (OUTPATIENT)
Dept: PHARMACY | Age: 72
Setting detail: THERAPIES SERIES
Discharge: HOME | End: 2022-01-04
Payer: MEDICARE

## 2022-01-04 DIAGNOSIS — Z51.81 ENCOUNTER FOR THERAPEUTIC DRUG MONITORING: ICD-10-CM

## 2022-01-04 DIAGNOSIS — Z86.79 S/P MAZE OPERATION FOR ATRIAL FIBRILLATION: ICD-10-CM

## 2022-01-04 DIAGNOSIS — Z98.890 S/P MAZE OPERATION FOR ATRIAL FIBRILLATION: ICD-10-CM

## 2022-01-04 DIAGNOSIS — Z79.01 ANTICOAGULATED ON COUMADIN: ICD-10-CM

## 2022-01-04 DIAGNOSIS — I48.91 ATRIAL FIBRILLATION, UNSPECIFIED TYPE (HCC): Primary | ICD-10-CM

## 2022-01-04 PROCEDURE — 99211 OFF/OP EST MAY X REQ PHY/QHP: CPT

## 2022-01-04 RX ORDER — WARFARIN SODIUM 2 MG/1
TABLET ORAL
Qty: 60 TABLET | Refills: 1 | Status: SHIPPED | OUTPATIENT
Start: 2022-01-04 | End: 2022-01-25 | Stop reason: SDUPTHER

## 2022-01-04 NOTE — PROGRESS NOTES
[facility]ication Management [address] [place] [address]  [phone] (phone)  [phone] (fax)        INR drawn by [address]. Nursing assessment reviewed and appreciated. Nursing assessment within the normal limits with the exception of the following:  none    Anticoagulation encounter completed via telephone. Interview conducted with patient's grandson, Hola Glaser    Patient verifies current dosing regimen and tablet strength. HasThe Old Reader sets up pill box  No missed or extra doses. Patient denies s/s bleeding/bruising/swelling/SOB/chest pain  No blood in urine or stool. No dietary changes. No changes in medication/OTC agents/Herbals. No change in alcohol use or tobacco use. No change in activity level. Patient denies headaches/dizziness/lightheadedness/falls. No vomiting/diarrhea or acute illness. No Procedures scheduled in the future at this time. Assessment:   Lab Results   Component Value Date    INR 1.14 (H) 2022    INR 1.10 2021    INR 0.94 2021     INR subtherapeutic   Recent Labs     22  1240   INR 1.14*     Discussion with Hola Glaser to please ensure that patient is taking all of her doses. He is not there when she takes them, but he said he will stop over and check. Plan:  Coumadin 6 mg x 1 then increase Coumadin 4 mg daily. [facility]in 2 [facility]. Recheck INR in 1 week(s) on 1/10/21 by [address]. Patient reminded to call the Anticoagulation Clinic with signs or symptoms of bleeding or with any medication changes. Patient given instructions utilizing the teach back method. After visit summary printed and reviewed with patient. Discharged ambulatory in no apparent distress.         For Pharmacy Admin Tracking Only     Intervention Detail: Adherence Monitorin and Dose Adjustment: 1, reason: Therapy Optimization   Total # of Interventions Recommended: 2   Total # of Interventions Accepted: 2   Time Spent (min): 8028 Griffin Romero, PharmD, BCPS  1/4/2022  3:14 PM

## 2022-01-10 ENCOUNTER — APPOINTMENT (OUTPATIENT)
Dept: PHARMACY | Age: 72
End: 2022-01-10
Payer: MEDICARE

## 2022-01-10 ENCOUNTER — TELEPHONE (OUTPATIENT)
Dept: PHARMACY | Age: 72
End: 2022-01-10

## 2022-01-10 NOTE — TELEPHONE ENCOUNTER
Received call from [employer] with [facility] stating patient was discharged from their services. Spoke with patient's son Nguyễn Lugo notifying him of this and to set up in-person appointment. Patient rescheduled for INR in clinic tomorrow 1/11/22 @ 9:20 am. Patient will continue taking Coumadin 4 mg daily as directed at last visit. Nguyễn Lugo voiced understanding. Nguyễn Lugo given instructions utilizing the teach back method.     Nina Danielson, RolaD, BCPS  1/10/2022  8:24 AM

## 2022-01-11 ENCOUNTER — HOSPITAL ENCOUNTER (OUTPATIENT)
Dept: PHARMACY | Age: 72
Setting detail: THERAPIES SERIES
Discharge: HOME | End: 2022-01-11
Payer: MEDICARE

## 2022-01-11 DIAGNOSIS — Z98.890 S/P MAZE OPERATION FOR ATRIAL FIBRILLATION: ICD-10-CM

## 2022-01-11 DIAGNOSIS — Z79.01 ANTICOAGULATED ON COUMADIN: ICD-10-CM

## 2022-01-11 DIAGNOSIS — I48.91 ATRIAL FIBRILLATION, UNSPECIFIED TYPE (HCC): Primary | ICD-10-CM

## 2022-01-11 DIAGNOSIS — Z51.81 ENCOUNTER FOR THERAPEUTIC DRUG MONITORING: ICD-10-CM

## 2022-01-11 DIAGNOSIS — Z86.79 S/P MAZE OPERATION FOR ATRIAL FIBRILLATION: ICD-10-CM

## 2022-01-11 LAB — POC INR: 1.5 (ref 0.8–1.2)

## 2022-01-11 PROCEDURE — 99213 OFFICE O/P EST LOW 20 MIN: CPT | Performed by: PHARMACIST

## 2022-01-11 PROCEDURE — 85610 PROTHROMBIN TIME: CPT | Performed by: PHARMACIST

## 2022-01-11 PROCEDURE — 36416 COLLJ CAPILLARY BLOOD SPEC: CPT | Performed by: PHARMACIST

## 2022-01-11 NOTE — PROGRESS NOTES
Medication Management [address]  [phone] (phone)  [phone] (fax)    Ms. Jia Gayle is a 66 y.o.  female with history of Afib who presents today for anticoagulation monitoring and adjustment. Patient unable to verifies current dosing regimen or tablet strength. Son, Cathy Ramos, also unable to verify tablet size or regimen. Educated pt and son on importance of providing this information at [facility] appts. Took 4mg yesterday, had been instructed to take 2mg. Also thinks she missed her dose on Sun. Pt states \"am I supposed to take it every day? \". Pt had been [address] pt, Cathy Ramos was receiving instruction via telephone and relaying instructions to his sister, who then fills the pill box. Cathy Ramos and patient \"cannot guarantee\" that pt receives dose every day. Patient denies s/s bleeding/bruising/swelling/SOB/chest pain  No blood in urine or stool. No dietary changes. Unable to verify medication list.  Pt and son are not sure of patient's medications. Discussed in detail. Educated on importance of accurate list.    No change in alcohol use or tobacco use. Working on increasing her activity level. Patient denies headaches/dizziness/lightheadedness/falls. No vomiting/diarrhea or acute illness. No Procedures scheduled in the future at this time. Assessment:   Lab Results   Component Value Date    INR 1.50 (H) 01/11/2022    INR 1.14 (H) 01/04/2022    INR 1.10 12/30/2021     INR subtherapeutic   Recent Labs     01/11/22  0939   INR 1.50*     INR subtherapeutic despite dose increases. Unable to verify patient compliance. Plan:  Increase Coumadin 4mg daily. Recheck INR in 1 week(s). Pt and son re-educated with new patient warfarin education. Stressed importance of compliance with doses and reporting of above information. Patient reminded to call the Anticoagulation Clinic with any signs or symptoms of bleeding or with any medication changes. Patient given instructions utilizing the teach back method. After visit summary printed and reviewed with patient. Discharged ambulatory in no apparent distress.     For Pharmacy Admin Tracking Only     Intervention Detail: Adherence Monitorin and Dose Adjustment: 1, reason: Therapy Optimization   Total # of Interventions Recommended: 1   Total # of Interventions Accepted: 1   Time Spent (min): 30

## 2022-01-14 ENCOUNTER — OFFICE VISIT (OUTPATIENT)
Dept: CARDIOLOGY CLINIC | Age: 72
End: 2022-01-14
Payer: MEDICARE

## 2022-01-14 VITALS
WEIGHT: 204 LBS | HEART RATE: 76 BPM | HEIGHT: 65 IN | BODY MASS INDEX: 33.99 KG/M2 | DIASTOLIC BLOOD PRESSURE: 78 MMHG | SYSTOLIC BLOOD PRESSURE: 126 MMHG

## 2022-01-14 DIAGNOSIS — I25.83 CORONARY ARTERY DISEASE DUE TO LIPID RICH PLAQUE: Primary | ICD-10-CM

## 2022-01-14 DIAGNOSIS — I25.10 CORONARY ARTERY DISEASE DUE TO LIPID RICH PLAQUE: Primary | ICD-10-CM

## 2022-01-14 DIAGNOSIS — I50.20 HEART FAILURE WITH REDUCED EJECTION FRACTION (HCC): ICD-10-CM

## 2022-01-14 DIAGNOSIS — I48.91 ATRIAL FIBRILLATION, UNSPECIFIED TYPE (HCC): ICD-10-CM

## 2022-01-14 PROCEDURE — G8484 FLU IMMUNIZE NO ADMIN: HCPCS | Performed by: INTERNAL MEDICINE

## 2022-01-14 PROCEDURE — 3017F COLORECTAL CA SCREEN DOC REV: CPT | Performed by: INTERNAL MEDICINE

## 2022-01-14 PROCEDURE — 4040F PNEUMOC VAC/ADMIN/RCVD: CPT | Performed by: INTERNAL MEDICINE

## 2022-01-14 PROCEDURE — G8400 PT W/DXA NO RESULTS DOC: HCPCS | Performed by: INTERNAL MEDICINE

## 2022-01-14 PROCEDURE — G8417 CALC BMI ABV UP PARAM F/U: HCPCS | Performed by: INTERNAL MEDICINE

## 2022-01-14 PROCEDURE — 99214 OFFICE O/P EST MOD 30 MIN: CPT | Performed by: INTERNAL MEDICINE

## 2022-01-14 PROCEDURE — 1036F TOBACCO NON-USER: CPT | Performed by: INTERNAL MEDICINE

## 2022-01-14 PROCEDURE — 1090F PRES/ABSN URINE INCON ASSESS: CPT | Performed by: INTERNAL MEDICINE

## 2022-01-14 PROCEDURE — 1123F ACP DISCUSS/DSCN MKR DOCD: CPT | Performed by: INTERNAL MEDICINE

## 2022-01-14 PROCEDURE — G8427 DOCREV CUR MEDS BY ELIG CLIN: HCPCS | Performed by: INTERNAL MEDICINE

## 2022-01-14 NOTE — PROGRESS NOTES
patient here for check up wearing zoll     Pt has questions about continuing taking lasix    Pt denies chest pain, dizziness,     Pt continues with sob on exertion, wears celeste hose for swelling, has opening where had sx

## 2022-01-14 NOTE — PROGRESS NOTES
[place] [address]  [place] [address]  Dept: [phone]  Dept Fax: [phone]  Loc: [phone]    Visit Date: 1/14/2022    Ms. Hira Manning is a 66 y.o. female  who presented for:  Chief Complaint   Patient presents with    New Patient    Atrial Fibrillation    Congestive Heart Failure       HPI:   65 yo F c hx of MI, CAD s/p CABG (LIMA-LAD, SVG-RCA, SVG-OM 11/18/21), MVR (Hsu Ring), MAZE, TVR (Lashawn stitch), LAAC, Afib on coumadin,at [facility],  LVEF 30%, DM, HLD, is here for a follow up. Recently had thoracentesis. States she is doing better. Denies any chest pain,  palpitations, lightheadedness, dizziness, orthopnea, PND or pedal edema. Had mild shortness fo breath but its improving.            Current Outpatient Medications:     warfarin (COUMADIN) 2 MG tablet, Take as directed by [facility] (60 tablets = 30 days), Disp: 60 tablet, Rfl: 1    potassium chloride (KLOR-CON M) 20 MEQ extended release tablet, Take 1 tablet by mouth 2 times daily, Disp: 60 tablet, Rfl: 5    furosemide (LASIX) 80 MG tablet, Take 1 tablet by mouth daily, Disp: 30 tablet, Rfl: 3    acetaminophen (TYLENOL) 325 MG tablet, Take 325-650 mg by mouth every 6 hours as needed, Disp: , Rfl:     aspirin 81 MG chewable tablet, Take 81 mg by mouth daily, Disp: , Rfl:     atorvastatin (LIPITOR) 80 MG tablet, Take 1 tablet by mouth every evening, Disp: , Rfl:     losartan (COZAAR) 25 MG tablet, Take 0.5 tablets by mouth daily, Disp: , Rfl:     metoprolol succinate (TOPROL XL) 50 MG extended release tablet, Take 1.5 tablets by mouth 2 times daily, Disp: , Rfl:     Past Medical History  Swati  has a past medical history of Allergic rhinitis, Angiolipoma of right kidney, ASHD (arteriosclerotic heart disease), Atrial fibrillation ([facility] [address] Celiac disease, DM2 (diabetes mellitus, type 2) ([address] Eczema, GERD (gastroesophageal reflux disease), Heart failure with reduced ejection fraction ([facility] [address] History of ST elevation myocardial infarction (STEMI), Hyperlipidemia, Hypertension, Ischemic cardiomyopathy, Meningioma (HCC), Mixed hearing loss, Obesity (BMI 30-39.9), Osteoarthritis, Post-menopausal, S/P CABG (coronary artery bypass graft), S/P Maze operation for atrial fibrillation, S/P MVR (mitral valve repair), and S/P tricuspid valve repair. Social History  Swati  reports that she has never smoked. She has never used smokeless tobacco. She reports that she does not drink alcohol and does not use drugs. Family History  Swati family history includes Breast Cancer (age of onset: 40) in her maternal cousin; Breast Cancer (age of onset: 50) in her maternal aunt; Heart Attack in her maternal grandfather; Heart Attack (age of onset: 62) in her maternal uncle; Heart Disease in her mother; Benjamine Furth in her mother; Osteoporosis in her mother; Other in her father and maternal grandmother. Past Surgical History   Past Surgical History:   Procedure Laterality Date     SECTION  1972 64    CHOLECYSTECTOMY      COLONOSCOPY  2006    [facility] GRAFT  [facility]    [facility]    [facility]RAL VALVE REPAIR  2021    [facility]    MYRINGOTOMY AND TYMPANOSTOMY TUBE PLACEMENT  2012    Dr Kate Fair    OTHER SURGICAL HISTORY  2021    MAZE Procedure during CABG and MVR/TVR at [facility]    TRICUSPID VALVE SURGERY  2021    TV Repair [facility]. ENDOSCOPY  2006       Subjective:     REVIEW OF SYSTEMS  Constitutional: denies sweats, chills and fever  HENT: denies  congestion, sinus pressure, sneezing and sore throat. Eyes: denies  pain, discharge, redness and itching. Respiratory: denies apnea, cough  Gastrointestinal: denies blood in stool, constipation, diarrhea   Endocrine: denies cold intolerance, heat intolerance, polydipsia. Genitourinary: denies dysuria, enuresis, flank pain and hematuria. Musculoskeletal: denies arthralgias, joint swelling and neck pain. Neurological: denies numbness and headaches. Psychiatric/Behavioral: denies agitation, confusion, decreased concentration and dysphoric mood    All others reviewed and are negative. Objective:     /78   Pulse 76   Ht 5' 5\" (1.651 m)   Wt 204 lb (92.5 kg)   BMI 33.95 kg/m²     Wt Readings from Last 3 Encounters:   01/14/22 204 lb (92.5 kg)   12/30/21 208 lb (94.3 kg)   12/14/21 220 lb 12.8 oz (100.2 kg)     BP Readings from Last 3 Encounters:   01/14/22 126/78   12/30/21 137/63   12/14/21 120/72       PHYSICAL EXAM  Constitutional: Oriented to person, place, and time. Appears well-developed and well-nourished. HENT:   Head: Normocephalic and atraumatic. Eyes: EOM are normal. Pupils are equal, round, and reactive to light. Neck: Normal range of motion. Neck supple. No JVD present. Cardiovascular: Normal rate , normal heart sounds and intact distal pulses. Pulmonary/Chest: Effort normal and breath sounds normal. No respiratory distress. No wheezes. No rales. Abdominal: Soft. Bowel sounds are normal. No distension. There is no tenderness. Musculoskeletal: Normal range of motion. No edema. Neurological: Alert and oriented to person, place, and time. No cranial nerve deficit. Coordination normal.   Skin: Skin is warm and dry. Psychiatric: Normal mood and affect.        No results found for: CKTOTAL, CKMB, CKMBINDEX    Lab Results   Component Value Date    WBC 5.6 12/30/2021    RBC 4.47 12/30/2021    RBC 4.57 11/09/2020    HGB 14.2 12/30/2021    HCT 45.2 12/30/2021     12/30/2021    MCH 31.8 12/30/2021    MCHC 31.4 12/30/2021    RDW 16.7 12/30/2021     12/30/2021    MPV 11.0 12/30/2021       Lab Results   Component Value Date     12/17/2021    K 3.5 12/17/2021    CL 99 12/17/2021    CO2 36 12/17/2021    BUN 27 12/17/2021    LABALBU 4.6 11/05/2021    CREATININE 0.8 12/17/2021    CALCIUM 9.2 12/17/2021 LABGLOM 70 12/17/2021    GLUCOSE 140 12/17/2021    GLUCOSE 146 10/23/2020       Lab Results   Component Value Date    ALKPHOS 79 11/05/2021    ALT 34 11/05/2021    AST 27 11/05/2021    PROT 7.7 11/05/2021    BILITOT 0.4 11/05/2021    BILIDIR <0.2 11/05/2021    LABALBU 4.6 11/05/2021       No results found for: MG    Lab Results   Component Value Date    INR 1.50 (H) 01/11/2022    INR 1.14 (H) 01/04/2022    INR 1.10 12/30/2021         Lab Results   Component Value Date    LABA1C 6.1 10/28/2021    LABA1C 7.1 10/15/2019       Lab Results   Component Value Date    TRIG 163 10/27/2021    HDL 43 10/27/2021    LDLCALC 112 10/27/2021    LABVLDL 36 10/23/2020       Lab Results   Component Value Date    TSH 1.760 10/27/2021         Testing Reviewed:      I haveindividually reviewed the below cardiac tests    EKG:    ECHO: Results for orders placed during the hospital encounter of 11/05/21    ECHO Complete 2D W Doppler W Color    Narrative  Transthoracic Echocardiography Report (TTE)    Demographics    Patient Name   Divine Sands Gender              Female  G    MR #           [id number]        Race                    Ethnicity    Account #      [de-identified]        Room Number         0015    Accession      [id number]       Date of Study       11/05/2021  Number    Date of Birth  1950       Referring Physician Jailene Gary MD    Age            66 year(s)       Sonographer         JOSÉ CurryT    Interpreting        Celina Alvarez MD  Physician    Procedure    Type of Study    TTE procedure:ECHOCARDIOGRAM COMPLETE 2D W DOPPLER W COLOR. Procedure Date  Date: 11/05/2021 Start: 03:23 PM    Study Location: Bedside  Technical Quality: Limited visualization due to body habitus. Indications:Preop cardiac evaluation and STEMI.     Additional Medical History:STEMI, hyperlipidemia, GERD, arthritis,  hyperlkipidemia, obesity, diabetes, coronary artery disease, atrial  fibrillation    Patient Status: Routine    Height: 62.52 inches Weight: 214.95 pounds BSA: 1.98 m^2 BMI: 38.66 kg/m^2    BP: 105/78 mmHg    Allergies  - Other allergy:(See Epic). Conclusions    Summary  Technically difficult examination. Left ventricular size is normal and systolic function is severely reduced. Ejection fraction was estimated at 25-30%. LV wall thickness is within  normal limits. There was severe global hypokinesis of the left ventricle. Mild to Moderate mitral regurgitation is present. Mild tricuspid regurgitation visualized. Right ventricular systolic pressure of 72-99 mm Hg consistent with mild  pulmonary hypertension. Signature    ----------------------------------------------------------------  Electronically signed by Vanessa Garcia MD (Interpreting  physician) on 11/05/2021 at 07:49 PM  ----------------------------------------------------------------    Findings    Mitral Valve  The mitral valve structure is normal with normal leaflet separation. DOPPLER: The transmitral velocity was within the normal range with no  evidence for mitral stenosis. Mild to Moderate mitral regurgitation is present. Aortic Valve  The aortic valve appears trileaflet with normal thickness and leaflet  excursion. DOPPLER: Transaortic velocity was within the normal range with  no evidence of aortic stenosis. There was no evidence of aortic  regurgitation. Tricuspid Valve  The tricuspid valve structure is normal with normal leaflet separation. DOPPLER: There is no evidence of tricuspid stenosis. Mild tricuspid regurgitation visualized. Pulmonic Valve  The pulmonic valve was not well visualized . Left Atrium  Left atrial size is normal.    Left Ventricle  Left ventricular size is normal and systolic function is severely reduced. Ejection fraction was estimated at 25-30%. LV wall thickness is within  normal limits. There was severe global hypokinesis of the left ventricle.     Right Atrium  Right atrial size was normal.    Right Ventricle  The right ventricular size appears normal with normal systolic function  and wall thickness. Right ventricular systolic pressure of 91-81 mm Hg consistent with mild  pulmonary hypertension. Pericardial Effusion  The pericardium appears normal with no evidence of a pericardial effusion. Pleural Effusion  No evidence of pleural effusion. Aorta / Great Vessels  -Aortic root dimension within normal limits. -IVC size is within normal limits with normal respiratory phasic changes.     M-Mode/2D Measurements & Calculations    LV Diastolic   LV Systolic Dimension:    AV Cusp Separation: 1.3 cmLA  Dimension: 5   4.5 cm                    Dimension: 4.3 cmAO Root  cm             LV Volume Diastolic:      Dimension: 3.4 cmLA Area: 22.9  LV FS:10 %     151.3 ml                  cm^2  LV PW          LV Volume Systolic: 848  Diastolic: 0.9 ml  cm             LV EDV/LV EDV Index:  Septum         151.3 ml/76 m^2LV ESV/LV  RV Diastolic Dimension: 3.2 cm  Diastolic: 1   ESV Index: 227 ml/55 m^2  cm             EF Calculated: 28.6 %     LA/Aorta: 1.26    LV Length: 8.2 cm         LA volume/Index: 74.7 ml /38m^2    LV Area  Diastolic:  07.5 cm^2  LV Area  Systolic: 06.9  cm^2    Doppler Measurements & Calculations    MV Peak E-Wave: 124 cm/s       AV Peak Velocity:   LVOT Peak Velocity:  MV Peak A-Wave: 43.8 cm/s      105 cm/s            61.8 cm/s  MV E/A Ratio: 2.83             AV Peak Gradient:   LVOT Peak Gradient: 2  MV Peak Gradient: 6.15 mmHg    4.41 mmHg           mmHg    MV Deceleration Time: 183 msec                     TV Peak E-Wave: 67.3  MV P1/2t: 54 msec                                  cm/s  MVA by PHT:4.07 cm^2                               TV Peak A-Wave: 47.6  MV Area (PISA): 0.2 cm^2       IVRT: 106 msec      cm/s  MV E' Septal Velocity: 4.9  cm/s                                               TV Peak Gradient: 1.81  MV A' Septal Velocity: 7.5     AV DVI (Vmax):0.59  mmHg  cm/s                                               TR Velocity:280 cm/s  MV E' Lateral Velocity: 8.7                        TR Gradient:31.36 mmHg  cm/s  MV A' Lateral Velocity: 6.7  cm/s  E/E' septal: 25.31                                 MO ED Velocity: 115  E/E' lateral: 14.25                                cm/s  MR Velocity: 415 cm/s  MV BHAVNA PISA: 0.36 cm^2  Alias Velocity: 36.9 cm/sPISA  Radius: 0.8 cm  MV ERO Volumetric: 0.2 cm^2  PISA area: 4.02 cm^2MR flow  rate: 148.34 ml/s    [id number]      STRESS:    CATH:    Assessment/Plan       Diagnosis Orders   1. Coronary artery disease due to lipid rich plaque  Echo 2D w doppler w color complete   2. Heart failure with reduced ejection fraction ([address]  Echo 2D w doppler w color complete   3. Atrial fibrillation, unspecified type ([address]       CAD s/p CABG x 3, MAZE, TVR, MVR, LAAC  LV EF 30%  DM  AFib on coumadin  HTN  HLD    EKG shows SR  On amiodarone  On coumadin  Surgical site healing well  Continue Aspirin, statin, losartan and toprol  On life vest  watns to take it off  Will repeat Echo  On lasix,   The patient is asked to make an attempt to improve diet and exercise patterns to aid in medical management of this problem. Advised more plant based nutrition/meditarrean diet   Advised patient to call office or seek immediate medical attention if there is any new onset of  any chest pain, sob, palpitations, lightheadedness, dizziness, orthopnea, PND or pedal edema. All medication side effects were discussed in details. Thank youfor allowing me to participate in the care of this patient. Please do not hesitate to contact me for any further questions. Return in about 3 months (around 4/14/2022), or if symptoms worsen or fail to improve, for Review testing, Regular follow up.        Electronically signed by Lashae Wright MD [facility]  1/14/2022 at 9:12 AM EST

## 2022-01-17 RX ORDER — METOPROLOL SUCCINATE 50 MG/1
75 TABLET, EXTENDED RELEASE ORAL 2 TIMES DAILY
Qty: 90 TABLET | Refills: 3 | Status: SHIPPED | OUTPATIENT
Start: 2022-01-17 | End: 2022-02-21 | Stop reason: SDUPTHER

## 2022-01-17 NOTE — TELEPHONE ENCOUNTER
Swati Tran called requesting a refill on the following medications:  Requested Prescriptions     Pending Prescriptions Disp Refills    metoprolol succinate (TOPROL XL) 50 MG extended release tablet 30 tablet      Sig: Take 1.5 tablets by mouth 2 times daily     Pharmacy verified:[facility]  . pv      Date of last visit:   Date of next visit (if applicable): 1/87/5720

## 2022-01-17 NOTE — TELEPHONE ENCOUNTER
Patient's son Eric Prescott is calling stating that she is needing a refill on her metoprolol. He is going to check strength of prescription and call back. Needs to go to [facility] [place]. Med originally prescribed by [address] [place] [address], but he said Dr. Joie Lyn is who she is currently seeing for this.   DOLV 01/14/2022

## 2022-01-18 ENCOUNTER — HOSPITAL ENCOUNTER (OUTPATIENT)
Dept: PHARMACY | Age: 72
Setting detail: THERAPIES SERIES
Discharge: HOME | End: 2022-01-18
Payer: MEDICARE

## 2022-01-18 DIAGNOSIS — Z79.01 ANTICOAGULATED ON COUMADIN: ICD-10-CM

## 2022-01-18 DIAGNOSIS — Z51.81 ENCOUNTER FOR THERAPEUTIC DRUG MONITORING: ICD-10-CM

## 2022-01-18 DIAGNOSIS — I48.91 ATRIAL FIBRILLATION, UNSPECIFIED TYPE (HCC): Primary | ICD-10-CM

## 2022-01-18 DIAGNOSIS — Z98.890 S/P MAZE OPERATION FOR ATRIAL FIBRILLATION: ICD-10-CM

## 2022-01-18 DIAGNOSIS — Z86.79 S/P MAZE OPERATION FOR ATRIAL FIBRILLATION: ICD-10-CM

## 2022-01-18 LAB — POC INR: 1.2 (ref 0.8–1.2)

## 2022-01-18 PROCEDURE — 99213 OFFICE O/P EST LOW 20 MIN: CPT

## 2022-01-18 PROCEDURE — 36416 COLLJ CAPILLARY BLOOD SPEC: CPT

## 2022-01-18 PROCEDURE — 99211 OFF/OP EST MAY X REQ PHY/QHP: CPT

## 2022-01-18 PROCEDURE — 85610 PROTHROMBIN TIME: CPT

## 2022-01-18 NOTE — PROGRESS NOTES
Medication Management [address]   [phone] (phone)  [phone] (fax)    Ms. Brit Terry is a 66 y.o.  female with history of Afib, s/p MAZE operation who presents today for anticoagulation monitoring and adjustment. Patient verifies current dosing regimen and tablet strength. - Following dosing calendar  No missed or extra doses. Patient denies s/s bleeding/bruising/swelling/SOB/chest pain  - Normal shortness of breath when walking for longer distances  - Swelling in legs is going down. No blood in urine or stool. No dietary changes. - Eating broccoli and dark green leafy salads  No changes in medication/OTC agents/Herbals. No change in alcohol use or tobacco use. No change in activity level. - Activity level is increasing. She reports to exercising and walking. Patient denies headaches/dizziness/lightheadedness/falls. No vomiting/diarrhea or acute illness. No Procedures scheduled in the future at this time. - Echo on Thursday    Assessment:   Lab Results   Component Value Date    INR 1.20 2022    INR 1.50 (H) 2022    INR 1.14 (H) 2022     INR subtherapeutic   Recent Labs     22  1011   INR 1.20   Goal INR: 2-3    Plan:  Coumadin 8 mg today, then Coumadin 6 mg tomorrow, then continue Coumadin 4 mg daily. Recheck INR in 1 week(s). Patient reminded to call the Anticoagulation Clinic with any signs or symptoms of bleeding or with any medication changes. Patient given instructions utilizing the teach back method. Had extensive discussion regarding diet and foods that are high in vitamin K. Compliance with maintaining a consistent diet was stressed with the patient. After visit summary printed and reviewed with patient. Discharged ambulatory in no apparent distress.     For Pharmacy Admin Tracking Only     Intervention Detail: Adherence Monitorin and Dose Adjustment: 1, reason: Therapy Optimization   Total # of Interventions Recommended: 2   Total # of Interventions Accepted: 2   Time Spent (min): [address], PharmD, BCPS  1/18/2022  11:45 AM

## 2022-01-20 ENCOUNTER — HOSPITAL ENCOUNTER (OUTPATIENT)
Dept: NON INVASIVE DIAGNOSTICS | Age: 72
Discharge: HOME | End: 2022-01-20
Payer: MEDICARE

## 2022-01-20 DIAGNOSIS — I25.83 CORONARY ARTERY DISEASE DUE TO LIPID RICH PLAQUE: ICD-10-CM

## 2022-01-20 DIAGNOSIS — I50.20 HEART FAILURE WITH REDUCED EJECTION FRACTION (HCC): ICD-10-CM

## 2022-01-20 DIAGNOSIS — I25.10 CORONARY ARTERY DISEASE DUE TO LIPID RICH PLAQUE: ICD-10-CM

## 2022-01-20 LAB
LV EF: 33 %
LVEF MODALITY: NORMAL

## 2022-01-20 PROCEDURE — 93306 TTE W/DOPPLER COMPLETE: CPT

## 2022-01-24 ENCOUNTER — TELEPHONE (OUTPATIENT)
Dept: CARDIOLOGY CLINIC | Age: 72
End: 2022-01-24

## 2022-01-24 DIAGNOSIS — I50.20 HEART FAILURE WITH REDUCED EJECTION FRACTION (HCC): Primary | ICD-10-CM

## 2022-01-24 NOTE — TELEPHONE ENCOUNTER
Please see echo results. Anything needed? Pt asking if she can remove lifevest     Summary   Technically difficult examination. Left Ventricular size is Mildly increased . Normal left ventricular wall thickness. There was severe global hypokinesis of the left ventricle. Systolic function was severely reduced. Ejection fraction is visually estimated in the range of 30% to 35%. The left atrium is Mildly dilated.

## 2022-01-25 ENCOUNTER — HOSPITAL ENCOUNTER (OUTPATIENT)
Dept: PHARMACY | Age: 72
Setting detail: THERAPIES SERIES
Discharge: HOME | End: 2022-01-25
Payer: MEDICARE

## 2022-01-25 LAB — POC INR: 1.6 (ref 0.8–1.2)

## 2022-01-25 PROCEDURE — 85610 PROTHROMBIN TIME: CPT

## 2022-01-25 PROCEDURE — 36416 COLLJ CAPILLARY BLOOD SPEC: CPT

## 2022-01-25 PROCEDURE — 99212 OFFICE O/P EST SF 10 MIN: CPT

## 2022-01-25 RX ORDER — WARFARIN SODIUM 2 MG/1
TABLET ORAL
Qty: 75 TABLET | Refills: 1 | Status: SHIPPED | OUTPATIENT
Start: 2022-01-25 | End: 2022-03-16 | Stop reason: SDUPTHER

## 2022-01-25 NOTE — PROGRESS NOTES
Medication Management [address]   [phone] (phone)  [phone] (fax)    Ms. Sharron Cabello is a 66 y.o.  female with history of Afib who presents today for anticoagulation monitoring and adjustment. Patient verifies current dosing regimen and tablet strength. No missed or extra doses. Patient denies s/s bleeding/bruising/swelling/SOB/chest pain  No blood in urine or stool. No dietary changes. - Patient reports to eating no green leafy vegetables, but has been eating cranberries. No changes in medication/OTC agents/Herbals. - Patient reports that she may be starting to take a multivitamin. Instructed her to read the back of the package as many have vitamin K in it. No change in alcohol use or tobacco use. No change in activity level. Patient denies headaches/dizziness/lightheadedness/falls. No vomiting/diarrhea or acute illness. No Procedures scheduled in the future at this time. - Will be having ICD placed. Just notified today. Assessment:   Lab Results   Component Value Date    INR 1.60 (H) 2022    INR 1.20 2022    INR 1.50 (H) 2022     INR subtherapeutic   Recent Labs     22  1403   INR 1.60*     Plan:  Coumadin 8 mg today, then increase Coumadin 6 mg MF, 4 mg all other days (~14.3%). Recheck INR in 10 day(s). Patient reminded to call the Anticoagulation Clinic with signs or symptoms of bleeding or with any medication changes. Patient given instructions utilizing the teach back method. After visit summary printed and reviewed with patient. Discharged ambulatory in no apparent distress.     For Pharmacy Admin Tracking Only     Intervention Detail: Adherence Monitorin, Dose Adjustment: 1, reason: Therapy Optimization and Refill(s) Provided   Total # of Interventions Recommended: 3   Total # of Interventions Accepted: 3   Time Spent (min): [address], PharmD, BCPS  2022  3:14 PM

## 2022-02-02 ENCOUNTER — HOSPITAL ENCOUNTER (OUTPATIENT)
Dept: PHARMACY | Age: 72
Setting detail: THERAPIES SERIES
Discharge: HOME | End: 2022-02-02
Payer: MEDICARE

## 2022-02-02 ENCOUNTER — OFFICE VISIT (OUTPATIENT)
Dept: CARDIOLOGY CLINIC | Age: 72
End: 2022-02-02
Payer: MEDICARE

## 2022-02-02 VITALS
HEART RATE: 77 BPM | BODY MASS INDEX: 34.45 KG/M2 | SYSTOLIC BLOOD PRESSURE: 132 MMHG | HEIGHT: 65 IN | WEIGHT: 206.8 LBS | DIASTOLIC BLOOD PRESSURE: 66 MMHG

## 2022-02-02 DIAGNOSIS — R00.2 INTERMITTENT PALPITATIONS: ICD-10-CM

## 2022-02-02 DIAGNOSIS — Z98.890 S/P MAZE OPERATION FOR ATRIAL FIBRILLATION: Primary | ICD-10-CM

## 2022-02-02 DIAGNOSIS — I50.20 HEART FAILURE WITH REDUCED EJECTION FRACTION (HCC): ICD-10-CM

## 2022-02-02 DIAGNOSIS — I48.91 ATRIAL FIBRILLATION, UNSPECIFIED TYPE (HCC): Primary | ICD-10-CM

## 2022-02-02 DIAGNOSIS — I48.91 ATRIAL FIBRILLATION, UNSPECIFIED TYPE (HCC): ICD-10-CM

## 2022-02-02 DIAGNOSIS — Z79.01 ANTICOAGULATED ON COUMADIN: ICD-10-CM

## 2022-02-02 DIAGNOSIS — Z86.79 S/P MAZE OPERATION FOR ATRIAL FIBRILLATION: Primary | ICD-10-CM

## 2022-02-02 DIAGNOSIS — Z51.81 ENCOUNTER FOR THERAPEUTIC DRUG MONITORING: ICD-10-CM

## 2022-02-02 LAB — POC INR: 1.5 (ref 0.8–1.2)

## 2022-02-02 PROCEDURE — 99212 OFFICE O/P EST SF 10 MIN: CPT | Performed by: PHARMACIST

## 2022-02-02 PROCEDURE — G8400 PT W/DXA NO RESULTS DOC: HCPCS | Performed by: INTERNAL MEDICINE

## 2022-02-02 PROCEDURE — 4040F PNEUMOC VAC/ADMIN/RCVD: CPT | Performed by: INTERNAL MEDICINE

## 2022-02-02 PROCEDURE — 93000 ELECTROCARDIOGRAM COMPLETE: CPT | Performed by: INTERNAL MEDICINE

## 2022-02-02 PROCEDURE — 1036F TOBACCO NON-USER: CPT | Performed by: INTERNAL MEDICINE

## 2022-02-02 PROCEDURE — G8484 FLU IMMUNIZE NO ADMIN: HCPCS | Performed by: INTERNAL MEDICINE

## 2022-02-02 PROCEDURE — 85610 PROTHROMBIN TIME: CPT | Performed by: PHARMACIST

## 2022-02-02 PROCEDURE — G8427 DOCREV CUR MEDS BY ELIG CLIN: HCPCS | Performed by: INTERNAL MEDICINE

## 2022-02-02 PROCEDURE — 1123F ACP DISCUSS/DSCN MKR DOCD: CPT | Performed by: INTERNAL MEDICINE

## 2022-02-02 PROCEDURE — 99204 OFFICE O/P NEW MOD 45 MIN: CPT | Performed by: INTERNAL MEDICINE

## 2022-02-02 PROCEDURE — 36416 COLLJ CAPILLARY BLOOD SPEC: CPT | Performed by: PHARMACIST

## 2022-02-02 PROCEDURE — 1090F PRES/ABSN URINE INCON ASSESS: CPT | Performed by: INTERNAL MEDICINE

## 2022-02-02 PROCEDURE — G8417 CALC BMI ABV UP PARAM F/U: HCPCS | Performed by: INTERNAL MEDICINE

## 2022-02-02 PROCEDURE — 3017F COLORECTAL CA SCREEN DOC REV: CPT | Performed by: INTERNAL MEDICINE

## 2022-02-02 NOTE — PROGRESS NOTES
Medication Management [address]  [phone] (phone)  [phone] (fax)    Ms. Demetra Saul is a 66 y.o.  female with history of Afib who presents today for anticoagulation monitoring and adjustment. Patient verifies current dosing regimen and tablet strength. No missed or extra doses. She sets up a pill box and uses her calendar to cross off each dose. Patient denies s/s bleeding/bruising/swelling/SOB/chest pain  No blood in urine or stool. No dietary changes. No changes in medication/OTC agents/Herbals. Patient inquired about Intenzyme Forte to help with inflammation per her chiropractor. No vitamin K in it that I can see. She also inquired about Balance of Nature Fruit and Veggies capsules. We had a discussion and she is ok with not starting at this time. The Fruit capsule contains cranberries and the veggies capsule contains broccoli, kale and spinach. She states she has not started taking these and she said she is fine with holding off at this time especially until we get INR therapeutic. No change in alcohol use or tobacco use. No change in activity level. Patient denies headaches/dizziness/lightheadedness/falls. No vomiting/diarrhea or acute illness. No Procedures scheduled in the future at this time. Going to Dr. Appointment with Dr. Yana Jules after our appointment about ICD insertion. Will let us know what he says about a date and if she will need to hold coumadin or not. Assessment:   Lab Results   Component Value Date    INR 1.50 (H) 02/02/2022    INR 1.60 (H) 01/25/2022    INR 1.20 01/18/2022     INR subtherapeutic   Recent Labs     02/02/22  1100   INR 1.50*       Plan:  Coumadin 8mg x1, then Coumadin 6mgx1 then increase to Coumadin 6mg MWF 4mg TuThSS 6.2% increase (from Coumadin 6mg MF4mg TuWThSS. Recheck INR in 2 week(s).   Patient reminded to call the Anticoagulation Clinic with any signs or symptoms of bleeding or with any medication changes. Patient given instructions utilizing the teach back method. After visit summary printed and reviewed with patient. Discharged ambulatory in no apparent distress.     For Pharmacy Admin Tracking Only     Intervention Detail: Dose Adjustment: 1, reason: Therapy Optimization   Total # of Interventions Recommended: 1   Total # of Interventions Accepted: 1   Time Spent (min): Karthik Schaffer, PharmD 2/2/2022 11:39 AM

## 2022-02-02 NOTE — PROGRESS NOTES
[facility]  Dept: [phone]    CARDIAC ELECTROPHYSIOLOGY: CONSULTATION NOTE  PATIENT DEMOGRAPHICS:  Date:   2/2/2022  Patient name:              Montrell Goodman  YOB: 1950  Sex: female   MRN:   [id number]    PRIMARY CARE PHYSICIAN:   Dipesh Jaramillo DO    REFERRING PHYSICIAN:  Katarina Jackson MD    REASON FOR CONSULTATION:  evaluation for ICD implantation. HISTORY OF PRESENT ILLNESS:  71/F presented to emergency room on 11/5/2021 low back and right upper extremity pain. She was incidentally noted to have STEMI and AF/RVR on electrocardiogram.  Echocardiogram showed severe left ventricular dysfunction, ejection fraction 25%. Her angiogram showed severe multivessel coronary artery disease. Attempted PCI to RCA was unsuccessful. She was referred to [facility] clinic where she underwent three-vessel coronary artery bypass surgery, Maze procedure, mitral intact valve repair and left atrial appendage ligation. Follow-up echocardiogram showed LVEF 30 to 35%. She was referred here for evaluation of VSD implantation. The patient has no complaints today. She is wearing LifeVest.  Denies chest pain, shortness of breath, palpitation, syncope or lower extremity swelling. Denies therapy from [address] Hx: ICM (LVEF 30%), CAD/MI/CABG x3 (11/2021), MR s/p mitral annuloplasty (Hsu ring) and tricuspid valve repair (Darene Generous stitch), PeAF s/p Maze and LAAC (11/2021), DM, HPL and morbid obesity. REVIEW OF SYSTEMS:    Constitutional: Negative for chills and fever  HENT: Negative for congestion, sinus pressure, sneezing and sore throat. Eyes: Negative for pain, discharge, redness and itching. Respiratory: Negative for apnea, cough  Gastrointestinal: Negative for blood in stool, constipation, diarrhea   Endocrine: Negative for cold intolerance, heat intolerance, polydipsia.   Genitourinary: Negative for dysuria, enuresis, flank pain and hematuria. Musculoskeletal: Negative for arthralgias, joint swelling and neck pain. Neurological: Negative for numbness and headaches. Psychiatric/Behavioral: Negative for agitation, confusion, decreased concentration and dysphoric mood. PAST MEDICAL HISTORY:  Past Medical History:   Diagnosis Date    Allergic rhinitis     Angiolipoma of right kidney     ASHD (arteriosclerotic heart disease)     Atrial fibrillation (HCC)     Celiac disease     DM2 (diabetes mellitus, type 2) (HCC)     Eczema     GERD (gastroesophageal reflux disease)     Heart failure with reduced ejection fraction (HCC)     History of ST elevation myocardial infarction (STEMI)     Hyperlipidemia     Hypertension     Ischemic cardiomyopathy     Meningioma ([facility] [address] 2012    Noted MRI . Never did f/u MRI. Told she didn't need to. Has 0 sxs. Denies HA, vision changes, lateralizing numbness or weakness.  Mixed hearing loss 2013    Obesity (BMI 30-39. 9)     Osteoarthritis     Post-menopausal     S/P CABG (coronary artery bypass graft)     S/P Maze operation for atrial fibrillation     S/P MVR (mitral valve repair)     S/P tricuspid valve repair        PSH:  Past Surgical History:   Procedure Laterality Date     SECTION  1972     CHOLECYSTECTOMY  2004    COLONOSCOPY  2006    CORONARY ARTERY [facility]PASS GRAFT  2021    [facility]    MITRAL VALVE REPAIR  2021    [facility]    MYRINGOTOMY AND TYMPANOSTOMY TUBE PLACEMENT  2012    Dr Keiko Adamson    OTHER SURGICAL HISTORY  2021    MAZE Procedure during CABG and MVR/TVR at [address] [place] [address]  2021    TV Repair 2021 at . [facility] UPPER GASTROINTESTINAL ENDOSCOPY  2006       FAMILY HISTORY:  Family History   Problem Relation Age of Onset    Heart Disease Mother     Lung Cancer Mother     Osteoporosis Mother     Other Father         Did not know her father.      Other Maternal Grandmother         blood clot, no clear hx surrounding    Heart Attack Maternal Grandfather     Heart Attack Maternal Uncle 62    Breast Cancer Maternal Aunt 50    Breast Cancer Maternal Cousin 40        SOCIAL HISTORY:  Social History     Socioeconomic History    Marital status:      Spouse name: Not on file    Number of children: Not on file    Years of education: Not on file    Highest education level: Not on file   Occupational History    Not on file   Tobacco Use    Smoking status: Never Smoker    Smokeless tobacco: Never Used   Vaping Use    Vaping Use: Never used   Substance and Sexual Activity    Alcohol use: No    Drug use: No    Sexual activity: Not on file   Other Topics Concern    Not on file   Social History Narrative    Not on file     Social Determinants of Health     Financial Resource Strain:     Difficulty of Paying Living Expenses: Not on file   Food Insecurity:     Worried About [address] Overload in the Last Year: Not on file    [address] [employer] in the Last Year: Not on file   Transportation Needs:     Lack of Transportation (Medical): Not on file    Lack of Transportation (Non-Medical):  Not on file   Physical Activity:     Days of Exercise per Week: Not on file    Minutes of Exercise per Session: Not on file   Stress:     Feeling of Stress : Not on file   Social Connections:     Frequency of Communication with Friends and Family: Not on file    Frequency of Social Gatherings with Friends and Family: Not on file    Attends Mandaeism Services: Not on file    Active Member of Clubs or Organizations: Not on file    Attends Club or Organization Meetings: Not on file    Marital Status: Not on file   Intimate Partner Violence:     Fear of Current or Ex-Partner: Not on file    Emotionally Abused: Not on file    Physically Abused: Not on file    Sexually Abused: Not on file   Housing Stability:     Unable to Pay for Housing in the Last Year: Not on file    Number of Places Lived in the Last Year: Not on file    Unstable Housing in the Last Year: Not on file        ALLERGY HISTORY:  Allergies   Allergen Reactions    Biaxin [Clarithromycin] Nausea Only    Doxycycline Other (See Comments)     GI side effects    Keflex [Cephalexin] Other (See Comments)     Tongue Swelling    Norvasc [Amlodipine Besylate] Other (See Comments)     Nasuea, hot flashes    Statins Other (See Comments)     Myalgias all    Zetia [Ezetimibe] Other (See Comments)     Chest pain    Zocor [Simvastatin] Other (See Comments)     Myalgias        MEDICATIONS:  Current Outpatient Medications   Medication Sig Dispense Refill    warfarin (COUMADIN) 2 MG tablet Take as directed by [facility] (75 tablets = 30 days) 75 tablet 1    metoprolol succinate (TOPROL XL) 50 MG extended release tablet Take 1.5 tablets by mouth 2 times daily 90 tablet 3    potassium chloride (KLOR-CON M) 20 MEQ extended release tablet Take 1 tablet by mouth 2 times daily 60 tablet 5    furosemide (LASIX) 80 MG tablet Take 1 tablet by mouth daily 30 tablet 3    acetaminophen (TYLENOL) 325 MG tablet Take 325-650 mg by mouth every 6 hours as needed      aspirin 81 MG chewable tablet Take 81 mg by mouth daily      atorvastatin (LIPITOR) 80 MG tablet Take 1 tablet by mouth every evening      losartan (COZAAR) 25 MG tablet Take 0.5 tablets by mouth daily       No current facility-administered medications for this visit. PHYSICAL EXAM:  Vitals:    02/02/22 1151   BP: 132/66   Pulse: 77     Body mass index is 34.41 kg/m². GENERAL: Alert and oriented. No distress. EYES: No pallor or icterus. ENT: No cyanosis. No thyromegaly or cervical LAP. VESSELS: No jugular venous distension or carotid bruits. HEART: Normal S1/S2. No murmur, rub or gallop. LUNGS: Clear to auscultation. ABDOMEN: Soft and non-tender. EXTREMITIES: No lower extremity edema. Feet are warm.    NEUROLOGICAL: Grossly normal. LABORATORY DATA AND DIAGNOSTIC DATA:  I have personally reviewed and interpreted the results of the following diagnostic testing    Lab Results   Component Value Date    WBC 5.6 12/30/2021    HGB 14.2 12/30/2021    HCT 45.2 12/30/2021     (L) 12/30/2021    CHOL 188 10/27/2021    TRIG 163 10/27/2021    HDL 43 10/27/2021    ALT 34 11/05/2021    AST 27 11/05/2021     12/17/2021    K 3.5 12/17/2021    CL 99 12/17/2021    CREATININE 0.8 12/17/2021    BUN 27 (H) 12/17/2021    CO2 36 (H) 12/17/2021    TSH 1.760 10/27/2021    INR 1.60 (H) 01/25/2022    LABA1C 6.1 (H) 10/28/2021    LABMICR 2.20 10/27/2021      Echocardiogram 1/20/2022: LVEF 30 to 35%, LVWT 1.2 cm, LAD/JENELLE 59.9. No significant valvular abnormalities. Echo 11/5/2021: LVEF 25 to 30%.  ECG 11/7/2021: Atrial fibrillation with rapid ventricular rate. Normal QRS duration. ECG 2/2/2022: Sinus rhythm and normal QRS.  Coronary angiogram 11/5/2021: Severe triple-vessel coronary artery disease. IMPRESSION:  · Chronic systolic heart failure, LVEF 30-35%, NYHA class III. Normal QRS duration. · ICM. · CAD/CABG x 3 (11/2021). · PeAF s/p MAZE procedure and ANTOINETTE closure. [id number] score 5. On warfarin. · MR/TR s/p mitral annuloplasty and Lashawn stitch  · DM, HPL and morbid obesity. · Life Vest. No therapy. ASSESSMENT AND RECOMMENDATIONS:  Discussed management options regarding her heart failure and ICD implantation for primary prevention of sudden cardiac death. At this time I would recommend switching her from losartan to Michalene Care and optimize the dose per patient's tolerance and response to medication. She is on a good dose of metoprolol succinate. Limited echocardiogram 6 weeks. We will readdress ICD implantation at that time. Continue wearing LifeVest.  Establish care with heart failure clinic. Questions answered. Thank you for allowing me to participate in the care of your patient.  Please call me if you have any questions. **This report has been created using voice recognition software. It may contain minor errors which are inherent in voice recognition technology. **       Yunior Romano MD, MRCP, [facility], [facility] on 2/2/2022 at 12:51 PM

## 2022-02-03 ENCOUNTER — APPOINTMENT (OUTPATIENT)
Dept: PHARMACY | Age: 72
End: 2022-02-03
Payer: MEDICARE

## 2022-02-03 NOTE — TELEPHONE ENCOUNTER
Pt son Norma Precise asking if pt should be on 80 mg of the lasix or something less as she was on 20 mg before at [address] [place] [address] , and should she still be on losartan as I see if off her list now?

## 2022-02-04 RX ORDER — ATORVASTATIN CALCIUM 80 MG/1
80 TABLET, FILM COATED ORAL EVERY EVENING
Qty: 30 TABLET | Refills: 1 | Status: SHIPPED | OUTPATIENT
Start: 2022-02-04 | End: 2022-02-15

## 2022-02-04 RX ORDER — FUROSEMIDE 80 MG
80 TABLET ORAL DAILY
Qty: 30 TABLET | Refills: 3 | Status: SHIPPED | OUTPATIENT
Start: 2022-02-04 | End: 2022-02-08 | Stop reason: SDUPTHER

## 2022-02-07 NOTE — TELEPHONE ENCOUNTER
Dr. Carolyn Russell- patient was at [facility] and discharged on Thanksgiving day on Lasix 20mg daily. Then she ended up having a thoracentesis in December and Lasix was increased to 80mg daily. Regarding Losartan-that was stopped and Entresto started. LM for patient-is she taking both? ?  Losartan stopped by Dr. Anne Richards. Son will get ahold of patient to have her call our office. So the son is asking should she continue Lasix 80mg daily or go back to 20mg daily?

## 2022-02-07 NOTE — TELEPHONE ENCOUNTER
Pt called back and she has been taking both losartan and entresto     Pt was advised to stop losartan      pt also states she can not take the Lipitor due to pain   Please advise ? ?   Also please advise on lasix below

## 2022-02-08 NOTE — TELEPHONE ENCOUNTER
Ok with entresto  Needs to go back to Lasix 20mg daily  Monitor symptoms of shortness of breath  Track daily weights.

## 2022-02-11 RX ORDER — FUROSEMIDE 20 MG/1
80 TABLET ORAL DAILY
Qty: 30 TABLET | Refills: 3 | Status: SHIPPED | OUTPATIENT
Start: 2022-02-11 | End: 2022-02-15

## 2022-02-15 ENCOUNTER — HOSPITAL ENCOUNTER (OUTPATIENT)
Dept: PHARMACY | Age: 72
Setting detail: THERAPIES SERIES
Discharge: HOME | End: 2022-02-15
Payer: MEDICARE

## 2022-02-15 ENCOUNTER — TELEPHONE (OUTPATIENT)
Dept: CARDIOLOGY CLINIC | Age: 72
End: 2022-02-15

## 2022-02-15 ENCOUNTER — OFFICE VISIT (OUTPATIENT)
Dept: CARDIOLOGY CLINIC | Age: 72
End: 2022-02-15
Payer: MEDICARE

## 2022-02-15 VITALS
SYSTOLIC BLOOD PRESSURE: 138 MMHG | HEART RATE: 72 BPM | DIASTOLIC BLOOD PRESSURE: 68 MMHG | WEIGHT: 208.38 LBS | HEIGHT: 65 IN | OXYGEN SATURATION: 95 % | BODY MASS INDEX: 34.72 KG/M2

## 2022-02-15 DIAGNOSIS — Z79.01 ANTICOAGULATED ON COUMADIN: ICD-10-CM

## 2022-02-15 DIAGNOSIS — Z51.81 ENCOUNTER FOR THERAPEUTIC DRUG MONITORING: ICD-10-CM

## 2022-02-15 DIAGNOSIS — Z86.79 S/P MAZE OPERATION FOR ATRIAL FIBRILLATION: Primary | ICD-10-CM

## 2022-02-15 DIAGNOSIS — I48.91 ATRIAL FIBRILLATION, UNSPECIFIED TYPE (HCC): ICD-10-CM

## 2022-02-15 DIAGNOSIS — Z98.890 S/P MAZE OPERATION FOR ATRIAL FIBRILLATION: Primary | ICD-10-CM

## 2022-02-15 DIAGNOSIS — I50.23 ACUTE ON CHRONIC SYSTOLIC CONGESTIVE HEART FAILURE, NYHA CLASS 2 (HCC): Primary | ICD-10-CM

## 2022-02-15 DIAGNOSIS — I25.5 ISCHEMIC CARDIOMYOPATHY: ICD-10-CM

## 2022-02-15 LAB
ANION GAP SERPL CALCULATED.3IONS-SCNC: 11 MEQ/L (ref 8–16)
BUN BLDV-MCNC: 15 MG/DL (ref 7–22)
CALCIUM SERPL-MCNC: 9.5 MG/DL (ref 8.5–10.5)
CHLORIDE BLD-SCNC: 102 MEQ/L (ref 98–111)
CO2: 28 MEQ/L (ref 23–33)
CREAT SERPL-MCNC: 0.7 MG/DL (ref 0.4–1.2)
GFR SERPL CREATININE-BSD FRML MDRD: 82 ML/MIN/1.73M2
GLUCOSE BLD-MCNC: 92 MG/DL (ref 70–108)
MAGNESIUM: 2.1 MG/DL (ref 1.6–2.4)
POC INR: 1.6 (ref 0.8–1.2)
POTASSIUM SERPL-SCNC: 4.6 MEQ/L (ref 3.5–5.2)
PRO-BNP: 910.2 PG/ML (ref 0–900)
SODIUM BLD-SCNC: 141 MEQ/L (ref 135–145)

## 2022-02-15 PROCEDURE — G8427 DOCREV CUR MEDS BY ELIG CLIN: HCPCS | Performed by: NURSE PRACTITIONER

## 2022-02-15 PROCEDURE — 99212 OFFICE O/P EST SF 10 MIN: CPT

## 2022-02-15 PROCEDURE — 36416 COLLJ CAPILLARY BLOOD SPEC: CPT

## 2022-02-15 PROCEDURE — G8484 FLU IMMUNIZE NO ADMIN: HCPCS | Performed by: NURSE PRACTITIONER

## 2022-02-15 PROCEDURE — 36415 COLL VENOUS BLD VENIPUNCTURE: CPT | Performed by: NURSE PRACTITIONER

## 2022-02-15 PROCEDURE — 99214 OFFICE O/P EST MOD 30 MIN: CPT | Performed by: NURSE PRACTITIONER

## 2022-02-15 PROCEDURE — 1036F TOBACCO NON-USER: CPT | Performed by: NURSE PRACTITIONER

## 2022-02-15 PROCEDURE — G8417 CALC BMI ABV UP PARAM F/U: HCPCS | Performed by: NURSE PRACTITIONER

## 2022-02-15 PROCEDURE — G8400 PT W/DXA NO RESULTS DOC: HCPCS | Performed by: NURSE PRACTITIONER

## 2022-02-15 PROCEDURE — 85610 PROTHROMBIN TIME: CPT

## 2022-02-15 PROCEDURE — 1090F PRES/ABSN URINE INCON ASSESS: CPT | Performed by: NURSE PRACTITIONER

## 2022-02-15 PROCEDURE — 4040F PNEUMOC VAC/ADMIN/RCVD: CPT | Performed by: NURSE PRACTITIONER

## 2022-02-15 PROCEDURE — 3017F COLORECTAL CA SCREEN DOC REV: CPT | Performed by: NURSE PRACTITIONER

## 2022-02-15 PROCEDURE — 1123F ACP DISCUSS/DSCN MKR DOCD: CPT | Performed by: NURSE PRACTITIONER

## 2022-02-15 RX ORDER — POTASSIUM CHLORIDE 20 MEQ/1
20 TABLET, EXTENDED RELEASE ORAL DAILY
Qty: 60 TABLET | Refills: 5
Start: 2022-02-15 | End: 2022-03-30 | Stop reason: SDUPTHER

## 2022-02-15 RX ORDER — FUROSEMIDE 20 MG/1
20 TABLET ORAL DAILY
Qty: 30 TABLET | Refills: 0
Start: 2022-02-15 | End: 2022-02-28 | Stop reason: DRUGHIGH

## 2022-02-15 ASSESSMENT — ENCOUNTER SYMPTOMS
SHORTNESS OF BREATH: 0
COUGH: 0
ABDOMINAL DISTENTION: 0
WHEEZING: 0
ABDOMINAL PAIN: 0

## 2022-02-15 NOTE — TELEPHONE ENCOUNTER
Please let patient know lab work looks good, going to increase entresto to next dose to start 49/51 BID tomorrow , called in, BMP in two weeks.  Will watch K level, may need to stop KCl

## 2022-02-15 NOTE — PROGRESS NOTES
Medication Management [address]   [phone] (phone)  [phone] (fax)    Ms. Demetra Saul is a 66 y.o.  female with history of Afib who presents today for anticoagulation monitoring and adjustment. Patient verifies current tablet strength. Patient follows the calendar provided at each visit. No missed or extra doses. Patient denies s/s bleeding/swelling/SOB/chest pain. Patient states she bruised her foot. No blood in urine or stool. No dietary changes. No changes in OTC agents/Herbals. Patient recently had Lasix decreased to 20 mg daily and Potassium decreased to 20 mEq daily. No change in alcohol use or tobacco use. No change in activity level. Patient denies headaches/dizziness/lightheadedness/falls. No vomiting/diarrhea or acute illness. No Procedures scheduled in the future at this time. Assessment:   Lab Results   Component Value Date    INR 1.60 (H) 02/15/2022    INR 1.50 (H) 02/02/2022    INR 1.60 (H) 01/25/2022     INR subtherapeutic   Recent Labs     02/15/22  1432   INR 1.60*     Patient remains subtherapeutic despite multiple dose adjustments. Plan:  Coumadin 8 mg x 1 dose today 2/15/22 then increase Coumadin from 6 mg MWF and 4 mg TuThSaSu to Coumadin 4 mg MF and 6 mg TuWThSaSu (11.8% increase). Recheck INR in 2 week(s). Patient reminded to call the Anticoagulation Clinic with signs or symptoms of bleeding or with any medication changes. Patient given instructions utilizing the teach back method. After visit summary printed and reviewed with patient. Discharged ambulatory in no apparent distress.     For Pharmacy Admin Tracking Only     Intervention Detail: Dose Adjustment: 1, reason: Therapy Optimization   Total # of Interventions Recommended: 1   Total # of Interventions Accepted: 1   Time Spent (min): 0866  [facility], PharmD, BCPS  2/15/2022  3:05 PM

## 2022-02-15 NOTE — PROGRESS NOTES
Heart Failure Clinic       Visit Date: 2/15/2022  Cardiologist:  Dr. Barraza Courser  Primary Care Physician: Dr. Maria Del Carmen Machado, Juanjose Briggs is a 66 y.o. female who presents today for:  Chief Complaint   Patient presents with    Check-Up    Congestive Heart Failure       HPI:   Gregorio Coronel is a 66 y.o. female who presents to the office for a new patient visit in the heart failure clinic. Accompanied by self    TYPE HF: HFrEF 25-30%   Cause: ischemic  Device: life vest? Repeat echo is 3/16  HX: HLD, YASHIRA on CPAP, HTN, DM II, Afib w. RVR s/p Maze and LAAC (11/2021), (coumadin)    Dry Wt:  200? Hospitalization:      ADMISSION DATE: 11/7/2021   MRN: [id number] DISCHARGE DATE: 11/25/2021     evaluation of de brad ischemic cardiomyopathy in context of AFRVR with ACS. She presented to [facility] on 11/5/2021 with severe bilateral acute on chronic hip pain. She was found to be in [facility] with rates in the 160s and ischemic changes diffusely on the ECG. She was hypoxic requiring 5L O2 by NC. She was given IV metoprolol which did not affect her rate much. She was taken to cath lab for evaluation of these ischemic changes and was discovered to have three vessel CAD. Echo showed LVEF 25-30%. She was managed in the ICU and given IV metoprolol, IV diltiazem, IV esmolol, and IV amiodarone for her atrial fibrillation; with amiodarone she converted to sinus rhythm (not clear if pt ever underwent LAWSON). She was anticoagulated with heparin gtt. CTS was consulted at [facility] for CABG evaluation. However given the reduced LV systolic function and possible need for mechanical support in the perioperative period, CTS recommended transfer to a tertiary center for evaluation; she was accepted for transfer. Patient underwent emergent left heart catheterization which found three-vessel coronary artery disease.  She had a proximal RCA calcified lesion in which a PTCA was attempted however unsuccessful    11/18/2021: CABG(LAD-LIMA, OM-SVG, RCA-SVG), MVr(#29 Hsu Ring), TVr(Lashawn stitch), MAZE, LAAC(#35 clip)    11/20/2021: AF RVR    Acute Systolic HF - Hemodynamically stable off inotropic support. Diuresis/BB. 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 Outpatient endocrine eval recommended. Noted on DC summary for following. RESULT:     Lines, tubes, and devices:  None. Lungs and pleura:  Mixed changes overall improvement of perihilar and   basilar vague opacities with volume loss, suggesting atelectasis.     Superimposed infiltrates/infection or edema cannot be entirely excluded.     Underlying small pleural effusions are seen, mostly on the lateral view.     No pneumothorax. Cardiomediastinal silhouette:  Heart is enlarged.  Thoracic aorta is   tortuous.  Pulmonary vascular redistribution is compatible with pulmonary   venous hypertension. Bones and soft tissues:  Median sternotomy.  Osteopenia and degenerative   changes are seen in the spine. Concerns today: No major concerns today. Was evaluated by Dr. Norma Bishop on 2/2/22 and started on Entresto, no lab work ordered since. States she stopped her Lipitor last week d/t back discomfort as a side effect.      Activity: ADLs performed w/o limitation, uses cane currently d/t heaviness of life vest. Was SOB walking to office today  Diet: educated today    Patient has:  Chest Pain: no  SOB: no  Orthopnea/PND: no  YASHIRA: not tested  Edema: no  Fatigue: no  Abdominal bloating: no  Cough: no  Appetite: good  Home weight: daily   Home blood pressure: does not check    Past Medical History:   Diagnosis Date    Allergic rhinitis     Angiolipoma of right kidney     ASHD (arteriosclerotic heart disease)     Atrial fibrillation (HCC)     Celiac disease     DM2 (diabetes mellitus, type 2) (HCC)     Eczema     GERD (gastroesophageal reflux disease)     [facility]     History of ST elevation myocardial infarction (STEMI)     Hyperlipidemia     Hypertension     Ischemic cardiomyopathy     Meningioma [facility] 2012    Noted MRI . Never did f/u MRI. Told she didn't need to. Has 0 sxs. Denies HA, vision changes, lateralizing numbness or weakness.  Mixed hearing loss 2013    Obesity (BMI 30-39. 9)     Osteoarthritis     Post-menopausal     S/P CABG (coronary artery bypass graft)     S/P Maze operation for atrial fibrillation     S/P MVR (mitral valve repair)     S/P tricuspid valve repair      Past Surgical History:   Procedure Laterality Date     SECTION  1972    CHOLECYSTECTOMY  2004    COLONOSCOPY  2006    CORONARY ARTERY [facility]PASS GRAFT  2021    [facility]    MITRAL VALVE REPAIR  2021    [facility]    MYRINGOTOMY AND TYMPANOSTOMY TUBE PLACEMENT  2012    Dr Carlos Meneses    OTHER SURGICAL HISTORY  2021    MAZE Procedure during CABG and MVR/TVR at [address] [place] [address]  2021    TV Repair 2021 at . [facility] UPPER GASTROINTESTINAL ENDOSCOPY  2006     Family History   Problem Relation Age of Onset    Heart Disease Mother     Lung Cancer Mother     Osteoporosis Mother     Other Father         Did not know her father.      Other Maternal Grandmother         blood clot, no clear hx surrounding    Heart Attack Maternal Grandfather     Heart Attack Maternal Uncle 62    Breast Cancer Maternal Aunt 50    Breast Cancer Maternal Cousin 40     Social History     Tobacco Use    Smoking status: Never Smoker    Smokeless tobacco: Never Used   Substance Use Topics    Alcohol use: No     Current Outpatient Medications   Medication Sig Dispense Refill    furosemide (LASIX) 20 MG tablet Take 1 tablet by mouth daily 30 tablet 0    potassium chloride (KLOR-CON M) 20 MEQ extended release tablet Take 1 tablet by mouth daily 60 tablet 5    sacubitril-valsartan (ENTRESTO) 24-26 MG per tablet Take 1 tablet by mouth 2 times daily 60 tablet 3    warfarin (COUMADIN) 2 MG tablet Take as directed by [facility] (75 tablets = 30 days) 75 tablet 1    metoprolol succinate (TOPROL XL) 50 MG extended release tablet Take 1.5 tablets by mouth 2 times daily 90 tablet 3    acetaminophen (TYLENOL) 325 MG tablet Take 325-650 mg by mouth every 6 hours as needed      aspirin 81 MG chewable tablet Take 81 mg by mouth daily       No current facility-administered medications for this visit. Allergies   Allergen Reactions    Biaxin [Clarithromycin] Nausea Only    Doxycycline Other (See Comments)     GI side effects    Keflex [Cephalexin] Other (See Comments)     Tongue Swelling    Norvasc [Amlodipine Besylate] Other (See Comments)     Nasuea, hot flashes    Statins Other (See Comments)     Myalgias all    Zetia [Ezetimibe] Other (See Comments)     Chest pain    Zocor [Simvastatin] Other (See Comments)     Myalgias       SUBJECTIVE:   Review of Systems   Constitutional: Negative for activity change, appetite change and fatigue. Respiratory: Negative for cough, shortness of breath and wheezing. Cardiovascular: Negative for chest pain, palpitations and leg swelling. Gastrointestinal: Negative for abdominal distention and abdominal pain. Musculoskeletal: Negative for gait problem. Neurological: Negative for weakness, light-headedness and headaches. Psychiatric/Behavioral: Negative for sleep disturbance. OBJECTIVE:   Today's Vitals:  /68   Pulse 72   Ht 5' 5\" (1.651 m)   Wt 208 lb 6 oz (94.5 kg)   SpO2 95%   BMI 34.68 kg/m²     Physical Exam  Vitals reviewed. Constitutional:       General: She is not in acute distress. Appearance: Normal appearance. She is well-developed. She is not diaphoretic. HENT:      Head: Normocephalic and atraumatic. Eyes:      Conjunctiva/sclera: Conjunctivae normal.   Cardiovascular:      Rate and Rhythm: Normal rate and regular rhythm. Heart sounds: Normal heart sounds. No murmur heard. Pulmonary:      Effort: Pulmonary effort is normal. No respiratory distress. Breath sounds: Normal breath sounds. No wheezing, rhonchi or rales. Abdominal:      General: Bowel sounds are normal. There is no distension. Palpations: Abdomen is soft. Tenderness: There is no abdominal tenderness. Musculoskeletal:         General: Normal range of motion. Cervical back: Normal range of motion and neck supple. Right lower leg: No edema. Left lower leg: No edema. Skin:     General: Skin is warm and dry. Capillary Refill: Capillary refill takes less than 2 seconds. Neurological:      Mental Status: She is alert and oriented to person, place, and time. Coordination: Coordination normal.   Psychiatric:         Behavior: Behavior normal.         Wt Readings from Last 3 Encounters:   02/15/22 208 lb 6 oz (94.5 kg)   02/02/22 206 lb 12.8 oz (93.8 kg)   01/14/22 204 lb (92.5 kg)     BP Readings from Last 3 Encounters:   02/15/22 138/68   02/02/22 132/66   01/14/22 126/78     Pulse Readings from Last 3 Encounters:   02/15/22 72   02/02/22 77   01/14/22 76     Body mass index is 34.68 kg/m². ECHO:     Summary   Technically difficult examination. Left Ventricular size is Mildly increased . Normal left ventricular wall thickness. There was severe global hypokinesis of the left ventricle. Systolic function was severely reduced. Ejection fraction is visually estimated in the range of 30% to 35%. The left atrium is Mildly dilated. Signature      ----------------------------------------------------------------   Electronically signed by Faith Hester MD (Interpreting   physician) on 01/21/2022 at 07:16 PM      CONCLUSIONS:   - Technically difficult exam due to post op, bandages/chest tubes/wound,   suboptimal positioning and body habitus. Subcostal images are not obtainable due   to bandages.    - Exam indication: S/P CABG, MVr, TVr   - The left ventricle is normal in size. Left ventricular systolic function is   severely decreased. EF = 25 ± 5% (visual est.) Definity contrast used for   endocardial border detection. Left ventricular diastolic function was not   evaluated due to mitral valve surgery. - The right ventricle is normal in size. Right ventricular systolic function is   normal.   - Post mitral valve repair. Hsu Mitral Valve Annuloplasty Ring (size #29). There    is trace mitral valve regurgitation. The peak gradient is 15 mmHg and the mean   gradient is 4 mmHg. - Post tricuspid valve repair. Sebastian Math. There is trace tricuspid valve   regurgitation. The peak gradient is 2 mmHg and the mean gradient is 1 mmHg. - There is a trivial posterior pericardial effusion. There is a left pleural   effusion.   - Exam was compared with the prior  echocardiographic exam performed on   11/8/2021. s/p CABG, MVr, TVr.     Electronically signed by Odalys Hollins MD on 11/24/2021 at 2:13:41 PM         CATH/STRESS:        Recommendations   IV beta blockers to reduce heart rate   IV heparin   CTS consultation for surgery   Aspirin   Monitor access site and hemodynamics   Extensive discussion done with patient and family members      Estimated Blood Loss:10 ml. Complications:No complications.       Signatures      ----------------------------------------------------------------   Electronically signed by Shanelle Kapadia MD (Performing   Physician) on 11/05/2021 at 20:05   ----------------------------------------------------------------        Results reviewed:  BNP: No results found for: BNP  CBC:   Lab Results   Component Value Date    WBC 5.6 12/30/2021    RBC 4.47 12/30/2021    RBC 4.57 11/09/2020    HGB 14.2 12/30/2021    HCT 45.2 12/30/2021     12/30/2021     CMP:    Lab Results   Component Value Date     12/17/2021    K 3.5 12/17/2021    CL 99 12/17/2021    CO2 36 12/17/2021    BUN 27 12/17/2021 blood work today, and if blood work ok today, we will increase the Entresto dosage. BP/HR stable   Changing KCl 20 daily. Increasing Entresto dosage on Friday. Blood work again in two weeks. Continue diet/fluid adherence  Continue daily wts. F/U w/ Cardiology  F/U in clinic in 6 weeks      Patient was instructed to call the [facility] for any changes in symptoms as noted in AVS.      Return in about 6 weeks (around 3/29/2022). or sooner if needed     Patient given educational materials - see patient instructions. We discussed the importance of weighing oneself and recording daily. We also discussed the importance of a low sodium diet, higher sodium foods to avoid and better low sodium food options. Patient verbalizes understanding of plan of care using teach back method, and is agreeable to the treatment plan.        Electronically signed by ORAL Pozo CNP on 2/15/2022 at 2:12 PM

## 2022-02-15 NOTE — PROGRESS NOTES
Venipuncture obtained from [address] [place] [address]. Patient tolerated procedure without complications or complaints.

## 2022-02-21 RX ORDER — METOPROLOL SUCCINATE 50 MG/1
75 TABLET, EXTENDED RELEASE ORAL 2 TIMES DAILY
Qty: 90 TABLET | Refills: 3 | Status: SHIPPED | OUTPATIENT
Start: 2022-02-21 | End: 2022-02-22 | Stop reason: ALTCHOICE

## 2022-02-22 ENCOUNTER — TELEPHONE (OUTPATIENT)
Dept: CARDIOLOGY CLINIC | Age: 72
End: 2022-02-22

## 2022-02-22 RX ORDER — METOPROLOL SUCCINATE 50 MG/1
50 TABLET, EXTENDED RELEASE ORAL 2 TIMES DAILY
COMMUNITY
End: 2022-02-22 | Stop reason: SDUPTHER

## 2022-02-22 RX ORDER — METOPROLOL SUCCINATE 25 MG/1
25 TABLET, EXTENDED RELEASE ORAL 2 TIMES DAILY
Qty: 180 TABLET | Refills: 3 | Status: SHIPPED | OUTPATIENT
Start: 2022-02-22 | End: 2022-05-06 | Stop reason: SDUPTHER

## 2022-02-22 RX ORDER — METOPROLOL SUCCINATE 25 MG/1
25 TABLET, EXTENDED RELEASE ORAL 2 TIMES DAILY
COMMUNITY
End: 2022-02-22 | Stop reason: SDUPTHER

## 2022-02-22 RX ORDER — METOPROLOL SUCCINATE 50 MG/1
50 TABLET, EXTENDED RELEASE ORAL 2 TIMES DAILY
Qty: 180 TABLET | Refills: 3 | Status: SHIPPED | OUTPATIENT
Start: 2022-02-22 | End: 2022-05-06 | Stop reason: SDUPTHER

## 2022-02-22 NOTE — TELEPHONE ENCOUNTER
Patient called stating she will be out of Toprol 50 mg. Patient takes 1.5 tablets BID. Informed patient prior Dale Reynoso was pending. Patient agreed to taking one Toprol 50 mg tablet along with one 25 mg tablet BID to equal 75 mg BID. Scripts pended for pharmacy.

## 2022-02-22 NOTE — TELEPHONE ENCOUNTER
Patient called stating she will be out of Toprol 50 mg. Patient takes 1.5 tablets BID. Informed patient prior Sharlett Bullocks was pending. Patient agreed to taking one Toprol 50 mg tablet along with one 25 mg tablet BID to equal 75 mg BID. Scripts pended for pharmacy in different encounter.

## 2022-02-28 ENCOUNTER — OFFICE VISIT (OUTPATIENT)
Dept: ENT CLINIC | Age: 72
End: 2022-02-28
Payer: MEDICARE

## 2022-02-28 ENCOUNTER — HOSPITAL ENCOUNTER (OUTPATIENT)
Dept: PHARMACY | Age: 72
Setting detail: THERAPIES SERIES
Discharge: HOME | End: 2022-02-28
Payer: MEDICARE

## 2022-02-28 ENCOUNTER — HOSPITAL ENCOUNTER (OUTPATIENT)
Dept: CT IMAGING | Age: 72
Discharge: HOME | End: 2022-02-28

## 2022-02-28 ENCOUNTER — NURSE ONLY (OUTPATIENT)
Dept: LAB | Age: 72
End: 2022-02-28

## 2022-02-28 ENCOUNTER — HOSPITAL ENCOUNTER (OUTPATIENT)
Dept: ULTRASOUND IMAGING | Age: 72
Discharge: HOME | End: 2022-02-28

## 2022-02-28 ENCOUNTER — TELEPHONE (OUTPATIENT)
Dept: CARDIOLOGY CLINIC | Age: 72
End: 2022-02-28

## 2022-02-28 VITALS
OXYGEN SATURATION: 97 % | WEIGHT: 213.2 LBS | RESPIRATION RATE: 16 BRPM | BODY MASS INDEX: 36.4 KG/M2 | SYSTOLIC BLOOD PRESSURE: 142 MMHG | DIASTOLIC BLOOD PRESSURE: 78 MMHG | HEART RATE: 72 BPM | HEIGHT: 64 IN | TEMPERATURE: 97.2 F

## 2022-02-28 DIAGNOSIS — E04.1 THYROID NODULE: Primary | ICD-10-CM

## 2022-02-28 DIAGNOSIS — Z98.890 S/P TRICUSPID VALVE REPAIR: ICD-10-CM

## 2022-02-28 DIAGNOSIS — I50.23 ACUTE ON CHRONIC SYSTOLIC CONGESTIVE HEART FAILURE, NYHA CLASS 2 (HCC): ICD-10-CM

## 2022-02-28 DIAGNOSIS — Z95.1 S/P CABG (CORONARY ARTERY BYPASS GRAFT): ICD-10-CM

## 2022-02-28 DIAGNOSIS — Z98.890 S/P MVR (MITRAL VALVE REPAIR): ICD-10-CM

## 2022-02-28 DIAGNOSIS — E04.1 THYROID NODULE: ICD-10-CM

## 2022-02-28 DIAGNOSIS — Z86.79 S/P MAZE OPERATION FOR ATRIAL FIBRILLATION: ICD-10-CM

## 2022-02-28 DIAGNOSIS — Z98.890 S/P MAZE OPERATION FOR ATRIAL FIBRILLATION: ICD-10-CM

## 2022-02-28 DIAGNOSIS — Z79.01 ANTICOAGULATED ON COUMADIN: ICD-10-CM

## 2022-02-28 DIAGNOSIS — I48.91 ATRIAL FIBRILLATION, UNSPECIFIED TYPE (HCC): Primary | ICD-10-CM

## 2022-02-28 DIAGNOSIS — Z51.81 ENCOUNTER FOR THERAPEUTIC DRUG MONITORING: ICD-10-CM

## 2022-02-28 DIAGNOSIS — I50.20 HEART FAILURE WITH REDUCED EJECTION FRACTION (HCC): ICD-10-CM

## 2022-02-28 DIAGNOSIS — Z00.6 ENCOUNTER FOR EXAMINATION FOR NORMAL COMPARISON OR CONTROL IN CLINICAL RESEARCH PROGRAM: ICD-10-CM

## 2022-02-28 DIAGNOSIS — I50.23 ACUTE ON CHRONIC SYSTOLIC CONGESTIVE HEART FAILURE, NYHA CLASS 2 (HCC): Primary | ICD-10-CM

## 2022-02-28 DIAGNOSIS — D49.7 FOLLICULAR NEOPLASM OF THYROID: ICD-10-CM

## 2022-02-28 LAB
ANION GAP SERPL CALCULATED.3IONS-SCNC: 14 MEQ/L (ref 8–16)
BUN BLDV-MCNC: 15 MG/DL (ref 7–22)
CALCIUM SERPL-MCNC: 9.8 MG/DL (ref 8.5–10.5)
CHLORIDE BLD-SCNC: 103 MEQ/L (ref 98–111)
CO2: 28 MEQ/L (ref 23–33)
CREAT SERPL-MCNC: 0.7 MG/DL (ref 0.4–1.2)
GFR SERPL CREATININE-BSD FRML MDRD: 82 ML/MIN/1.73M2
GLUCOSE BLD-MCNC: 110 MG/DL (ref 70–108)
POC INR: 2.5 (ref 0.8–1.2)
POTASSIUM SERPL-SCNC: 4.2 MEQ/L (ref 3.5–5.2)
PTH INTACT: 37.5 PG/ML (ref 15–65)
SODIUM BLD-SCNC: 145 MEQ/L (ref 135–145)

## 2022-02-28 PROCEDURE — 1036F TOBACCO NON-USER: CPT | Performed by: PHYSICIAN ASSISTANT

## 2022-02-28 PROCEDURE — 36416 COLLJ CAPILLARY BLOOD SPEC: CPT

## 2022-02-28 PROCEDURE — G8427 DOCREV CUR MEDS BY ELIG CLIN: HCPCS | Performed by: PHYSICIAN ASSISTANT

## 2022-02-28 PROCEDURE — G8484 FLU IMMUNIZE NO ADMIN: HCPCS | Performed by: PHYSICIAN ASSISTANT

## 2022-02-28 PROCEDURE — 99211 OFF/OP EST MAY X REQ PHY/QHP: CPT

## 2022-02-28 PROCEDURE — 4040F PNEUMOC VAC/ADMIN/RCVD: CPT | Performed by: PHYSICIAN ASSISTANT

## 2022-02-28 PROCEDURE — G8417 CALC BMI ABV UP PARAM F/U: HCPCS | Performed by: PHYSICIAN ASSISTANT

## 2022-02-28 PROCEDURE — 3017F COLORECTAL CA SCREEN DOC REV: CPT | Performed by: PHYSICIAN ASSISTANT

## 2022-02-28 PROCEDURE — 99204 OFFICE O/P NEW MOD 45 MIN: CPT | Performed by: PHYSICIAN ASSISTANT

## 2022-02-28 PROCEDURE — 1090F PRES/ABSN URINE INCON ASSESS: CPT | Performed by: PHYSICIAN ASSISTANT

## 2022-02-28 PROCEDURE — G8400 PT W/DXA NO RESULTS DOC: HCPCS | Performed by: PHYSICIAN ASSISTANT

## 2022-02-28 PROCEDURE — 85610 PROTHROMBIN TIME: CPT

## 2022-02-28 PROCEDURE — 1123F ACP DISCUSS/DSCN MKR DOCD: CPT | Performed by: PHYSICIAN ASSISTANT

## 2022-02-28 RX ORDER — OMEPRAZOLE 20 MG/1
20 CAPSULE, DELAYED RELEASE ORAL DAILY
COMMUNITY
End: 2022-08-23

## 2022-02-28 RX ORDER — FUROSEMIDE 20 MG/1
40 TABLET ORAL DAILY
Qty: 30 TABLET | Refills: 0 | Status: SHIPPED
Start: 2022-02-28 | End: 2022-03-02 | Stop reason: SDUPTHER

## 2022-02-28 NOTE — TELEPHONE ENCOUNTER
Patient called in. Having weight gain and swelling. Said she was previously on Lasix 80mg/day abd was changed to Lasix 20mg/day. Has had weight gain from 202 lb to 209 lb. Swelling in feet, cant put shoes on. Denies SOB. Going to get repeat BMP done today from the increase in Entresto 2 weeks ago.

## 2022-02-28 NOTE — PROGRESS NOTES
Medication Management [address]  [phone] (phone)  [phone] (fax)    Ms. Oscar Patiño is a 66 y.o.  female with history of Afib, status post Maze procedure. who presents today for anticoagulation monitoring and adjustment. Patient verifies current dosing regimen and tablet strength. No missed or extra doses. Patient denies s/s bleeding/bruising/SOB/chest pain. Pt complains of swelling in the feet - cardiologist increased her Bumex recently to help. No blood in urine or stool. No dietary changes. No changes in medication/OTC agents. Pt interested in taking the following herbal supplements -  Mullein (no interaction), Red Yeast Rice (no interaction), Karina D'Arco (does interact - potential anticoagulation/antiplatelet activity). Discuss findings at next visit. No change in alcohol use or tobacco use. No change in activity level. Patient denies headaches/dizziness/lightheadedness/falls. No vomiting/diarrhea or acute illness. Pt states she may have potential thyroid surgery, but needs clearance - will inform us if anything is scheduled. Assessment:   Lab Results   Component Value Date    INR 2.50 (H) 02/28/2022    INR 1.60 (H) 02/15/2022    INR 1.50 (H) 02/02/2022     INR therapeutic   Recent Labs     02/28/22  1424   INR 2.50*     Therapeutic x 1    Plan:  Continue Coumadin 4 mg MF and 6 mg [id number]. Recheck INR in 17 day(s) after echo is completed. Patient reminded to call the Anticoagulation Clinic with any signs or symptoms of bleeding or with any medication changes. Patient given instructions utilizing the teach back method. After visit summary printed and reviewed with patient and her . Discharged ambulatory in no apparent distress.     Shelley Kilgore, PharmD  3:08 PM  2/28/2022      For Pharmacy Admin Tracking Only     Time Spent (min): 20

## 2022-02-28 NOTE — PROGRESS NOTES
[facility]YSIC[facility]ANS [place] [facility]  [facility] EAR, NOSE AND THROAT  [facility] [place]  Dept: [phone]  Dept Fax: [phone]  Loc: Lorena Mcghee is a 66 y.o. female who was referred by Courtney Smith DO for:  Chief Complaint   Patient presents with    New Patient     patient is here for follocular neoplasm of thyroid. Ref by Yomaira Hernandez R/S from 1/17/22   . HPI:     The patient presents for evaluation regarding thyroid nodule concerning for follicular neoplasm. The patient presented to [facility] 11/5/21 due to right hip pain and fatigue. She was found to be in Afrib with RVR and EKG showing signs of ST elevation in the inferior and depression in lateral leads. It was recommended she be transferred to [facility] for further care due to surgical complexity and concerns. While at [facility] the patient underwent pre-op CT chest which showed a right sided thyroid nodule. She underwent biopsy of the nodule on 11/16/21 which reportedly showed lesion concerning for follicular neoplasm. Patient taken for cardiac surgery on 11/18/21 and had CABG(LAD-LIMA, OM-SVG, RCA-SVG), MVr(#29 Hsu Ring), MAZE, TVr(Lashawn stitch), LAAC(#35 clip) per care everywhere. Patient has external defibrillator since then. She states that she has noticed it has gone off a few times. The patient reports that she was to follow up with cardiology at [facility] but has been unable to due to transportation issues. She is established back with local cardiology, Dr Cornelio Ingram, in January 2022. She is scheduled for repeat echo in March and following up with him in April. She reports some mild, intermittent lower extremity edema. She feels that her breathing is pretty good. Difficult to obtain history from patient as she is vague and has difficult time describing symptoms/history of illness.        Reviewed records in [facility] with some of the pertinent findings below:  Thyroid Nodule   History: Incidental finding on pre-op CT chest. TSH WNL. Assessment: s/p right thyroid lobe biopsy 11/16/2021. Surg path: Suspicious for a follicular neoplasm. Plan: Needs endo eval/recs. Per CT surgery, outpatient endo eval is appropriate at this point. Pt. elects to follow-up with endo locally. Noted on dc summary. A. THYROID, RIGHT LOBE, FINE NEEDLE ASPIRATE: Suspicious for a follicular neoplasm. See comment. Comment: The aspirate sample demonstrates groups of follicular cells with architectural and cytologic atypia. The differential diagnosis includes a   hyperplastic nodule, follicular neoplasm, niftp and follicular variant of papillary thyroid carcinoma. The case was reviewed in consultation with Caroline Bowens and Misty, who concur. Subjective:      REVIEW OF SYSTEMS:    A complete multi-organ review of systems was performed using a new patient questionnaire, and reviewed by me.   ENT:  negative except as noted in HPI  CONSTITUTIONAL:  negative except as noted in HPI  EYES:  negative except as noted in HPI  RESPIRATORY:  negative except as noted in HPI  CARDIOVASCULAR:  negative except as noted in HPI  GASTROINTESTINAL:  negative except as noted in HPI  GENITOURINARY:  negative except as noted in HPI  MUSCULOSKELETAL:  negative except as noted in HPI  SKIN:  negative except as noted in HPI  ENDOCRINE/METABOLIC: negative except as noted in HPI  HEMATOLOGIC/LYMPHATIC:  negative except as noted in HPI  ALLERGY/IMMUN: negative except as noted in HPI  NEUROLOGICAL:  negative except as noted in HPI  BEHAVIOR/PSYCH:  negative except as noted in HPI    Past Medical History:  Past Medical History:   Diagnosis Date    Allergic rhinitis     Angiolipoma of right kidney     ASHD (arteriosclerotic heart disease)     Atrial fibrillation ([address]     Celiac disease     DM2 (diabetes mellitus, type 2) ([address]     Eczema     GERD (gastroesophageal reflux disease)     Heart failure with reduced ejection fraction [facility]     History of ST elevation myocardial infarction (STEMI)     Hyperlipidemia     Hypertension     Ischemic cardiomyopathy     Meningioma ([facility] 12/26/2012    Noted MRI 2012. Never did f/u MRI. Told she didn't need to. Has 0 sxs. Denies HA, vision changes, lateralizing numbness or weakness.  Mixed hearing loss 09/17/2013    Obesity (BMI 30-39. 9)     Osteoarthritis     Post-menopausal     S/P CABG (coronary artery bypass graft)     S/P Maze operation for atrial fibrillation     S/P MVR (mitral valve repair)     S/P tricuspid valve repair        Social History:    TOBACCO:   reports that she has never smoked. She has never used smokeless tobacco.  ETOH:   reports no history of alcohol use. DRUGS:   reports no history of drug use. Family History:       Problem Relation Age of Onset    Heart Disease Mother     Lung Cancer Mother     Osteoporosis Mother     Other Father         Did not know her father.  Other Maternal Grandmother         blood clot, no clear hx surrounding    Heart Attack Maternal Grandfather     Heart Attack Maternal Uncle 62    Breast Cancer Maternal Aunt 50    Breast Cancer Maternal Cousin 40       Surgical History:  Past Surgical History:   Procedure Laterality Date   [address]    CHOLECYSTECTOMY  2004    COLONOSCOPY  05/2006    CORONARY [facility] GRAFT  [facility]2021    [facility]    [facility]AL [facility]PA[facility]  11/2021    [facility]    MYRINGOTOMY AND TYMPANOSTOMY TUBE PLACEMENT  12/07/2012    Dr Marcy Alex    OTHER SURGICAL HISTORY  11/2021    MAZE Procedure during CABG and MVR/TVR [facility]    TRICUSPID VALVE SURGERY  11/2021    TV Repair NOV 2021 at [facility]. ENDOSCOPY  05/2006        Objective: This is a 66 y.o. female. Patient is alert and oriented to person, place and time. Patient appears well developed, well nourished. Mood is happy with normal affect. Not obviously hearing impaired. No abnormality in speech noted. BP (!) 142/78 (Site: Right Upper Arm, Position: Sitting)   Pulse 72   Temp 97.2 °F (36.2 °C) (Infrared)   Resp 16   Ht 5' 4\" (1.626 m)   Wt 213 lb 3.2 oz (96.7 kg)   SpO2 97%   BMI 36.60 kg/m²     Head:   Normocephalic, atraumatic. No obvious masses or lesions noted. Ears:  External ears: Normal: no scars, lesions or masses. Mastoid process: No erythema noted. No tenderness to palpation. R External auditory canal: clear and free of any pathology  L External auditory canal: clear and free of any pathology   Tympanic membranes:  R intact, translucent                                                  L intact, translucent  Nose:    External nose: Appears midline. No obvious deformity or masses. Septum:  Septal spur on right anterior septum. No septal hematoma. No perforation. Mucosa:  clear  Turbinates: normal and pink            Discharge:  clear    Mouth/Throat:  Lips, tongue and oral cavity: Normal. No masses or lesions noted   Dentition: poor, no malocclusion  Oral mucosa: moist  Tonsils: present, not acutely enlarged and no erythema or exudates  Oropharynx: normal-appearing mucosa  Hard and soft palates: symmetrical and intact. Salivary glands: not enlarged and no tenderness to palpation. Uvula: midline, no obvious lesions   Gag reflex is present. Neck: Trachea midline. Asymmetry of the thyroid with palpation, right javed than left. No tenderness with palpation  Lymphatic: No palpable cervical lymphadenopathy noted. Eyes: MARIAN, EOM intact. Conjunctiva moist without discharge. Lungs: Normal effort of breathing, not obviously distressed. Neuro: Cranial nerves II-XII grossly intact. Extremities: No clubbing, edema, or cyanosis noted. Data:    [email or web address] that were available in [facility]. Assessment/Plan:     Diagnosis Orders   1. Thyroid nodule  Thyroglobulin and anti-Thyroglobulin AB    PTH, Intact   2.  Follicular neoplasm of thyroid     3. S/P CABG (coronary artery bypass graft)     4. S/P Maze operation for atrial fibrillation     5. S/P MVR (mitral valve repair)     6. S/P tricuspid valve repair         The patient is a 66 y.o. female that presents for evaluation of thyroid nodule. Patient will likely be recommended to proceed with right thyroidectomy, but patient will need cleared from cardiac standpoint. The patient is scheduled for repeat echo in a couple of weeks. Will discuss Echo with cardiology and see if patient is ok to proceed with surgery. I encouraged patient to contact our office to let us know when echo is completed and let her cardiologist know that she may need surgical clearance. PTH, calcium, ionized calcium, thyroglobulin and anti-thryoglobulin ordered. Patient will tentatively be scheduled to follow up in about 1 month to review labs and discuss possible surgery. The patient expresses understanding of the plan and thanked me. She will contact the office in the meantime with new/worsening symptoms or other concerns.      (Please note that portions of this note may have been completed with a voice recognition program.  Efforts were made to edit the dictation but occasionally words are mis-transcribed.)    Electronically signed by NESSA Diaz on 3/1/2022 at 10:33 AM

## 2022-03-01 NOTE — TELEPHONE ENCOUNTER
----- Message from ORAL Brown CNP sent at 3/1/2022  7:52 AM EST -----  Labs good, continue meds as is

## 2022-03-02 LAB — CALCIUM IONIZED: NORMAL

## 2022-03-02 RX ORDER — FUROSEMIDE 40 MG/1
40 TABLET ORAL DAILY
Qty: 90 TABLET | Refills: 1 | Status: SHIPPED | OUTPATIENT
Start: 2022-03-02 | End: 2022-05-03 | Stop reason: SDUPTHER

## 2022-03-04 LAB — THYROGLOBULIN: NORMAL

## 2022-03-16 ENCOUNTER — HOSPITAL ENCOUNTER (OUTPATIENT)
Dept: PHARMACY | Age: 72
Setting detail: THERAPIES SERIES
Discharge: HOME | End: 2022-03-16
Payer: MEDICARE

## 2022-03-16 ENCOUNTER — HOSPITAL ENCOUNTER (OUTPATIENT)
Dept: NON INVASIVE DIAGNOSTICS | Age: 72
Discharge: HOME | End: 2022-03-16
Payer: MEDICARE

## 2022-03-16 ENCOUNTER — NURSE ONLY (OUTPATIENT)
Dept: LAB | Age: 72
End: 2022-03-16

## 2022-03-16 DIAGNOSIS — I48.91 ATRIAL FIBRILLATION, UNSPECIFIED TYPE (HCC): ICD-10-CM

## 2022-03-16 DIAGNOSIS — I50.20 HEART FAILURE WITH REDUCED EJECTION FRACTION (HCC): ICD-10-CM

## 2022-03-16 DIAGNOSIS — R00.2 INTERMITTENT PALPITATIONS: ICD-10-CM

## 2022-03-16 LAB
ANION GAP SERPL CALCULATED.3IONS-SCNC: 12 MEQ/L (ref 8–16)
BUN BLDV-MCNC: 17 MG/DL (ref 7–22)
CALCIUM SERPL-MCNC: 9.7 MG/DL (ref 8.5–10.5)
CHLORIDE BLD-SCNC: 102 MEQ/L (ref 98–111)
CO2: 29 MEQ/L (ref 23–33)
CREAT SERPL-MCNC: 0.7 MG/DL (ref 0.4–1.2)
GFR SERPL CREATININE-BSD FRML MDRD: 82 ML/MIN/1.73M2
GLUCOSE BLD-MCNC: 88 MG/DL (ref 70–108)
LV EF: 48 %
LVEF MODALITY: NORMAL
POC INR: 1.4 (ref 0.8–1.2)
POTASSIUM SERPL-SCNC: 4.4 MEQ/L (ref 3.5–5.2)
SODIUM BLD-SCNC: 143 MEQ/L (ref 135–145)

## 2022-03-16 PROCEDURE — 99212 OFFICE O/P EST SF 10 MIN: CPT

## 2022-03-16 PROCEDURE — 93306 TTE W/DOPPLER COMPLETE: CPT

## 2022-03-16 PROCEDURE — 36416 COLLJ CAPILLARY BLOOD SPEC: CPT

## 2022-03-16 PROCEDURE — 85610 PROTHROMBIN TIME: CPT

## 2022-03-16 RX ORDER — WARFARIN SODIUM 2 MG/1
TABLET ORAL
Qty: 90 TABLET | Refills: 2 | Status: SHIPPED | OUTPATIENT
Start: 2022-03-16 | End: 2022-06-13 | Stop reason: ALTCHOICE

## 2022-03-16 NOTE — PROGRESS NOTES
Medication Management [address]  [phone] (phone)  [phone] (fax)    Ms. Rebeca Connell is a 67 y.o.  female with history of Afib who presents today for anticoagulation monitoring and adjustment. Patient verifies current dosing regimen and tablet strength. No missed or extra doses. Patient denies s/s bleeding/bruising/swelling/SOB/chest pain  No blood in urine or stool. No dietary changes. Reports to eating more dried nuts/fruit trail mix over the past week. Stressed consistency of diet to patient. No changes in medication/OTC agents/Herbals. Patient interested about taking [place] Kaylene bounty hair and nail vitamins (no interaction)  No change in alcohol use or tobacco use. No change in activity level. Patient denies headaches/dizziness/lightheadedness/falls. No vomiting/diarrhea or acute illness. No Procedures scheduled in the future at this time. Patient received echocardiogram today and she may have potential thyroid surgery    Assessment:   Lab Results   Component Value Date    INR 1.40 (H) 03/16/2022    INR 2.50 (H) 02/28/2022    INR 1.60 (H) 02/15/2022     INR subtherapeutic   Recent Labs     03/16/22  1410   INR 1.40*     Patient interview completed and discussed with pharmacist by Ronnell Mendez PharmD Candidate      Plan:  Coumadin 8 mg today and tomorrow, then continue Coumadin 4 mg MF, 6 mg all other days. Recheck INR in 1 week(s). Patient reminded to call the Anticoagulation Clinic with any signs or symptoms of bleeding or with any medication changes. Patient given instructions utilizing the teach back method. After visit summary printed and reviewed with patient. Discharged ambulatory in no apparent distress.     For Pharmacy Admin Tracking Only   Time Spent (min): [address], PharmD, BCPS  3/16/2022  2:46 PM

## 2022-03-17 ENCOUNTER — TELEPHONE (OUTPATIENT)
Dept: CARDIOLOGY CLINIC | Age: 72
End: 2022-03-17

## 2022-03-17 NOTE — TELEPHONE ENCOUNTER
----- Message from Cesar Preston, ORAL - CNP sent at 3/17/2022  7:53 AM EDT -----  Labs look good, continue as is

## 2022-03-17 NOTE — TELEPHONE ENCOUNTER
ASSESSMENT AND RECOMMENDATIONS:  Discussed management options regarding her heart failure and ICD implantation for primary prevention of sudden cardiac death. At this time I would recommend switching her from losartan to Nataly Drones and optimize the dose per patient's tolerance and response to medication. She is on a good dose of metoprolol succinate. Limited echocardiogram 6 weeks. We will readdress ICD implantation at that time. Continue wearing LifeVest.  Establish care with heart failure clinic.   Questions answered.       Natanael Hickey MD, MRCP, [facility] on 2/2/2022 at 12:51 PM     Please see echo results 03.16.2022-  Not yet released

## 2022-03-18 NOTE — TELEPHONE ENCOUNTER
Pt called the office and I let her know her ef was 45-50% and per DR Nataly Zafar she does not need and ICD and she can return the zoll life vest. Pt verbalizes understanding

## 2022-03-21 ENCOUNTER — HOSPITAL ENCOUNTER (OUTPATIENT)
Dept: PHARMACY | Age: 72
Setting detail: THERAPIES SERIES
Discharge: HOME | End: 2022-03-21
Payer: MEDICARE

## 2022-03-21 DIAGNOSIS — Z86.79 S/P MAZE OPERATION FOR ATRIAL FIBRILLATION: ICD-10-CM

## 2022-03-21 DIAGNOSIS — Z98.890 S/P MAZE OPERATION FOR ATRIAL FIBRILLATION: ICD-10-CM

## 2022-03-21 DIAGNOSIS — I48.91 ATRIAL FIBRILLATION, UNSPECIFIED TYPE (HCC): Primary | ICD-10-CM

## 2022-03-21 DIAGNOSIS — Z51.81 ENCOUNTER FOR THERAPEUTIC DRUG MONITORING: ICD-10-CM

## 2022-03-21 DIAGNOSIS — Z79.01 ANTICOAGULATED ON COUMADIN: ICD-10-CM

## 2022-03-21 LAB — POC INR: 2.4 (ref 0.8–1.2)

## 2022-03-21 PROCEDURE — 99211 OFF/OP EST MAY X REQ PHY/QHP: CPT

## 2022-03-21 PROCEDURE — 36416 COLLJ CAPILLARY BLOOD SPEC: CPT

## 2022-03-21 PROCEDURE — 85610 PROTHROMBIN TIME: CPT

## 2022-03-21 NOTE — PROGRESS NOTES
Medication Management [address]  [phone] (phone)  [phone] (fax)    Ms. Domingo Wiggins is a 67 y.o.  female with history of Afib who presents today for anticoagulation monitoring and adjustment. Patient goes off of the dosing chart. No missed or extra doses. Patient denies s/s bleeding/bruising/swelling/SOB/chest pain  No blood in urine or stool. No dietary changes. No changes in medication/OTC agents/Herbals. No change in alcohol use or tobacco use. No change in activity level. Continues to increase she thinks, she got her vest taken off last Friday   Patient denies headaches/dizziness/lightheadedness/falls. No vomiting/diarrhea or acute illness. No Procedures scheduled in the future at this time. She sees Dr. Alexander Dooley, potential cancer on her thyroid. Nothing scheduled right now. Assessment:   Lab Results   Component Value Date    INR 2.40 (H) 03/21/2022    INR 1.40 (H) 03/16/2022    INR 2.50 (H) 02/28/2022     INR therapeutic   Recent Labs     03/21/22  1413   INR 2.40*     Patient interview completed and discussed with pharmacist by Christie Villalta     Patient received 42 mg in the past week. Plan:  Increase Coumadin 4 mg M, 6 mg TWTHFSS (5.3% increase). Recheck INR in 2 week(s). Patient reminded to call the Anticoagulation Clinic with signs or symptoms of bleeding or with any medication changes. Patient given instructions utilizing the teach back method. After visit summary printed and reviewed with patient. Discharged ambulatory in no apparent distress.     For Pharmacy Admin Tracking Only     Intervention Detail: Dose Adjustment: 1, reason: Therapy Optimization   Total # of Interventions Recommended: 1   Total # of Interventions Accepted: 1   Time Spent (min): [address] Romero PharmD, BCPS  3/21/2022  2:40 PM

## 2022-03-28 DIAGNOSIS — D69.6 THROMBOCYTOPENIA (HCC): Primary | ICD-10-CM

## 2022-03-29 ENCOUNTER — OFFICE VISIT (OUTPATIENT)
Dept: ONCOLOGY | Age: 72
End: 2022-03-29
Payer: MEDICARE

## 2022-03-29 ENCOUNTER — HOSPITAL ENCOUNTER (OUTPATIENT)
Dept: INFUSION THERAPY | Age: 72
Discharge: HOME | End: 2022-03-29
Payer: MEDICARE

## 2022-03-29 VITALS
HEIGHT: 64 IN | WEIGHT: 210 LBS | SYSTOLIC BLOOD PRESSURE: 136 MMHG | RESPIRATION RATE: 18 BRPM | DIASTOLIC BLOOD PRESSURE: 55 MMHG | BODY MASS INDEX: 35.85 KG/M2 | TEMPERATURE: 97.8 F | HEART RATE: 79 BPM | OXYGEN SATURATION: 95 %

## 2022-03-29 VITALS
OXYGEN SATURATION: 95 % | SYSTOLIC BLOOD PRESSURE: 136 MMHG | TEMPERATURE: 97.8 F | DIASTOLIC BLOOD PRESSURE: 55 MMHG | HEART RATE: 79 BPM | RESPIRATION RATE: 18 BRPM

## 2022-03-29 DIAGNOSIS — D69.6 THROMBOCYTOPENIA (HCC): ICD-10-CM

## 2022-03-29 DIAGNOSIS — L65.9 HAIR LOSS: ICD-10-CM

## 2022-03-29 DIAGNOSIS — D69.6 THROMBOCYTOPENIA (HCC): Primary | ICD-10-CM

## 2022-03-29 LAB
ABSOLUTE IMMATURE GRANULOCYTE: 0.01 THOU/MM3 (ref 0–0.07)
BASINOPHIL, AUTOMATED: 1 % (ref 0–3)
BASOPHILS ABSOLUTE: 0.1 THOU/MM3 (ref 0–0.1)
EOSINOPHILS ABSOLUTE: 0.3 THOU/MM3 (ref 0–0.4)
EOSINOPHILS RELATIVE PERCENT: 5 % (ref 0–4)
HCT VFR BLD CALC: 45.5 % (ref 37–47)
HEMOGLOBIN: 14.4 GM/DL (ref 12–16)
IMMATURE GRANULOCYTES: 0 %
LYMPHOCYTES # BLD: 24 % (ref 15–47)
LYMPHOCYTES ABSOLUTE: 1.5 THOU/MM3 (ref 1–4.8)
MCH RBC QN AUTO: 30.3 PG (ref 26–33)
MCHC RBC AUTO-ENTMCNC: 31.6 GM/DL (ref 32.2–35.5)
MCV RBC AUTO: 96 FL (ref 81–99)
MONOCYTES ABSOLUTE: 0.3 THOU/MM3 (ref 0.4–1.3)
MONOCYTES: 5 % (ref 0–12)
PDW BLD-RTO: 13.5 % (ref 11.5–14.5)
PLATELET # BLD: 88 THOU/MM3 (ref 130–400)
PMV BLD AUTO: 12.7 FL (ref 9.4–12.4)
RBC # BLD: 4.75 MILL/MM3 (ref 4.2–5.4)
SEG NEUTROPHILS: 65 % (ref 43–75)
SEGMENTED NEUTROPHILS ABSOLUTE COUNT: 3.9 THOU/MM3 (ref 1.8–7.7)
WBC # BLD: 6 THOU/MM3 (ref 4.8–10.8)

## 2022-03-29 PROCEDURE — 3017F COLORECTAL CA SCREEN DOC REV: CPT | Performed by: PHYSICIAN ASSISTANT

## 2022-03-29 PROCEDURE — 36415 COLL VENOUS BLD VENIPUNCTURE: CPT

## 2022-03-29 PROCEDURE — 99213 OFFICE O/P EST LOW 20 MIN: CPT | Performed by: PHYSICIAN ASSISTANT

## 2022-03-29 PROCEDURE — 1123F ACP DISCUSS/DSCN MKR DOCD: CPT | Performed by: PHYSICIAN ASSISTANT

## 2022-03-29 PROCEDURE — 1090F PRES/ABSN URINE INCON ASSESS: CPT | Performed by: PHYSICIAN ASSISTANT

## 2022-03-29 PROCEDURE — G8400 PT W/DXA NO RESULTS DOC: HCPCS | Performed by: PHYSICIAN ASSISTANT

## 2022-03-29 PROCEDURE — G8484 FLU IMMUNIZE NO ADMIN: HCPCS | Performed by: PHYSICIAN ASSISTANT

## 2022-03-29 PROCEDURE — 4040F PNEUMOC VAC/ADMIN/RCVD: CPT | Performed by: PHYSICIAN ASSISTANT

## 2022-03-29 PROCEDURE — G8427 DOCREV CUR MEDS BY ELIG CLIN: HCPCS | Performed by: PHYSICIAN ASSISTANT

## 2022-03-29 PROCEDURE — 85025 COMPLETE CBC W/AUTO DIFF WBC: CPT

## 2022-03-29 PROCEDURE — 99211 OFF/OP EST MAY X REQ PHY/QHP: CPT

## 2022-03-29 PROCEDURE — G8417 CALC BMI ABV UP PARAM F/U: HCPCS | Performed by: PHYSICIAN ASSISTANT

## 2022-03-29 PROCEDURE — 1036F TOBACCO NON-USER: CPT | Performed by: PHYSICIAN ASSISTANT

## 2022-03-29 NOTE — PROGRESS NOTES
[facility], [address] [facility]  [address] [address]  Dept: [phone]  Dept Fax: [phone]: [phone]      Visit Date:3/29/2022     Columba Fine is a 67 y.o. female who presents today for:   Chief Complaint   Patient presents with    Follow-up     Thrombocytopenia ([facility]        HPI:   Columba Fine is a 67 y.o. female followed for history of thrombocytopenia. The patient has a history of chronic thrombocytopenia which as gradually worsened over the last year. Per chart review, she has had persistent thrombocytopenia since 2014 per EMR with platelets decreasing from 105,000 to 69,000 on 10/23/2020. Her most recent CBC completed on 2/23/2021 showed platelet count 47,915. The patient affirms history of thrombocytopenia, but has not had formal work-up in the past.  She denies drinking alcohol. She denies history of autoimmune or liver dysfunction. She denies history of chronic infection such as hepatitis C or HIV. She denies any recent history of COVID-19 infection. The patient states in June 2020 she had a viral illness that lasted for approximately 7 days. She describes this as GI upset with diarrhea. At that time the patient decided to go gluten-free and has had improvement in symptoms since. She has never been diagnosed with celiac disease she admits to fatigue intermittently. She denies any B type symptoms; no recurrent fever, persistent infection, lymphadenopathy, night sweats, poor appetite, early satiety or unintentional weight loss. States she bruises easily at times but denies any abnormal bleeding. Denies epistaxis, hemoptysis, hematemesis, melena, hematochezia, hematuria or vaginal bleeding. Her past medical history includes hypertension, diabetes, GERD, hyperlipidemia. Interval History 3/29/2022:   The patient is here for follow up evaluation of thrombocytopenia.  Since her last appointment in December 2021, the patient states she has been feeling well. She continues to recover from her cardiothoracic surgery at the [facility] in November 2021. She denies any abnormal bleeding; no epistaxis, hemoptysis, hematemesis, melena, hematochezia, hematuria. She denies unintentional weight loss, poor appetite, chest pain, night sweats, recent infections/recurrent infections. Denies ED visits or hospitalizations in the last 3 months. PMH, SH, and FH:  I reviewed the patients medication list and allergy list as noted on the electronic medical record. The PMH, SH and FH were also reviewed as noted on the EMR. Review of Systems:   Review of Systems   Pertinent review of systems noted in HPI, all other ROS negative. Objective:   Physical Exam   BP (!) 136/55 (Site: Left Upper Arm, Position: Sitting, Cuff Size: Medium Adult)   Pulse 79   Temp 97.8 °F (36.6 °C) (Oral)   Resp 18   Ht 5' 4\" (1.626 m)   Wt 210 lb (95.3 kg)   SpO2 95%   BMI 36.05 kg/m²    General appearance: No apparent distress, well developed, and cooperative. HEENT: Pupils equal, round, and reactive to light. Conjunctivae/corneas clear. Neck: Supple, with full range of motion. Trachea midline. Respiratory:  Normal respiratory effort. Clear to auscultation bilaterally. No wheezes, rhonchi. On room air. Cardiovascular: Regular rate and rhythm with normal S1/S2   Abdomen: Soft, active bowel sounds. Musculoskeletal: No clubbing, cyanosis or edema bilaterally. Ambulates in office. Skin: Skin color, texture, turgor normal.  No visible rashes or lesions. Neurologic:  Neurovascularly intact without any focal sensory/motor deficits.    Psychiatric: Alert and oriented      Imaging Studies and Labs:   CBC:   Lab Results   Component Value Date    WBC 5.6 12/30/2021    HGB 14.2 12/30/2021    HCT 45.2 12/30/2021     (H) 12/30/2021     (L) 12/30/2021     BMP:   Lab Results   Component Value Date     03/16/2022    K 4.4 03/16/2022     03/16/2022    CO2 29 03/16/2022    BUN 17 03/16/2022    CREATININE 0.7 03/16/2022    GLUCOSE 88 03/16/2022    GLUCOSE 146 10/23/2020    CALCIUM 9.7 03/16/2022      LFT:   Lab Results   Component Value Date    ALT 34 11/05/2021    AST 27 11/05/2021    ALKPHOS 79 11/05/2021    BILITOT 0.4 11/05/2021         Assessment and Plan:   1. Thrombocytopenia  Chronic since at least 2014 per EMR with platelet count ranging 80,000 to 110,000. WBC count, Hgb/hct, MCV within normal limits. Vitamin B12/folic acid within normal limits. Denies alcohol use. No history of liver disorder, autoimmune disorder, HIV, Hep C. No immunosuppressive medications. platelet count on 34/25/68 126,000. Today on 3/29/22 platelet count 84,919 at baseline.      -Will continue to monitor platelet count   -Patient instructed to monitor for signs of bleeding              -Return to clinic in 3 months              -Labs on RTC          All patient questions answered. Pt voiced understanding. Patient agreed with treatment plan. Follow up as directed. Patient instructed to call for questions or concerns.         Electronically signed by   Adamaris Medina PA-C

## 2022-03-30 ENCOUNTER — OFFICE VISIT (OUTPATIENT)
Dept: CARDIOLOGY CLINIC | Age: 72
End: 2022-03-30
Payer: MEDICARE

## 2022-03-30 VITALS
BODY MASS INDEX: 36.19 KG/M2 | DIASTOLIC BLOOD PRESSURE: 80 MMHG | SYSTOLIC BLOOD PRESSURE: 150 MMHG | HEART RATE: 83 BPM | OXYGEN SATURATION: 96 % | WEIGHT: 212 LBS | HEIGHT: 64 IN

## 2022-03-30 DIAGNOSIS — I50.22 CHRONIC SYSTOLIC CONGESTIVE HEART FAILURE, NYHA CLASS 1 (HCC): Primary | ICD-10-CM

## 2022-03-30 PROCEDURE — G8427 DOCREV CUR MEDS BY ELIG CLIN: HCPCS | Performed by: NURSE PRACTITIONER

## 2022-03-30 PROCEDURE — 3017F COLORECTAL CA SCREEN DOC REV: CPT | Performed by: NURSE PRACTITIONER

## 2022-03-30 PROCEDURE — 1090F PRES/ABSN URINE INCON ASSESS: CPT | Performed by: NURSE PRACTITIONER

## 2022-03-30 PROCEDURE — G8417 CALC BMI ABV UP PARAM F/U: HCPCS | Performed by: NURSE PRACTITIONER

## 2022-03-30 PROCEDURE — G8400 PT W/DXA NO RESULTS DOC: HCPCS | Performed by: NURSE PRACTITIONER

## 2022-03-30 PROCEDURE — G8484 FLU IMMUNIZE NO ADMIN: HCPCS | Performed by: NURSE PRACTITIONER

## 2022-03-30 PROCEDURE — 1123F ACP DISCUSS/DSCN MKR DOCD: CPT | Performed by: NURSE PRACTITIONER

## 2022-03-30 PROCEDURE — 99213 OFFICE O/P EST LOW 20 MIN: CPT | Performed by: NURSE PRACTITIONER

## 2022-03-30 PROCEDURE — 4040F PNEUMOC VAC/ADMIN/RCVD: CPT | Performed by: NURSE PRACTITIONER

## 2022-03-30 PROCEDURE — 1036F TOBACCO NON-USER: CPT | Performed by: NURSE PRACTITIONER

## 2022-03-30 RX ORDER — POTASSIUM CHLORIDE 20 MEQ/1
20 TABLET, EXTENDED RELEASE ORAL DAILY
Qty: 90 TABLET | Refills: 3 | Status: SHIPPED | OUTPATIENT
Start: 2022-03-30 | End: 2022-05-03 | Stop reason: SDUPTHER

## 2022-03-30 RX ORDER — CETIRIZINE HYDROCHLORIDE 10 MG/1
10 TABLET ORAL DAILY
COMMUNITY

## 2022-03-30 ASSESSMENT — ENCOUNTER SYMPTOMS
ABDOMINAL DISTENTION: 0
COUGH: 0
WHEEZING: 0
SHORTNESS OF BREATH: 0
ABDOMINAL PAIN: 0

## 2022-03-30 NOTE — PATIENT INSTRUCTIONS
You may receive a survey regarding the care you received during your visit. Your input is valuable to us. We encourage you to complete and return your survey. We hope you will choose us in the future for your healthcare needs. Continue:  · Continue current medications  · Daily weights and record  · Fluid restriction of 2 Liters per day  · Limit sodium in diet to around 5347-8111 mg/day  · Monitor BP  · Activity as tolerated     Call the Heart Failure Clinic for any of the following symptoms: [phone]   Weight gain of 2-3 pounds in 1 day or 5 pounds in 1 week   Increased shortness of breath   Shortness of breath while laying down   Cough   Chest pain   Swelling in feet, ankles or legs   Tenderness or bloating in the abdomen   Fatigue    Decreased appetite or nausea    Confusion       BP/HR stable   No med changes today  Continue diet/fluid adherence  Continue daily wts.   F/U w/ Cardiology  F/U in clinic in 6 month

## 2022-03-30 NOTE — PROGRESS NOTES
Heart Failure Clinic       Visit Date: 3/30/2022  Cardiologist:  Dr. Rosalba Tan  Primary Care Physician: Dr. Carlos Leigh, DO    Hayden Andres is a 67 y.o. female who presents today for:  Chief Complaint   Patient presents with    Congestive Heart Failure       HPI:   Hayden Andres is a 67 y.o. female who presents to the office for a f/u patient visit in the heart failure clinic. Accompanied by self    TYPE HF: HFrEF (45-50%, 3/18/22) (25-30%, 1/20/22)    Cause: ischemic  Device: none  HX: HLD, YASHIRA on CPAP, HTN, DM II, Afib w. RVR s/p Maze and LAAC (11/2021), (coumadin) CAD s/p CABG 11/158/21    Dry Wt:  200? (208 on 2/15/22) (212 on 3/30/22)    Hospitalization:      ADMISSION DATE: 11/7/2021   MRN: [id number] DISCHARGE DATE: 11/25/2021     evaluation of de brad ischemic cardiomyopathy in context of AFRVR with ACS. [facility] with severe bilateral acute on chronic hip pain. She was found to be in [facility] with rates in the 160s and ischemic changes diffusely on the ECG. She was hypoxic requiring 5L O2 by NC. She was given IV metoprolol which did not affect her rate much. She was taken to cath lab for evaluation of these ischemic changes and was discovered to have three vessel CAD. Echo showed LVEF 25-30%. She was managed in the ICU and given IV metoprolol, IV diltiazem, IV esmolol, and IV amiodarone for her atrial fibrillation; with amiodarone she converted to sinus rhythm (not clear if pt ever underwent LAWSON). She was anticoagulated with heparin gtt. [facility] was [facility] for [facility]G evaluation. However given the reduced LV systolic function and possible need for mechanical support in the perioperative period, CTS recommended transfer to a tertiary center for evaluation; she was accepted for transfer. Patient underwent emergent left heart catheterization which found three-vessel coronary artery disease.  She had a proximal RCA calcified lesion in which a PTCA was attempted however unsuccessful    11/18/2021: CABG(LAD-LIMA, OM-SVG, RCA-SVG), MVr(#29 Hsu Ring), TVr(Lashawn stitch), MAZE, LAAC(#35 clip)    11/20/2021: AF RVR    Acute Systolic HF - Hemodynamically stable off inotropic support. Diuresis/BB. 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 Outpatient endocrine eval recommended. Noted on DC summary for following. RESULT:     Lines, tubes, and devices:  None. Lungs and pleura:  Mixed changes overall improvement of perihilar and   basilar vague opacities with volume loss, suggesting atelectasis.     Superimposed infiltrates/infection or edema cannot be entirely excluded.     Underlying small pleural effusions are seen, mostly on the lateral view.     No pneumothorax. Cardiomediastinal silhouette:  Heart is enlarged.  Thoracic aorta is   tortuous.  Pulmonary vascular redistribution is compatible with pulmonary   venous hypertension. Bones and soft tissues:  Median sternotomy.  Osteopenia and degenerative   changes are seen in the spine. Concerns today: weight is staying consistant since increasing lasix to 40mg after a phone call into the office, notes improvement of LE swelling. EF improved, life vest returned. Asking about cardiac clearance for up coming thyroid surgery, sees Dr. Carolyn Russell next month. Visit on 2/15: No major concerns today. Was evaluated by Dr. Anne Richards on 2/2/22 and started on Entresto, no lab work ordered since. States she stopped her Lipitor last week d/t back discomfort as a side effect.      Activity: ADLs performed w/o limitation, uses cane currently d/t heaviness of life vest. Was SOB walking to office today  Diet: educated today    Patient has:  Chest Pain: no  SOB: no  Orthopnea/PND: no  YASHIRA: not tested  Edema: no  Fatigue: no  Abdominal bloating: no  Cough: no  Appetite: good  Home weight: daily   Home blood pressure: does not check    Past Medical History: Diagnosis Date    Allergic rhinitis     Angiolipoma of right kidney     ASHD (arteriosclerotic heart disease)     Atrial fibrillation (HCC)     Celiac disease     DM2 (diabetes mellitus, type 2) (HCC)     Eczema     GERD (gastroesophageal reflux disease)     Heart failure with reduced ejection fraction (HCC)     History of ST elevation myocardial infarction (STEMI)     Hyperlipidemia     Hypertension     Ischemic cardiomyopathy     Meningioma ([facility] [address] 2012    Noted MRI . Never did f/u MRI. Told she didn't need to. Has 0 sxs. Denies HA, vision changes, lateralizing numbness or weakness.  Mixed hearing loss 2013    Obesity (BMI 30-39. 9)     Osteoarthritis     Post-menopausal     S/P CABG (coronary artery bypass graft)     S/P Maze operation for atrial fibrillation     S/P MVR (mitral valve repair)     S/P tricuspid valve repair      Past Surgical History:   Procedure Laterality Date     SECTION  1972    CHOLECYSTECTOMY  2004    COLONOSCOPY  2006    CORONARY ARTERY BYPASS GRAFT  2021    [facility]    MITRAL VALVE REPAIR  2021    [facility]    MYRINGOTOMY AND TYMPANOSTOMY TUBE PLACEMENT  2012    Dr Miah Man    OTHER SURGICAL HISTORY  2021    MAZE Procedure during CABG and MVR/TVR at [address] [place] [address]  2021    TV Repair 2021 at [facility] UPPER GASTROINTESTINAL ENDOSCOPY  2006     Family History   Problem Relation Age of Onset    Heart Disease Mother     Lung Cancer Mother     Osteoporosis Mother     Other Father         Did not know her father.  Other Maternal Grandmother         blood clot, no clear hx surrounding    Heart Attack Maternal Grandfather     Heart Attack Maternal Uncle 62    Breast Cancer Maternal Aunt 50    Breast Cancer Maternal Cousin 40     Social History     Tobacco Use    Smoking status: Never Smoker    Smokeless tobacco: Never Used   Substance Use Topics    Alcohol use:  No Current Outpatient Medications   Medication Sig Dispense Refill    cetirizine (ZYRTEC) 10 MG tablet Take 10 mg by mouth daily      potassium chloride (KLOR-CON M) 20 MEQ extended release tablet Take 1 tablet by mouth daily 90 tablet 3    warfarin (COUMADIN) 2 MG tablet Take as directed by [facility] (90 tablets = 30 days) 90 tablet 2    furosemide (LASIX) 40 MG tablet Take 1 tablet by mouth daily 90 tablet 1    omeprazole (PRILOSEC) 20 MG delayed release capsule Take 20 mg by mouth daily      metoprolol succinate (TOPROL XL) 50 MG extended release tablet Take 1 tablet by mouth 2 times daily Take along with 25 mg BID to make 75 mg BID. 180 tablet 3    metoprolol succinate (TOPROL XL) 25 MG extended release tablet Take 1 tablet by mouth 2 times daily Take along with 50 mg BID to make 75 mg BID. 180 tablet 3    sacubitril-valsartan (ENTRESTO) 49-51 MG per tablet Take 1 tablet by mouth 2 times daily 180 tablet 3    acetaminophen (TYLENOL) 325 MG tablet Take 325-650 mg by mouth every 6 hours as needed      aspirin 81 MG chewable tablet Take 81 mg by mouth daily       No current facility-administered medications for this visit. Allergies   Allergen Reactions    Biaxin [Clarithromycin] Nausea Only    Doxycycline Other (See Comments)     GI side effects    Keflex [Cephalexin] Other (See Comments)     Tongue Swelling    Norvasc [Amlodipine Besylate] Other (See Comments)     Nasuea, hot flashes    Statins Other (See Comments)     Myalgias all    Zetia [Ezetimibe] Other (See Comments)     Chest pain    Zocor [Simvastatin] Other (See Comments)     Myalgias       SUBJECTIVE:   Review of Systems   Constitutional: Negative for activity change, appetite change and fatigue. Respiratory: Negative for cough, shortness of breath and wheezing. Cardiovascular: Negative for chest pain, palpitations and leg swelling. Gastrointestinal: Negative for abdominal distention and abdominal pain. Musculoskeletal: Negative for gait problem. Neurological: Negative for weakness, light-headedness and headaches. Psychiatric/Behavioral: Negative for sleep disturbance. OBJECTIVE:   Today's Vitals:  BP (!) 150/80   Pulse 83   Ht 5' 4\" (1.626 m)   Wt 212 lb (96.2 kg)   SpO2 96%   BMI 36.39 kg/m²     Physical Exam  Vitals reviewed. Constitutional:       General: She is not in acute distress. Appearance: Normal appearance. She is well-developed. She is not diaphoretic. HENT:      Head: Normocephalic and atraumatic. Eyes:      Conjunctiva/sclera: Conjunctivae normal.   Cardiovascular:      Rate and Rhythm: Normal rate and regular rhythm. Heart sounds: Normal heart sounds. No murmur heard. Pulmonary:      Effort: Pulmonary effort is normal. No respiratory distress. Breath sounds: Normal breath sounds. No wheezing, rhonchi or rales. Abdominal:      General: Bowel sounds are normal. There is no distension. Palpations: Abdomen is soft. Tenderness: There is no abdominal tenderness. Musculoskeletal:         General: Normal range of motion. Cervical back: Normal range of motion and neck supple. Right lower leg: No edema. Left lower leg: No edema. Skin:     General: Skin is warm and dry. Capillary Refill: Capillary refill takes less than 2 seconds. Neurological:      Mental Status: She is alert and oriented to person, place, and time. Coordination: Coordination normal.   Psychiatric:         Behavior: Behavior normal.         Wt Readings from Last 3 Encounters:   03/30/22 212 lb (96.2 kg)   03/29/22 210 lb (95.3 kg)   02/28/22 213 lb 3.2 oz (96.7 kg)     BP Readings from Last 3 Encounters:   03/30/22 (!) 150/80   03/29/22 (!) 136/55   03/29/22 (!) 136/55     Pulse Readings from Last 3 Encounters:   03/30/22 83   03/29/22 79   03/29/22 79     Body mass index is 36.39 kg/m². ECHO:     Summary   Technically difficult examination.    Left Ventricular size is Mildly increased . Normal left ventricular wall thickness. There was severe global hypokinesis of the left ventricle. Systolic function was severely reduced. Ejection fraction is visually estimated in the range of 30% to 35%. The left atrium is Mildly dilated. Signature      ----------------------------------------------------------------   Electronically signed by Werner Nuñez MD (Interpreting   physician) on 01/21/2022 at 07:16 PM      CONCLUSIONS:   - Technically difficult exam due to post op, bandages/chest tubes/wound,   suboptimal positioning and body habitus. Subcostal images are not obtainable due   to bandages. - Exam indication: S/P CABG, MVr, TVr   - The left ventricle is normal in size. Left ventricular systolic function is   severely decreased. EF = 25 ± 5% (visual est.) Definity contrast used for   endocardial border detection. Left ventricular diastolic function was not   evaluated due to mitral valve surgery. - The right ventricle is normal in size. Right ventricular systolic function is   normal.   - Post mitral valve repair. Hsu Mitral Valve Annuloplasty Ring (size #29). There    is trace mitral valve regurgitation. The peak gradient is 15 mmHg and the mean   gradient is 4 mmHg. - Post tricuspid valve repair. Elverna Landsberg. There is trace tricuspid valve   regurgitation. The peak gradient is 2 mmHg and the mean gradient is 1 mmHg. - There is a trivial posterior pericardial effusion.  There is a left pleural   effusion.   - Exam was compared with the prior  echocardiographic exam performed on   11/8/2021. s/p CABG, MVr, TVr.     Electronically signed by Ryan Hi MD on 11/24/2021 at 2:13:41 PM         CATH/STRESS:        Recommendations   IV beta blockers to reduce heart rate   IV heparin   CTS consultation for surgery   Aspirin   Monitor access site and hemodynamics   Extensive discussion done with patient and family members      Estimated Blood Loss:10 ml. Complications:No complications. Signatures      ----------------------------------------------------------------   Electronically signed by Andrew Quiñones MD (Performing   Physician) on 11/05/2021 at 20:05   ----------------------------------------------------------------        Results reviewed:  BNP: No results found for: BNP  CBC:   Lab Results   Component Value Date    WBC 6.0 03/29/2022    RBC 4.75 03/29/2022    RBC 4.57 11/09/2020    HGB 14.4 03/29/2022    HCT 45.5 03/29/2022    PLT 88 03/29/2022     CMP:    Lab Results   Component Value Date     03/16/2022    K 4.4 03/16/2022     03/16/2022    CO2 29 03/16/2022    BUN 17 03/16/2022    CREATININE 0.7 03/16/2022    LABGLOM 82 03/16/2022    GLUCOSE 88 03/16/2022    GLUCOSE 146 10/23/2020    CALCIUM 9.7 03/16/2022     Hepatic Function Panel:    Lab Results   Component Value Date    ALKPHOS 79 11/05/2021    ALT 34 11/05/2021    AST 27 11/05/2021    PROT 7.7 11/05/2021    BILITOT 0.4 11/05/2021    BILIDIR <0.2 11/05/2021    LABALBU 4.6 11/05/2021     Magnesium:    Lab Results   Component Value Date    MG 2.1 02/15/2022     PT/INR:    Lab Results   Component Value Date    INR 2.40 03/21/2022    INR 1.14 01/04/2022     Lipids:    Lab Results   Component Value Date    TRIG 163 10/27/2021    HDL 43 10/27/2021    LDLCALC 112 10/27/2021    LABVLDL 36 10/23/2020       ASSESSMENT AND PLAN:   The patient's condition/symptoms are Stable: No clinical evidence of fluid overload today. Continue current medical regimen without changes at present time. Diagnosis Orders   1. Chronic systolic congestive heart failure, NYHA class 1 (HCC)       Continue:  GDMT:   ACE/ARB/ARNI - Entresto 49/51 BID   BB - Toprol 75 daily   Diuretic - Lasix 40 daily  AA - none  SGLT2 -  none  Vasodilator - none  Other - ASA, coumadin, KCl 20 daily    Tolerating above noted HF meds, no ill side effects noted.  Will continue to monitor kidney function and electrolytes. Will optimize as tolerated. Pt is compliant w/ medications. Total visit time of 25 minutes has been spent with patient on education of symptoms, management, medication, and plan of care; as well as review of chart: labs, ECHO, radiology reports, etc.   I personally spent more then 50% of the appt time face to face with the patient. · Daily weights  · Fluid restriction of 2 Liters per day  · Limit sodium in diet to around 5316-9334 mg/day  · Monitor BP  · Activity as tolerated     Stable, appears Euvolemic  Lab reviewed - stable, K is 4.4. Cr. . 0.7   BP/HR stable   No med changes today  Continue diet/fluid adherence  Continue daily wts. F/U w/ Cardiology  F/U in clinic in 6 month      Patient was instructed to call the [facility] for any changes in symptoms as noted in AVS.      Return in about 6 months (around 9/30/2022). or sooner if needed     Patient given educational materials - see patient instructions. We discussed the importance of weighing oneself and recording daily. We also discussed the importance of a low sodium diet, higher sodium foods to avoid and better low sodium food options. Patient verbalizes understanding of plan of care using teach back method, and is agreeable to the treatment plan.        Electronically signed by ORAL Garcia CNP on 3/30/2022 at 2:46 PM

## 2022-04-04 ENCOUNTER — HOSPITAL ENCOUNTER (OUTPATIENT)
Dept: PHARMACY | Age: 72
Setting detail: THERAPIES SERIES
Discharge: HOME | End: 2022-04-04
Payer: MEDICARE

## 2022-04-04 ENCOUNTER — NURSE ONLY (OUTPATIENT)
Dept: LAB | Age: 72
End: 2022-04-04

## 2022-04-04 ENCOUNTER — OFFICE VISIT (OUTPATIENT)
Dept: ENT CLINIC | Age: 72
End: 2022-04-04
Payer: MEDICARE

## 2022-04-04 VITALS
DIASTOLIC BLOOD PRESSURE: 76 MMHG | OXYGEN SATURATION: 95 % | RESPIRATION RATE: 16 BRPM | HEART RATE: 74 BPM | SYSTOLIC BLOOD PRESSURE: 148 MMHG | BODY MASS INDEX: 34.51 KG/M2 | WEIGHT: 214.7 LBS | TEMPERATURE: 97 F | HEIGHT: 66 IN

## 2022-04-04 DIAGNOSIS — Z98.890 S/P MVR (MITRAL VALVE REPAIR): ICD-10-CM

## 2022-04-04 DIAGNOSIS — I48.91 ATRIAL FIBRILLATION, UNSPECIFIED TYPE (HCC): Primary | ICD-10-CM

## 2022-04-04 DIAGNOSIS — Z79.01 ANTICOAGULATED ON COUMADIN: ICD-10-CM

## 2022-04-04 DIAGNOSIS — D49.7 FOLLICULAR NEOPLASM OF THYROID: Primary | ICD-10-CM

## 2022-04-04 DIAGNOSIS — Z86.79 S/P MAZE OPERATION FOR ATRIAL FIBRILLATION: ICD-10-CM

## 2022-04-04 DIAGNOSIS — Z98.890 S/P MAZE OPERATION FOR ATRIAL FIBRILLATION: ICD-10-CM

## 2022-04-04 DIAGNOSIS — D69.6 THROMBOCYTOPENIA (HCC): ICD-10-CM

## 2022-04-04 DIAGNOSIS — R09.89 OTHER SPECIFIED SYMPTOMS AND SIGNS INVOLVING THE CIRCULATORY AND RESPIRATORY SYSTEMS: ICD-10-CM

## 2022-04-04 DIAGNOSIS — Z51.81 ENCOUNTER FOR THERAPEUTIC DRUG MONITORING: ICD-10-CM

## 2022-04-04 LAB
ANION GAP SERPL CALCULATED.3IONS-SCNC: 13 MEQ/L (ref 8–16)
BUN BLDV-MCNC: 19 MG/DL (ref 7–22)
CALCIUM SERPL-MCNC: 9.8 MG/DL (ref 8.5–10.5)
CHLORIDE BLD-SCNC: 103 MEQ/L (ref 98–111)
CO2: 28 MEQ/L (ref 23–33)
CREAT SERPL-MCNC: 0.7 MG/DL (ref 0.4–1.2)
ERYTHROCYTE [DISTWIDTH] IN BLOOD BY AUTOMATED COUNT: 13.7 % (ref 11.5–14.5)
ERYTHROCYTE [DISTWIDTH] IN BLOOD BY AUTOMATED COUNT: 48.9 FL (ref 35–45)
GFR SERPL CREATININE-BSD FRML MDRD: 82 ML/MIN/1.73M2
GLUCOSE BLD-MCNC: 96 MG/DL (ref 70–108)
HCT VFR BLD CALC: 45.4 % (ref 37–47)
HEMOGLOBIN: 14.5 GM/DL (ref 12–16)
MCH RBC QN AUTO: 31 PG (ref 26–33)
MCHC RBC AUTO-ENTMCNC: 31.9 GM/DL (ref 32.2–35.5)
MCV RBC AUTO: 97 FL (ref 81–99)
PLATELET # BLD: 89 THOU/MM3 (ref 130–400)
PMV BLD AUTO: 13.8 FL (ref 9.4–12.4)
POC INR: 3 (ref 0.8–1.2)
POTASSIUM SERPL-SCNC: 4.5 MEQ/L (ref 3.5–5.2)
RBC # BLD: 4.68 MILL/MM3 (ref 4.2–5.4)
SODIUM BLD-SCNC: 144 MEQ/L (ref 135–145)
WBC # BLD: 6.7 THOU/MM3 (ref 4.8–10.8)

## 2022-04-04 PROCEDURE — G8417 CALC BMI ABV UP PARAM F/U: HCPCS | Performed by: OTOLARYNGOLOGY

## 2022-04-04 PROCEDURE — 99215 OFFICE O/P EST HI 40 MIN: CPT | Performed by: OTOLARYNGOLOGY

## 2022-04-04 PROCEDURE — 4040F PNEUMOC VAC/ADMIN/RCVD: CPT | Performed by: OTOLARYNGOLOGY

## 2022-04-04 PROCEDURE — 1036F TOBACCO NON-USER: CPT | Performed by: OTOLARYNGOLOGY

## 2022-04-04 PROCEDURE — 3017F COLORECTAL CA SCREEN DOC REV: CPT | Performed by: OTOLARYNGOLOGY

## 2022-04-04 PROCEDURE — 36416 COLLJ CAPILLARY BLOOD SPEC: CPT

## 2022-04-04 PROCEDURE — 99211 OFF/OP EST MAY X REQ PHY/QHP: CPT

## 2022-04-04 PROCEDURE — 85610 PROTHROMBIN TIME: CPT

## 2022-04-04 PROCEDURE — 1090F PRES/ABSN URINE INCON ASSESS: CPT | Performed by: OTOLARYNGOLOGY

## 2022-04-04 PROCEDURE — G8400 PT W/DXA NO RESULTS DOC: HCPCS | Performed by: OTOLARYNGOLOGY

## 2022-04-04 PROCEDURE — G8427 DOCREV CUR MEDS BY ELIG CLIN: HCPCS | Performed by: OTOLARYNGOLOGY

## 2022-04-04 PROCEDURE — 1123F ACP DISCUSS/DSCN MKR DOCD: CPT | Performed by: OTOLARYNGOLOGY

## 2022-04-04 RX ORDER — THYROTROPIN ALFA 1.1 MG
0.9 KIT INTRAMUSCULAR ONCE
Qty: 1 EACH | Refills: 0 | Status: SHIPPED | OUTPATIENT
Start: 2022-04-04 | End: 2022-04-04

## 2022-04-04 NOTE — PROGRESS NOTES
Medication Management [address]  [phone] (phone)  [phone] (fax)    Ms. Radha Church is a 67 y.o.  female with history of Afib who presents today for anticoagulation monitoring and adjustment. Patient verifies current dosing regimen and tablet strength. No missed or extra doses. Patient denies s/s bleeding/bruising/swelling/SOB/chest pain-few bruises on her arms, healing  No blood in urine or stool. No dietary changes. No changes in medication/OTC agents/Herbals. No change in alcohol use or tobacco use. No change in activity level. Patient denies headaches/dizziness/lightheadedness/falls. No vomiting/diarrhea or acute illness. No Procedures scheduled in the future at this time.-Likely thyroid surgery in June. Patient meets with Dr Brittney Gutiérrez on April 18th for cardiac clearance. Assessment:   Lab Results   Component Value Date    INR 3.00 (H) 04/04/2022    INR 2.40 (H) 03/21/2022    INR 1.40 (H) 03/16/2022     INR therapeutic   Recent Labs     04/04/22  1546   INR 3.00*     1st therapeutic INR following dose increase. Plan:  Continue Coumadin 4 mg M, 6 mg [id number]. Recheck INR in 2 week(s). Patient reminded to call the Anticoagulation Clinic with any signs or symptoms of bleeding or with any medication changes. Patient given instructions utilizing the teach back method. After visit summary printed and reviewed with patient. Discharged ambulatory in no apparent distress.         For Pharmacy Admin Tracking Only     Time Spent (min): [id number] Griffin Romero PharmD, BCPS  4/4/2022  3:55 PM

## 2022-04-04 NOTE — PROGRESS NOTES
[facility] [place] [facility]  [facility] EAR, NOSE AND THROAT  [address] [place] [address] [place] [address]  Dept: [phone]  Dept Fax: [phone]  Loc: [phone]    Reyesside is a 67 y.o. female who was referred by No ref. provider found for:  Chief Complaint   Patient presents with    Follow-up     patient is here for a f/u to discuss labs       HPI:     Reyesside is a 67 y.o. female with a history of a follicular lesion of the thyroid gland who returns today for a review of her additional diagnostic studies and discussion of the risks and benefits of proceeding with surgical treatment of her thyroid disease. Her laboratory studies are of her thyroglobulin level which she was 10 times elevated to near 300 with a negative antithyroglobulin making the first laboratory study likely an accurate measure. She is joined with her  and her son. Her son had multiple worthwhile questions about how to proceed why she might consider surgical treatment for this condition as well as whether or not there are legitimate alternatives. History:      Allergies   Allergen Reactions    Biaxin [Clarithromycin] Nausea Only    Doxycycline Other (See Comments)     GI side effects    Keflex [Cephalexin] Other (See Comments)     Tongue Swelling    Norvasc [Amlodipine Besylate] Other (See Comments)     Nasuea, hot flashes    Statins Other (See Comments)     Myalgias all    Zetia [Ezetimibe] Other (See Comments)     Chest pain    Zocor [Simvastatin] Other (See Comments)     Myalgias     Current Outpatient Medications   Medication Sig Dispense Refill    thyrotropin colleen (THYROGEN) 1.1 MG injection Inject 0.9 mg into the muscle once for 1 dose 1 each 0    thyrotropin colleen (THYROGEN) 1.1 MG injection Inject 0.9 mg into the muscle once for 1 dose 1 each 0    cetirizine (ZYRTEC) 10 MG tablet Take 10 mg by mouth daily      potassium chloride (KLOR-CON M) 20 MEQ extended release tablet Take 1 tablet by mouth daily 90 tablet 3    warfarin (COUMADIN) 2 MG tablet Take as directed by [facility] (90 tablets = 30 days) 90 tablet 2    furosemide (LASIX) 40 MG tablet Take 1 tablet by mouth daily 90 tablet 1    omeprazole (PRILOSEC) 20 MG delayed release capsule Take 20 mg by mouth daily      metoprolol succinate (TOPROL XL) 50 MG extended release tablet Take 1 tablet by mouth 2 times daily Take along with 25 mg BID to make 75 mg BID. 180 tablet 3    metoprolol succinate (TOPROL XL) 25 MG extended release tablet Take 1 tablet by mouth 2 times daily Take along with 50 mg BID to make 75 mg BID. 180 tablet 3    sacubitril-valsartan (ENTRESTO) 49-51 MG per tablet Take 1 tablet by mouth 2 times daily 180 tablet 3    acetaminophen (TYLENOL) 325 MG tablet Take 325-650 mg by mouth every 6 hours as needed      aspirin 81 MG chewable tablet Take 81 mg by mouth daily       No current facility-administered medications for this visit. Past Medical History:   Diagnosis Date    Allergic rhinitis     Angiolipoma of right kidney     ASHD (arteriosclerotic heart disease)     Atrial fibrillation (HCC)     Celiac disease     DM2 (diabetes mellitus, type 2) (HCC)     Eczema     GERD (gastroesophageal reflux disease)     Heart failure with reduced ejection fraction (HCC)     History of ST elevation myocardial infarction (STEMI)     Hyperlipidemia     Hypertension     Ischemic cardiomyopathy     Meningioma ([facility] [address] 12/26/2012    Noted MRI 2012. Never did f/u MRI. Told she didn't need to. Has 0 sxs. Denies HA, vision changes, lateralizing numbness or weakness.  Mixed hearing loss 09/17/2013    Obesity (BMI 30-39. 9)     Osteoarthritis     Post-menopausal     S/P CABG (coronary artery bypass graft)     S/P Maze operation for atrial fibrillation     S/P MVR (mitral valve repair)     S/P tricuspid valve repair       Past Surgical History:   Procedure Laterality Date    [address]    CHOLECYSTECTOMY  2004    COLONOSCOPY  05/2006    CORON[facility] GRAFT  [facility]20[facility]    [facility]    [facility]PAIR  11/2021    [facility]    MYRINGOTOMY AND TYMPANOSTOMY TUBE PLACEMENT  12/07/2012    Dr Jorge L Da Silva    OTHER SURGICAL HISTORY  11/2021    MAZE Procedure during CABG and MVR/TVR at [facility]  11/2021    TV Repair NOV 2021 at [address] UPPER GASTROINTESTINAL ENDOSCOPY  05/2006     Family History   Problem Relation Age of Onset    Heart Disease Mother     Lung Cancer Mother     Osteoporosis Mother     Other Father         Did not know her father.  Other Maternal Grandmother         blood clot, no clear hx surrounding    Heart Attack Maternal Grandfather     Heart Attack Maternal Uncle 62    Breast Cancer Maternal Aunt 50    Breast Cancer Maternal Cousin 40     Social History     Tobacco Use    Smoking status: Never Smoker    Smokeless tobacco: Never Used   Substance Use Topics    Alcohol use: No        Subjective:      Review of Systems  Rest of review of systems are negative, except as noted in HPI. Objective:     BP (!) 148/76 (Site: Left Upper Arm, Position: Sitting)   Pulse 74   Temp 97 °F (36.1 °C) (Infrared)   Resp 16   Ht 5' 6\" (1.676 m)   Wt 214 lb 11.2 oz (97.4 kg)   SpO2 95%   BMI 34.65 kg/m²     Physical Exam     Vitals reviewed. No results found.    Lab Results   Component Value Date     03/16/2022     02/28/2022     02/15/2022    K 4.4 03/16/2022    K 4.2 02/28/2022    K 4.6 02/15/2022     03/16/2022     02/28/2022     02/15/2022    CO2 29 03/16/2022    CO2 28 02/28/2022    CO2 28 02/15/2022    BUN 17 03/16/2022    BUN 15 02/28/2022    BUN 15 02/15/2022    CREATININE 0.7 03/16/2022    CREATININE 0.7 02/28/2022    CREATININE 0.7 02/15/2022    CALCIUM 9.7 03/16/2022    CALCIUM 9.8 02/28/2022    CALCIUM 9.5 02/15/2022    PROT 7.7 11/05/2021    PROT 7.0 10/27/2021 PROT 7.4 10/23/2020    LABALBU 4.6 11/05/2021    LABALBU 4.5 10/27/2021    LABALBU 4.3 10/23/2020    BILITOT 0.4 11/05/2021    BILITOT 0.5 10/27/2021    BILITOT 0.4 10/23/2020    ALKPHOS 79 11/05/2021    ALKPHOS 73 10/27/2021    ALKPHOS 56 10/23/2020    ALKPHOS 70 10/14/2019    ALKPHOS 67 10/10/2018    ALKPHOS 71 11/04/2016    AST 27 11/05/2021    AST 45 10/27/2021    AST 24 10/23/2020    ALT 34 11/05/2021    ALT 21 10/27/2021    ALT 23 10/23/2020       All of the past medical history, past surgical history, family history,social history, allergies and current medications were reviewed with the patient. Assessment & Plan   Diagnoses and all orders for this visit:     Diagnosis Orders   1. Follicular neoplasm of thyroid  NM THYROID METASTASES SCAN WHOLE BODY       Based on the patient's history and these physical findings as well as her laboratory studies, the patient has a significant chance of having a follicular malignancy of the thyroid gland that would be benefited by a hemithyroidectomy for diagnosis with the possibility that she will need a completion thyroidectomy thereafter if she is found to have thyroid cancer. This would present the need for cardiology clearance and potentially medical optimization prior to a likely 2-hour general anesthesia. I would recommend admitting the patient postoperatively for close observation, troponin analysis and IV fluid diuresis if needed. While the lesion could be watched and potentially a repeat fine-needle aspiration could be performed, it is unlikely that a follow-up study would decrease the need for surgical intervention. If it increase the need for surgical intervention it would be in the context of a definitive fine-needle aspiration or with the potential diagnosis of metastatic disease. To that end I will order a metastatic evaluation with radioactive iodine to see if there are any any signs of metastatic disease.   This will involve Thyrogen stimulation the head of the nuclear medicine evaluation and can likely be done sometime in the next several weeks. If she gets on my surgical schedule right now, her operation will likely be scheduled sometime in late May or early June absent to cancellation. Therefore there will be lots of time for optimization and cardiac clearance. I spent over 45 minutes of face-to-face time with the patient and her family the entirety of which was dedicated to reviewing her complex history and reviewing surgical and nonsurgical options. Return in about 4 weeks (around 5/2/2022) for Follow-up consultation if indicated preoperatively. **This report has been created using voice recognition software. It may contain minor errors which are inherent in voice recognition technology. **

## 2022-04-06 ENCOUNTER — TELEPHONE (OUTPATIENT)
Dept: ENT CLINIC | Age: 72
End: 2022-04-06

## 2022-04-06 NOTE — TELEPHONE ENCOUNTER
[facility] is calling asking why patient's NM Thyroid Scan is being done with Thyrogen. Is the patient on thyroid medication or has she had her thyroid removed. They are also asking about her pathology report.     Please advise

## 2022-04-06 NOTE — TELEPHONE ENCOUNTER
Per Dr Senia Bowser' OV note: To that end I will order a metastatic evaluation with radioactive iodine to see if there are any any signs of metastatic disease. This will involve Thyrogen stimulation the head of the nuclear medicine evaluation and can likely be done sometime in the next several weeks. I do not see where she has had thyroidectomy or on thyroid hormone.

## 2022-04-07 LAB — LEUK/LYMPH PHENOTYPING WB: NORMAL

## 2022-04-18 ENCOUNTER — OFFICE VISIT (OUTPATIENT)
Dept: CARDIOLOGY CLINIC | Age: 72
End: 2022-04-18
Payer: MEDICARE

## 2022-04-18 ENCOUNTER — HOSPITAL ENCOUNTER (OUTPATIENT)
Dept: PHARMACY | Age: 72
Setting detail: THERAPIES SERIES
Discharge: HOME | End: 2022-04-18
Payer: MEDICARE

## 2022-04-18 ENCOUNTER — HOSPITAL ENCOUNTER (OUTPATIENT)
Dept: NUCLEAR MEDICINE | Age: 72
Discharge: HOME | End: 2022-04-18
Payer: MEDICARE

## 2022-04-18 VITALS
DIASTOLIC BLOOD PRESSURE: 62 MMHG | HEART RATE: 76 BPM | WEIGHT: 217.6 LBS | SYSTOLIC BLOOD PRESSURE: 103 MMHG | BODY MASS INDEX: 36.25 KG/M2 | HEIGHT: 65 IN

## 2022-04-18 DIAGNOSIS — Z98.890 S/P MAZE OPERATION FOR ATRIAL FIBRILLATION: ICD-10-CM

## 2022-04-18 DIAGNOSIS — I48.91 ATRIAL FIBRILLATION, UNSPECIFIED TYPE (HCC): Primary | ICD-10-CM

## 2022-04-18 DIAGNOSIS — Z51.81 ENCOUNTER FOR THERAPEUTIC DRUG MONITORING: ICD-10-CM

## 2022-04-18 DIAGNOSIS — I48.0 PAROXYSMAL ATRIAL FIBRILLATION (HCC): ICD-10-CM

## 2022-04-18 DIAGNOSIS — I25.10 CORONARY ARTERY DISEASE INVOLVING NATIVE CORONARY ARTERY OF NATIVE HEART WITHOUT ANGINA PECTORIS: Primary | ICD-10-CM

## 2022-04-18 DIAGNOSIS — D49.7 FOLLICULAR NEOPLASM OF THYROID: ICD-10-CM

## 2022-04-18 DIAGNOSIS — Z86.79 S/P MAZE OPERATION FOR ATRIAL FIBRILLATION: ICD-10-CM

## 2022-04-18 DIAGNOSIS — Z79.01 ANTICOAGULATED ON COUMADIN: ICD-10-CM

## 2022-04-18 LAB — POC INR: 3 (ref 0.8–1.2)

## 2022-04-18 PROCEDURE — 1036F TOBACCO NON-USER: CPT | Performed by: INTERNAL MEDICINE

## 2022-04-18 PROCEDURE — 4040F PNEUMOC VAC/ADMIN/RCVD: CPT | Performed by: INTERNAL MEDICINE

## 2022-04-18 PROCEDURE — G8417 CALC BMI ABV UP PARAM F/U: HCPCS | Performed by: INTERNAL MEDICINE

## 2022-04-18 PROCEDURE — 36416 COLLJ CAPILLARY BLOOD SPEC: CPT

## 2022-04-18 PROCEDURE — 99214 OFFICE O/P EST MOD 30 MIN: CPT | Performed by: INTERNAL MEDICINE

## 2022-04-18 PROCEDURE — 3017F COLORECTAL CA SCREEN DOC REV: CPT | Performed by: INTERNAL MEDICINE

## 2022-04-18 PROCEDURE — 3430000000 HC RX DIAGNOSTIC RADIOPHARMACEUTICAL: Performed by: OTOLARYNGOLOGY

## 2022-04-18 PROCEDURE — G8400 PT W/DXA NO RESULTS DOC: HCPCS | Performed by: INTERNAL MEDICINE

## 2022-04-18 PROCEDURE — 78018 THYROID MET IMAGING BODY: CPT

## 2022-04-18 PROCEDURE — 1123F ACP DISCUSS/DSCN MKR DOCD: CPT | Performed by: INTERNAL MEDICINE

## 2022-04-18 PROCEDURE — 99211 OFF/OP EST MAY X REQ PHY/QHP: CPT

## 2022-04-18 PROCEDURE — G8427 DOCREV CUR MEDS BY ELIG CLIN: HCPCS | Performed by: INTERNAL MEDICINE

## 2022-04-18 PROCEDURE — 1090F PRES/ABSN URINE INCON ASSESS: CPT | Performed by: INTERNAL MEDICINE

## 2022-04-18 PROCEDURE — 85610 PROTHROMBIN TIME: CPT

## 2022-04-18 PROCEDURE — A9509 IODINE I-123 SOD IODIDE MIL: HCPCS | Performed by: OTOLARYNGOLOGY

## 2022-04-18 RX ADMIN — Medication 2.5 MILLICURIE: at 10:42

## 2022-04-18 NOTE — PROGRESS NOTES
[facility] [address]  [phone] (phone)  [phone] (fax)    Ms. Antoine Wells is a 67 y.o.  female with history of atrial fib. Brandon Timothy procedure, per Dr. Laura Haywood referral, who presents today for Warfarin monitoring and adjustment (2 week visit). Patient verifies tablet strength. Followed printed instructions for dose. Uses pill box. Normally fills every Sunday, but did last Saturday, 4/16. No missed (not sure about 4/16) or extra doses. Patient denies bleeding/swelling/chest pain. Has usual SOB if walks too fast.  Has usual easy bruising (even before Coumadin). No blood in urine or stool. No dietary changes. No changes in medication/OTC agents/herbals. No change in alcohol use or tobacco use. No change in activity level. Patient denies headaches/dizziness/lightheadedness/falls. No vomiting/diarrhea or acute illness. No procedures scheduled in the future at this time. Will probably have thyroid surgery in June - sees Dr. Анна Lynn next for clearance. Reminded to call this clinic as soon as date known and how long was told to stop Coumadin. Assessment:   Lab Results   Component Value Date    INR 3.00 (H) 04/18/2022    INR 3.00 (H) 04/04/2022    INR 2.40 (H) 03/21/2022     INR therapeutic - goal 2-3. Recent Labs     04/18/22  1424   INR 3.00*       Plan:  POCT INR ordered/performed/result reviewed. Continue PO Coumadin 4 mg M, 6 mg [id number]. Recheck INR in 2.5 week(s). (Report given - orders entered by RAFIA Gonzalez., PharmD.)  Patient reminded to call the Anticoagulation Clinic with any signs or symptoms of bleeding or with any medication changes. Patient given instructions utilizing the teach back method. After visit summary printed and reviewed with patient. Discharged ambulatory in no apparent distress, wearing mask, with .     For Pharmacy Admin Tracking Onl   Time Spent (min): 0.5

## 2022-04-18 NOTE — PROGRESS NOTES
[id number] [facility] [address]  [place] [address]  Dept: [phone]  Dept Fax: [phone]  Loc: [phone]    Visit Date: 4/18/2022    Ms. Logan Flores is a 67 y.o. female  who presented for:  Chief Complaint   Patient presents with    3 Month Follow-Up    Coronary Artery Disease       HPI:   68 yo F c hx of CAD s/p CABG (LIMA-LAD, SVG -RCA, SVG -OM 11/2021), MVR, (Hsu ring), MAZE, TVR (Lashawn stitch), LAAC, Afib on coumadin at [facility], LVEF 45-50%. She has been doing well. Denies any chest pain, sob, palpitations, lightheadedness, dizziness, orthopnea, PND or pedal edema. States she has been doing all the work at home without issues. 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             Current Outpatient Medications:     cetirizine (ZYRTEC) 10 MG tablet, Take 10 mg by mouth daily, Disp: , Rfl:     potassium chloride (KLOR-CON M) 20 MEQ extended release tablet, Take 1 tablet by mouth daily, Disp: 90 tablet, Rfl: 3    warfarin (COUMADIN) 2 MG tablet, Take as directed by [facility] (90 tablets = 30 days), Disp: 90 tablet, Rfl: 2    furosemide (LASIX) 40 MG tablet, Take 1 tablet by mouth daily, Disp: 90 tablet, Rfl: 1    omeprazole (PRILOSEC) 20 MG delayed release capsule, Take 20 mg by mouth daily, Disp: , Rfl:     metoprolol succinate (TOPROL XL) 50 MG extended release tablet, Take 1 tablet by mouth 2 times daily Take along with 25 mg BID to make 75 mg BID., Disp: 180 tablet, Rfl: 3    metoprolol succinate (TOPROL XL) 25 MG extended release tablet, Take 1 tablet by mouth 2 times daily Take along with 50 mg BID to make 75 mg BID., Disp: 180 tablet, Rfl: 3    sacubitril-valsartan (ENTRESTO) 49-51 MG per tablet, Take 1 tablet by mouth 2 times daily, Disp: 180 tablet, Rfl: 3    acetaminophen (TYLENOL) 325 MG tablet, Take 325-650 mg by mouth every 6 hours as needed for Pain or Fever Don't take more than 3,000 mg each day., Disp: , Rfl:     aspirin 81 MG chewable tablet, Take 81 mg by mouth daily, Disp: , Rfl:     Past Medical History  Swati  has a past medical history of Allergic rhinitis, Angiolipoma of right kidney, ASHD (arteriosclerotic heart disease), Atrial fibrillation (HCC), Celiac disease, DM2 (diabetes mellitus, type 2) ([facility] [address] Eczema, GERD (gastroesophageal reflux disease), Heart failure with reduced ejection fraction ([facility] [address] History of ST elevation myocardial infarction (STEMI), Hyperlipidemia, Hypertension, Ischemic cardiomyopathy, Meningioma ([facility] [address] Mixed hearing loss, Obesity (BMI 30-39.9), Osteoarthritis, Post-menopausal, S/P CABG (coronary artery bypass graft), S/P Maze operation for atrial fibrillation, S/P MVR (mitral valve repair), and S/P tricuspid valve repair. Social History  Swati  reports that she is a non-smoker but has been exposed to tobacco smoke. She has never used smokeless tobacco. She reports that she does not drink alcohol and does not use drugs. Family History  Swati family history includes Breast Cancer (age of onset: 40) in her maternal cousin; Breast Cancer (age of onset: 50) in her maternal aunt; Heart Attack in her maternal grandfather; Heart Attack (age of onset: 62) in her maternal uncle; Heart Disease in her mother; Lorena Hermanns in her mother; Osteoporosis in her mother; Other in her father and maternal grandmother.     Past Surgical History   Past Surgical History:   Procedure Laterality Date     SECTION  1972 64    CHOLECYSTECTOMY      COLONOSCOPY  2006    [facility]RAFT  20[facility]    [facility]    [facility]PAIR  2021    [facility]    MYRINGOTOMY AND TYMPANOSTOMY TUBE PLACEMENT  2012    Dr Evonne Mccullough    OTHER SURGICAL HISTORY  2021    MAZE Procedure during CABG and MVR/TVR at [address] [place] [address]  2021    TV Repair 2021 at [facility] 05/2006       Subjective:     REVIEW OF SYSTEMS  Constitutional: denies sweats, chills and fever  HENT: denies  congestion, sinus pressure, sneezing and sore throat. Eyes: denies  pain, discharge, redness and itching. Respiratory: denies apnea, cough  Gastrointestinal: denies blood in stool, constipation, diarrhea   Endocrine: denies cold intolerance, heat intolerance, polydipsia. Genitourinary: denies dysuria, enuresis, flank pain and hematuria. Musculoskeletal: denies arthralgias, joint swelling and neck pain. Neurological: denies numbness and headaches. Psychiatric/Behavioral: denies agitation, confusion, decreased concentration and dysphoric mood    All others reviewed and are negative. Objective:     /62   Pulse 76   Ht 5' 5\" (1.651 m)   Wt 217 lb 9.6 oz (98.7 kg)   BMI 36.21 kg/m²     Wt Readings from Last 3 Encounters:   04/18/22 217 lb 9.6 oz (98.7 kg)   04/04/22 214 lb 11.2 oz (97.4 kg)   03/30/22 212 lb (96.2 kg)     BP Readings from Last 3 Encounters:   04/18/22 103/62   04/04/22 (!) 148/76   03/30/22 (!) 150/80       PHYSICAL EXAM  Constitutional: Oriented to person, place, and time. Appears well-developed and well-nourished. HENT:   Head: Normocephalic and atraumatic. Eyes: EOM are normal. Pupils are equal, round, and reactive to light. Neck: Normal range of motion. Neck supple. No JVD present. Cardiovascular: Normal rate , normal heart sounds and intact distal pulses. Pulmonary/Chest: Effort normal and breath sounds normal. No respiratory distress. No wheezes. No rales. Abdominal: Soft. Bowel sounds are normal. No distension. There is no tenderness. Musculoskeletal: Normal range of motion. No edema. Neurological: Alert and oriented to person, place, and time. No cranial nerve deficit. Coordination normal.   Skin: Skin is warm and dry. Psychiatric: Normal mood and affect.        No results found for: CKTOTAL, CKMB, CKMBINDEX    Lab Results   Component Value Date WBC 6.7 04/04/2022    RBC 4.68 04/04/2022    RBC 4.57 11/09/2020    HGB 14.5 04/04/2022    HCT 45.4 04/04/2022    MCV 97.0 04/04/2022    MCH 31.0 04/04/2022    MCHC 31.9 04/04/2022    RDW 13.5 03/29/2022    PLT 89 04/04/2022    MPV 13.8 04/04/2022       Lab Results   Component Value Date     04/04/2022    K 4.5 04/04/2022     04/04/2022    CO2 28 04/04/2022    BUN 19 04/04/2022    LABALBU 4.6 11/05/2021    CREATININE 0.7 04/04/2022    CALCIUM 9.8 04/04/2022    LABGLOM 82 04/04/2022    GLUCOSE 96 04/04/2022    GLUCOSE 146 10/23/2020       Lab Results   Component Value Date    ALKPHOS 79 11/05/2021    ALT 34 11/05/2021    AST 27 11/05/2021    PROT 7.7 11/05/2021    BILITOT 0.4 11/05/2021    BILIDIR <0.2 11/05/2021    LABALBU 4.6 11/05/2021       Lab Results   Component Value Date    MG 2.1 02/15/2022       Lab Results   Component Value Date    INR 3.00 (H) 04/18/2022    INR 3.00 (H) 04/04/2022    INR 2.40 (H) 03/21/2022         Lab Results   Component Value Date    LABA1C 6.1 10/28/2021    LABA1C 7.1 10/15/2019       Lab Results   Component Value Date    TRIG 163 10/27/2021    HDL 43 10/27/2021    LDLCALC 112 10/27/2021    LABVLDL 36 10/23/2020       Lab Results   Component Value Date    TSH 1.760 10/27/2021         Testing Reviewed:      I haveindividually reviewed the below cardiac tests    EKG:    ECHO: Results for orders placed during the hospital encounter of 03/16/22    ECHO Complete 2D W Doppler W Color    Narrative  Transthoracic Echocardiography Report (TTE)    Demographics    Patient Name    Cheyenne Lacy  Gender               Female  MOHAMUD    MR #            [id number]         Race                     Ethnicity    Account #       [de-identified]         Room Number    Accession       [id number]        Date of Study        03/16/2022  Number    Date of Birth   1950        Referring Physician  Dale Mcneil MD    Age             67 year(s)        Sonographer Anil Cordero [facility]    Interpreting         Hudson Frost MD  Physician    Procedure    Type of Study    TTE procedure:ECHOCARDIOGRAM COMPLETE 2D W DOPPLER W COLOR. Procedure Date  Date: 03/16/2022 Start: 12:05 PM    Study Location: Echo Lab  Technical Quality: Limited visualization due to body habitus. Indications:Atrial fibrillation and Palpitations. Additional Medical History:Coronary artery disease, CABG, Life vest, STEMI,  MV repair (Hsu ring), TV repair ([place]) DIabetes, Hypertension,  Hyperlipidemia, CHF, GERD, MAZE, Family history of heart disease    Patient Status: Routine    Height: 63.78 inches Weight: 213.01 pounds BSA: 2 m^2 BMI: 36.81 kg/m^2    BP: 142/78 mmHg    Allergies  - Other allergy:(See Epic). Conclusions    Summary  When this echo is compared with the echo from 5/41/1755, LV systolic  function have improved. Limited TTE to assess LV systolic function. Left ventricle size is normal.  Left ventricular systolic function is mildly reduced. Ejection fraction is visually estimated at 45-50%. Normal left ventricular wall thickness. Mild mitral stenosis (mean gradient 4 mm Hg). The ascending aorta is mildly dilated (3.4 cm). No evidence of any pericardial effusion. Signature    ----------------------------------------------------------------  Electronically signed by Hudson Frost MD (Interpreting  physician) on 03/18/2022 at 11:42 AM  ----------------------------------------------------------------    Findings    Mitral Valve  Mild mitral stenosis (mean gradient 4 mm Hg). Aortic Valve  Structurally normal aortic valve. Aortic valve appears tricuspid. No evidence of aortic valve stenosis or regurgitation. Tricuspid Valve  Trace tricuspid regurgitation. Left Atrium  Normal size left atrium. Left Ventricle  Left ventricle size is normal.  Left ventricular systolic function is mildly reduced. Ejection fraction is visually estimated at 45-50%.   Normal left ventricular wall thickness. Right Ventricle  Normal right ventricular size and function. Pericardial Effusion  No evidence of any pericardial effusion. Aorta / Great Vessels  The ascending aorta is mildly dilated (3.4 cm).     M-Mode/2D Measurements & Calculations    LV Diastolic   LV Systolic Dimension:    AV Cusp Separation: 1.9 cmLA  Dimension: 4.7 3.6 cm                    Dimension: 3.2 cmAO Root  cm             LV Volume Diastolic: 513  Dimension: 3.5 cmLA Area: 17.1  LV FS:23.4 %   ml                        cm^2  LV PW          LV Volume Systolic: 50.6  Diastolic: 0.9 ml  cm             LV EDV/LV EDV Index: 102  Septum         ml/51 m^2LV ESV/LV ESV    RV Diastolic Dimension: 2.4 cm  Diastolic: 0.9 Index: 88.2 ml/27 m^2  cm             EF Calculated: 46.7 %     LA/Aorta: 0.91    LV Length: 7 cm           LA volume/Index: 46 ml /23m^2    LV Area  Diastolic:  70.3 cm^2  LV Area  Systolic: 45.6  cm^2    Doppler Measurements & Calculations    MV Peak E-Wave: 129 cm/s  AV Peak Velocity: 126 LVOT Peak Velocity: 96.4  MV Peak A-Wave: 99 cm/s   cm/s                  cm/s  MV E/A Ratio: 1.3         AV Peak Gradient:     LVOT Peak Gradient: 4 mmHg  MV Peak Gradient: 6.66    6.35 mmHg  mmHg                                            TV Peak E-Wave: 50.8 cm/s  MV Mean Gradient: 4 mmHg                        TV Peak A-Wave: 54 cm/s  MV Mean Velocity: 92.2  cm/s                                            TV Peak Gradient: 1.03  MV Deceleration Time: 359 IVRT: 92 msec         mmHg  msec                                            TR Velocity:255 cm/s  MV P1/2t: 105 msec                              TR Gradient:26.01 mmHg  MVA by PHT:2.1 cm^2       AV DVI (Vmax):0.77    PV Peak Velocity: 67.1  cm/s  MV E' Septal Velocity:                          PV Peak Gradient: 1.8 mmHg  6.4 cm/s  MV A' Septal Velocity:  8.2 cm/s  MV E' Lateral Velocity:  9.9 cm/s  MV A' Lateral Velocity:  7.7 cm/s  E/E' septal: 20.16  E/E' lateral: 13.03    [id number]      STRESS:    CATH:    Assessment/Plan       Diagnosis Orders   1. Coronary artery disease involving native coronary artery of native heart without angina pectoris     2. Paroxysmal atrial fibrillation (HCC)         CAD s/p CABG x 3, MAZE, TVR, MVR, LAAC  LV EF 45-50%  DM  AFib on coumadin  HTN  HLD     EKG shows SR  On amiodarone  No cardiac symptoms  EF improved to 45-50%  Current euvolemic  May proceed with thyroid surgery if deemed necessary  On coumadin, stop 5 days preceding surgery  Surgical site healing well  Continue Aspirin, statin, losartan and toprol  On lasix,   Labs reviewed  Aspirin, entresto, metoprolol, lasix, coumadin  The patient is asked to make an attempt to improve diet and exercise patterns to aid in medical management of this problem. Advised more plant based nutrition/meditarrean diet   Advised patient to call office or seek immediate medical attention if there is any new onset of  any chest pain, sob, palpitations, lightheadedness, dizziness, orthopnea, PND or pedal edema. All medication side effects were discussed in details.     Thank youfor allowing me to participate in the care of this patient. Please do not hesitate to contact me for any further questions. Return in about 6 months (around 10/18/2022), or if symptoms worsen or fail to improve, for Review testing, Regular follow up.        Electronically signed by Álvaro Pop MD [facility]  4/18/2022 at 3:33 PM EDT

## 2022-04-19 ENCOUNTER — HOSPITAL ENCOUNTER (OUTPATIENT)
Dept: NUCLEAR MEDICINE | Age: 72
Discharge: HOME | End: 2022-04-19
Payer: MEDICARE

## 2022-04-20 ENCOUNTER — HOSPITAL ENCOUNTER (OUTPATIENT)
Dept: NUCLEAR MEDICINE | Age: 72
Discharge: HOME | End: 2022-04-20
Payer: MEDICARE

## 2022-04-20 ENCOUNTER — TELEPHONE (OUTPATIENT)
Dept: OTOLARYNGOLOGY | Age: 72
End: 2022-04-20

## 2022-04-21 NOTE — TELEPHONE ENCOUNTER
Phone Note:    I called to let the patient know that her nuclear medicine scan supports the need to remove the right side of her thyroid gland. No signs of spread of the disease to anywhere else was seen, however.    pfc

## 2022-04-22 ENCOUNTER — TELEPHONE (OUTPATIENT)
Dept: CARDIOLOGY CLINIC | Age: 72
End: 2022-04-22

## 2022-04-22 NOTE — TELEPHONE ENCOUNTER
Swati is scheduled for surgery with Dr Janneth Sanders on 6/16/22 for a remove the right side of her thyroid gland. We are requesting cardiac clearance and the patient informs us she was just seen on 4/18/22. Please advise. Thank you!

## 2022-04-22 NOTE — TELEPHONE ENCOUNTER
Yes she may take an ext 40 today and tomorrow w/ an extra 20 of Kcl each time. If she continues to have swelling with no changes I need to know Monday.  Also needs to be cognizant as to why weight is up (Diet/fluid intake?)

## 2022-04-22 NOTE — TELEPHONE ENCOUNTER
Pt taking 40 mg lasix , noticing weight up, notice more swelling in bilateral feet. Pt requesting to take additional lasix when has more swelling? Isidra advise?

## 2022-04-25 NOTE — TELEPHONE ENCOUNTER
Feet still swollen  4/22    212  4/23    213  4/24    212  4/25    not weighed yet    States being compliant with diet and fluid restriction

## 2022-04-25 NOTE — TELEPHONE ENCOUNTER
Spoke with patient   Wt on 4/21 212  214 today   Does not think swelling is any better than Friday    Discussed with Cheyenne Tobin CNP   VO received for Metolazone    Patient notified and stated she is going to see Dr. Henley on Thursday and will wait to see him and see what he says

## 2022-04-25 NOTE — TELEPHONE ENCOUNTER
She weighted 312 when she had seen me the end of march. What was her weight on 3/21? Considering ordering metolazone.

## 2022-04-27 ENCOUNTER — TELEPHONE (OUTPATIENT)
Dept: PHARMACY | Age: 72
End: 2022-04-27

## 2022-04-27 NOTE — TELEPHONE ENCOUNTER
----- Message from [employer] sent at 4/27/2022  3:00 PM EDT -----  Contact: [phone]  Swati called and said she has a cough and is wondering if she can take Mullein? It's from the [employer] food store, and if not, she would like suggestions. If she doesn't answer, please leave a  msg. She also used to take Red Yeast rice. She used to take it but couldn't remember what it's for prior to being in [address] [place] [address].

## 2022-04-27 NOTE — TELEPHONE ENCOUNTER
Tried to call Swati and she did not answer. Left voicemail message stating that  Mullein would not interact with the Coumadin. Also told her that red yeast rice is typically used to help lower cholesterol and it may interact with her Coumadin.     Hobert Sever, PharmD, BCPS  4/27/2022  3:58 PM

## 2022-04-28 ENCOUNTER — OFFICE VISIT (OUTPATIENT)
Dept: FAMILY MEDICINE CLINIC | Age: 72
End: 2022-04-28
Payer: MEDICARE

## 2022-04-28 VITALS
RESPIRATION RATE: 14 BRPM | HEART RATE: 72 BPM | SYSTOLIC BLOOD PRESSURE: 112 MMHG | WEIGHT: 215 LBS | BODY MASS INDEX: 39.56 KG/M2 | OXYGEN SATURATION: 99 % | TEMPERATURE: 97.7 F | DIASTOLIC BLOOD PRESSURE: 60 MMHG | HEIGHT: 62 IN

## 2022-04-28 DIAGNOSIS — E78.2 MIXED HYPERLIPIDEMIA: ICD-10-CM

## 2022-04-28 DIAGNOSIS — I25.5 ISCHEMIC CARDIOMYOPATHY: ICD-10-CM

## 2022-04-28 DIAGNOSIS — Z12.11 SCREENING FOR COLON CANCER: ICD-10-CM

## 2022-04-28 DIAGNOSIS — Z86.79 S/P MAZE OPERATION FOR ATRIAL FIBRILLATION: ICD-10-CM

## 2022-04-28 DIAGNOSIS — D32.9 MENINGIOMA (HCC): ICD-10-CM

## 2022-04-28 DIAGNOSIS — Z98.890 S/P MAZE OPERATION FOR ATRIAL FIBRILLATION: ICD-10-CM

## 2022-04-28 DIAGNOSIS — Z98.890 S/P TRICUSPID VALVE REPAIR: ICD-10-CM

## 2022-04-28 DIAGNOSIS — E11.9 TYPE 2 DIABETES MELLITUS WITHOUT COMPLICATION, WITHOUT LONG-TERM CURRENT USE OF INSULIN (HCC): ICD-10-CM

## 2022-04-28 DIAGNOSIS — K21.9 GASTROESOPHAGEAL REFLUX DISEASE, UNSPECIFIED WHETHER ESOPHAGITIS PRESENT: ICD-10-CM

## 2022-04-28 DIAGNOSIS — D49.7 FOLLICULAR NEOPLASM OF THYROID: ICD-10-CM

## 2022-04-28 DIAGNOSIS — I25.2 HISTORY OF ST ELEVATION MYOCARDIAL INFARCTION (STEMI): Chronic | ICD-10-CM

## 2022-04-28 DIAGNOSIS — I50.20 HEART FAILURE WITH REDUCED EJECTION FRACTION (HCC): ICD-10-CM

## 2022-04-28 DIAGNOSIS — D69.6 THROMBOCYTOPENIA (HCC): ICD-10-CM

## 2022-04-28 DIAGNOSIS — I10 ESSENTIAL HYPERTENSION: ICD-10-CM

## 2022-04-28 DIAGNOSIS — I25.10 ASHD (ARTERIOSCLEROTIC HEART DISEASE): Primary | Chronic | ICD-10-CM

## 2022-04-28 DIAGNOSIS — Z95.1 S/P CABG (CORONARY ARTERY BYPASS GRAFT): ICD-10-CM

## 2022-04-28 DIAGNOSIS — Z98.890 S/P MVR (MITRAL VALVE REPAIR): ICD-10-CM

## 2022-04-28 DIAGNOSIS — I48.91 ATRIAL FIBRILLATION, UNSPECIFIED TYPE (HCC): ICD-10-CM

## 2022-04-28 PROBLEM — I21.3 STEMI (ST ELEVATION MYOCARDIAL INFARCTION) (HCC): Status: RESOLVED | Noted: 2021-11-05 | Resolved: 2022-04-28

## 2022-04-28 LAB — HBA1C MFR BLD: 6.4 % (ref 4.3–5.7)

## 2022-04-28 PROCEDURE — 3044F HG A1C LEVEL LT 7.0%: CPT | Performed by: FAMILY MEDICINE

## 2022-04-28 PROCEDURE — G8400 PT W/DXA NO RESULTS DOC: HCPCS | Performed by: FAMILY MEDICINE

## 2022-04-28 PROCEDURE — 1123F ACP DISCUSS/DSCN MKR DOCD: CPT | Performed by: FAMILY MEDICINE

## 2022-04-28 PROCEDURE — 3017F COLORECTAL CA SCREEN DOC REV: CPT | Performed by: FAMILY MEDICINE

## 2022-04-28 PROCEDURE — 1090F PRES/ABSN URINE INCON ASSESS: CPT | Performed by: FAMILY MEDICINE

## 2022-04-28 PROCEDURE — 2022F DILAT RTA XM EVC RTNOPTHY: CPT | Performed by: FAMILY MEDICINE

## 2022-04-28 PROCEDURE — G8427 DOCREV CUR MEDS BY ELIG CLIN: HCPCS | Performed by: FAMILY MEDICINE

## 2022-04-28 PROCEDURE — G8417 CALC BMI ABV UP PARAM F/U: HCPCS | Performed by: FAMILY MEDICINE

## 2022-04-28 PROCEDURE — 4040F PNEUMOC VAC/ADMIN/RCVD: CPT | Performed by: FAMILY MEDICINE

## 2022-04-28 PROCEDURE — 99214 OFFICE O/P EST MOD 30 MIN: CPT | Performed by: FAMILY MEDICINE

## 2022-04-28 PROCEDURE — 1036F TOBACCO NON-USER: CPT | Performed by: FAMILY MEDICINE

## 2022-04-28 NOTE — PROGRESS NOTES
Chief Complaint   Patient presents with    Follow-up     DM , Chronic        History obtained from the patient. SUBJECTIVE:  Sabrina Saravia is a 67 y.o. female that presents today for     -LAST VISIT:  Patient admitted to [facility] from 11/6 to 11/7 for STEMI etc.   D/c summary: \"Hospital Course:     Left heart cath:   Findings: Severely calcified multivessel CAD                                        Recommendations:    Needs CTS evaluation for possible CABG  Attempted PCA of proximal RCA but due to severe calcification the procedure was aborted   Needs good rate control  IV Heparin  Monitor symptoms and hemodynamics  Discussed case extensively with family     Cardiothoracic surgery consult:  66year old diabetic female, s/p STEMI in context of 3v CAD and AF RVR, with severe mitral regurgitation and profound LV dysfunction. While the individual components of the appropriate open heart procedure (CABG, MVV/MVR, maze) are technically straightforward, in the context of a severe cardiomyopathy with apical akinesis this becomes a high-risk procedure. Patient would probably benefit from a viability study to assess LV recoverability. In any case, the risk for perioperative LV failure and for mechanical support is significant. Therefore, recommend transfer to tertiary center. These issues were discussed in detail with the patient, with whom the TTE images were reviewed. With this in mind, she is unsure as to whether she will proceed with open heart surgery. \"      -Pt was then transferred to [facility] on 11/7.       -Patient admitted to [facility] from 11/7 to 11/25 for issues as noted below. D/c summary:  \"Admission Diagnosis: Acute on chronic systolic (congestive) heart failure (HCC) [I50.23]  Discharge Diagnosis: Acute on chronic systolic (congestive) heart failure ([facility] [address] [I50.23]    Reason for Hospitalization:  This is a 66year old female with HTN, morbid obesity, HLD, T2DM, and YASHIRA who is transferred to [facility] main [place] for evaluation of de brad ischemic cardiomyopathy in context of AFRVR with ACS.     She presented to [address] [place] [address] [facility] ED on 11/5/2021 with severe bilateral acute on chronic hip pain. She was found to be in General acute hospital with rates in the 160s and ischemic changes diffusely on the ECG. She was hypoxic requiring 5L O2 by NC. She was given IV metoprolol which did not affect her rate much. She was taken to cath lab for evaluation of these ischemic changes and was discovered to have three vessel CAD. Echo showed LVEF 25-30%. She was managed in the ICU and given IV metoprolol, IV diltiazem, IV esmolol, and IV amiodarone for her atrial fibrillation; with amiodarone she converted to sinus rhythm (not clear if pt ever underwent LAWSON). She was anticoagulated with heparin gtt. CTS was consulted at [address] [place] [address] [facility] for CABG evaluation. However given the reduced LV systolic function and possible need for mechanical support in the perioperative period, CTS recommended transfer to a tertiary center for evaluation; she was accepted for transfer.     On arrival pt is comfortable, does not complain of hip pain, continues to be on nasal cannula. She arrives on a heparin and amiodarone drip. Operations during Hospitalization:   Day of Surgery:11/18/2021   Surgeon: Ness Cabrera MD  S/P SURGERY: CABG(LAD-LIMA, OM-SVG, RCA-SVG), MVr(#29 Hsu Ring), MAZE, TVr(Lashawn stitch), LAAC(#35 clip)    Hospital Course:  * How was the Reason for Hospitalization Addressed:   Indication for Surgery: CAD, MR, TR, Paroxysmal Atrial Fibrillation, Primary Stroke Prevention  Preop LVEF: 30% RVF: Mild CARDS: Anel CATH: MVD, needs CABG EKG: AF w/ RVR  Postop LVEF: Moderate RVF: Mild  PMH/PSH: HTN, chronic thrombocytopenia, chronic bilateral hip pain, GERD, diabetes, hyperlipidemia   Preoperative Hospital Course: Presented to outside hospital with chief complaint of hip pain and nausea.  In the ED patient denied any report of chest pain or shortness of breath however she was noted to be in atrial fibrillation with RVR rate of 160 bpm on twelve-lead ECG and signs of inferior ST elevation with lateral segment ST depressions. Due to the findings ED proceeded to proceed with ischemic evaluation. Patient underwent emergent left heart catheterization which found three-vessel coronary artery disease. She had a proximal RCA calcified lesion in which a PTCA was attempted however unsuccessful. Airway Difficulty: Grade I - Glidescope (planned for study) Pacing Wires: No: V cut 11/24    Chronological List of Surgeries and Major Events (Diagnosis): (Surgeries in bold characters)  11/18/2021: CABG(LAD-LIMA, OM-SVG, RCA-SVG), MVr(#29 Hsu Ring), TVr(Lashawn stitch), MAZE, LAAC(#35 clip)  11/20/2021: AF RVR    A/P of Major Active Problems (excluding routine care and common problems):   Cardiac AF RVR - Currently in SR, pAF. Transition to oral amiodarone. Starting low-dose BB. Optimize electrolytes. Coumadin - Daily Dose. Acute Systolic HF - Hemodynamically stable off inotropic support. Diuresis/BB. Reassess best-practices prior to discharge. Hypervolemia - Diuresis. Transferred to floor 11/21    To Do or to Watch:   POD#7 No tubes/wires. NSR---AF--NSR. On room air. Prevena dressing removed 11/24  - s/p CABG: ASA, BB, statin Atorvastatin 80mg that was started pre-op (pt. with hx myalgias w/ statins)  - s/p MVr, TVr: ASA, BB. No surg path. Post-op echo completed 11/24: EF 25%, trace MR (grads 15/4), trace TR, trivial posterior pericardial effusion.  - admitted w/ AF, now s/p MAZE/ANTOINETTE clip: Pt. with recurrent A-fib post-op. Amiodarone drip restarted 11/21 for A-fib RVR and transitioned to po amio taper 11/22. Continues to alternate between AF and NSR. Continue BB; increase as tolerated. Coumadin started 11/21. INR 1.0. Daily dose while inpatient. PCP Dr. Cheli López will refer pt. to local Coumadin clinic.  Please fax DC summary to [phone] and they will contact pt. with appointment information.  - FVO: below pre-op weight. CXR with improving interstitial edema. Continue IV lasix but reduce to daily dosing.  - acute on chronic systolic CHF: SEAN Cast. EF remains 25% post-op. Life vest placed on patient 11/24. Resume losartan. - DM: H1C: 6.2; diet controlled pre-op. Cont. SS coverage. Consult endo if uncontrolled. - HTN: controlled with IV lasix, BB post-op. FYI was on clonidine, losartan/HCTZ pre-op. 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 Outpatient endocrine eval recommended. Noted on DC summary for following.  - YASHIRA: CPAP  - thrombocytopenia: chronic problem. Evaluated by hemo pre-op. PF4 negative. Currently stable  - dispo: 66year old yo  female from [place], [place]. CM following. PT eval rec home PT; but pt. is declining. No skilled needs. Declines OPD/[facility] cards and will f/u local providers. PCP appointment scheduled 12/2 at [address] [place] [address] on patient's behalf. \"    Since discharge:  Doing ok thus far  Weak but getting stronger  Refused HH PT  Good home support  Breathing at baseline  No CP, orthopnea or PND    Taking meds as rx'd, INR 2.9 today  Needs referrals for local cardio, CTS and coumadin clinic    Has life vest, has not fired    Incidental thyroid nodule c/w follicular neoplasm, needs ENT f/u. UPDATE TODAY:   Doing well  Off amio  On toprol yet  On entresto  Pt stopped her lipitor d/t myalgias. Cardio is aware. Declines resuming any statin  EF improved, off life vest   Breathing at baseline  No CP/sOB, orthopnea or PND    On asa, coumadin, toprol, entresto and lasix    For the thyroid, as above, has seen ENT and they are planning a partial thyroidectomy in June      -DM2 PRIOR VISIT: USed to be on metformin for preDM. Stopped a while ago. Labs done last year showed DM2. She was to f/u with me and she never came in. Here a year later. Labs still c/w DM. Controlled. No polyphagia, uria or dypsia.      UPDATE PRIOR VISIT: diet controlled. a1c good. Working on wt loss etc.      UPDATE PRIOR VISIT: diet controlled yet. a1c in range. wts about the same    UPDATE PRIOR VISIT: a1c stable. Diet controlled. wts about the same. + microal in OCT. Wanted to repeat today, but she couldn't void, order given    UPDATE PRIOR VISIT:   a1c stable  Diet controlled  Wts about the same  Repeat neg microal, + before     UPDATE TODAY:   Diet controlled  a1c at goal   wts the same    Wt Readings from Last 3 Encounters:   04/28/22 215 lb (97.5 kg)   04/18/22 217 lb 9.6 oz (98.7 kg)   04/04/22 214 lb 11.2 oz (97.4 kg)     Lab Results   Component Value Date    LABA1C 6.4 04/28/2022    LABA1C 6.1 10/28/2021    LABA1C 6.6 04/26/2021    LABA1C 7.0 10/26/2020    LABA1C 7.1 10/15/2019    LABA1C 6.9 04/11/2019    LABA1C 7.0 10/11/2018    LABA1C 6.8 04/12/2018    LABA1C 6.9 09/21/2017    LABA1C 6.5 11/04/2016       -HTN:    HPI:    Taking meds as prescribed ?: yes  Tolerating well ?: yes  Side Effects ?: deneis  BP at home ?: <140/90  Working on TLCS ?: yes  Chest Pain/SOB/Palpitations? denies    BP Readings from Last 3 Encounters:   04/28/22 112/60   04/18/22 103/62   04/04/22 (!) 148/76       -HLD PRIOR VISIT: should be on statin, however intolerant of zocor as the last statin she took. States took other statins years ago, can't remember names, but they call caused myalgias. She refuses further statins. Lipids a little worse. Diet not as good. Plans to get this improved. UPDATE LAST VISIT:   On lipitor now  Tolerating thus far  Previous hx of not tolerating statins    UPDATE TODAY:   Back off lipitor as above  Did not tolerate  Declines resuming any statin  Is aware of risks       -Low platelets PRIOR VISIT: chronic, no clear etiology and mild. Thought maybe d/t naproxen. Labs stable on f/u. Denies fevers, chills, night sweats or wt loss. UPDATE PRIOR VISIT:   Platelets a little lower than before  No bleeding or hematochezia.    No petechia   No fevers, chills, night sweats or wt loss    UPDATE PRIOR VISIT:   platelets lower than a year ago  Had ordered f/u labs last year to get done, she never did  No bleeding, bruising or hemarthrosis  Denies fevers, chills or nights sweats     UPDATE TODAY:   Following with heme  Neg w/u  No intervention, per heme, unless Plt <50K  Has f/u and is current  No s/s bleeding      -GERD:    HPI:  Back on omeprazole    Taking meds as prescribed ?: yes  Tolerating well ?: yes  Side Effects ?: denies  Dysphagia ?: denies  Odynophagia ?: denies  Melena ?: denies  Working on TLCS ?: yes      -Meningioma: noted MRI 2012. Never did f/u MRI. Told she didn't need to. Has 0 sxs. Denies HA, vision changes, lateralizing numbness or weakness.       -Colon Ca screening:  Due for colo  Wanted to see Amber Medrano, not covered  Declines getting done right now d/t covid  No s/s colon dz  Discussed cologuard, still wants to wait. Declines any and all colorectal cancer screening again today      Age/Gender Health Maintenance    Lipid -   Lab Results   Component Value Date    CHOL 188 10/27/2021    CHOL 215 (H) 10/23/2020    CHOL 235 (H) 10/14/2019     Lab Results   Component Value Date    TRIG 163 10/27/2021    TRIG 182 (H) 10/23/2020    TRIG 220 (H) 10/14/2019     Lab Results   Component Value Date    HDL 43 10/27/2021    HDL 42 10/23/2020    HDL 38 (L) 10/14/2019     Lab Results   Component Value Date    LDLCALC 112 10/27/2021    LDLCALC 137 (H) 10/23/2020    LDLCALC 153 (H) 10/14/2019       Colon Cancer Screening - Somewhere within the last 10 years, was not [facility]. Awaiting records/due, ordered today (APR 2019)/pt declines OCT 2020/APR 2021/OCT 2021  Lung Cancer Screening (Age 54 to [de-identified] with 30 pack year hx, current smoker or quit within past 15 years) - never smoker.      Tetanus - to get at pharmacy per medicare rules  Influenza Vaccine - Declines SEPT 2016/SEPT 2017/OCT 2018/OCT 2019/OCT 2020/OCT 2021  Pneumonia Vaccine - Declines SEPT 2016/SEPT 2017/OCT 2018/APR 2019/OCT 2019/OCT 2020/APR 2021  Shingrix - to get at pharmacy per medicare rules    Breast Cancer Screening - NEG APR 2021  Osteoporosis Screening - declines SEPT 2016, wants to discuss again in 1 year.  We discussed today and declines SEPT 2017/OCT 2018/APR 2019/OCT 2019/OCT 2020/APR 2021/OCT 2021    Diabetes Health Maintenance    A1C -   Lab Results   Component Value Date    LABA1C 6.4 04/28/2022    LABA1C 6.1 10/28/2021    LABA1C 6.6 04/26/2021    LABA1C 7.0 10/26/2020    LABA1C 7.1 10/15/2019    LABA1C 6.9 04/11/2019    LABA1C 7.0 10/11/2018    LABA1C 6.8 04/12/2018    LABA1C 6.9 09/21/2017    LABA1C 6.5 11/04/2016       ACE/ARB - YES hyzaar  Eye - due, pt reminded  Foot - WNL OCT 2021  Microal/Cr - + OCT 2018/APR 2019  NEG OCT 2019    Lab Results   Component Value Date    LABMICR 2.20 10/27/2021    LABCREA 175.3 10/27/2021    MACRR 13 10/27/2021     Lab Results   Component Value Date    CREATINEUR 148.36 10/23/2020    MICROALBUR 2.3 (H) 10/23/2020    MALBCR 15.5 10/23/2020         eGFR -   No results found for: GFRESTIMATE  Lab Results   Component Value Date    LABGLOM 82 04/04/2022     No results found for: [facility]  Lab Results   Component Value Date    EGFRNONAA >60 10/23/2020     Lab Results   Component Value Date    EGFRAA >60 10/23/2020       Component      Latest Ref Rng & Units 10/14/2019           7:47 AM   EGFR IF NonAfrican American      >59 ml/min/1.73sq.m >60        Statin - statin intolerance/allergy      Current Outpatient Medications   Medication Sig Dispense Refill    cetirizine (ZYRTEC) 10 MG tablet Take 10 mg by mouth daily      potassium chloride (KLOR-CON M) 20 MEQ extended release tablet Take 1 tablet by mouth daily 90 tablet 3    warfarin (COUMADIN) 2 MG tablet Take as directed by [facility] (90 tablets = 30 days) 90 tablet 2    furosemide (LASIX) 40 MG tablet Take 1 tablet by mouth daily 90 tablet 1    omeprazole (PRILOSEC) 20 MG delayed release capsule Take 20 mg by mouth daily      metoprolol succinate (TOPROL XL) 50 MG extended release tablet Take 1 tablet by mouth 2 times daily Take along with 25 mg BID to make 75 mg BID. 180 tablet 3    metoprolol succinate (TOPROL XL) 25 MG extended release tablet Take 1 tablet by mouth 2 times daily Take along with 50 mg BID to make 75 mg BID. 180 tablet 3    sacubitril-valsartan (ENTRESTO) 49-51 MG per tablet Take 1 tablet by mouth 2 times daily 180 tablet 3    acetaminophen (TYLENOL) 325 MG tablet Take 325-650 mg by mouth every 6 hours as needed for Pain or Fever Don't take more than 3,000 mg each day.  aspirin 81 MG chewable tablet Take 81 mg by mouth daily       No current facility-administered medications for this visit. No orders of the defined types were placed in this encounter. All medications reviewed and reconciled, including OTC and herbal medications. Updated list given to patient. Patient Active Problem List    Diagnosis Date Noted    Anticoagulated on Coumadin 12/06/2021    Encounter for therapeutic drug monitoring 36/67/2050    Follicular neoplasm of thyroid 12/02/2021    History of ST elevation myocardial infarction (STEMI)     ASHD (arteriosclerotic heart disease)     Atrial fibrillation (HCC)     S/P CABG (coronary artery bypass graft)     Heart failure with reduced ejection fraction ([address]     Ischemic cardiomyopathy     S/P Maze operation for atrial fibrillation     S/P MVR (mitral valve repair)     S/P tricuspid valve repair     DM2 [facility]di[facility] mellitus, type 2) [facility]     Statin intolerance     GERD (gastroesophageal reflux disease)     Osteoarthritis     Allergic rhinitis     Angiolipoma of right kidney     Eczema     Hyperlipidemia     Obesity (BMI 30-39. 9)     Post-menopausal     Thrombocytopenia ([address]     Mixed hearing loss 09/17/2013    Meningioma ([address] 12/26/2012     Noted MRI 2012. Never did f/u MRI. Told she didn't need to. Has 0 sxs.  Denies HA, vision changes, lateralizing numbness or weakness.  Hypertension 12/10/2012       Past Medical History:   Diagnosis Date    Allergic rhinitis     Angiolipoma of right kidney     ASHD (arteriosclerotic heart disease)     Atrial fibrillation (HCC)     Celiac disease     DM2 (diabetes mellitus, type 2) (HCC)     Eczema     GERD (gastroesophageal reflux disease)     Heart failure with reduced ejection fraction (HCC)     History of ST elevation myocardial infarction (STEMI)     Hyperlipidemia     Hypertension     Ischemic cardiomyopathy     Meningioma ([address] 2012    Noted MRI . Never did f/u MRI. Told she didn't need to. Has 0 sxs. Denies HA, vision changes, lateralizing numbness or weakness.  Mixed hearing loss 2013    Obesity (BMI 30-39. 9)     Osteoarthritis     Post-menopausal     S/P CABG (coronary artery bypass graft)     S/P Maze operation for atrial fibrillation     S/P MVR (mitral valve repair)     S/P tricuspid valve repair     Thrombocytopenia ([address]        Past Surgical History:   Procedure Laterality Date     SECTION  1972    CHOLECYSTECTOMY  2004    COLONOSCOPY  2006    CORONARY ARTERY BYPASS GRAFT  2021    [facility]    MITRAL VALVE REPAIR  2021    [facility]    MYRINGOTOMY AND TYMPANOSTOMY TUBE PLACEMENT  2012    Dr Lucas Auguste    OTHER SURGICAL HISTORY  2021    MAZE Procedure [facility]R [facility]    TRICUSPID VALVE SURGERY  2021    [facility]air [facility]. ENDOSCOPY  2006       Allergies   Allergen Reactions    Biaxin [Clarithromycin] Nausea Only    Doxycycline Other (See Comments)     GI side effects    Keflex [Cephalexin] Other (See Comments)     Tongue Swelling    Norvasc [Amlodipine Besylate] Other (See Comments)     Nasuea, hot flashes    Statins Other (See Comments)     Myalgias all    Zetia [Ezetimibe] Other (See Comments)     Chest pain    Zocor [Simvastatin] Other (See Comments)     Myalgias       Social History     Tobacco Use    Smoking status: Passive Smoke Exposure - Never Smoker    Smokeless tobacco: Never Used   Substance Use Topics    Alcohol use: No       Family History   Problem Relation Age of Onset    Heart Disease Mother     Lung Cancer Mother     Osteoporosis Mother     Other Father         Did not know her father.  Other Maternal Grandmother         blood clot, no clear hx surrounding    Heart Attack Maternal Grandfather     Heart Attack Maternal Uncle 62    Breast Cancer Maternal Aunt 50    Breast Cancer Maternal Cousin 40         I have reviewed the patient's past medical history, past surgical history, allergies, medications, social and family history and I have made updates where appropriate. Review of Systems  Positive responses are highlighted in bold    Constitutional:  Fever, Chills, Night Sweats, Fatigue, Unexpected changes in weight  HENT:  Ear pain, Tinnitus, Nosebleeds, Trouble swallowing, Hearing loss, Sore throat  Cardiovascular:  Chest Pain, Palpitations, Orthopnea, Paroxysmal Nocturnal Dyspnea  Respiratory:  Cough, Wheezing, Shortness of breath, Chest tightness, Apnea  Gastrointestinal:  Nausea, Vomiting, Diarrhea, Constipation, Heartburn, Blood in stool  Genitourinary:  Difficulty or painful urination, Flank pain, Change in frequency, Urgency  Skin:  Color change, Rash, Itching, Wound  Musculoskeletal:  Joint pain, Back pain, Gait problems, Joint swelling, Myalgias  Neurological:  Dizziness, Headaches, Presyncope, Numbness, Seizures, Tremors  Endocrine:  Heat Intolerance, Cold Intolerance, Polydipsia, Polyphagia, Polyuria      PHYSICAL EXAM:  Vitals:    04/28/22 1525   BP: 112/60   Pulse: 72   Resp: 14   Temp: 97.7 °F (36.5 °C)   TempSrc: Oral   SpO2: 99%   Weight: 215 lb (97.5 kg)   Height: 5' 2\" (1.575 m)     Body mass index is 39.32 kg/m².        VS Reviewed  General Appearance: A&O x 3, No acute distress,well developed and well- nourished  Head: normocephalic and atraumatic  Eyes: pupils equal, round, and reactive to light, extraocular eye movements intact, conjunctivae and eye lids without erythema  ENT: external ear and ear canal clear bilaterally, TMs intact and regular, nose without deformity, nasal mucosa and turbinates normal without polyps, oropharynx normal, dentition is normal for age  Neck: supple and non-tender without mass, no thyromegaly or thyroid nodules, no cervical lymphadenopathy  Pulmonary/Chest: clear to auscultation bilaterally- no wheezes, rales or rhonchi, normal air movement, no respiratory distress or retractions. Chest wall incision healing well, clean, dry and intact. Cardiovascular: S1 and S2 auscultated w/ RRR. No murmurs, rubs, clicks, or gallops, distal pulses intact. Abdomen: soft, non-tender, non-distended, bowl sounds physiologic,  no rebound or guarding, no masses or hernias noted. Liver and spleen without enlargement. Extremities: no cyanosis, clubbing of the lower extremities. +2 PT/DP bilaterally. 1+ bilat LE edema. Musculoskeletal: No joint swelling or gross deformity   Skin: warm and dry, no rash or erythema      Results for POC orders placed in visit on 04/28/22   POCT glycosylated hemoglobin (Hb A1C)   Result Value Ref Range    Hemoglobin A1C 6.4 (H) 4.3 - 5.7 %       ECHO 16 MARCH 2022   Summary   When this echo is compared with the echo from 2/04/8340, LV systolic   function have improved. Limited TTE to assess LV systolic function. Left ventricle size is normal.   Left ventricular systolic function is mildly reduced. Ejection fraction is visually estimated at 45-50%. Normal left ventricular wall thickness. Mild mitral stenosis (mean gradient 4 mm Hg). The ascending aorta is mildly dilated (3.4 cm). No evidence of any pericardial effusion. ASSESSMENT & PLAN  1.  ASHD (arteriosclerotic heart disease)    Stable  con't asa, coumadin, Toprol, entresto and lasix  Daily wts  Low salt diet  Coumadin clinic f/u  Cardio f/u  Discussed statin, declines and is aware of risks. Cardio aware she is not taking    2. History of ST elevation myocardial infarction (STEMI)    As above    3. S/P CABG (coronary artery bypass graft)    As above    4. Heart failure with reduced ejection fraction ([facility] [address]    As above    5. Ischemic cardiomyopathy    As above    6. Atrial fibrillation, unspecified type ([address]    As above    7. S/P Maze operation for atrial fibrillation    As above    8. S/P MVR (mitral valve repair)    As above    9. S/P tricuspid valve repair    As above    10. Follicular neoplasm of thyroid    F/u ENT for planned surgery    11. Type 2 diabetes mellitus without complication, without long-term current use of insulin (HCC)    Stable  At goal  Diet controlled     12. Essential hypertension    Stable  con't Toprol and entresto    13. Mixed hyperlipidemia    Declines resuming Lipitor  Aware of risks    14. Thrombocytopenia ([address]    Stable  May be mild ITP per heme  They are monitoring  F/u as planned    15. Gastroesophageal reflux disease, unspecified whether esophagitis present    Stable  con't omeprazole. 16. Meningioma ([facility]    Stable over long time  Benign  Monitor for s/s   No sxs  Monitor for sxs  If occur, MRI. Pt aware    17. Screening for colon cancer    She wants to hold off yet. Is considering colo vs cologuard     Discussed colon cancer screening with patient today. The benefits and risks of having testing performed were discussed with patient. After a long and detailed discussion with the patient, the patient does not want to proceed with colon cancer screening at this time. Pt is aware of the risks of this decision, including earlier death and are accepting of this risk. DISPOSITION    Return in about 6 months (around 10/28/2022) for AWV, Follow-up Diabetes, follow-up on chronic medical conditions, sooner as needed.     Swati released without restrictions. Future Appointments   Date Time Provider Andrei Pedraza   5/5/2022  2:00 PM PHILLY Pimentel [facility]   5/10/2022  3:45 PM Ashlee Mandujano MD N ENT [facility] [[email or web address]] - [place]   6/30/2022 11:20 AM Debbie Layton [facility] [place]   9/13/2022  2:00 PM Parminder Carroll APRN - CNP [facility] [place]   10/10/2022  3:00 PM Carly Collazo MD N [facility] Heart P [place]   10/27/2022  3:40 PM Juan R Lopez DO [facility]     PATIENT COUNSELING    Barriers to learning and self management: none    Discussed use, benefit, and side effects of prescribed medications. Barriers to medication compliance addressed. All patient questions answered. Pt voiced understanding.        Electronically signed by Juan R Lopez DO on 4/28/2022 at 4:05 PM

## 2022-05-03 RX ORDER — POTASSIUM CHLORIDE 20 MEQ/1
20 TABLET, EXTENDED RELEASE ORAL DAILY
Qty: 90 TABLET | Refills: 3 | Status: SHIPPED | OUTPATIENT
Start: 2022-05-03

## 2022-05-03 RX ORDER — FUROSEMIDE 40 MG/1
40 TABLET ORAL DAILY
Qty: 90 TABLET | Refills: 3 | Status: SHIPPED | OUTPATIENT
Start: 2022-05-03

## 2022-05-03 NOTE — TELEPHONE ENCOUNTER
Swati Tran called requesting a refill on the following medications:  Requested Prescriptions     Pending Prescriptions Disp Refills    metoprolol succinate (TOPROL XL) 50 MG extended release tablet 180 tablet 3     Sig: Take 1 tablet by mouth 2 times daily Take along with 25 mg BID to make 75 mg BID.  metoprolol succinate (TOPROL XL) 25 MG extended release tablet 180 tablet 3     Sig: Take 1 tablet by mouth 2 times daily Take along with 50 mg BID to make 75 mg BID. Pharmacy verified:[facility] mail delivery  .       Date of last visit: 4-  Date of next visit (if applicable): 59/19/3613

## 2022-05-05 ENCOUNTER — HOSPITAL ENCOUNTER (OUTPATIENT)
Dept: PHARMACY | Age: 72
Setting detail: THERAPIES SERIES
Discharge: HOME | End: 2022-05-05
Payer: MEDICARE

## 2022-05-05 DIAGNOSIS — I48.91 ATRIAL FIBRILLATION, UNSPECIFIED TYPE (HCC): Primary | ICD-10-CM

## 2022-05-05 DIAGNOSIS — Z79.01 ANTICOAGULATED ON COUMADIN: ICD-10-CM

## 2022-05-05 DIAGNOSIS — Z51.81 ENCOUNTER FOR THERAPEUTIC DRUG MONITORING: ICD-10-CM

## 2022-05-05 DIAGNOSIS — Z86.79 S/P MAZE OPERATION FOR ATRIAL FIBRILLATION: ICD-10-CM

## 2022-05-05 DIAGNOSIS — Z98.890 S/P MAZE OPERATION FOR ATRIAL FIBRILLATION: ICD-10-CM

## 2022-05-05 LAB — POC INR: 1.9 (ref 0.8–1.2)

## 2022-05-05 PROCEDURE — 85610 PROTHROMBIN TIME: CPT | Performed by: PHARMACIST

## 2022-05-05 PROCEDURE — 99211 OFF/OP EST MAY X REQ PHY/QHP: CPT | Performed by: PHARMACIST

## 2022-05-05 PROCEDURE — 36416 COLLJ CAPILLARY BLOOD SPEC: CPT | Performed by: PHARMACIST

## 2022-05-06 RX ORDER — METOPROLOL SUCCINATE 50 MG/1
50 TABLET, EXTENDED RELEASE ORAL 2 TIMES DAILY
Qty: 180 TABLET | Refills: 3 | Status: SHIPPED | OUTPATIENT
Start: 2022-05-06

## 2022-05-06 RX ORDER — METOPROLOL SUCCINATE 25 MG/1
25 TABLET, EXTENDED RELEASE ORAL 2 TIMES DAILY
Qty: 180 TABLET | Refills: 3 | Status: SHIPPED | OUTPATIENT
Start: 2022-05-06

## 2022-05-10 ENCOUNTER — OFFICE VISIT (OUTPATIENT)
Dept: ENT CLINIC | Age: 72
End: 2022-05-10
Payer: MEDICARE

## 2022-05-10 VITALS
TEMPERATURE: 96.6 F | WEIGHT: 216.6 LBS | HEART RATE: 86 BPM | DIASTOLIC BLOOD PRESSURE: 78 MMHG | SYSTOLIC BLOOD PRESSURE: 130 MMHG | BODY MASS INDEX: 39.86 KG/M2 | OXYGEN SATURATION: 96 % | HEIGHT: 62 IN

## 2022-05-10 DIAGNOSIS — Z95.1 S/P CABG (CORONARY ARTERY BYPASS GRAFT): ICD-10-CM

## 2022-05-10 DIAGNOSIS — Z98.890 S/P TRICUSPID VALVE REPAIR: ICD-10-CM

## 2022-05-10 DIAGNOSIS — Z98.890 S/P MAZE OPERATION FOR ATRIAL FIBRILLATION: ICD-10-CM

## 2022-05-10 DIAGNOSIS — D49.7 FOLLICULAR NEOPLASM OF THYROID: Primary | ICD-10-CM

## 2022-05-10 DIAGNOSIS — Z86.79 S/P MAZE OPERATION FOR ATRIAL FIBRILLATION: ICD-10-CM

## 2022-05-10 DIAGNOSIS — Z98.890 S/P MVR (MITRAL VALVE REPAIR): ICD-10-CM

## 2022-05-10 PROCEDURE — 1036F TOBACCO NON-USER: CPT | Performed by: OTOLARYNGOLOGY

## 2022-05-10 PROCEDURE — 3017F COLORECTAL CA SCREEN DOC REV: CPT | Performed by: OTOLARYNGOLOGY

## 2022-05-10 PROCEDURE — G8427 DOCREV CUR MEDS BY ELIG CLIN: HCPCS | Performed by: OTOLARYNGOLOGY

## 2022-05-10 PROCEDURE — 4040F PNEUMOC VAC/ADMIN/RCVD: CPT | Performed by: OTOLARYNGOLOGY

## 2022-05-10 PROCEDURE — G8417 CALC BMI ABV UP PARAM F/U: HCPCS | Performed by: OTOLARYNGOLOGY

## 2022-05-10 PROCEDURE — G8400 PT W/DXA NO RESULTS DOC: HCPCS | Performed by: OTOLARYNGOLOGY

## 2022-05-10 PROCEDURE — 99214 OFFICE O/P EST MOD 30 MIN: CPT | Performed by: OTOLARYNGOLOGY

## 2022-05-10 PROCEDURE — 1090F PRES/ABSN URINE INCON ASSESS: CPT | Performed by: OTOLARYNGOLOGY

## 2022-05-10 PROCEDURE — 1123F ACP DISCUSS/DSCN MKR DOCD: CPT | Performed by: OTOLARYNGOLOGY

## 2022-05-10 NOTE — PROGRESS NOTES
[facility] [place] [facility]  [facility] EAR, NOSE AND THROAT  [address] [place] [address] [place] [address]  Dept: [phone]  Dept Fax: [phone]  Loc: [phone]    Reyesside is a 67 y.o. female who was referred by No ref. provider found for:    Chief Complaint   Patient presents with    Follow-up     Patient is here for f/u after NM Thyroid Metastases Scan. HPI:     Reyesside is a 67 y.o. female referred several months ago for evaluation and treatment of a follicular lesion of the right hemithyroid. She is now status post additional work-up that includes a nuclear medicine scan demonstrating a complex goitrous mass of the right hemithyroid including a photopenic area of the central gland concerning for neoplasm. This area would constitute a cold nodule. In addition the patient's thyroglobulin level is 10 times normal at 300. The patient comes in to discuss these findings and to plan surgery if she agrees to proceed as recommended. History:      Allergies   Allergen Reactions    Biaxin [Clarithromycin] Nausea Only    Doxycycline Other (See Comments)     GI side effects    Keflex [Cephalexin] Other (See Comments)     Tongue Swelling    Norvasc [Amlodipine Besylate] Other (See Comments)     Nasuea, hot flashes    Statins Other (See Comments)     Myalgias all    Zetia [Ezetimibe] Other (See Comments)     Chest pain    Zocor [Simvastatin] Other (See Comments)     Myalgias     Current Outpatient Medications   Medication Sig Dispense Refill    metoprolol succinate (TOPROL XL) 50 MG extended release tablet Take 1 tablet by mouth 2 times daily Take along with 25 mg BID to make 75 mg BID. 180 tablet 3    metoprolol succinate (TOPROL XL) 25 MG extended release tablet Take 1 tablet by mouth 2 times daily Take along with 50 mg BID to make 75 mg BID. 180 tablet 3    furosemide (LASIX) 40 MG tablet Take 1 tablet by mouth daily 90 tablet 3    sacubitril-valsartan (ENTRESTO) 49-51 MG per tablet Take 1 tablet by mouth 2 times daily 180 tablet 3    potassium chloride (KLOR-CON M) 20 MEQ extended release tablet Take 1 tablet by mouth daily 90 tablet 3    cetirizine (ZYRTEC) 10 MG tablet Take 10 mg by mouth daily      warfarin (COUMADIN) 2 MG tablet Take as directed by [facility] (90 tablets = 30 days) 90 tablet 2    omeprazole (PRILOSEC) 20 MG delayed release capsule Take 20 mg by mouth daily      acetaminophen (TYLENOL) 325 MG tablet Take 325-650 mg by mouth every 6 hours as needed for Pain or Fever Don't take more than 3,000 mg each day.  aspirin 81 MG chewable tablet Take 81 mg by mouth daily       No current facility-administered medications for this visit. Past Medical History:   Diagnosis Date    Allergic rhinitis     Angiolipoma of right kidney     ASHD (arteriosclerotic heart disease)     Atrial fibrillation (HCC)     Celiac disease     DM2 (diabetes mellitus, type 2) (HCC)     Eczema     GERD (gastroesophageal reflux disease)     Heart failure with reduced ejection fraction (HCC)     History of ST elevation myocardial infarction (STEMI)     Hyperlipidemia     Hypertension     Ischemic cardiomyopathy     Meningioma ([facility] [address] 2012    Noted MRI . Never did f/u MRI. Told she didn't need to. Has 0 sxs. Denies HA, vision changes, lateralizing numbness or weakness.  Mixed hearing loss 2013    Obesity (BMI 30-39. 9)     Osteoarthritis     Post-menopausal     S/P CABG (coronary artery bypass graft)     S/P Maze operation for atrial fibrillation     S/P MVR (mitral valve repair)     S/P tricuspid valve repair     Thrombocytopenia [facility])       Past Surgical History:   Procedure Laterality Date     SECTION  1972    CHOLECYSTECTOMY      COLONOSCOPY  2006    [facility]ON[facility]RAFT  2021    [facility]    [facility]AL VAL[facility]PAIR  2021    [facility]    MYRINGOTOMY AND TYMPANOSTOMY TUBE PLACEMENT  12/07/2012    Dr Eliezer Sims OTHER SURGICAL HISTORY  11/2021    MAZE Procedure during CABG and MVR/TVR at [address]  11/2021    TV Repair NOV 2021 at [address] UPPER GASTROINTESTINAL ENDOSCOPY  05/2006     Family History   Problem Relation Age of Onset    Heart Disease Mother     Lung Cancer Mother     Osteoporosis Mother     Other Father         Did not know her father.  Other Maternal Grandmother         blood clot, no clear hx surrounding    Heart Attack Maternal Grandfather     Heart Attack Maternal Uncle 62    Breast Cancer Maternal Aunt 50    Breast Cancer Maternal Cousin 40     Social History     Tobacco Use    Smoking status: Passive Smoke Exposure - Never Smoker    Smokeless tobacco: Never Used   Substance Use Topics    Alcohol use: No        Subjective:      Review of Systems  Rest of review of systems are negative, except as noted in HPI. Objective:     /78 (Site: Right Upper Arm, Position: Sitting)   Pulse 86   Temp 96.6 °F (35.9 °C) (Infrared)   Ht 5' 2\" (1.575 m)   Wt 216 lb 9.6 oz (98.2 kg)   SpO2 96%   BMI 39.62 kg/m²     Physical Exam           Vitals reviewed. NM THYROID METASTASES SCAN WHOLE BODY    Result Date: 4/20/2022  1. Activity in a bulky and enlarged intact thyroid gland. 2. Limited evaluation of potential metastatic disease outside the neck.  Final report electronically signed by Dr. Marietta Mcmahon on 4/20/2022 11:45 AM     Lab Results   Component Value Date     04/04/2022     03/16/2022     02/28/2022    K 4.5 04/04/2022    K 4.4 03/16/2022    K 4.2 02/28/2022     04/04/2022     03/16/2022     02/28/2022    CO2 28 04/04/2022    CO2 29 03/16/2022    CO2 28 02/28/2022    BUN 19 04/04/2022    BUN 17 03/16/2022    BUN 15 02/28/2022    CREATININE 0.7 04/04/2022    CREATININE 0.7 03/16/2022    CREATININE 0.7 02/28/2022    CALCIUM 9.8 04/04/2022    CALCIUM 9.7 03/16/2022 CALCIUM 9.8 02/28/2022    PROT 7.7 11/05/2021    PROT 7.0 10/27/2021    PROT 7.4 10/23/2020    LABALBU 4.6 11/05/2021    LABALBU 4.5 10/27/2021    LABALBU 4.3 10/23/2020    BILITOT 0.4 11/05/2021    BILITOT 0.5 10/27/2021    BILITOT 0.4 10/23/2020    ALKPHOS 79 11/05/2021    ALKPHOS 73 10/27/2021    ALKPHOS 56 10/23/2020    ALKPHOS 70 10/14/2019    ALKPHOS 67 10/10/2018    ALKPHOS 71 11/04/2016    AST 27 11/05/2021    AST 45 10/27/2021    AST 24 10/23/2020    ALT 34 11/05/2021    ALT 21 10/27/2021    ALT 23 10/23/2020       All of the past medical history, past surgical history, family history,social history, allergies and current medications were reviewed with the patient. Assessment & Plan   Diagnoses and all orders for this visit:     Diagnosis Orders   1. Follicular neoplasm of thyroid     2. S/P CABG (coronary artery bypass graft)     3. S/P Maze operation for atrial fibrillation     4. S/P MVR (mitral valve repair)     5. S/P tricuspid valve repair         After reviewing the test results with the patient and discussing the pros and cons of proceeding with a right hemithyroidectomy, the patient asked appropriate questions regarding her being cleared for surgery by cardiology, which has occurred and plans to observe her overnight to make sure she is having no complications from the general anesthetic as well. Based on her understanding of my recommendations and of the risks that include medical complications, bleeding, infection, and need for further surgery in the event that this is found to be cancer, the patient reported understanding these risks and wishes to proceed. Informed consent was based on these conversations including today's and the prior clinic's visit as well as my phone conversation with the patient over the weekend. We will proceed as planned.     I spent over 30 minutes of face-to-face time with the patient and her  the majority of which was dedicated to reviewing these factors and planning surgical treatment in the near future. Return in about 4 weeks (around 6/7/2022) for 1 week postop visit for follow-up care and to remove her wound drain. **This report has been created using voice recognition software. It may contain minor errors which are inherent in voice recognition technology. **

## 2022-05-12 ENCOUNTER — TELEPHONE (OUTPATIENT)
Dept: ENT CLINIC | Age: 72
End: 2022-05-12

## 2022-05-12 NOTE — TELEPHONE ENCOUNTER
It looks as though Dr Morris Ching is planning a right Hemithyroidectomy. I am holding time for her on 6/23/22. Please place surgery orders. Thank you!

## 2022-05-18 ENCOUNTER — PREP FOR PROCEDURE (OUTPATIENT)
Dept: ENT CLINIC | Age: 72
End: 2022-05-18

## 2022-05-23 ENCOUNTER — HOSPITAL ENCOUNTER (OUTPATIENT)
Dept: PHARMACY | Age: 72
Setting detail: THERAPIES SERIES
Discharge: HOME | End: 2022-05-23
Payer: MEDICARE

## 2022-05-23 DIAGNOSIS — Z98.890 S/P MAZE OPERATION FOR ATRIAL FIBRILLATION: ICD-10-CM

## 2022-05-23 DIAGNOSIS — Z79.01 ANTICOAGULATED ON COUMADIN: ICD-10-CM

## 2022-05-23 DIAGNOSIS — Z86.79 S/P MAZE OPERATION FOR ATRIAL FIBRILLATION: ICD-10-CM

## 2022-05-23 DIAGNOSIS — Z51.81 ENCOUNTER FOR THERAPEUTIC DRUG MONITORING: ICD-10-CM

## 2022-05-23 DIAGNOSIS — I48.91 ATRIAL FIBRILLATION, UNSPECIFIED TYPE (HCC): Primary | ICD-10-CM

## 2022-05-23 LAB — POC INR: 1.9 (ref 0.8–1.2)

## 2022-05-23 PROCEDURE — 99211 OFF/OP EST MAY X REQ PHY/QHP: CPT

## 2022-05-23 PROCEDURE — 85610 PROTHROMBIN TIME: CPT

## 2022-05-23 PROCEDURE — 36416 COLLJ CAPILLARY BLOOD SPEC: CPT

## 2022-05-23 NOTE — PROGRESS NOTES
Medication Management [address]   [phone] (phone)  [phone] (fax)    Ms. Maria Del Carmen Solorzano is a 67 y.o.  female with history of Afib who presents today for anticoagulation monitoring and adjustment. Patient verifies current dosing regimen and tablet strength. No missed or extra doses. Patient denies s/s bleeding/bruising/swelling/SOB/chest pain-usual SOB  No blood in urine or stool. No dietary changes. No changes in medication/OTC agents/Herbals. No change in alcohol use or tobacco use. No change in activity level. Patient denies headaches/dizziness/lightheadedness/falls. No vomiting/diarrhea or acute illness. No Procedures scheduled in the future at this time. States scheduled for thyroid surgery 6/23. Dr Wilian Mcnair approved 5 day hold of Coumadin. Patient did NOT bridge for procedure 12/2021    Assessment:   Lab Results   Component Value Date    INR 1.90 (H) 05/23/2022    INR 1.90 (H) 05/05/2022    INR 3.00 (H) 04/18/2022     INR subtherapeutic   Recent Labs     05/23/22  1420   INR 1.90*     2nd consecutive subtherapeutic INR    Plan:  Increase Coumadin 6 mg daily (5% increase). Recheck INR in 3 week(s) per patient preference. Patient reminded to call the Anticoagulation Clinic with signs or symptoms of bleeding or with any medication changes. Patient given instructions utilizing the teach back method. After visit summary printed and reviewed with patient. Discharged ambulatory in no apparent distress.         For Pharmacy Admin Tracking Only     Intervention Detail: Dose Adjustment: 1, reason: Therapy Optimization   Total # of Interventions Recommended: 1   Total # of Interventions Accepted: 1   Time Spent (min): 8623 Griffin Romero, PharmD, BCPS  5/23/2022  2:28 PM Referring Provider (Optional): Ledy

## 2022-05-24 DIAGNOSIS — D49.7 FOLLICULAR NEOPLASM OF THYROID: Primary | ICD-10-CM

## 2022-05-25 ENCOUNTER — PREP FOR PROCEDURE (OUTPATIENT)
Dept: ENT CLINIC | Age: 72
End: 2022-05-25

## 2022-05-25 DIAGNOSIS — Z01.818 PRE-OP TESTING: Primary | ICD-10-CM

## 2022-06-13 ENCOUNTER — HOSPITAL ENCOUNTER (OUTPATIENT)
Dept: PHARMACY | Age: 72
Setting detail: THERAPIES SERIES
Discharge: HOME | End: 2022-06-13
Payer: MEDICARE

## 2022-06-13 DIAGNOSIS — Z98.890 S/P MAZE OPERATION FOR ATRIAL FIBRILLATION: Primary | ICD-10-CM

## 2022-06-13 DIAGNOSIS — Z86.79 S/P MAZE OPERATION FOR ATRIAL FIBRILLATION: Primary | ICD-10-CM

## 2022-06-13 DIAGNOSIS — Z51.81 ENCOUNTER FOR THERAPEUTIC DRUG MONITORING: ICD-10-CM

## 2022-06-13 DIAGNOSIS — I48.91 ATRIAL FIBRILLATION, UNSPECIFIED TYPE (HCC): ICD-10-CM

## 2022-06-13 DIAGNOSIS — Z79.01 ANTICOAGULATED ON COUMADIN: ICD-10-CM

## 2022-06-13 LAB — POC INR: 2.2 (ref 0.8–1.2)

## 2022-06-13 PROCEDURE — 99212 OFFICE O/P EST SF 10 MIN: CPT

## 2022-06-13 PROCEDURE — 85610 PROTHROMBIN TIME: CPT

## 2022-06-13 PROCEDURE — 36416 COLLJ CAPILLARY BLOOD SPEC: CPT

## 2022-06-13 RX ORDER — WARFARIN SODIUM 3 MG/1
TABLET ORAL
Qty: 60 TABLET | Refills: 11 | Status: SHIPPED | OUTPATIENT
Start: 2022-06-13

## 2022-06-13 NOTE — PROGRESS NOTES
Medication Management [address]  [phone] (phone)  [phone] (fax)    Ms. Maria Del Carmen Solorzano is a 67 y.o.  female with history of Afib who presents today for anticoagulation monitoring and adjustment. Patient verifies current dosing regimen and tablet strength. No missed or extra doses. Patient denies s/s bleeding/bruising/swelling/SOB/chest pain  No blood in urine or stool. No dietary changes. No changes in medication/OTC agents/Herbals. States that she takes oreganol and [employer] every once in a while. No change in alcohol use or tobacco use. No change in activity level. Patient denies headaches/dizziness/lightheadedness/falls. No vomiting/diarrhea or acute illness. No Procedures scheduled in the future at this time. Right Hemithyroidectomy on 6/23/22. Needs to stop coumadin 5 days before surgery. Did NOT bridge for procedure in December 2021    Patient requested a new prescription for warfarin. Discussed extensively with the patients about changing her tablet strength from 2 mg to 3 mg tablets in order to decrease the number of tablets she takes per day. Patient agreed. Sent an Rx to [facility] for warfarin 3 mg tablets, also called and cancelled the script for 2 mg tablet if there were any refills remaining. Again, discussed extensively with the patient about changing the number of tablets that she is taking. She understood and repeated back.  was also in the room listening to the instructions. Patient interview completed and discussed with pharmacist by NICCI Garcia PharmD Candidate. Assessment:   Lab Results   Component Value Date    INR 2.20 (H) 06/13/2022    INR 1.90 (H) 05/23/2022    INR 1.90 (H) 05/05/2022     INR therapeutic   Recent Labs     06/13/22  1459   INR 2.20*         Plan:  Continue Coumadin 6 mg daily; hold 6/18-6/23; coumadin 12 mg x 1, coumadin 9 mg x 2 then continue coumadin 6 mg daily. Recheck INR in 2 week(s). Patient reminded to call the Anticoagulation Clinic with any signs or symptoms of bleeding or with any medication changes. Patient given instructions utilizing the teach back method. After visit summary printed and reviewed with patient. Discharged ambulatory in no apparent distress. [id number] Score for Atrial Fibrillation Stroke Risk   Risk   Factors  Component Value   C CHF Yes 1   H HTN Yes 1   A2 Age >= 76 No,  (77 y.o.) 0   D DM Yes 1   S2 Prior Stroke/TIA No 0   V Vascular Disease Yes 1   A Age 74-69 Yes,  (77 y.o.) 1   Sc Sex female 1    [id number]  Score  6   Score last updated 6/13/22 3:11 PM EDT    Click here for a link to the UpToDate guideline \"Atrial Fibrillation: Anticoagulation therapy to prevent embolization    Disclaimer: Risk Score calculation is dependent on accuracy of patient problem list and past encounter diagnosis.     For Pharmacy Admin Tracking Only     Intervention Detail: Dose Adjustment: 1, reason: Therapy Optimization and Refill(s) Provided   Total # of Interventions Recommended: 2   Total # of Interventions Accepted: 2   Time Spent (min): 202 Yang Chance, PharmD, BCPS  6/13/2022  3:59 PM

## 2022-06-15 ENCOUNTER — TELEPHONE (OUTPATIENT)
Dept: ONCOLOGY | Age: 72
End: 2022-06-15

## 2022-06-15 ENCOUNTER — TELEPHONE (OUTPATIENT)
Dept: ENT CLINIC | Age: 72
End: 2022-06-15

## 2022-06-15 ENCOUNTER — NURSE ONLY (OUTPATIENT)
Dept: LAB | Age: 72
End: 2022-06-15

## 2022-06-15 DIAGNOSIS — D69.6 THROMBOCYTOPENIA (HCC): Primary | ICD-10-CM

## 2022-06-15 DIAGNOSIS — Z01.818 PRE-OP TESTING: ICD-10-CM

## 2022-06-15 LAB
ALBUMIN SERPL-MCNC: 4.5 G/DL (ref 3.5–5.1)
ALP BLD-CCNC: 90 U/L (ref 38–126)
ALT SERPL-CCNC: 13 U/L (ref 11–66)
ANION GAP SERPL CALCULATED.3IONS-SCNC: 13 MEQ/L (ref 8–16)
AST SERPL-CCNC: 15 U/L (ref 5–40)
BASOPHILS # BLD: 1 %
BASOPHILS ABSOLUTE: 0.1 THOU/MM3 (ref 0–0.1)
BILIRUB SERPL-MCNC: 0.3 MG/DL (ref 0.3–1.2)
BUN BLDV-MCNC: 19 MG/DL (ref 7–22)
CALCIUM SERPL-MCNC: 9.7 MG/DL (ref 8.5–10.5)
CHLORIDE BLD-SCNC: 103 MEQ/L (ref 98–111)
CO2: 28 MEQ/L (ref 23–33)
CREAT SERPL-MCNC: 0.7 MG/DL (ref 0.4–1.2)
EOSINOPHIL # BLD: 4.1 %
EOSINOPHILS ABSOLUTE: 0.2 THOU/MM3 (ref 0–0.4)
ERYTHROCYTE [DISTWIDTH] IN BLOOD BY AUTOMATED COUNT: 12.3 % (ref 11.5–14.5)
ERYTHROCYTE [DISTWIDTH] IN BLOOD BY AUTOMATED COUNT: 44.2 FL (ref 35–45)
GFR SERPL CREATININE-BSD FRML MDRD: 82 ML/MIN/1.73M2
GLUCOSE BLD-MCNC: 111 MG/DL (ref 70–108)
HCT VFR BLD CALC: 44.7 % (ref 37–47)
HEMOGLOBIN: 14.5 GM/DL (ref 12–16)
IMMATURE GRANS (ABS): 0.02 THOU/MM3 (ref 0–0.07)
IMMATURE GRANULOCYTES: 0.3 %
LYMPHOCYTES # BLD: 24.9 %
LYMPHOCYTES ABSOLUTE: 1.4 THOU/MM3 (ref 1–4.8)
MCH RBC QN AUTO: 31.7 PG (ref 26–33)
MCHC RBC AUTO-ENTMCNC: 32.4 GM/DL (ref 32.2–35.5)
MCV RBC AUTO: 97.6 FL (ref 81–99)
MONOCYTES # BLD: 5.7 %
MONOCYTES ABSOLUTE: 0.3 THOU/MM3 (ref 0.4–1.3)
NUCLEATED RED BLOOD CELLS: 0 /100 WBC
PLATELET # BLD: 69 THOU/MM3 (ref 130–400)
PLATELET ESTIMATE: ABNORMAL
PMV BLD AUTO: 14.3 FL (ref 9.4–12.4)
POTASSIUM SERPL-SCNC: 4.6 MEQ/L (ref 3.5–5.2)
RBC # BLD: 4.58 MILL/MM3 (ref 4.2–5.4)
SCAN OF BLOOD SMEAR: NORMAL
SEG NEUTROPHILS: 64 %
SEGMENTED NEUTROPHILS ABSOLUTE COUNT: 3.7 THOU/MM3 (ref 1.8–7.7)
SODIUM BLD-SCNC: 144 MEQ/L (ref 135–145)
TOTAL PROTEIN: 6.5 G/DL (ref 6.1–8)
WBC # BLD: 5.8 THOU/MM3 (ref 4.8–10.8)

## 2022-06-15 NOTE — TELEPHONE ENCOUNTER
Swati had her pre surgical testing done today and her platelets are still low at 69 (range 130-400). Dottie Arzate in Pre admission called to inform Dr Sonal Jaramillo. She is asking if he wants her to get platelets done the morning of surgery? Typically they have them come in 4 hours prior to surgery if so. She is scheduled on next Thursday 6/23/22 for a Partial Thyroidectomy    Please advise.

## 2022-06-15 NOTE — TELEPHONE ENCOUNTER
Patient called on Tuesday 6/15/22 and would like you to call her. Patient is having a a hemithyroidectomy on 6/23/22 and her platelet count on Tuesday was 69.  She would like to discuss this with you. [phone]

## 2022-06-15 NOTE — TELEPHONE ENCOUNTER
Patient is having hemithyroidectomy on 6/23 and current platelet count 74,917 - Do you have any recommendations in preparation for surgery?

## 2022-06-15 NOTE — PROGRESS NOTES
CBC today 6/15/22- Platelets still low at 69. I notified Erika at Dr. Syed Martínez' office- she will notify Dr. Syed Martínez.

## 2022-06-16 NOTE — TELEPHONE ENCOUNTER
Crystal - patient had CBC completed on 6/15/22 showing platelet count 74,221. Will have her repeat CBC tomorrow on 6/17/22. Discussed with patient. Further plan based off CBC on 6/17/22.

## 2022-06-16 NOTE — TELEPHONE ENCOUNTER
Called and discussed with patient. Recommend repeat CBC tomorrow on 6/17/22. Discussed will plan further recommendations based off CBC.  She expressed understanding and will have CBC completed

## 2022-06-17 ENCOUNTER — NURSE ONLY (OUTPATIENT)
Dept: LAB | Age: 72
End: 2022-06-17

## 2022-06-17 ENCOUNTER — PREP FOR PROCEDURE (OUTPATIENT)
Dept: ENT CLINIC | Age: 72
End: 2022-06-17

## 2022-06-17 DIAGNOSIS — D69.6 THROMBOCYTOPENIA (HCC): ICD-10-CM

## 2022-06-17 LAB
BASOPHILS # BLD: 1 %
BASOPHILS ABSOLUTE: 0.1 THOU/MM3 (ref 0–0.1)
EOSINOPHIL # BLD: 3.9 %
EOSINOPHILS ABSOLUTE: 0.2 THOU/MM3 (ref 0–0.4)
ERYTHROCYTE [DISTWIDTH] IN BLOOD BY AUTOMATED COUNT: 12.3 % (ref 11.5–14.5)
ERYTHROCYTE [DISTWIDTH] IN BLOOD BY AUTOMATED COUNT: 44 FL (ref 35–45)
HCT VFR BLD CALC: 45 % (ref 37–47)
HEMOGLOBIN: 14.5 GM/DL (ref 12–16)
IMMATURE GRANS (ABS): 0.02 THOU/MM3 (ref 0–0.07)
IMMATURE GRANULOCYTES: 0.3 %
LYMPHOCYTES # BLD: 24 %
LYMPHOCYTES ABSOLUTE: 1.4 THOU/MM3 (ref 1–4.8)
MCH RBC QN AUTO: 31.5 PG (ref 26–33)
MCHC RBC AUTO-ENTMCNC: 32.2 GM/DL (ref 32.2–35.5)
MCV RBC AUTO: 97.6 FL (ref 81–99)
MONOCYTES # BLD: 6.2 %
MONOCYTES ABSOLUTE: 0.4 THOU/MM3 (ref 0.4–1.3)
NUCLEATED RED BLOOD CELLS: 0 /100 WBC
PLATELET # BLD: 70 THOU/MM3 (ref 130–400)
PMV BLD AUTO: 14.3 FL (ref 9.4–12.4)
RBC # BLD: 4.61 MILL/MM3 (ref 4.2–5.4)
SEG NEUTROPHILS: 64.6 %
SEGMENTED NEUTROPHILS ABSOLUTE COUNT: 3.7 THOU/MM3 (ref 1.8–7.7)
WBC # BLD: 5.8 THOU/MM3 (ref 4.8–10.8)

## 2022-06-17 NOTE — TELEPHONE ENCOUNTER
Lab received: platelet count 09,555  Lab Results   Component Value Date    WBC 5.8 06/17/2022    HGB 14.5 06/17/2022    HCT 45.0 06/17/2022    MCV 97.6 06/17/2022    PLT 70 (L) 06/17/2022     Reviewed with Dr. Dari Garrison, recommend obtaining sample in anticoagulation tube to rule out clumping. Order placed. Called and discussed with patient.

## 2022-06-20 ENCOUNTER — NURSE ONLY (OUTPATIENT)
Dept: LAB | Age: 72
End: 2022-06-20

## 2022-06-20 DIAGNOSIS — D69.6 THROMBOCYTOPENIA (HCC): ICD-10-CM

## 2022-06-20 LAB — PLATELET # BLD: 69 THOU/MM3 (ref 130–400)

## 2022-06-22 LAB
ABO: NORMAL
RH FACTOR: NORMAL

## 2022-06-22 PROCEDURE — APPNB45 APP NON BILLABLE 31-45 MINUTES: Performed by: NURSE PRACTITIONER

## 2022-06-22 RX ORDER — SODIUM CHLORIDE 0.9 % (FLUSH) 0.9 %
5-40 SYRINGE (ML) INJECTION EVERY 12 HOURS SCHEDULED
Status: CANCELLED | OUTPATIENT
Start: 2022-06-22

## 2022-06-22 RX ORDER — SODIUM CHLORIDE 0.9 % (FLUSH) 0.9 %
5-40 SYRINGE (ML) INJECTION PRN
Status: CANCELLED | OUTPATIENT
Start: 2022-06-22

## 2022-06-22 RX ORDER — DEXAMETHASONE SODIUM PHOSPHATE 4 MG/ML
8 INJECTION, SOLUTION INTRA-ARTICULAR; INTRALESIONAL; INTRAMUSCULAR; INTRAVENOUS; SOFT TISSUE
Status: CANCELLED | OUTPATIENT
Start: 2022-06-22

## 2022-06-22 RX ORDER — SODIUM CHLORIDE 9 MG/ML
INJECTION, SOLUTION INTRAVENOUS PRN
Status: CANCELLED | OUTPATIENT
Start: 2022-06-22

## 2022-06-22 NOTE — TELEPHONE ENCOUNTER
I spoke to [address] [place] [address] and she will be contacting the blood bank and putting in the orders for platelets prior to surgery.  The patient has been contacted and is aware to arrive at 6:00am for platelets prior to surgery at 10:00am.

## 2022-06-23 ENCOUNTER — ANESTHESIA (OUTPATIENT)
Dept: OPERATING ROOM | Age: 72
End: 2022-06-23
Payer: MEDICARE

## 2022-06-23 ENCOUNTER — ANESTHESIA EVENT (OUTPATIENT)
Dept: OPERATING ROOM | Age: 72
End: 2022-06-23
Payer: MEDICARE

## 2022-06-23 ENCOUNTER — HOSPITAL ENCOUNTER (OUTPATIENT)
Age: 72
Setting detail: OBSERVATION
Discharge: HOME | End: 2022-06-24
Attending: OTOLARYNGOLOGY
Payer: MEDICARE

## 2022-06-23 ENCOUNTER — TELEPHONE (OUTPATIENT)
Dept: PHARMACY | Age: 72
End: 2022-06-23

## 2022-06-23 DIAGNOSIS — D44.0 FOLLICULAR TUMOR OF THYROID GLAND (UNCERTAIN IF BENIGN OR MALIGNANT): ICD-10-CM

## 2022-06-23 DIAGNOSIS — G89.18 ACUTE POST-OPERATIVE PAIN: Primary | ICD-10-CM
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
## 2022-06-23 LAB
ABO: NORMAL
ANTIBODY SCREEN: NORMAL
CALCIUM SERPL-MCNC: 9.4 MG/DL (ref 8.5–10.5)
EKG ATRIAL RATE: 74 BPM
EKG P AXIS: 60 DEGREES
EKG P-R INTERVAL: 174 MS
EKG Q-T INTERVAL: 424 MS
EKG QRS DURATION: 80 MS
EKG QTC CALCULATION (BAZETT): 470 MS
EKG R AXIS: -36 DEGREES
EKG T AXIS: 128 DEGREES
EKG VENTRICULAR RATE: 74 BPM
GLUCOSE BLD-MCNC: 135 MG/DL (ref 70–108)
INR BLD: 1 (ref 0.85–1.13)
POTASSIUM REFLEX MAGNESIUM: 3.9 MEQ/L (ref 3.5–5.2)
RH FACTOR: NORMAL

## 2022-06-23 PROCEDURE — 36415 COLL VENOUS BLD VENIPUNCTURE: CPT

## 2022-06-23 PROCEDURE — 3700000001 HC ADD 15 MINUTES (ANESTHESIA): Performed by: OTOLARYNGOLOGY

## 2022-06-23 PROCEDURE — 93010 ELECTROCARDIOGRAM REPORT: CPT | Performed by: NUCLEAR MEDICINE

## 2022-06-23 PROCEDURE — 7100000000 HC PACU RECOVERY - FIRST 15 MIN: Performed by: OTOLARYNGOLOGY

## 2022-06-23 PROCEDURE — 7100000010 HC PHASE II RECOVERY - FIRST 15 MIN: Performed by: OTOLARYNGOLOGY

## 2022-06-23 PROCEDURE — 7100000011 HC PHASE II RECOVERY - ADDTL 15 MIN: Performed by: OTOLARYNGOLOGY

## 2022-06-23 PROCEDURE — 84132 ASSAY OF SERUM POTASSIUM: CPT

## 2022-06-23 PROCEDURE — 2720000010 HC SURG SUPPLY STERILE: Performed by: OTOLARYNGOLOGY

## 2022-06-23 PROCEDURE — 93005 ELECTROCARDIOGRAM TRACING: CPT | Performed by: OTOLARYNGOLOGY

## 2022-06-23 PROCEDURE — G0378 HOSPITAL OBSERVATION PER HR: HCPCS

## 2022-06-23 PROCEDURE — 99220 PR INITIAL OBSERVATION CARE/DAY 70 MINUTES: CPT | Performed by: PHYSICIAN ASSISTANT

## 2022-06-23 PROCEDURE — 6360000002 HC RX W HCPCS: Performed by: NURSE ANESTHETIST, CERTIFIED REGISTERED

## 2022-06-23 PROCEDURE — 6370000000 HC RX 637 (ALT 250 FOR IP): Performed by: OTOLARYNGOLOGY

## 2022-06-23 PROCEDURE — 6360000002 HC RX W HCPCS: Performed by: ANESTHESIOLOGY

## 2022-06-23 PROCEDURE — P9037 PLATE PHERES LEUKOREDU IRRAD: HCPCS

## 2022-06-23 PROCEDURE — 60220 PARTIAL REMOVAL OF THYROID: CPT | Performed by: OTOLARYNGOLOGY

## 2022-06-23 PROCEDURE — 85610 PROTHROMBIN TIME: CPT

## 2022-06-23 PROCEDURE — 6360000002 HC RX W HCPCS

## 2022-06-23 PROCEDURE — 86900 BLOOD TYPING SEROLOGIC ABO: CPT

## 2022-06-23 PROCEDURE — 3700000000 HC ANESTHESIA ATTENDED CARE: Performed by: OTOLARYNGOLOGY

## 2022-06-23 PROCEDURE — A4216 STERILE WATER/SALINE, 10 ML: HCPCS | Performed by: OTOLARYNGOLOGY

## 2022-06-23 PROCEDURE — 82310 ASSAY OF CALCIUM: CPT

## 2022-06-23 PROCEDURE — 7100000001 HC PACU RECOVERY - ADDTL 15 MIN: Performed by: OTOLARYNGOLOGY

## 2022-06-23 PROCEDURE — 36430 TRANSFUSION BLD/BLD COMPNT: CPT

## 2022-06-23 PROCEDURE — 3600000013 HC SURGERY LEVEL 3 ADDTL 15MIN: Performed by: OTOLARYNGOLOGY

## 2022-06-23 PROCEDURE — 2709999900 HC NON-CHARGEABLE SUPPLY: Performed by: OTOLARYNGOLOGY

## 2022-06-23 PROCEDURE — 2580000003 HC RX 258: Performed by: OTOLARYNGOLOGY

## 2022-06-23 PROCEDURE — 2580000003 HC RX 258: Performed by: NURSE PRACTITIONER

## 2022-06-23 PROCEDURE — 3600000003 HC SURGERY LEVEL 3 BASE: Performed by: OTOLARYNGOLOGY

## 2022-06-23 PROCEDURE — 6360000002 HC RX W HCPCS: Performed by: OTOLARYNGOLOGY

## 2022-06-23 PROCEDURE — 82948 REAGENT STRIP/BLOOD GLUCOSE: CPT

## 2022-06-23 PROCEDURE — 83519 RIA NONANTIBODY: CPT

## 2022-06-23 PROCEDURE — 88307 TISSUE EXAM BY PATHOLOGIST: CPT

## 2022-06-23 PROCEDURE — 2500000003 HC RX 250 WO HCPCS: Performed by: NURSE ANESTHETIST, CERTIFIED REGISTERED

## 2022-06-23 PROCEDURE — 88305 TISSUE EXAM BY PATHOLOGIST: CPT

## 2022-06-23 PROCEDURE — 86901 BLOOD TYPING SEROLOGIC RH(D): CPT

## 2022-06-23 PROCEDURE — 6370000000 HC RX 637 (ALT 250 FOR IP): Performed by: PHYSICIAN ASSISTANT

## 2022-06-23 PROCEDURE — 86850 RBC ANTIBODY SCREEN: CPT

## 2022-06-23 PROCEDURE — 2580000003 HC RX 258: Performed by: PHYSICIAN ASSISTANT

## 2022-06-23 RX ORDER — ACETAMINOPHEN 325 MG/1
650 TABLET ORAL EVERY 6 HOURS PRN
Status: DISCONTINUED | OUTPATIENT
Start: 2022-06-23 | End: 2022-06-24 | Stop reason: HOSPADM

## 2022-06-23 RX ORDER — SODIUM CHLORIDE 0.9 % (FLUSH) 0.9 %
10 SYRINGE (ML) INJECTION PRN
Status: DISCONTINUED | OUTPATIENT
Start: 2022-06-23 | End: 2022-06-24 | Stop reason: HOSPADM

## 2022-06-23 RX ORDER — SODIUM CHLORIDE 9 MG/ML
INJECTION, SOLUTION INTRAVENOUS PRN
Status: COMPLETED | OUTPATIENT
Start: 2022-06-23 | End: 2022-06-23

## 2022-06-23 RX ORDER — METOPROLOL SUCCINATE 25 MG/1
25 TABLET, EXTENDED RELEASE ORAL 2 TIMES DAILY
Status: DISCONTINUED | OUTPATIENT
Start: 2022-06-23 | End: 2022-06-24 | Stop reason: HOSPADM

## 2022-06-23 RX ORDER — LIDOCAINE HYDROCHLORIDE 20 MG/ML
INJECTION, SOLUTION INTRAVENOUS PRN
Status: DISCONTINUED | OUTPATIENT
Start: 2022-06-23 | End: 2022-06-23 | Stop reason: SDUPTHER

## 2022-06-23 RX ORDER — SODIUM CHLORIDE 0.9 % (FLUSH) 0.9 %
5-40 SYRINGE (ML) INJECTION EVERY 12 HOURS SCHEDULED
Status: DISCONTINUED | OUTPATIENT
Start: 2022-06-23 | End: 2022-06-23 | Stop reason: HOSPADM

## 2022-06-23 RX ORDER — PROPOFOL 10 MG/ML
INJECTION, EMULSION INTRAVENOUS PRN
Status: DISCONTINUED | OUTPATIENT
Start: 2022-06-23 | End: 2022-06-23 | Stop reason: SDUPTHER

## 2022-06-23 RX ORDER — HYDROCODONE BITARTRATE AND ACETAMINOPHEN 5; 325 MG/1; MG/1
1 TABLET ORAL EVERY 6 HOURS PRN
Status: DISCONTINUED | OUTPATIENT
Start: 2022-06-23 | End: 2022-06-24 | Stop reason: HOSPADM

## 2022-06-23 RX ORDER — GINSENG 100 MG
CAPSULE ORAL PRN
Status: DISCONTINUED | OUTPATIENT
Start: 2022-06-23 | End: 2022-06-23 | Stop reason: ALTCHOICE

## 2022-06-23 RX ORDER — DEXAMETHASONE SODIUM PHOSPHATE 4 MG/ML
8 INJECTION, SOLUTION INTRA-ARTICULAR; INTRALESIONAL; INTRAMUSCULAR; INTRAVENOUS; SOFT TISSUE
Status: DISCONTINUED | OUTPATIENT
Start: 2022-06-23 | End: 2022-06-23 | Stop reason: HOSPADM

## 2022-06-23 RX ORDER — SUCCINYLCHOLINE/SOD CL,ISO/PF 200MG/10ML
SYRINGE (ML) INTRAVENOUS PRN
Status: DISCONTINUED | OUTPATIENT
Start: 2022-06-23 | End: 2022-06-23 | Stop reason: SDUPTHER

## 2022-06-23 RX ORDER — ACETAMINOPHEN 650 MG/1
650 SUPPOSITORY RECTAL EVERY 6 HOURS PRN
Status: DISCONTINUED | OUTPATIENT
Start: 2022-06-23 | End: 2022-06-24 | Stop reason: HOSPADM

## 2022-06-23 RX ORDER — POTASSIUM CHLORIDE 20 MEQ/1
20 TABLET, EXTENDED RELEASE ORAL DAILY
Status: DISCONTINUED | OUTPATIENT
Start: 2022-06-23 | End: 2022-06-24 | Stop reason: HOSPADM

## 2022-06-23 RX ORDER — ONDANSETRON 4 MG/1
4 TABLET, ORALLY DISINTEGRATING ORAL EVERY 8 HOURS PRN
Status: DISCONTINUED | OUTPATIENT
Start: 2022-06-23 | End: 2022-06-24 | Stop reason: HOSPADM

## 2022-06-23 RX ORDER — SODIUM CHLORIDE 0.9 % (FLUSH) 0.9 %
10 SYRINGE (ML) INJECTION EVERY 12 HOURS SCHEDULED
Status: DISCONTINUED | OUTPATIENT
Start: 2022-06-23 | End: 2022-06-24 | Stop reason: HOSPADM

## 2022-06-23 RX ORDER — HYDROCODONE BITARTRATE AND ACETAMINOPHEN 5; 325 MG/1; MG/1
1 TABLET ORAL ONCE
Status: COMPLETED | OUTPATIENT
Start: 2022-06-23 | End: 2022-06-23

## 2022-06-23 RX ORDER — CIPROFLOXACIN 500 MG/1
500 TABLET, FILM COATED ORAL 2 TIMES DAILY
Qty: 20 TABLET | Refills: 0 | Status: SHIPPED | OUTPATIENT
Start: 2022-06-23 | End: 2022-07-03

## 2022-06-23 RX ORDER — ONDANSETRON 2 MG/ML
INJECTION INTRAMUSCULAR; INTRAVENOUS PRN
Status: DISCONTINUED | OUTPATIENT
Start: 2022-06-23 | End: 2022-06-23 | Stop reason: SDUPTHER

## 2022-06-23 RX ORDER — EPINEPHRINE 1 MG/ML
INJECTION, SOLUTION, CONCENTRATE INTRAVENOUS PRN
Status: DISCONTINUED | OUTPATIENT
Start: 2022-06-23 | End: 2022-06-23 | Stop reason: ALTCHOICE

## 2022-06-23 RX ORDER — FENTANYL CITRATE 50 UG/ML
25 INJECTION, SOLUTION INTRAMUSCULAR; INTRAVENOUS EVERY 5 MIN PRN
Status: DISCONTINUED | OUTPATIENT
Start: 2022-06-23 | End: 2022-06-23 | Stop reason: HOSPADM

## 2022-06-23 RX ORDER — PANTOPRAZOLE SODIUM 40 MG/1
40 TABLET, DELAYED RELEASE ORAL
Status: DISCONTINUED | OUTPATIENT
Start: 2022-06-24 | End: 2022-06-24 | Stop reason: HOSPADM

## 2022-06-23 RX ORDER — FENTANYL CITRATE 50 UG/ML
INJECTION, SOLUTION INTRAMUSCULAR; INTRAVENOUS PRN
Status: DISCONTINUED | OUTPATIENT
Start: 2022-06-23 | End: 2022-06-23 | Stop reason: SDUPTHER

## 2022-06-23 RX ORDER — ONDANSETRON 2 MG/ML
4 INJECTION INTRAMUSCULAR; INTRAVENOUS EVERY 6 HOURS PRN
Status: DISCONTINUED | OUTPATIENT
Start: 2022-06-23 | End: 2022-06-24 | Stop reason: HOSPADM

## 2022-06-23 RX ORDER — EPHEDRINE SULFATE/0.9% NACL/PF 50 MG/5 ML
SYRINGE (ML) INTRAVENOUS PRN
Status: DISCONTINUED | OUTPATIENT
Start: 2022-06-23 | End: 2022-06-23 | Stop reason: SDUPTHER

## 2022-06-23 RX ORDER — CETIRIZINE HYDROCHLORIDE 10 MG/1
10 TABLET ORAL DAILY
Status: DISCONTINUED | OUTPATIENT
Start: 2022-06-23 | End: 2022-06-24 | Stop reason: HOSPADM

## 2022-06-23 RX ORDER — HYDROCODONE BITARTRATE AND ACETAMINOPHEN 5; 325 MG/1; MG/1
1 TABLET ORAL EVERY 4 HOURS PRN
Qty: 30 TABLET | Refills: 0 | Status: SHIPPED | OUTPATIENT
Start: 2022-06-23 | End: 2022-06-28

## 2022-06-23 RX ORDER — SODIUM CHLORIDE, SODIUM LACTATE, POTASSIUM CHLORIDE, CALCIUM CHLORIDE 600; 310; 30; 20 MG/100ML; MG/100ML; MG/100ML; MG/100ML
INJECTION, SOLUTION INTRAVENOUS CONTINUOUS
Status: DISCONTINUED | OUTPATIENT
Start: 2022-06-23 | End: 2022-06-24 | Stop reason: HOSPADM

## 2022-06-23 RX ORDER — SODIUM CHLORIDE 0.9 % (FLUSH) 0.9 %
5-40 SYRINGE (ML) INJECTION PRN
Status: DISCONTINUED | OUTPATIENT
Start: 2022-06-23 | End: 2022-06-23 | Stop reason: HOSPADM

## 2022-06-23 RX ORDER — SODIUM CHLORIDE 9 MG/ML
INJECTION INTRAVENOUS PRN
Status: DISCONTINUED | OUTPATIENT
Start: 2022-06-23 | End: 2022-06-23 | Stop reason: ALTCHOICE

## 2022-06-23 RX ORDER — PHENYLEPHRINE HYDROCHLORIDE 10 MG/ML
INJECTION INTRAVENOUS PRN
Status: DISCONTINUED | OUTPATIENT
Start: 2022-06-23 | End: 2022-06-23 | Stop reason: SDUPTHER

## 2022-06-23 RX ORDER — METOPROLOL SUCCINATE 25 MG/1
50 TABLET, EXTENDED RELEASE ORAL 2 TIMES DAILY
Status: DISCONTINUED | OUTPATIENT
Start: 2022-06-23 | End: 2022-06-24 | Stop reason: HOSPADM

## 2022-06-23 RX ORDER — FENTANYL CITRATE 50 UG/ML
INJECTION, SOLUTION INTRAMUSCULAR; INTRAVENOUS
Status: COMPLETED
Start: 2022-06-23 | End: 2022-06-23

## 2022-06-23 RX ORDER — CIPROFLOXACIN 2 MG/ML
INJECTION, SOLUTION INTRAVENOUS PRN
Status: DISCONTINUED | OUTPATIENT
Start: 2022-06-23 | End: 2022-06-23 | Stop reason: SDUPTHER

## 2022-06-23 RX ORDER — SODIUM CHLORIDE 9 MG/ML
INJECTION, SOLUTION INTRAVENOUS PRN
Status: DISCONTINUED | OUTPATIENT
Start: 2022-06-23 | End: 2022-06-23 | Stop reason: HOSPADM

## 2022-06-23 RX ORDER — FENTANYL CITRATE 50 UG/ML
50 INJECTION, SOLUTION INTRAMUSCULAR; INTRAVENOUS EVERY 5 MIN PRN
Status: DISCONTINUED | OUTPATIENT
Start: 2022-06-23 | End: 2022-06-23 | Stop reason: HOSPADM

## 2022-06-23 RX ORDER — SODIUM CHLORIDE 9 MG/ML
INJECTION, SOLUTION INTRAVENOUS PRN
Status: DISCONTINUED | OUTPATIENT
Start: 2022-06-23 | End: 2022-06-24 | Stop reason: HOSPADM

## 2022-06-23 RX ORDER — POLYETHYLENE GLYCOL 3350 17 G/17G
17 POWDER, FOR SOLUTION ORAL DAILY PRN
Status: DISCONTINUED | OUTPATIENT
Start: 2022-06-23 | End: 2022-06-24 | Stop reason: HOSPADM

## 2022-06-23 RX ORDER — FUROSEMIDE 40 MG/1
40 TABLET ORAL DAILY
Status: DISCONTINUED | OUTPATIENT
Start: 2022-06-23 | End: 2022-06-24 | Stop reason: HOSPADM

## 2022-06-23 RX ADMIN — ONDANSETRON 4 MG: 2 INJECTION INTRAMUSCULAR; INTRAVENOUS at 11:39

## 2022-06-23 RX ADMIN — PHENYLEPHRINE HYDROCHLORIDE 200 MCG: 10 INJECTION INTRAVENOUS at 11:10

## 2022-06-23 RX ADMIN — METOPROLOL SUCCINATE 50 MG: 25 TABLET, EXTENDED RELEASE ORAL at 21:33

## 2022-06-23 RX ADMIN — METOPROLOL SUCCINATE 25 MG: 25 TABLET, EXTENDED RELEASE ORAL at 21:34

## 2022-06-23 RX ADMIN — HYDROCODONE BITARTRATE AND ACETAMINOPHEN 1 TABLET: 5; 325 TABLET ORAL at 16:12

## 2022-06-23 RX ADMIN — PHENYLEPHRINE HYDROCHLORIDE 200 MCG: 10 INJECTION INTRAVENOUS at 10:50

## 2022-06-23 RX ADMIN — FENTANYL CITRATE 50 MCG: 50 INJECTION, SOLUTION INTRAMUSCULAR; INTRAVENOUS at 10:50

## 2022-06-23 RX ADMIN — Medication 20 MG: at 12:46

## 2022-06-23 RX ADMIN — PHENYLEPHRINE HYDROCHLORIDE 300 MCG: 10 INJECTION INTRAVENOUS at 11:34

## 2022-06-23 RX ADMIN — Medication 20 MG: at 11:54

## 2022-06-23 RX ADMIN — Medication 20 MG: at 12:16

## 2022-06-23 RX ADMIN — CIPROFLOXACIN 400 MG: 2 INJECTION, SOLUTION INTRAVENOUS at 11:15

## 2022-06-23 RX ADMIN — SODIUM CHLORIDE, PRESERVATIVE FREE 10 ML: 5 INJECTION INTRAVENOUS at 21:35

## 2022-06-23 RX ADMIN — PHENYLEPHRINE HYDROCHLORIDE 200 MCG: 10 INJECTION INTRAVENOUS at 11:11

## 2022-06-23 RX ADMIN — PHENYLEPHRINE HYDROCHLORIDE 150 MCG: 10 INJECTION INTRAVENOUS at 11:20

## 2022-06-23 RX ADMIN — PROPOFOL 150 MG: 10 INJECTION, EMULSION INTRAVENOUS at 10:50

## 2022-06-23 RX ADMIN — SACUBITRIL AND VALSARTAN 1 TABLET: 49; 51 TABLET, FILM COATED ORAL at 21:34

## 2022-06-23 RX ADMIN — Medication 10 MG: at 11:59

## 2022-06-23 RX ADMIN — FENTANYL CITRATE 25 MCG: 50 INJECTION, SOLUTION INTRAMUSCULAR; INTRAVENOUS at 14:38

## 2022-06-23 RX ADMIN — FENTANYL CITRATE 50 MCG: 50 INJECTION, SOLUTION INTRAMUSCULAR; INTRAVENOUS at 11:46

## 2022-06-23 RX ADMIN — PROPOFOL 50 MG: 10 INJECTION, EMULSION INTRAVENOUS at 11:46

## 2022-06-23 RX ADMIN — Medication 10 MG: at 13:15

## 2022-06-23 RX ADMIN — DEXAMETHASONE SODIUM PHOSPHATE 8 MG: 4 INJECTION, SOLUTION INTRA-ARTICULAR; INTRALESIONAL; INTRAMUSCULAR; INTRAVENOUS; SOFT TISSUE at 09:48

## 2022-06-23 RX ADMIN — FENTANYL CITRATE 25 MCG: 50 INJECTION, SOLUTION INTRAMUSCULAR; INTRAVENOUS at 14:31

## 2022-06-23 RX ADMIN — FENTANYL CITRATE 50 MCG: 50 INJECTION, SOLUTION INTRAMUSCULAR; INTRAVENOUS at 13:09

## 2022-06-23 RX ADMIN — PHENYLEPHRINE HYDROCHLORIDE 200 MCG: 10 INJECTION INTRAVENOUS at 11:23

## 2022-06-23 RX ADMIN — Medication 20 MG: at 12:56

## 2022-06-23 RX ADMIN — Medication 120 MG: at 10:50

## 2022-06-23 RX ADMIN — CETIRIZINE HYDROCHLORIDE 10 MG: 10 TABLET, FILM COATED ORAL at 21:34

## 2022-06-23 RX ADMIN — PHENYLEPHRINE HYDROCHLORIDE 200 MCG: 10 INJECTION INTRAVENOUS at 11:32

## 2022-06-23 RX ADMIN — SODIUM CHLORIDE: 9 INJECTION, SOLUTION INTRAVENOUS at 10:41

## 2022-06-23 RX ADMIN — FENTANYL CITRATE 50 MCG: 50 INJECTION, SOLUTION INTRAMUSCULAR; INTRAVENOUS at 14:53

## 2022-06-23 RX ADMIN — LIDOCAINE HYDROCHLORIDE 50 MG: 20 INJECTION, SOLUTION INTRAVENOUS at 10:50

## 2022-06-23 RX ADMIN — POTASSIUM CHLORIDE 20 MEQ: 1500 TABLET, EXTENDED RELEASE ORAL at 21:34

## 2022-06-23 RX ADMIN — PHENYLEPHRINE HYDROCHLORIDE 300 MCG: 10 INJECTION INTRAVENOUS at 11:52

## 2022-06-23 SDOH — ECONOMIC STABILITY: INCOME INSECURITY: IN THE LAST 12 MONTHS, WAS THERE A TIME WHEN YOU WERE NOT ABLE TO PAY THE MORTGAGE OR RENT ON TIME?: NO

## 2022-06-23 SDOH — ECONOMIC STABILITY: FOOD INSECURITY: WITHIN THE PAST 12 MONTHS, THE FOOD YOU BOUGHT JUST DIDN'T LAST AND YOU DIDN'T HAVE MONEY TO GET MORE.: NEVER TRUE

## 2022-06-23 SDOH — ECONOMIC STABILITY: TRANSPORTATION INSECURITY
IN THE PAST 12 MONTHS, HAS THE LACK OF TRANSPORTATION KEPT YOU FROM MEDICAL APPOINTMENTS OR FROM GETTING MEDICATIONS?: NO

## 2022-06-23 SDOH — ECONOMIC STABILITY: TRANSPORTATION INSECURITY
IN THE PAST 12 MONTHS, HAS LACK OF TRANSPORTATION KEPT YOU FROM MEETINGS, WORK, OR FROM GETTING THINGS NEEDED FOR DAILY LIVING?: NO

## 2022-06-23 SDOH — ECONOMIC STABILITY: FOOD INSECURITY: WITHIN THE PAST 12 MONTHS, YOU WORRIED THAT YOUR FOOD WOULD RUN OUT BEFORE YOU GOT MONEY TO BUY MORE.: NEVER TRUE

## 2022-06-23 SDOH — HEALTH STABILITY: PHYSICAL HEALTH: ON AVERAGE, HOW MANY DAYS PER WEEK DO YOU ENGAGE IN MODERATE TO STRENUOUS EXERCISE (LIKE A BRISK WALK)?: 0 DAYS

## 2022-06-23 SDOH — ECONOMIC STABILITY: HOUSING INSECURITY
IN THE LAST 12 MONTHS, WAS THERE A TIME WHEN YOU DID NOT HAVE A STEADY PLACE TO SLEEP OR SLEPT IN A SHELTER (INCLUDING NOW)?: NO

## 2022-06-23 SDOH — HEALTH STABILITY: PHYSICAL HEALTH: ON AVERAGE, HOW MANY MINUTES DO YOU ENGAGE IN EXERCISE AT THIS LEVEL?: 0 MIN

## 2022-06-23 ASSESSMENT — PAIN DESCRIPTION - DESCRIPTORS
DESCRIPTORS: SORE
DESCRIPTORS: SORE

## 2022-06-23 ASSESSMENT — SOCIAL DETERMINANTS OF HEALTH (SDOH)
WITHIN THE LAST YEAR, HAVE YOU BEEN KICKED, HIT, SLAPPED, OR OTHERWISE PHYSICALLY HURT BY YOUR PARTNER OR EX-PARTNER?: NO
WITHIN THE LAST YEAR, HAVE YOU BEEN AFRAID OF YOUR PARTNER OR EX-PARTNER?: NO
WITHIN THE LAST YEAR, HAVE TO BEEN RAPED OR FORCED TO HAVE ANY KIND OF SEXUAL ACTIVITY BY YOUR PARTNER OR EX-PARTNER?: NO
WITHIN THE LAST YEAR, HAVE YOU BEEN HUMILIATED OR EMOTIONALLY ABUSED IN OTHER WAYS BY YOUR PARTNER OR EX-PARTNER?: NO
HOW HARD IS IT FOR YOU TO PAY FOR THE VERY BASICS LIKE FOOD, HOUSING, MEDICAL CARE, AND HEATING?: NOT HARD AT ALL

## 2022-06-23 ASSESSMENT — PAIN SCALES - GENERAL
PAINLEVEL_OUTOF10: 0
PAINLEVEL_OUTOF10: 4
PAINLEVEL_OUTOF10: 0
PAINLEVEL_OUTOF10: 6
PAINLEVEL_OUTOF10: 2
PAINLEVEL_OUTOF10: 3
PAINLEVEL_OUTOF10: 4
PAINLEVEL_OUTOF10: 6
PAINLEVEL_OUTOF10: 0
PAINLEVEL_OUTOF10: 0
PAINLEVEL_OUTOF10: 7
PAINLEVEL_OUTOF10: 2
PAINLEVEL_OUTOF10: 4

## 2022-06-23 ASSESSMENT — LIFESTYLE VARIABLES
HOW OFTEN DO YOU HAVE A DRINK CONTAINING ALCOHOL: NEVER
HOW OFTEN DO YOU HAVE A DRINK CONTAINING ALCOHOL: NEVER

## 2022-06-23 ASSESSMENT — PAIN DESCRIPTION - LOCATION
LOCATION: NECK
LOCATION: THROAT

## 2022-06-23 ASSESSMENT — PAIN DESCRIPTION - FREQUENCY: FREQUENCY: INTERMITTENT

## 2022-06-23 ASSESSMENT — PAIN DESCRIPTION - PAIN TYPE: TYPE: ACUTE PAIN

## 2022-06-23 ASSESSMENT — PAIN - FUNCTIONAL ASSESSMENT: PAIN_FUNCTIONAL_ASSESSMENT: 0-10

## 2022-06-23 NOTE — ANESTHESIA POSTPROCEDURE EVALUATION
Department of Anesthesiology  Postprocedure Note    Patient: Sal Mercer  MRN: [id number]  YOB: 1950  Date of evaluation: 6/23/2022      Procedure Summary     Date: 06/23/22 Room / Location: [address] [facility]    Anesthesia Start: 6352 Anesthesia Stop: 7277    Procedure: Right Hemithyroidectomy (N/A Neck) Diagnosis:       Follicular tumor of thyroid gland (uncertain if benign or malignant)      (Follicular tumor of thyroid gland (uncertain if benign or malignant) [D44.0])    Surgeons: Ashlee Mandujano MD Responsible Provider: Bib Acevedo MD    Anesthesia Type: general ASA Status: 4          Anesthesia Type: No value filed.     Raf Phase I: Raf Score: 9    Raf Phase II:        Anesthesia Post Evaluation    Patient location during evaluation: PACU  Patient participation: complete - patient participated  Level of consciousness: awake  Airway patency: patent  Nausea & Vomiting: no vomiting and no nausea  Complications: no  Cardiovascular status: hemodynamically stable  Respiratory status: acceptable and nasal cannula  Hydration status: stable

## 2022-06-23 NOTE — PROGRESS NOTES
Report called to [facility]. Asked if the patient can come up after report.  Stated I will send pt up closer to 1930

## 2022-06-23 NOTE — ANESTHESIA PRE PROCEDURE
IntraVENous 2 times per day Tucker Palomino MD        sodium chloride flush 0.9 % injection 5-40 mL  5-40 mL IntraVENous PRN Tucker Palomino MD        dexamethasone (DECADRON) injection 8 mg  8 mg IntraVENous 30 Min Pre-Op Tucker Palomino MD           Allergies: Allergies   Allergen Reactions    Keflex [Cephalexin] Other (See Comments)     Tongue Swelling    Norvasc [Amlodipine Besylate] Other (See Comments)     Nasuea, hot flashes    Statins Other (See Comments)     Myalgias all    Zetia [Ezetimibe] Other (See Comments)     Chest pain    Zocor [Simvastatin] Other (See Comments)     Myalgias    Biaxin [Clarithromycin] Nausea Only    Doxycycline Other (See Comments)     GI side effects       Problem List:    Patient Active Problem List   Diagnosis Code    Hypertension I10    Meningioma ([facility] [address] D32.9    Mixed hearing loss H90.8    GERD (gastroesophageal reflux disease) K21.9    Osteoarthritis M19.90    Allergic rhinitis J30.9    Angiolipoma of right kidney D17.71    Eczema L30.9    Hyperlipidemia E78.5    Obesity (BMI 30-39. 9) E66.9    Post-menopausal Z78.0    Thrombocytopenia (HCC) D69.6    DM2 (diabetes mellitus, type 2) (HCC) E11.9    Statin intolerance Z78.9    History of ST elevation myocardial infarction (STEMI) I25.2    ASHD (arteriosclerotic heart disease) I25.10    Atrial fibrillation (HCC) I48.91    S/P CABG (coronary artery bypass graft) Z95.1    Heart failure with reduced ejection fraction (HCC) I50.20    Ischemic cardiomyopathy I25.5    S/P Maze operation for atrial fibrillation Z98.890, Z86.79    S/P MVR (mitral valve repair) Z98.890    S/P tricuspid valve repair G25.596    Follicular neoplasm of thyroid D49.7    Anticoagulated on Coumadin Z79.01    Encounter for therapeutic drug monitoring N05.11    Follicular tumor of thyroid gland (uncertain if benign or malignant) D44.0       Past Medical History:        Diagnosis Date    Allergic rhinitis     Angiolipoma of right kidney     ASHD (arteriosclerotic heart disease)     Atrial fibrillation (HCC)     Celiac disease     DM2 (diabetes mellitus, type 2) (HCC)     does not take medications for or check blood sugars    Eczema     GERD (gastroesophageal reflux disease)     Heart failure with reduced ejection fraction ([facility] [address]     History of blood transfusion 2021    s/p Heart surgery    History of ST elevation myocardial infarction (STEMI)     Hyperlipidemia     Hypertension     Ischemic cardiomyopathy     Meningioma ([facility] [address] 2012    Noted MRI . Never did f/u MRI. Told she didn't need to. Has 0 sxs. Denies HA, vision changes, lateralizing numbness or weakness.  Mixed hearing loss 2013    Obesity (BMI 30-39. 9)     Osteoarthritis     Post-menopausal     S/P CABG (coronary artery bypass graft)     S/P Maze operation for atrial fibrillation     S/P MVR (mitral valve repair)     S/P tricuspid valve repair     Thrombocytopenia [facility])        Past Surgical History:        Procedure Laterality Date     SECTION  1972    CHOLECYSTECTOMY  2004    COLONOSCOPY  2006    [facility]    MITRAL VALVE REPAIR  2021    [facility]    MYRINGOTOMY AND TYMPANOSTOMY TUBE PLACEMENT  2012    Dr Hamilton Bernal OTHER SURGICAL HISTORY  2021    MAZE Procedure during CABG and MVR/TVR at [facility]  2021    TV Repair 2021 at [facility]. ENDOSCOPY  2006       Social History:    Social History     Tobacco Use    Smoking status: Passive Smoke Exposure - Never Smoker    Smokeless tobacco: Never Used   Substance Use Topics    Alcohol use:  No                                Counseling given: Not Answered      Vital Signs (Current):   Vitals:    22 0624 22 0657 22 0804 22 0815   BP: 139/66  (!) 134/58 132/63   Pulse: 75  69 68   Resp: 12  16 16   Temp: 97.2 °F (36.2 °C)  (!) 96.7 °F (35.9 °C) (!) 96.7 °F (35.9 °C)   TempSrc:   Temporal Temporal   SpO2: 91%  94%    Weight:  216 lb 3.2 oz (98.1 kg)     Height:  5' 2\" (1.575 m)                                                BP Readings from Last 3 Encounters:   06/23/22 132/63   05/10/22 130/78   04/28/22 112/60       NPO Status: Time of last liquid consumption: 1800                        Time of last solid consumption: 1800                        Date of last liquid consumption: 06/22/22                        Date of last solid food consumption: 06/22/22    BMI:   Wt Readings from Last 3 Encounters:   06/23/22 216 lb 3.2 oz (98.1 kg)   05/10/22 216 lb 9.6 oz (98.2 kg)   04/28/22 215 lb (97.5 kg)     Body mass index is 39.54 kg/m². CBC:   Lab Results   Component Value Date    WBC 5.8 06/17/2022    RBC 4.61 06/17/2022    RBC 4.57 11/09/2020    HGB 14.5 06/17/2022    HCT 45.0 06/17/2022    MCV 97.6 06/17/2022    RDW 13.5 03/29/2022    PLT 69 06/20/2022       CMP:   Lab Results   Component Value Date     06/15/2022    K 3.9 06/23/2022     06/15/2022    CO2 28 06/15/2022    BUN 19 06/15/2022    CREATININE 0.7 06/15/2022    LABGLOM 82 06/15/2022    GLUCOSE 111 06/15/2022    GLUCOSE 146 10/23/2020    PROT 6.5 06/15/2022    CALCIUM 9.4 06/23/2022    BILITOT 0.3 06/15/2022    ALKPHOS 90 06/15/2022    AST 15 06/15/2022    ALT 13 06/15/2022       POC Tests: No results for input(s): POCGLU, POCNA, POCK, POCCL, POCBUN, POCHEMO, POCHCT in the last 72 hours.     Coags:   Lab Results   Component Value Date    INR 1.00 06/23/2022       HCG (If Applicable): No results found for: PREGTESTUR, PREGSERUM, HCG, HCGQUANT     ABGs: No results found for: PHART, PO2ART, JEI5AHJ, JMX9CWC, BEART, T8EYTVEN     Type & Screen (If Applicable):  Lab Results   Component Value Date    LAB POS 06/23/2022       Drug/Infectious Status (If Applicable):  No results found for: HIV, HEPCAB    COVID-19 Screening (If Applicable):   Lab Results   Component Value Date COVID19 NOT  DETECTED 11/06/2021           Anesthesia Evaluation    Airway: Mallampati: II  TM distance: >3 FB   Neck ROM: full  Mouth opening: > = 3 FB   Dental:          Pulmonary:   (+) decreased breath sounds                            Cardiovascular:    (+) hypertension:, CAD:, CABG/stent:, CHF:,         Rhythm: regular                      Neuro/Psych:               GI/Hepatic/Renal:   (+) GERD:, morbid obesity          Endo/Other:    (+) Diabetes, . Abdominal:   (+) obese,           Vascular: Other Findings:           Anesthesia Plan      general     ASA 4     (S/P CABG, MVR AND TVR  RECENT ECHO IMPROVED EF)  Induction: intravenous. MIPS: Postoperative opioids intended and Prophylactic antiemetics administered. Anesthetic plan and risks discussed with patient, spouse and child/children. Plan discussed with CRNA.                     Ariadna Silva MD   6/23/2022

## 2022-06-23 NOTE — DISCHARGE INSTR - PHARMACY
Recommendations for warfarin at discharge:      Date Warfarin Dose   06/23/22 No Coumadin    06/24/22 No Coumadin   06/25/22 No Coumadin   06/26/22 No Coumadin   06/27/22 No Coumadin   06/28/22 9 mg (3 tablets of the 3 mg tablet)   06/29/22 9 mg (3 tablets of the 3 mg tablet)   06/30/22 6 mg (2 tablets of the 3 mg tablet)   07/01/22 6 mg (2 tablets of the 3 mg tablet)   07/02/22 6 mg (2 tablets of the 3 mg tablet)   07/03/22 6 mg (2 tablets of the 3 mg tablet)   07/04/22 6 mg (2 tablets of the 3 mg tablet)         Recheck INR:  07/05/22 @ 2:40 PM with results to [address]

## 2022-06-23 NOTE — PROGRESS NOTES
OK to give pt home dose of 75 mg of Metoprolol oral at this time and hold off on giving home Entresto per Dr Josefina Maya.

## 2022-06-23 NOTE — PROGRESS NOTES
Pt returned to [facility] room 6. Vitals and assessment as charted. 0.9 infusing, @950ml to count from PACU. Pt has water. Family at the bedside. Pt and family verbalized understanding of discharge criteria and call light use. Call light in reach.

## 2022-06-23 NOTE — H&P
Adapted from prior ENT note:    Complex goitrous mass of the right hemithyroid including a photopenic area of the central gland concerning for neoplasm; Thyroglobulin 234  No new symptoms    Past Medical History:   Diagnosis Date    Allergic rhinitis     Angiolipoma of right kidney     ASHD (arteriosclerotic heart disease)     Atrial fibrillation (HCC)     Celiac disease     DM2 (diabetes mellitus, type 2) (HCC)     Eczema     GERD (gastroesophageal reflux disease)     Heart failure with reduced ejection fraction (HCC)     History of blood transfusion 2021    s/p Heart surgery    History of ST elevation myocardial infarction (STEMI)     Hyperlipidemia     Hypertension     Ischemic cardiomyopathy     Meningioma ([facility] [address] 2012    Noted MRI . Never did f/u MRI. Told she didn't need to. Has 0 sxs. Denies HA, vision changes, lateralizing numbness or weakness.  Mixed hearing loss 2013    Obesity (BMI 30-39. 9)     Osteoarthritis     Post-menopausal     S/P CABG (coronary artery bypass graft)     S/P Maze operation for atrial fibrillation     S/P MVR (mitral valve repair)     S/P tricuspid valve repair     [facility])        Past Surgical History:   Procedure Laterality Date     SECTION  1972    CHOLECYSTECTOMY  2004    COLONOSCOPY  2006    [facility]FT  [facility]    [facility]AL [facility]PAIR  2021    [facility]    MYRINGOTOMY AND TYMPANOSTOMY TUBE PLACEMENT  2012    Dr Ezio Sauer    OTHER SURGICAL HISTORY  2021    MAZE [facility]    TRICUSPID VALVE SURGERY  2021    TV Repair 2021 at [address] [facility]. ENDOSCOPY  2006       Allergies   Allergen Reactions    Keflex [Cephalexin] Other (See Comments)     Tongue Swelling    Norvasc [Amlodipine Besylate] Other (See Comments)     Nasuea, hot flashes    Statins Other (See Comments)     Myalgias all    Zetia [Ezetimibe] Other (See Comments)     Chest pain    Zocor [Simvastatin] Other (See Comments)     Myalgias    Biaxin [Clarithromycin] Nausea Only    Doxycycline Other (See Comments)     GI side effects       Current Facility-Administered Medications   Medication Dose Route Frequency Provider Last Rate Last Admin    0.9 % sodium chloride infusion   IntraVENous PRN ORAL Burnett CNP        0.9 % sodium chloride infusion   IntraVENous PRN Miky Allen MD        sodium chloride flush 0.9 % injection 5-40 mL  5-40 mL IntraVENous 2 times per day Miky Allen MD        sodium chloride flush 0.9 % injection 5-40 mL  5-40 mL IntraVENous PRN Miky Allen MD        dexamethasone (DECADRON) injection 8 mg  8 mg IntraVENous 30 Min Pre-Op Miky Allen MD           Current vitals  /66   Pulse 75   Temp 97.2 °F (36.2 °C)   Resp 12   Ht 5' 2\" (1.575 m)   Wt 216 lb 3.2 oz (98.1 kg)   SpO2 91%   BMI 39.54 kg/m²     Lung sounds clear throughout; Hear rate and rhythm regular, no murmur    Proceed with original surgical plan:  Right hemithyroidectomy    Electronically signed by ORAL Sorto CNP on 6/23/2022 at 7:32 AM      -----------------------------------------  -----------------------------------------      [address], NOSE AND THROAT  [address] [place] [id number]  Dept: [phone]  Dept Fax: [phone]  Loc: [phone]     Lucille Robins is a 67 y.o. female who was referred by No ref. provider found for:          Chief Complaint   Patient presents with    Follow-up       Patient is here for f/u after NM Thyroid Metastases Scan.          HPI:      Lucille Robins is a 67 y.o. female referred several months ago for evaluation and treatment of a follicular lesion of the right hemithyroid.   She is now status post additional work-up that includes a nuclear medicine scan demonstrating a complex goitrous mass of the right hemithyroid including a photopenic area of the central gland concerning for neoplasm. This area would constitute a cold nodule. In addition the patient's thyroglobulin level is 10 times normal at 300. The patient comes in to discuss these findings and to plan surgery if she agrees to proceed as recommended.                 History:            Allergies   Allergen Reactions    Biaxin [Clarithromycin] Nausea Only    Doxycycline Other (See Comments)       GI side effects    Keflex [Cephalexin] Other (See Comments)       Tongue Swelling    Norvasc [Amlodipine Besylate] Other (See Comments)       Nasuea, hot flashes    Statins Other (See Comments)       Myalgias all    Zetia [Ezetimibe] Other (See Comments)       Chest pain    Zocor [Simvastatin] Other (See Comments)       Myalgias      Current Facility-Administered Medications          Current Outpatient Medications   Medication Sig Dispense Refill    metoprolol succinate (TOPROL XL) 50 MG extended release tablet Take 1 tablet by mouth 2 times daily Take along with 25 mg BID to make 75 mg BID. 180 tablet 3    metoprolol succinate (TOPROL XL) 25 MG extended release tablet Take 1 tablet by mouth 2 times daily Take along with 50 mg BID to make 75 mg BID. 180 tablet 3    furosemide (LASIX) 40 MG tablet Take 1 tablet by mouth daily 90 tablet 3    sacubitril-valsartan (ENTRESTO) 49-51 MG per tablet Take 1 tablet by mouth 2 times daily 180 tablet 3    potassium chloride (KLOR-CON M) 20 MEQ extended release tablet Take 1 tablet by mouth daily 90 tablet 3    cetirizine (ZYRTEC) 10 MG tablet Take 10 mg by mouth daily        warfarin (COUMADIN) 2 MG tablet Take as directed by [facility] (90 tablets = 30 days) 90 tablet 2    omeprazole (PRILOSEC) 20 MG delayed release capsule Take 20 mg by mouth daily        acetaminophen (TYLENOL) 325 MG tablet Take 325-650 mg by mouth every 6 hours as needed for Pain or Fever Don't take more than 3,000 mg each day.        aspirin 81 MG chewable tablet Take 81 mg by mouth daily          No current facility-administered medications for this visit.         Past Medical History        Past Medical History:   Diagnosis Date    Allergic rhinitis      Angiolipoma of right kidney      ASHD (arteriosclerotic heart disease)      Atrial fibrillation (HCC)      Celiac disease      DM2 (diabetes mellitus, type 2) (HCC)      Eczema      GERD (gastroesophageal reflux disease)      Heart failure with reduced ejection fraction (HCC)      History of ST elevation myocardial infarction (STEMI)      Hyperlipidemia      Hypertension      Ischemic cardiomyopathy      Meningioma ([facility] [address] 2012     Noted MRI . Never did f/u MRI. Told she didn't need to. Has 0 sxs. Denies HA, vision changes, lateralizing numbness or weakness.  Mixed hearing loss 2013    Obesity (BMI 30-39. 9)      Osteoarthritis      Post-menopausal      S/P CABG (coronary artery bypass graft)      S/P Maze operation for atrial fibrillation      S/P MVR (mitral valve repair)      S/P tricuspid valve repair      [facility])           Past Surgical History         Past Surgical History:   Procedure Laterality Date     SECTION   1972    CHOLECYSTECTOMY   2004    COLONOSCOPY   2006    CORONARY ARTERY BYPASS GRAFT   2021     [facility]    MITRAL VALVE REPAIR   2021     [facility]    MYRINGOTOMY AND TYMPANOSTOMY TUBE PLACEMENT   2012     Dr Abraham Clement   [facility]   2021     MAZE Procedure during CABG and [facility]VR[facility]TVR [facility]    TRICUSPID VALVE SURGERY   2021     TV Repair 2021 [address]        UPPER GASTROINTESTINAL ENDOSCOPY   2006         Family History         Family History   Problem Relation Age of Onset    Heart Disease Mother      Lung Cancer Mother      Osteoporosis Mother      Other Father           Did not know her father.      Other 10/10/2018     ALKPHOS 71 11/04/2016     AST 27 11/05/2021     AST 45 10/27/2021     AST 24 10/23/2020     ALT 34 11/05/2021     ALT 21 10/27/2021     ALT 23 10/23/2020         All of the past medical history, past surgical history, family history,social history, allergies and current medications were reviewed with the patient. Assessment & Plan   Diagnoses and all orders for this visit:       Diagnosis Orders   1. Follicular neoplasm of thyroid      2. S/P CABG (coronary artery bypass graft)      3. S/P Maze operation for atrial fibrillation      4. S/P MVR (mitral valve repair)      5. S/P tricuspid valve repair            After reviewing the test results with the patient and discussing the pros and cons of proceeding with a right hemithyroidectomy, the patient asked appropriate questions regarding her being cleared for surgery by cardiology, which has occurred and plans to observe her overnight to make sure she is having no complications from the general anesthetic as well. Based on her understanding of my recommendations and of the risks that include medical complications, bleeding, infection, and need for further surgery in the event that this is found to be cancer, the patient reported understanding these risks and wishes to proceed. Informed consent was based on these conversations including today's and the prior clinic's visit as well as my phone conversation with the patient over the weekend. We will proceed as planned.     I spent over 30 minutes of face-to-face time with the patient and her  the majority of which was dedicated to reviewing these factors and planning surgical treatment in the near future.     Return in about 4 weeks (around 6/7/2022) for 1 week postop visit for follow-up care and to remove her wound drain.           **This report has been created using voice recognition software. It may contain minor errors which are inherent in voice recognition technology. **

## 2022-06-23 NOTE — PROGRESS NOTES
Post-Fall Assessment  Date of Fall:   6-23-22   Time of Fall:   1645   Yes No N/A Comment   Was Patient on [place] Program? [x] [] []    Was the Fall Witnessed? [x] [] []    Were Clothes a Factor? [] [x] []    Was Patient Wearing Corrective Footwear? [x] [] []    Other Environmental Factors Involved? [] [] [x]      Description of Fall  Who found the patient: Christofer Bustillo, RN with patient when she fell  Where was the patient at the time of the fall:  Walking into her room, back from the bathroom  Brief description of fall: Patient had used the restroom, was walking back with RN stand by assist. Pt states either her leg or her back gave out and she fell. She hit her right cheek on the bed side table. Patient denies any chest pain or another pain after fall. Patient was assisted with 2 assist to stand and get back into bed. Patient comments regarding fall:   She states she was not dizzy  Medications potentially contributing to fall risk (such as Sedatives, Hypnotics, Antihypertensives, Narcotics, Psychotropics, Anticonvulsants):   Patient recently had surgery. Medications given were, Norco, fentanyl, and propofol    Patient Assessment of Injury    (Please document Vital Signs in Doc Flowsheet)     Yes No   Patient hit his/her head [x] []   Patient is taking an anticoagulant [x] []   CT of Head requested [] [x]     Patient normally takes aspirin and coumadin, have been holding for surgery. Dr Zhane Salgado surgeon for patient did not order ct of head, only an EKG post fall. Neurological Assessment Protocol: If the patient has hit his/her head during this fall,   a Neurological Assessment must be completed every 2 hours for 12 hours;   every 3 hours for 24 hours;   then every 4 hours for 24 hours. Document in Doc Flowsheets. Neurological Assessment Protocol initiated  yes    If the patient did not hit his/her head during this fall, monitor vital signs every 8 hours.   Notify the physician within 24 hours and document. Physician Notification  Please document under Provider Notification group within the Assessment (Complex Assessment) template of Doc Flowsheets.      Physician notified yes at [phone]   Pharmacy notified yes Time Notified: [id number] Supervisor/Clinical Manager Notified:  Debbie Rahman  6-23-22 1706    Family Member Notified:   Daughter and  present during fall Da

## 2022-06-23 NOTE — PROGRESS NOTES
Pt admitted to [facility] room 6 and oriented to unit. SCD sleeves applied. Nares swabbed. Pt verbalized permission for first name, last initial and physicians name on white board. SDS board and discharge criteria explained, pt and family verbalized understanding. Pt denies thoughts of harming self or others. Call light in reach. Family at the bedside.

## 2022-06-23 NOTE — OP NOTE
Operative Note      Patient: Hetty Epley  YOB: 1950  MRN: [id number]    Date of Procedure: 6/23/2022    Pre-Op Diagnosis: Follicular tumor of thyroid gland (uncertain if benign or malignant) [D44.0]    Post-Op Diagnosis: Same       Procedure(s):  Right Hemithyroidectomy    Surgeon(s):  Benjamin Roland MD    Assistant:   * No surgical staff found *    Anesthesia: General    Estimated Blood Loss (mL): less than 50     Complications: None    Specimens:   ID Type Source Tests Collected by Time Destination   A : Right Hemithyroid Tissue Thyroid SURGICAL PATHOLOGY Benjamin Roland MD 6/23/2022 1300        Implants:  * No implants in log *      Drains:   Closed/Suction Drain Anterior Neck Bulb (Active)       Findings: 1. Multinodular enlarged right hemithyroid  2. Recurrent laryngeal nerve identified and preserved with nerve integrity monitoring  3. Modest blood loss    Detailed Description of Procedure: The patient was taken to the operating room awake and placed in the supine position. General anesthesia was induced and the patient was intubated with a 7.0 Nims nerve integrity monitoring tube without difficulty or vital sign instability. The patient's head was placed in a [place] head perdomo and extended posteriorly with the bed being placed in reverse Trendelenburg to keep her head above her heart. I cleansed her anterior neck skin and marked out a transverse neck crease which I injected with 5 cc of 1-50,000 saline epinephrine using a 25-gauge syringe needle. She was then prepped and draped in usual fashion for sterile approach to the anterior deep neck. I performed a timeout verifying the patient's identity and planned procedure. Using a 15 blade I made a transverse incision through the injected neck skin crease and into the subplatysmal space.   I raised inferiorly and superiorly based subplatysmal flaps with the upper flap going to the thyroid lamina and the inferior flap going over the sternum and clavicular heads. I identified the midline and divided the soft tissues overlying the thyroid lamina and trachea. I encountered a large vertical and transverse anterior jugular system that was in direct communication at the level of the upper thyroid notch. I dissected the upper transverse connection and divided it with double vascular clips on both sides in order to enable the dissection. I continued inferiorly to dissect the soft tissues overlying the trachea down to the thoracic inlet. The innominate artery was not in the field. I then reflected the soft tissues overlying the thyroid lamina superficially using retraction into the substrap level to enable the dissection. Using an Army-Jackson retractor and a [place] clamp, I dissected the superior pole in order to begin the isolation of the superior pole blood vessels. At this point I encountered a Davis middle lobe of the thyroid which I dissected with a Covidien dissector high up onto the thyroid lamina and mobilize this tissue inferiorly leaving it attached to the isthmus. I then used the same dissector to divide the superior medial aspect of the gland off of the thyroid lamina up onto the superior pole. I then dissected the superior pole vessels and double clamped the superior venous vessels and triple clamped the proximal part of the arterial vessels with single and double clamping of the distal part of the vessels on the gland itself. I rotated the gland inferiorly and took down the significant amount of the deep attachments of the gland in this process. Turned my attention to the isthmus, I dissected the gland off of the trachea and divided the isthmus to the left of midline so as to maintain this tissue with the goiter that was removed. I did this primarily with the [employer].   And then used retraction per my assistance and a fine tipped bayonet bipolar system to cauterize the ligament of Berry structures so as to free up to the gland from the majority of the trachea in the process. Turning my attention laterally, I dissected the soft tissues attaching the gland into the deep neck where I identified the middle thyroid vein and divided it with a triple clip laterally and a single clip medially. I took down additional amounts of lateral soft tissue attachments and identified a likely candidate for a parathyroid gland and preserved with its vessels. I then placed a green retractor underneath the inferior lateral aspect of the gland and retracted it superiorly so that I could work in the inferior thyroid triangle to look for the recurrent laryngeal nerve. At this point I also placed a pair of electrodes through the cricothyroid membrane into the right thyroid arytenoid muscle in order to get a more reliable signal for the nerve integrity monitoring. This was very successful. As such I was able to dissect down into the inferior pole triangle and quickly identified the recurrent nerve which I then traced cephalad. I verified the identity of the nerve using 1/2 mA nerve stimulator getting electrophysiological as well as anatomical confirmation of the identity of the structure. I dissected along the nerve superiorly and was able to take down the lateral and superficial structures there were clearly not running in the path of the nerve. This allowed me to reflect the gland further cephalad to eventually divide its deep attachments. I isolated the second parathyroid gland in the process. I then traced the recurrent nerve further cephalad until it began to turn towards the cricoid cartilage. This allowed me to release even more of the soft tissue attachments laterally. With 2 more releases of the ligament of Berry posterior attachments the gland was lifted off of the trachea and then immediately able to be removed. I handed off the field to be placed in formalin for permanent section.   I then checked the wound for hemostasis which was noted to be well achieved. I retested the nerve as inferiorly as it had been dissected and was able to get clean stimulation through the nerve integrity monitoring system indicating a functional nerve. I had the anesthetist give a Valsalva maneuver to 35 cm water pressure for approximately 30 seconds without any further bleeding. I irrigated out the wound with copious amounts of sterile saline and then placed a 19 [place] round slotted Rakan drain through separate stab incision to the right of the wound suturing into the skin in the usual manner. I ran the drain through the deep space and reapproximated the strap muscles with 2 figure-of-eight 3-0 Monocryl sutures on an SH taper needle. I then closed the wound with my assistance help with inverted figure-of-eight 3-0 Monocryl sutures on SH needle followed by running subcuticular suture of 4-0 Monocryl on a P3 cutting needle. The drain was holding vacuum in the operating room. Skin glue was placed on the surface with antibiotic ointment be placed at the base of the drain. The patient was reversed from general anesthesia in the operating room and extubated without difficulty. She was taken to recovery in satisfactory condition. Estimated blood loss in the case was less than 30 cc and the patient received approximately a liter and half of intravenous lactated Ringer's intraoperatively. No blood products were transfused. No intraoperative complications were detected. Counts were considered accurate x3. I was present for participated in the patient's entire operation up to the skin closure. Disposition: The patient will be discharged home assuming she does well in the first and second phases of recovery. Otherwise she will be observed overnight.     Electronically signed by Luc Stiles MD on 6/23/2022 at 2:01 PM

## 2022-06-23 NOTE — PROGRESS NOTES
pt arrived to PACU, awakens to voice and denies pain. VSS  1402 accucheck 135  1409 pt in bed moaning, denies pain at this time. VSS  1420 pt states having pain but easily falls asleep without answering further questions. Not medicated at this time. VSS  1431 c/o pain 4/10, medicated with 25 mcg fentanyl  1435 Dr Brissa Menaist at bedside to perform assessment. Pt able to say \"E\", smile, stick out tongue  1436 no change in pain status, medicated with 25 mcg fentanyl  1441 pt resting, resp easy  1453 c/o pain 7/10, medicated with 50 mcg fentanyl  1458 pt resting, resp easy  1503 pt states pain tolerable at this time.  VSS  1513 meets criteria for discharge from PACU, transported to [facility]

## 2022-06-23 NOTE — TELEPHONE ENCOUNTER
SDS surgery RN called to report that patient will need to HOLD x 4 days after procedure per Dr Tri Disla.   Patient is being discharged on ciprofloxacin 500 mg PO BID x 10 days; therefore, will use a less aggressive boost.       Recommendations for warfarin at discharge:     Date Warfarin Dose   06/23/22 No Coumadin    06/24/22 No Coumadin   06/25/22 No Coumadin   06/26/22 No Coumadin   06/27/22 No Coumadin   06/28/22 9 mg (3 tablets of the 3 mg tablet)   06/29/22 9 mg (3 tablets of the 3 mg tablet)   06/30/22 6 mg (2 tablets of the 3 mg tablet)   07/01/22 6 mg (2 tablets of the 3 mg tablet)   07/02/22 6 mg (2 tablets of the 3 mg tablet)   07/03/22 6 mg (2 tablets of the 3 mg tablet)   07/04/22 6 mg (2 tablets of the 3 mg tablet)       Recheck INR:  07/05/22 @ 2:40 PM with results to [address], PharmD, BCPS  6/23/2022  4:13 PM

## 2022-06-23 NOTE — H&P
Hospitalist - History & Physical      Patient: Swati MALLORY Settlemire    Unit/Bed:[facility] OR (General) POOL R*  YOB: 1950  MRN: [id number]   Acct: [de-identified]   PCP: Mckinley Mercer DO    Date of Service: Pt seen/examined on 06/23/22  and Admitted to Observation with expected LOS less than two midnights due to medical therapy. Chief Complaint:  Fall     Assessment and Plan:-  3. Fall, unknown etiology: Pt fell getting off cot. No dizziness or CP. EKG normal. Tele overnight. Orthostatics. Fluids. Pt did not hit her head or LOC. Will walk patient tomorrow to assess DC. 2. S/P right hemithyroidectomy: ENT consulted. Drain in place. Incision c/d/i. Will ask for ENT to see patient prior to DC. 3. HFrEF, chronic: Latest echo showed EF 45-50%. Continue Entresto, BB & Lasix. 4. GERD: PPI   5. CAD s/p CABG   6. Atrial Fibrillation s/p MAZE   7. S/P MVR, TVR   8. HLD     History Of Present Illness: This is a 65-year-old female who presented to [address] [place] [address] to have a right hemithyroidectomy today by Dr. Clayton Neff. Patient had a relatively normal surgery and perioperative course. When patient was getting up to the edge of the bed she fell and hit her cheek on the table in her room. Patient had drain placed and surgery and ENT checked patient after fall. Drain and incision were intact. Patient denies dizziness, loss of consciousness, chest pain, shortness of breath. Patient thinks that her knee gave out. Patient does not have a history of falls. During evaluation, patient denies any pain or any issues. Patient does have a significant cardiac history and has had CABG and multiple valve replacements and maze.       Past Medical History:        Diagnosis Date    Allergic rhinitis     Angiolipoma of right kidney     ASHD (arteriosclerotic heart disease)     Atrial fibrillation (HCC)     Celiac disease     DM2 (diabetes mellitus, type 2) (HCC)     does not take medications for or check blood sugars    Eczema     GERD (gastroesophageal reflux disease)     Heart failure with reduced ejection fraction (HCC)     History of blood transfusion 2021    s/p Heart surgery    History of ST elevation myocardial infarction (STEMI)     Hyperlipidemia     Hypertension     Ischemic cardiomyopathy     Meningioma ([address] 2012    Noted MRI . Never did f/u MRI. Told she didn't need to. Has 0 sxs. Denies HA, vision changes, lateralizing numbness or weakness.  Mixed hearing loss 2013    Obesity (BMI 30-39. 9)     Osteoarthritis     Post-menopausal     S/P CABG (coronary artery bypass graft)     S/P Maze operation for atrial fibrillation     S/P MVR (mitral valve repair)     S/P tricuspid valve repair     Thrombocytopenia [facility])        Past Surgical History:        Procedure Laterality Date     SECTION  1972    CHOLECYSTECTOMY      COLONOSCOPY  2006    CORONARY [facility] GRAFT  2021    [facility]    MITRAL VALVE REPAIR  2021    [facility]    MYRINGOTOMY AND TYMPANOSTOMY TUBE PLACEMENT  2012    Dr Oswaldo Andrews    OTHER SURGICAL HISTORY  2021    MAZE Procedure during CABG and MVR/TVR at [facility]    TRICUSPID VALVE SURGERY  2021    TV Repair 2021 at [facility]. ENDOSCOPY  2006       Home Medications:   No current facility-administered medications on file prior to encounter.      Current Outpatient Medications on File Prior to Encounter   Medication Sig Dispense Refill    metoprolol succinate (TOPROL XL) 50 MG extended release tablet Take 1 tablet by mouth 2 times daily Take along with 25 mg BID to make 75 mg BID. 180 tablet 3    metoprolol succinate (TOPROL XL) 25 MG extended release tablet Take 1 tablet by mouth 2 times daily Take along with 50 mg BID to make 75 mg BID. 180 tablet 3    furosemide (LASIX) 40 MG tablet Take 1 tablet by mouth daily 90 tablet 3    sacubitril-valsartan (ENTRESTO) 49-51 MG per tablet Take 1 tablet by mouth 2 times daily 180 tablet 3    potassium chloride (KLOR-CON M) 20 MEQ extended release tablet Take 1 tablet by mouth daily 90 tablet 3    cetirizine (ZYRTEC) 10 MG tablet Take 10 mg by mouth daily      omeprazole (PRILOSEC) 20 MG delayed release capsule Take 20 mg by mouth daily      aspirin 81 MG chewable tablet Take 81 mg by mouth daily         Allergies:    Keflex [cephalexin], Norvasc [amlodipine besylate], Statins, Zetia [ezetimibe], Zocor [simvastatin], Biaxin [clarithromycin], and Doxycycline    Social History:    reports that she is a non-smoker but has been exposed to tobacco smoke. She has never used smokeless tobacco. She reports that she does not drink alcohol and does not use drugs. Family History:       Problem Relation Age of Onset    Heart Disease Mother     Lung Cancer Mother     Osteoporosis Mother     Other Father         Did not know her father.  Other Maternal Grandmother         blood clot, no clear hx surrounding    Heart Attack Maternal Grandfather     Heart Attack Maternal Uncle 62    Breast Cancer Maternal Aunt 50    Breast Cancer Maternal Cousin 40       Diet:  ADULT DIET; Clear Liquid    Review of systems:   Pertinent positives as noted in the HPI. All other systems reviewed and negative. PHYSICAL EXAM:  BP (!) 154/67   Pulse 84   Temp 96.9 °F (36.1 °C) (Temporal)   Resp 18   Ht 5' 2\" (1.575 m)   Wt 216 lb 3.2 oz (98.1 kg)   SpO2 93%   BMI 39.54 kg/m²   General appearance: No apparent distress, appears stated age and cooperative. HEENT: Normal cephalic, atraumatic without obvious deformity. Pupils equal, round, and reactive to light. Extra ocular muscles intact. Conjunctivae/corneas clear. Neck: Supple, with full range of motion. Trachea midline. Incision c/d/i. Drain in place. Respiratory:  Normal respiratory effort on RA. Clear to auscultation, bilaterally without Rales/Wheezes/Rhonchi.   Cardiovascular: Regular rate and rhythm with normal S1/S2 without murmurs, rubs or gallops. Abdomen: Soft, non-tender, non-distended with normal bowel sounds. Musculoskeletal:  No clubbing, cyanosis or edema bilaterally. Skin: Skin color, texture, turgor normal.  No rashes or lesions. Neurologic:  Neurovascularly intact without any focal sensory/motor deficits. Cranial nerves: II-XII intact, grossly non-focal.  Psychiatric: Alert and oriented, thought content appropriate, normal insight  Capillary Refill: Brisk,< 3 seconds   Peripheral Pulses: +2 palpable, equal bilaterally     Labs:   No results for input(s): WBC, HGB, HCT, PLT in the last 72 hours. Recent Labs     06/23/22  0657 06/23/22  0907   K 3.9  --    CALCIUM  --  9.4     No results for input(s): AST, ALT, BILIDIR, BILITOT, ALKPHOS in the last 72 hours. Recent Labs     06/23/22  0657   INR 1.00     No results for input(s): Vanessa Highman in the last 72 hours. Urinalysis:    Lab Results   Component Value Date    NITRU NEGATIVE 08/27/2015    WBCUA 0-2 08/27/2015    BACTERIA FEW 08/27/2015    RBCUA 0-2 08/27/2015    BLOODU NEGATIVE 08/27/2015    SPECGRAV 1.021 08/25/2014    GLUCOSEU NEGATIVE 08/27/2015       Radiology:   No orders to display     No results found.       EKG: normal sinus rhythm, no blocks or conduction defects, no ischemic changes    Electronically signed by NESSA Dodd on 6/23/2022 at 5:58 PM

## 2022-06-24 VITALS
HEIGHT: 62 IN | SYSTOLIC BLOOD PRESSURE: 145 MMHG | WEIGHT: 216.2 LBS | BODY MASS INDEX: 39.79 KG/M2 | DIASTOLIC BLOOD PRESSURE: 75 MMHG | OXYGEN SATURATION: 96 % | HEART RATE: 69 BPM | TEMPERATURE: 98.4 F | RESPIRATION RATE: 20 BRPM

## 2022-06-24 LAB
ANION GAP SERPL CALCULATED.3IONS-SCNC: 13 MEQ/L (ref 8–16)
BASOPHILS # BLD: 0.1 %
BASOPHILS ABSOLUTE: 0 THOU/MM3 (ref 0–0.1)
BUN BLDV-MCNC: 16 MG/DL (ref 7–22)
CALCIUM SERPL-MCNC: 8.6 MG/DL (ref 8.5–10.5)
CHLORIDE BLD-SCNC: 104 MEQ/L (ref 98–111)
CO2: 23 MEQ/L (ref 23–33)
CREAT SERPL-MCNC: 0.6 MG/DL (ref 0.4–1.2)
EOSINOPHIL # BLD: 0 %
EOSINOPHILS ABSOLUTE: 0 THOU/MM3 (ref 0–0.4)
ERYTHROCYTE [DISTWIDTH] IN BLOOD BY AUTOMATED COUNT: 12.1 % (ref 11.5–14.5)
ERYTHROCYTE [DISTWIDTH] IN BLOOD BY AUTOMATED COUNT: 43.5 FL (ref 35–45)
GFR SERPL CREATININE-BSD FRML MDRD: > 90 ML/MIN/1.73M2
GLUCOSE BLD-MCNC: 108 MG/DL (ref 70–108)
GLUCOSE BLD-MCNC: 142 MG/DL (ref 70–108)
GLUCOSE BLD-MCNC: 95 MG/DL (ref 70–108)
HCT VFR BLD CALC: 40.3 % (ref 37–47)
HEMOGLOBIN: 13.1 GM/DL (ref 12–16)
IMMATURE GRANS (ABS): 0.04 THOU/MM3 (ref 0–0.07)
IMMATURE GRANULOCYTES: 0.4 %
LACTIC ACID: 2.4 MMOL/L (ref 0.5–2)
LYMPHOCYTES # BLD: 6.1 %
LYMPHOCYTES ABSOLUTE: 0.6 THOU/MM3 (ref 1–4.8)
MCH RBC QN AUTO: 31.7 PG (ref 26–33)
MCHC RBC AUTO-ENTMCNC: 32.5 GM/DL (ref 32.2–35.5)
MCV RBC AUTO: 97.6 FL (ref 81–99)
MONOCYTES # BLD: 4.9 %
MONOCYTES ABSOLUTE: 0.5 THOU/MM3 (ref 0.4–1.3)
NUCLEATED RED BLOOD CELLS: 0 /100 WBC
PLATELET # BLD: 68 THOU/MM3 (ref 130–400)
PLATELET ESTIMATE: ABNORMAL
PMV BLD AUTO: 14 FL (ref 9.4–12.4)
POTASSIUM REFLEX MAGNESIUM: 4.1 MEQ/L (ref 3.5–5.2)
RBC # BLD: 4.13 MILL/MM3 (ref 4.2–5.4)
SCAN OF BLOOD SMEAR: NORMAL
SEG NEUTROPHILS: 88.5 %
SEGMENTED NEUTROPHILS ABSOLUTE COUNT: 8.5 THOU/MM3 (ref 1.8–7.7)
SODIUM BLD-SCNC: 140 MEQ/L (ref 135–145)
WBC # BLD: 9.6 THOU/MM3 (ref 4.8–10.8)

## 2022-06-24 PROCEDURE — 6370000000 HC RX 637 (ALT 250 FOR IP): Performed by: PHYSICIAN ASSISTANT

## 2022-06-24 PROCEDURE — 83605 ASSAY OF LACTIC ACID: CPT

## 2022-06-24 PROCEDURE — 2580000003 HC RX 258: Performed by: PHYSICIAN ASSISTANT

## 2022-06-24 PROCEDURE — 80048 BASIC METABOLIC PNL TOTAL CA: CPT

## 2022-06-24 PROCEDURE — 82948 REAGENT STRIP/BLOOD GLUCOSE: CPT

## 2022-06-24 PROCEDURE — 99217 PR OBSERVATION CARE DISCHARGE MANAGEMENT: CPT | Performed by: PHYSICIAN ASSISTANT

## 2022-06-24 PROCEDURE — 36415 COLL VENOUS BLD VENIPUNCTURE: CPT

## 2022-06-24 PROCEDURE — 85025 COMPLETE CBC W/AUTO DIFF WBC: CPT

## 2022-06-24 PROCEDURE — G0378 HOSPITAL OBSERVATION PER HR: HCPCS

## 2022-06-24 PROCEDURE — 99024 POSTOP FOLLOW-UP VISIT: CPT | Performed by: NURSE PRACTITIONER

## 2022-06-24 RX ADMIN — SODIUM CHLORIDE, PRESERVATIVE FREE 10 ML: 5 INJECTION INTRAVENOUS at 10:04

## 2022-06-24 RX ADMIN — HYDROCODONE BITARTRATE AND ACETAMINOPHEN 1 TABLET: 5; 325 TABLET ORAL at 00:28

## 2022-06-24 RX ADMIN — POTASSIUM CHLORIDE 20 MEQ: 1500 TABLET, EXTENDED RELEASE ORAL at 10:06

## 2022-06-24 RX ADMIN — SACUBITRIL AND VALSARTAN 1 TABLET: 49; 51 TABLET, FILM COATED ORAL at 10:03

## 2022-06-24 RX ADMIN — METOPROLOL SUCCINATE 50 MG: 25 TABLET, EXTENDED RELEASE ORAL at 10:02

## 2022-06-24 RX ADMIN — FUROSEMIDE 40 MG: 40 TABLET ORAL at 10:03

## 2022-06-24 RX ADMIN — CETIRIZINE HYDROCHLORIDE 10 MG: 10 TABLET, FILM COATED ORAL at 10:03

## 2022-06-24 RX ADMIN — PANTOPRAZOLE SODIUM 40 MG: 40 TABLET, DELAYED RELEASE ORAL at 05:26

## 2022-06-24 ASSESSMENT — PAIN DESCRIPTION - PAIN TYPE
TYPE: SURGICAL PAIN
TYPE: ACUTE PAIN

## 2022-06-24 ASSESSMENT — PAIN DESCRIPTION - DESCRIPTORS
DESCRIPTORS: ACHING
DESCRIPTORS: SORE
DESCRIPTORS: NUMBNESS
DESCRIPTORS: ACHING
DESCRIPTORS: ACHING
DESCRIPTORS: ACHING;SORE
DESCRIPTORS: ACHING;SORE

## 2022-06-24 ASSESSMENT — PAIN DESCRIPTION - ORIENTATION
ORIENTATION: LEFT

## 2022-06-24 ASSESSMENT — PAIN DESCRIPTION - FREQUENCY
FREQUENCY: INTERMITTENT

## 2022-06-24 ASSESSMENT — PAIN SCALES - GENERAL
PAINLEVEL_OUTOF10: 7
PAINLEVEL_OUTOF10: 5
PAINLEVEL_OUTOF10: 0
PAINLEVEL_OUTOF10: 7
PAINLEVEL_OUTOF10: 3
PAINLEVEL_OUTOF10: 3

## 2022-06-24 ASSESSMENT — PAIN DESCRIPTION - LOCATION
LOCATION: ARM;NECK
LOCATION: NECK
LOCATION: NECK
LOCATION: ARM;NECK
LOCATION: NECK
LOCATION: NECK;ARM
LOCATION: ARM;NECK
LOCATION: NECK

## 2022-06-24 NOTE — PROGRESS NOTES
Post-fall pharmacy consult    Pharmacy has been consulted to review medication profile for fall risk. Medications increasing risk of falling: norco and fentanyl given today  Medications increasing risk of bleeding: none- anticoags and antiplatelets on hold due to surgery  Findings discussed with RN in SDS  Recommendations: No changes recommended at this time.     Linda Grace, RolaD, BCPS   6/23/2022  8:03 PM

## 2022-06-24 NOTE — FLOWSHEET NOTE
06/24/22 1029   Safe Environment   Safety Measures   (virtual safety round complete)   Patient did not respond to verbal stimuli by virtual nurse. Patient awake, sitting in bed watching TV. Call light within reach. No safety concerns. Will continue to monitor.

## 2022-06-24 NOTE — PLAN OF CARE
Problem: Discharge Planning  Goal: Discharge to home or other facility with appropriate resources  6/24/2022 1109 by Santy Llanes RN  Outcome: Adequate for Discharge  Flowsheets (Taken 6/24/2022 0810)  Discharge to home or other facility with appropriate resources: Identify barriers to discharge with patient and caregiver  6/24/2022 0503 by Richie Chaves RN  Outcome: Progressing  Flowsheets  Taken 6/23/2022 2104 by Trice Lugo RN  Discharge to home or other facility with appropriate resources:   Identify barriers to discharge with patient and caregiver   Identify discharge learning needs (meds, wound care, etc)   Refer to discharge planning if patient needs post-hospital services based on physician order or complex needs related to functional status, cognitive ability or social support system  Taken 6/23/2022 2012 by Richie Chaves RN  Discharge to home or other facility with appropriate resources: Identify barriers to discharge with patient and caregiver     Problem: Chronic Conditions and Co-morbidities  Goal: Patient's chronic conditions and co-morbidity symptoms are monitored and maintained or improved  6/24/2022 1109 by Santy Llanes RN  Outcome: Adequate for Discharge  Flowsheets (Taken 6/24/2022 0810)  Care Plan - Patient's Chronic Conditions and Co-Morbidity Symptoms are Monitored and Maintained or Improved: Monitor and assess patient's chronic conditions and comorbid symptoms for stability, deterioration, or improvement  6/24/2022 0503 by Richie Chaves RN  Outcome: Progressing  [address] (Taken 6/23/2022 2012)  Care Plan - Patient's Chronic Conditions and Co-Morbidity Symptoms are Monitored and Maintained or Improved: Monitor and assess patient's chronic conditions and comorbid symptoms for stability, deterioration, or improvement     Problem: Pain  Goal: Verbalizes/displays adequate comfort level or baseline comfort level  6/24/2022 1109 by Santy Llanes RN  Outcome: Adequate for Discharge  Flowsheets (Taken 6/24/2022 0845)  Verbalizes/displays adequate comfort level or baseline comfort level: Encourage patient to monitor pain and request assistance  6/24/2022 0503 by Balaji Tellez RN  Outcome: Progressing  Flowsheets (Taken 6/24/2022 0503)  Verbalizes/displays adequate comfort level or baseline comfort level:   Encourage patient to monitor pain and request assistance   Assess pain using appropriate pain scale     Problem: ABCDS Injury Assessment  Goal: Absence of physical injury  6/24/2022 1109 by Nancy Holland RN  Outcome: Adequate for Discharge  6/24/2022 0503 by Balaji Tellez RN  Outcome: Progressing  Flowsheets (Taken 6/24/2022 0500)  Absence of Physical Injury: Implement safety measures based on patient assessment     Problem: Safety - Adult  Goal: Free from fall injury  6/24/2022 1109 by Nancy Holland RN  Outcome: Adequate for Discharge  6/24/2022 0503 by Balaji Tellez RN  Outcome: Progressing  Flowsheets (Taken 6/24/2022 0500)  Free From Fall Injury: Instruct family/caregiver on patient safety     Problem: Skin/Tissue Integrity  Goal: Absence of new skin breakdown  Description: 1. Monitor for areas of redness and/or skin breakdown  2. Assess vascular access sites hourly  3. Every 4-6 hours minimum:  Change oxygen saturation probe site  4. Every 4-6 hours:  If on nasal continuous positive airway pressure, respiratory therapy assess nares and determine need for appliance change or resting period.   Outcome: Adequate for Discharge     Problem: Cardiovascular - Adult  Goal: Maintains optimal cardiac output and hemodynamic stability  6/24/2022 1109 by Nancy Holland RN  Outcome: Adequate for Discharge  6/24/2022 0503 by Balaji Tellez RN  Outcome: Progressing  Flowsheets (Taken 6/24/2022 0503)  Maintains optimal cardiac output and hemodynamic stability:   Monitor blood pressure and heart rate   Monitor urine output and notify Licensed Independent Practitioner for values outside of normal range   Assess for signs of decreased cardiac output  Goal: Absence of cardiac dysrhythmias or at baseline  6/24/2022 1109 by Jagdeep Romero RN  Outcome: Adequate for Discharge  6/24/2022 0503 by Ashanti Shukla RN  Outcome: Progressing  Flowsheets (Taken 6/24/2022 0503)  Absence of cardiac dysrhythmias or at baseline:   Monitor cardiac rate and rhythm   Assess for signs of decreased cardiac output   Administer antiarrhythmia medication and electrolyte replacement as ordered     Problem: Musculoskeletal - Adult  Goal: Return mobility to safest level of function  6/24/2022 1109 by Jagdeep Romero RN  Outcome: Adequate for Discharge  Flowsheets (Taken 6/24/2022 0810)  Return Mobility to Safest Level of Function: Assist with transfers and ambulation using safe patient handling equipment as needed  6/24/2022 0503 by Ashanti Shukla RN  Flowsheets (Taken 6/23/2022 2012)  Return Mobility to Safest Level of Function:   Assist with transfers and ambulation using safe patient handling equipment as needed   Assess patient stability and activity tolerance for standing, transferring and ambulating with or without assistive devices  Goal: Return ADL status to a safe level of function  6/24/2022 1109 by Jagdeep Romero RN  Outcome: Adequate for Discharge  Flowsheets (Taken 6/24/2022 0810)  Return ADL Status to a Safe Level of Function: Administer medication as ordered  6/24/2022 0503 by Ashanti Shukla RN  Flowsheets (Taken 6/23/2022 2012)  Return ADL Status to a Safe Level of Function: Administer medication as ordered     Problem: Infection - Adult  Goal: Absence of infection at discharge  6/24/2022 1109 by Jagdeep Romero RN  Outcome: Adequate for Discharge  Flowsheets (Taken 6/24/2022 0810)  Absence of infection at discharge: Assess and monitor for signs and symptoms of infection  6/24/2022 0503 by Ashanti Shukla RN  Flowsheets  Taken 6/24/2022 0500  Absence of infection at discharge:   Assess and monitor for signs and symptoms of infection   Monitor lab/diagnostic results  Taken 6/23/2022 2012  Absence of infection at discharge:   Assess and monitor for signs and symptoms of infection   Monitor lab/diagnostic results  Goal: Absence of infection during hospitalization  6/24/2022 1109 by Becky Venegas RN  Outcome: Adequate for Discharge  Flowsheets (Taken 6/24/2022 0810)  Absence of infection during hospitalization: Assess and monitor for signs and symptoms of infection  6/24/2022 0503 by Agustín Campa RN  Flowsheets  Taken 6/24/2022 0500  Absence of infection during hospitalization: Assess and monitor for signs and symptoms of infection  Taken 6/23/2022 2012  Absence of infection during hospitalization: Assess and monitor for signs and symptoms of infection  Goal: Absence of fever/infection during anticipated neutropenic period  Outcome: Adequate for Discharge     Problem: Metabolic/Fluid and Electrolytes - Adult  Goal: Electrolytes maintained within normal limits  Outcome: Adequate for Discharge  Flowsheets (Taken 6/24/2022 0810)  Electrolytes maintained within normal limits: Monitor labs and assess patient for signs and symptoms of electrolyte imbalances  Goal: Hemodynamic stability and optimal renal function maintained  Outcome: Adequate for Discharge  Flowsheets (Taken 6/24/2022 0810)  Hemodynamic stability and optimal renal function maintained: Monitor labs and assess for signs and symptoms of volume excess or deficit     Problem: Hematologic - Adult  Goal: Maintains hematologic stability  Outcome: Adequate for Discharge  Flowsheets (Taken 6/24/2022 0810)  Maintains hematologic stability: Assess for signs and symptoms of bleeding or hemorrhage

## 2022-06-24 NOTE — PROGRESS NOTES
Admission navigator and home medications reviewed and completed with patient and family is at bedside at this time. Patient voiced diet modifications including gluten, low sodium, and leafy greens due to Coumadin therapy. Patient denied any pain at this time and requested snacks for a clear liquid diet. Call light within reach. Bedside RN notified.     Martin Graham RN

## 2022-06-24 NOTE — PLAN OF CARE
Problem: Discharge Planning  Goal: Discharge to home or other facility with appropriate resources  Outcome: Progressing  Flowsheets  Taken 6/23/2022 2104 by Nicanor Jones RN  Discharge to home or other facility with appropriate resources:   Identify barriers to discharge with patient and caregiver   Identify discharge learning needs (meds, wound care, etc)   Refer to discharge planning if patient needs post-hospital services based on physician order or complex needs related to functional status, cognitive ability or social support system  Taken 6/23/2022 2012 by Norm Leggett RN  Discharge to home or other facility with appropriate resources: Identify barriers to discharge with patient and caregiver     Problem: Chronic Conditions and Co-morbidities  Goal: Patient's chronic conditions and co-morbidity symptoms are monitored and maintained or improved  Outcome: Progressing  Flowsheets (Taken 6/23/2022 2012)  Care Plan - Patient's Chronic Conditions and Co-Morbidity Symptoms are Monitored and Maintained or Improved: Monitor and assess patient's chronic conditions and comorbid symptoms for stability, deterioration, or improvement     Problem: Pain  Goal: Verbalizes/displays adequate comfort level or baseline comfort level  Outcome: Progressing  Flowsheets (Taken 6/24/2022 0503)  Verbalizes/displays adequate comfort level or baseline comfort level:   Encourage patient to monitor pain and request assistance   Assess pain using appropriate pain scale     Problem: ABCDS Injury Assessment  Goal: Absence of physical injury  Outcome: Progressing  Flowsheets (Taken 6/24/2022 0500)  Absence of Physical Injury: Implement safety measures based on patient assessment     Problem: Safety - Adult  Goal: Free from fall injury  Outcome: Progressing  Flowsheets (Taken 6/24/2022 0500)  Free From Fall Injury: Instruct family/caregiver on patient safety     Problem: Cardiovascular - Adult  Goal: Maintains optimal cardiac output and hemodynamic stability  Outcome: Progressing  Flowsheets (Taken 6/24/2022 0503)  Maintains optimal cardiac output and hemodynamic stability:   Monitor blood pressure and heart rate   Monitor urine output and notify Licensed Independent Practitioner for values outside of normal range   Assess for signs of decreased cardiac output  Goal: Absence of cardiac dysrhythmias or at baseline  Outcome: Progressing  Flowsheets (Taken 6/24/2022 0503)  Absence of cardiac dysrhythmias or at baseline:   Monitor cardiac rate and rhythm   Assess for signs of decreased cardiac output   Administer antiarrhythmia medication and electrolyte replacement as ordered     Problem: Discharge Planning  Goal: Discharge to home or other facility with appropriate resources  Outcome: Progressing  Flowsheets  Taken 6/23/2022 2104 by Barrie Yung RN  Discharge to home or other facility with appropriate resources:   Identify barriers to discharge with patient and caregiver   Identify discharge learning needs (meds, wound care, etc)   Refer to discharge planning if patient needs post-hospital services based on physician order or complex needs related to functional status, cognitive ability or social support system  Taken 6/23/2022 2012 by Kemal Santiago RN  Discharge to home or other facility with appropriate resources: Identify barriers to discharge with patient and caregiver     Problem: Chronic Conditions and Co-morbidities  Goal: Patient's chronic conditions and co-morbidity symptoms are monitored and maintained or improved  Outcome: Progressing  Flowsheets (Taken 6/23/2022 2012)  Care Plan - Patient's Chronic Conditions and Co-Morbidity Symptoms are Monitored and Maintained or Improved: Monitor and assess patient's chronic conditions and comorbid symptoms for stability, deterioration, or improvement     Problem: Pain  Goal: Verbalizes/displays adequate comfort level or baseline comfort level  Outcome: Progressing  Flowsheets (Taken 6/24/2022 0503)  Verbalizes/displays adequate comfort level or baseline comfort level:   Encourage patient to monitor pain and request assistance   Assess pain using appropriate pain scale     Problem: ABCDS Injury Assessment  Goal: Absence of physical injury  Outcome: Progressing  Flowsheets (Taken 6/24/2022 0500)  Absence of Physical Injury: Implement safety measures based on patient assessment     Problem: Safety - Adult  Goal: Free from fall injury  Outcome: Progressing  Flowsheets (Taken 6/24/2022 0500)  Free From Fall Injury: Instruct family/caregiver on patient safety     Problem: Cardiovascular - Adult  Goal: Maintains optimal cardiac output and hemodynamic stability  Outcome: Progressing  Flowsheets (Taken 6/24/2022 0503)  Maintains optimal cardiac output and hemodynamic stability:   Monitor blood pressure and heart rate   Monitor urine output and notify Licensed Independent Practitioner for values outside of normal range   Assess for signs of decreased cardiac output  Goal: Absence of cardiac dysrhythmias or at baseline  Outcome: Progressing  Flowsheets (Taken 6/24/2022 0503)  Absence of cardiac dysrhythmias or at baseline:   Monitor cardiac rate and rhythm   Assess for signs of decreased cardiac output   Administer antiarrhythmia medication and electrolyte replacement as ordered     Problem: Chronic Conditions and Co-morbidities  Goal: Patient's chronic conditions and co-morbidity symptoms are monitored and maintained or improved  Outcome: Progressing  Flowsheets (Taken 6/23/2022 2012)  Care Plan - Patient's Chronic Conditions and Co-Morbidity Symptoms are Monitored and Maintained or Improved: Monitor and assess patient's chronic conditions and comorbid symptoms for stability, deterioration, or improvement     Problem: Pain  Goal: Verbalizes/displays adequate comfort level or baseline comfort level  Outcome: Progressing  Flowsheets (Taken 6/24/2022 0503)  Verbalizes/displays adequate comfort level or baseline comfort level:   Encourage patient to monitor pain and request assistance   Assess pain using appropriate pain scale     Problem: ABCDS Injury Assessment  Goal: Absence of physical injury  Outcome: Progressing  Flowsheets (Taken 6/24/2022 0500)  Absence of Physical Injury: Implement safety measures based on patient assessment     Problem: Safety - Adult  Goal: Free from fall injury  Outcome: Progressing  Flowsheets (Taken 6/24/2022 0500)  Free From Fall Injury: Instruct family/caregiver on patient safety     Problem: Cardiovascular - Adult  Goal: Maintains optimal cardiac output and hemodynamic stability  Outcome: Progressing  Flowsheets (Taken 6/24/2022 0503)  Maintains optimal cardiac output and hemodynamic stability:   Monitor blood pressure and heart rate   Monitor urine output and notify Licensed Independent Practitioner for values outside of normal range   Assess for signs of decreased cardiac output     Problem: Cardiovascular - Adult  Goal: Absence of cardiac dysrhythmias or at baseline  Outcome: Progressing  Flowsheets (Taken 6/24/2022 0503)  Absence of cardiac dysrhythmias or at baseline:   Monitor cardiac rate and rhythm   Assess for signs of decreased cardiac output   Administer antiarrhythmia medication and electrolyte replacement as ordered     Problem: Musculoskeletal - Adult  Goal: Return mobility to safest level of function  Flowsheets (Taken 6/23/2022 2012)  Return Mobility to Safest Level of Function:   Assist with transfers and ambulation using safe patient handling equipment as needed   Assess patient stability and activity tolerance for standing, transferring and ambulating with or without assistive devices  Goal: Return ADL status to a safe level of function  Flowsheets (Taken 6/23/2022 2012)  Return ADL Status to a Safe Level of Function: Administer medication as ordered     Problem: Infection - Adult  Goal: Absence of infection at discharge  Flowsheets  Taken 6/24/2022 0500  Absence of infection at discharge:   Assess and monitor for signs and symptoms of infection   Monitor lab/diagnostic results  Taken 6/23/2022 2012  Absence of infection at discharge:   Assess and monitor for signs and symptoms of infection   Monitor lab/diagnostic results  Goal: Absence of infection during hospitalization  Flowsheets  Taken 6/24/2022 0500  Absence of infection during hospitalization: Assess and monitor for signs and symptoms of infection  Taken 6/23/2022 2012  Absence of infection during hospitalization: Assess and monitor for signs and symptoms of infection     Problem: Metabolic/Fluid and Electrolytes - Adult  Goal: Electrolytes maintained within normal limits  Recent Flowsheet Documentation  Taken 6/23/2022 2012 by Eliane Flynn RN  Electrolytes maintained within normal limits: Monitor labs and assess patient for signs and symptoms of electrolyte imbalances  Goal: Hemodynamic stability and optimal renal function maintained  Recent Flowsheet Documentation  Taken 6/23/2022 2012 by Eliane Flynn RN  Hemodynamic stability and optimal renal function maintained: Monitor labs and assess for signs and symptoms of volume excess or deficit     Problem: Hematologic - Adult  Goal: Maintains hematologic stability  Recent Flowsheet Documentation  Taken 6/23/2022 2012 by Eliane Flynn RN  Maintains hematologic stability: Assess for signs and symptoms of bleeding or hemorrhage

## 2022-06-24 NOTE — FLOWSHEET NOTE
06/24/22 1508   Safe Environment   Safety Measures   (virtual safety round complete)   Patient sitting in chair, watching TV. Family at bedside. Patient denies any pain. Denies any needs at this time. Call light within reach. No safety concerns. Will continue to monitor.

## 2022-06-24 NOTE — PROGRESS NOTES
Patient seen and examined with multiple family members at bedside. She is feeling well today, aside from being hungry (only clears since surgery yesterday). She denies any pain. Workup was negative post fall. Drain output scant. Incision without erythema or swelling. Okay for discharge form ENT standpoint. Home with drain. Antibiotic and pain medication already sent to pharmacy. Patient to follow up in office for drain removal Tuesday as scheduled.

## 2022-06-24 NOTE — FLOWSHEET NOTE
Swati is getting ready to leave. She is sitting up in a chair on 4k. Her , son,mary ann and grand daughter are here to see her. She is hoping to go home. She stated that her surgery on her thyroid is good and she is waiting to hear if there is cancer there or not. She believes God touched her and she is healed. We had prayer for her continued healing and recovery. 06/24/22 1534   Encounter Summary   Encounter Overview/Reason  Initial Encounter   Service Provided For: Patient and family together   Referral/Consult From: [facility]   Support System Spouse; Children;Family members   Last Encounter  06/24/22   Complexity of Encounter Moderate   Begin Time 1500   End Time  1525   Total Time Calculated 25 min   Encounter    Type Initial Screen/Assessment   Spiritual/Emotional needs   Type Spiritual Support

## 2022-06-24 NOTE — DISCHARGE SUMMARY
Hospitalist Discharge Summary        Patient: Jj Schultz  YOB: 1950  MRN: [id number]   Acct: [de-identified]    Primary Care Physician: Hayden Viramontes DO    Admit date  6/23/2022    Discharge date: 6/24/2022    Chief Complaint on presentation :-  Fall     Discharge Assessment and Plan:-   1. Fall, unknown etiology: Pt fell getting off cot. No dizziness or CP. EKG normal. Tele overnight showed no issues. Pt walked to the bathroom with no issues. 2. S/P right hemithyroidectomy: ENT consulted. Drain in place. Incision c/d/i. ENT saw patient prior to DC and gave additional post-op instructions. 3. HFrEF, chronic: Latest echo showed EF 45-50%. Continue Entresto, BB & Lasix. 4. GERD: PPI   5. CAD s/p CABG   6. Atrial Fibrillation s/p MAZE   7. S/P MVR, TVR   8. HLD       Initial H and P and Hospital course:-  Initial H&P \"This is a 67-year-old female who presented to [address] [place] [address] to have a right hemithyroidectomy today by Dr. Syed Martínez. Patient had a relatively normal surgery and perioperative course. When patient was getting up to the edge of the bed she fell and hit her cheek on the table in her room. Patient had drain placed and surgery and ENT checked patient after fall. Drain and incision were intact. Patient denies dizziness, loss of consciousness, chest pain, shortness of breath. Patient thinks that her knee gave out. Patient does not have a history of falls. During evaluation, patient denies any pain or any issues. Patient does have a significant cardiac history and has had CABG and multiple valve replacements and maze. \"     6/24: Pt's drain in place with no issues. ENT Okay with DC. Physical Exam:-  General appearance: No apparent distress, appears stated age and cooperative. HEENT: Normal cephalic, atraumatic without obvious deformity. Pupils equal, round, and reactive to light. Extra ocular muscles intact. Conjunctivae/corneas clear.   Neck: Supple, with full range of motion. Trachea midline. Incision c/d/i. Drain in place. Respiratory:  Normal respiratory effort on RA. Clear to auscultation, bilaterally without Rales/Wheezes/Rhonchi. Cardiovascular: Regular rate and rhythm with normal S1/S2 without murmurs, rubs or gallops. Abdomen: Soft, non-tender, non-distended with normal bowel sounds. Musculoskeletal:  No clubbing, cyanosis or edema bilaterally. Skin: Skin color, texture, turgor normal.  No rashes or lesions. Neurologic:  Neurovascularly intact without any focal sensory/motor deficits.  Cranial nerves: II-XII intact, grossly non-focal.  Psychiatric: Alert and oriented, thought content appropriate, normal insight  Capillary Refill: Brisk,< 3 seconds   Peripheral Pulses: +2 palpable, equal bilaterally     Vitals:   Patient Vitals for the past 24 hrs:   BP Temp Temp src Pulse Resp SpO2   06/24/22 1145 (!) 145/75 98.4 °F (36.9 °C) Oral 69 20 --   06/24/22 1002 133/70 -- -- -- -- --   06/24/22 0845 (!) 133/59 97.8 °F (36.6 °C) Oral 70 18 96 %   06/24/22 0810 -- -- -- 70 -- --   06/24/22 0437 (!) 117/59 97.9 °F (36.6 °C) Axillary 73 16 95 %   06/24/22 0026 105/60 97.4 °F (36.3 °C) Axillary 80 16 --   06/23/22 2012 123/73 97.8 °F (36.6 °C) Axillary 80 16 93 %   06/23/22 1841 139/65 -- -- 73 18 96 %   06/23/22 1659 (!) 154/67 -- -- 84 18 93 %   06/23/22 1646 (!) 143/65 -- -- 72 18 92 %   06/23/22 1551 138/67 -- -- 66 18 93 %   06/23/22 1529 (!) 151/70 96.9 °F (36.1 °C) Temporal 66 18 94 %   06/23/22 1515 (!) 142/70 -- -- 63 18 94 %   06/23/22 1510 (!) 142/67 -- -- 64 16 95 %   06/23/22 1505 (!) 147/71 -- -- 66 17 96 %   06/23/22 1500 138/65 -- -- 65 16 94 %   06/23/22 1455 (!) 149/70 -- -- 62 14 93 %   06/23/22 1450 (!) 147/65 -- -- 65 18 95 %   06/23/22 1445 (!) 155/70 -- -- 65 16 95 %   06/23/22 1440 136/60 -- -- 66 18 94 %   06/23/22 1435 (!) 140/64 -- -- 67 18 95 %   06/23/22 1430 (!) 142/70 -- -- 68 16 95 %   06/23/22 1425 (!) 143/69 -- -- 69 16 96 % as directed by [facility] (60 tablets = 30 days)         * This list has 2 medication(s) that are the same as other medications prescribed for you. Read the directions carefully, and ask your doctor or other care provider to review them with you.                Where to Get Your Medications      These medications were sent to [address] [place] [address] [address] - [place], [address] [facility] [phone]  [address], [place] [address]    Phone: [phone]   · ciprofloxacin 500 MG tablet  · HYDROcodone-acetaminophen 5-325 MG per tablet     Pharmacy Instructions:         Recommendations for warfarin at discharge:      Date Warfarin Dose   06/23/22 No Coumadin    06/24/22 No Coumadin   06/25/22 No Coumadin   06/26/22 No Coumadin   06/27/22 No Coumadin   06/28/22 9 mg (3 tablets of the 3 mg tablet)   06/29/22 9 mg (3 tablets of the 3 mg tablet)   06/30/22 6 mg (2 tablets of the 3 mg tablet)   07/01/22 6 mg (2 tablets of the 3 mg tablet)   07/02/22 6 mg (2 tablets of the 3 mg tablet)   07/03/22 6 mg (2 tablets of the 3 mg tablet)   07/04/22 6 mg (2 tablets of the 3 mg tablet)         Recheck INR:  07/05/22 @ 2:40 PM with results to [address] [place] [address] :-  Recent Results (from the past 67 hour(s))   ABO/Rh    Collection Time: 06/22/22  9:39 AM   Result Value Ref Range    ABO B     Rh Factor POS    TYPE AND SCREEN    Collection Time: 06/23/22  6:57 AM   Result Value Ref Range    ABO B     Rh Factor POS     Antibody Screen NEG    Potassium w/ Reflex to Magnesium    Collection Time: 06/23/22  6:57 AM   Result Value Ref Range    Potassium reflex Magnesium 3.9 3.5 - 5.2 meq/L   Protime-INR    Collection Time: 06/23/22  6:57 AM   Result Value Ref Range    INR 1.00 0.85 - 1.13   Calcium    Collection Time: 06/23/22  9:07 AM   Result Value Ref Range    Calcium 9.4 8.5 - 10.5 mg/dL   POCT Glucose    Collection Time: 06/23/22  2:02 PM   Result Value Ref Range Glom Filt Rate >90 ml/min/1.73m2   Scan of Blood Smear    Collection Time: 06/24/22  4:46 AM   Result Value Ref Range    SCAN OF BLOOD SMEAR see below    POCT Glucose    Collection Time: 06/24/22  8:04 AM   Result Value Ref Range    POC Glucose 108 70 - 108 mg/dl   POCT Glucose    Collection Time: 06/24/22 12:16 PM   Result Value Ref Range    POC Glucose 95 70 - 108 mg/dl        Microbiology:    Blood culture #1: No results found for: BC    Blood culture #2:No results found for: BLOODCULT2    Organism:  No results found for: LABGRAM    MRSA culture only:No results found for: [address]    Urine culture: No results found for: LABURIN  No results found for: ORG     Respiratory culture: No results found for: CULTRESP    Aerobic and Anaerobic :  No results found for: LABAERO  No results found for: LABANAE    Urinalysis:      Lab Results   Component Value Date    NITRU NEGATIVE 08/27/2015    WBCUA 0-2 08/27/2015    BACTERIA FEW 08/27/2015    RBCUA 0-2 08/27/2015    BLOODU NEGATIVE 08/27/2015    SPECGRAV 1.021 08/25/2014    GLUCOSEU NEGATIVE 08/27/2015       Radiology:-  No results found.      Follow-up scheduled after discharge :-    in the next few days with Virgel Sensor, DO  in the next few days with ENT     Consultations during this hospital stay:-  [] NONE [] Cardiology  [] Nephrology  [] Hemo onco   [] GI   [] ID  [] Endocrine  [] Pulm    [] Neuro    [] Psych   [] Urology  [x] ENT   [] G SURGERY   []Ortho    []CV surg    [] Palliative  [] Hospice [] Pain management   []    []TCU   [] PT/OT  OTHERS:-    Disposition: home  Condition at Discharge: Stable    Time Spent:- 35 minutes    Electronically signed by Elinor Fothergill, PA on 6/24/22 at 1:19 PM EDT   Discharging Hospitalist

## 2022-06-24 NOTE — PROGRESS NOTES
Julia Cuellar PA for hospitalist called per Dr Zhane Salgado. He wants hospitalist to admit patient. Ericka updated on fall and recent surgery for patient. States she will be down to see patient and put in orders. Julia Cuellar also notified of ekg results, no further orders.

## 2022-06-27 ENCOUNTER — CARE COORDINATION (OUTPATIENT)
Dept: CASE MANAGEMENT | Age: 72
End: 2022-06-27

## 2022-06-27 ENCOUNTER — TELEPHONE (OUTPATIENT)
Dept: PHARMACY | Age: 72
End: 2022-06-27

## 2022-06-27 ENCOUNTER — TELEPHONE (OUTPATIENT)
Dept: FAMILY MEDICINE CLINIC | Age: 72
End: 2022-06-27

## 2022-06-27 DIAGNOSIS — D44.0 FOLLICULAR TUMOR OF THYROID GLAND (UNCERTAIN IF BENIGN OR MALIGNANT): Primary | ICD-10-CM

## 2022-06-27 PROCEDURE — 1111F DSCHRG MED/CURRENT MED MERGE: CPT | Performed by: FAMILY MEDICINE

## 2022-06-27 NOTE — TELEPHONE ENCOUNTER
Patient called requesting dosing for 7/5/22 and 7/6/22 as she had to reschedule next appointment to 7/7/22 @ 1 pm. Instructed patient to take home dose of 6 mg on 7/5/22 and 7/6/22. Patient voiced understanding. Patient given instructions utilizing the teach back method.     Errol Gaucher, PharmD, BCPS  6/27/2022  3:16 PM

## 2022-06-27 NOTE — TELEPHONE ENCOUNTER
José Miguel [facility] Transitions Initial Follow Up Call    Outreach made within 2 business days of discharge: Yes    Patient: Gregg Jaimes Patient : 1950   MRN: [id number]  Reason for Admission: There are no discharge diagnoses documented for the most recent discharge. Discharge Date: 22       Spoke with: [facility] for patient to return a call back      Discharge department/facility:     TCM Interactive Patient Contact:  Was patient able to fill all prescriptions:   Was patient instructed to bring all medications to the follow-up visit:   Is patient taking all medications as directed in the discharge summary?    Does patient understand their discharge instructions:   Does patient have questions or concerns that need addressed prior to 7-14 day follow up office visit:     Scheduled appointment with PCP within 7-14 days    Follow Up  Future Appointments   Date Time Provider Andrei Pedraza   2022  1:45 PM Keely August MD N ENT [address]   2022 10:40 AM Nayan Tanner PA-C N Oncology [address]   2022 11:20 AM Dmitry Troy DO [address]   2022  2:40 PM PHILLY [facility] MED [address]   2022  2:00 PM ORAL Saavedra CNP N SRPX CHF [address]   10/10/2022  3:00 PM MD BINU Soares SRPX Heart [address]   10/27/2022  3:40 PM Dmitry Troy DO [facility]       Valentina Duncan CMA ([facility])

## 2022-06-27 NOTE — TELEPHONE ENCOUNTER
José Miguel 45 Transitions Initial Follow Up Call    Outreach made within 2 business days of discharge: Yes    Patient: Art Cordova Patient : 1950   MRN: [id number]  Reason for Admission: There are no discharge diagnoses documented for the most recent discharge.   Discharge Date: 22       Spoke with: [facility] to call back at earliest convenience    Discharge department/facility: [facility]

## 2022-06-27 NOTE — TELEPHONE ENCOUNTER
[facility] Transitions Initial Follow Up Call    Outreach made within 2 business days of discharge: Yes    Patient: Martin Solis Patient : 1950   MRN: [id number]  Reason for Admission: There are no discharge diagnoses documented for the most recent discharge. Discharge Date: 22       Spoke with: Pablo Boswell    Discharge department/facility: [facility]    TCM Interactive Patient Contact:  Was patient able to fill all prescriptions: Yes  Was patient instructed to bring all medications to the follow-up visit: Yes  Is patient taking all medications as directed in the discharge summary?  Yes  Does patient understand their discharge instructions: Yes  Does patient have questions or concerns that need addressed prior to 7-14 day follow up office visit: no    Scheduled appointment with PCP within 7-14 days  appt confirmed   Follow Up  Future Appointments   Date Time Provider Andrei Pedraza   2022  1:45 PM Isael Donald MD N ENT [address]   2022 10:40 AM Ishmael Hughes PA-C N Oncology [address]   2022 11:20 AM Larry Castro DO [address]   2022  2:40 PM PHILLY [facility] MED [facility] [place]   2022  2:00 PM ORAL Borja - CNP N SRPX CHF [address]   10/10/2022  3:00 PM MD BINU Broussard SRPX Heart [address]   10/27/2022  3:40 PM Larry Castro  [facility] [address] JAZIEL Ca LPN

## 2022-06-27 NOTE — CARE COORDINATION
José Miguel 45 Transitions Initial Follow Up Call    Call within 2 business days of discharge: Yes    Patient: Gil Chávez Patient : 1950   MRN: [id number]  Reason for Admission: fall  Discharge Date: 22 RARS: Readmission Risk Score: 10.7 ( )      Last Discharge [facility]       Complaint Diagnosis Description Type Department Provider    22  Acute post-operative pain . .. Admission (Discharged) STRZ 4K Bassem Reynaldo, [address]           Spoke with: Winnie Alonzo today and she says she is feeling pretty good. Post op pain level 3/10 just at hs, she takes the Norco then. Denies dysphagia, n/v/d, dizziness, falls, lightheadedness, sob. Surgical site well approximated without redness or swelling. Drain intact-> 50 cc total yesterday 20 cc so far today bloody fluid. Voice sounds strong but raspy. Coumadin is on hold through today, to restart tomorrow. Meds reviewed and are correct. Upcoming appts. Reviewed: ENT 22, Onc 22, PCP 22-  will transport her. Eating, drinking & sleeping well. Denies problems w/urination/bowels. No other concerns voiced at this time. CTN # given to pt.  Kristine Reid RN Care Transitions [phone]        Non-face-to-face services provided:  Scheduled appointment with PCP-22  Scheduled appointment with [facility] E[facility]; 22 ONC    Care Transitions 24 Hour Call    Schedule Follow Up Appointment with PCP: Completed  Do you have a copy of your discharge instructions?: Yes  Do you have all of your prescriptions and are they filled?: Yes  Have you been contacted by a [facility] Pharmacist?: No  Have you scheduled your follow up appointment?: Yes  How are you going to get to your appointment?: Car - family or friend to transport  Do you feel like you have everything you need to keep you well at home?: Yes  Care Transitions Interventions     Transportation Support: Declined   Disease Specific Clinic: Completed         Transitions of Care Initial Call        Challenges to be reviewed by the provider   Additional needs identified to be addressed with provider: No  none             Method of communication with provider : none    Advance Care Planning:   Does patient have an Advance Directive: on file. Care Transition Nurse contacted the patient by telephone to perform post hospital discharge assessment. Verified name and  with patient as identifiers. Provided introduction to self, and explanation of the CTN role. CTN reviewed discharge instructions, medical action plan and red flags with patient who verbalized understanding. Patient given an opportunity to ask questions and does not have any further questions or concerns at this time. Were discharge instructions available to patient? Yes. Reviewed appropriate site of care based on symptoms and resources available to patient including: PCP  Specialist. The patient agrees to contact the PCP office for questions related to their healthcare. Medication reconciliation was performed with patient, who verbalizes understanding of administration of home medications. Advised obtaining a 90-day supply of all daily and as-needed medications. Was patient discharged with a pulse oximeter? no    CTN provided contact information. Plan for follow-up call in 5-7 days based on severity of symptoms and risk factors.   Plan for next call: symptom management-dysphagia, post op pain, drain?, dizziness?, fall?  follow up appointment-ENT ; ONC 22 PCP 22  medication management-Coumadin restarted        Follow Up  Future Appointments   Date Time Provider Andrei Pedraza   2022  1:45 PM Marcos Ayala MD N ENT [facility] [address]   2022 10:40 AM KAYLA Blank Oncology [facility] [address]   2022 11:20 AM Casey Ramirez DO [facility] [address]   2022  2:40 PM PHILLY Ortiz [facility] MED MGMT [facility] [address] Lu[address]man   2022  2:00 PM ORAL Schulz - CNP N SRPX CHF [facility] [address]

## 2022-06-28 ENCOUNTER — OFFICE VISIT (OUTPATIENT)
Dept: ENT CLINIC | Age: 72
End: 2022-06-28

## 2022-06-28 VITALS
HEIGHT: 62 IN | DIASTOLIC BLOOD PRESSURE: 70 MMHG | OXYGEN SATURATION: 97 % | SYSTOLIC BLOOD PRESSURE: 128 MMHG | WEIGHT: 218 LBS | TEMPERATURE: 96.8 F | BODY MASS INDEX: 40.12 KG/M2 | HEART RATE: 78 BPM

## 2022-06-28 DIAGNOSIS — D69.6 THROMBOCYTOPENIA (HCC): Primary | ICD-10-CM

## 2022-06-28 DIAGNOSIS — D49.7 FOLLICULAR NEOPLASM OF THYROID: Primary | ICD-10-CM

## 2022-06-28 PROCEDURE — 99024 POSTOP FOLLOW-UP VISIT: CPT | Performed by: OTOLARYNGOLOGY

## 2022-06-28 NOTE — PROGRESS NOTES
[facility] PHYSICIANS [place] SPECIALTY  [facility] EAR, NOSE AND THROAT   [address] [address]  Dept: [phone]  Dept Fax: [phone]  Loc: [phone]    Reyesside is a 67 y.o. female who was referred by No ref. provider found for:  Chief Complaint   Patient presents with    Post-Op Check     Patient is here post-op right hemithyroidectomy 6/23/22, drain removal        HPI:     Reyesside is a 67 y.o. 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 The patient is here today with her  and reports having had no problems with her wound or her drain. She also is set to start her anticoagulation this evening. She has a record of her drain outputs. History: Allergies   Allergen Reactions    Keflex [Cephalexin] Other (See Comments)     Tongue Swelling    Norvasc [Amlodipine Besylate] Other (See Comments)     Nasuea, hot flashes    Statins Other (See Comments)     Myalgias all    Zetia [Ezetimibe] Other (See Comments)     Chest pain    Zocor [Simvastatin] Other (See Comments)     Myalgias    Biaxin [Clarithromycin] Nausea Only    Doxycycline Other (See Comments)     GI side effects     Current Outpatient Medications   Medication Sig Dispense Refill    HYDROcodone-acetaminophen (NORCO) 5-325 MG per tablet Take 1 tablet by mouth every 4 hours as needed for Pain for up to 5 days. Intended supply: 5 days. Take lowest dose possible to manage pain 30 tablet 0    ciprofloxacin (CIPRO) 500 MG tablet Take 1 tablet by mouth 2 times daily for 10 days 20 tablet 0    warfarin (COUMADIN) 3 MG tablet Take as directed by [facility] (60 tablets = 30 days) 60 tablet 11    metoprolol succinate (TOPROL XL) 50 MG extended release tablet Take 1 tablet by mouth 2 times daily Take along with 25 mg BID to make 75 mg BID. 180 tablet 3    metoprolol succinate (TOPROL XL) 25 MG extended release tablet Take 1 tablet by mouth 2 times daily Take along with 50 mg BID to make 75 mg BID. 180 tablet 3    furosemide (LASIX) 40 MG tablet Take 1 tablet by mouth daily 90 tablet 3    sacubitril-valsartan (ENTRESTO) 49-51 MG per tablet Take 1 tablet by mouth 2 times daily 180 tablet 3    potassium chloride (KLOR-CON M) 20 MEQ extended release tablet Take 1 tablet by mouth daily 90 tablet 3    cetirizine (ZYRTEC) 10 MG tablet Take 10 mg by mouth daily      omeprazole (PRILOSEC) 20 MG delayed release capsule Take 20 mg by mouth daily      aspirin 81 MG chewable tablet Take 81 mg by mouth daily       No current facility-administered medications for this visit. Past Medical History:   Diagnosis Date    Allergic rhinitis     Angiolipoma of right kidney     ASHD (arteriosclerotic heart disease)     Atrial fibrillation (HCC)     Celiac disease     DM2 (diabetes mellitus, type 2) (HCC)     does not take medications for or check blood sugars    Eczema     GERD (gastroesophageal reflux disease)     Heart failure with reduced ejection fraction (HCC)     History of blood transfusion 2021    s/p Heart surgery    History of ST elevation myocardial infarction (STEMI)     Hyperlipidemia     Hypertension     Ischemic cardiomyopathy     Meningioma ([facility] [address] 2012    Noted MRI . Never did f/u MRI. Told she didn't need to. Has 0 sxs. Denies HA, vision changes, lateralizing numbness or weakness.  Mixed hearing loss 2013    Obesity (BMI 30-39. 9)     Osteoarthritis     Post-menopausal     S/P CABG (coronary artery bypass graft)     S/P Maze operation for atrial fibrillation     S/P MVR (mitral valve repair)     S/P tricuspid valve repair     Thrombocytopenia [facility])       Past Surgical History:   Procedure Laterality Date     SECTION  1972    CHOLECYSTECTOMY      COLONOSCOPY  2006    CORONARY ARTERY BYPASS GRAFT  11/2021    F    MITRAL VALVE REPAIR  11/2021    F    MYRINGOTOMY AND TYMPANOSTOMY TUBE PLACEMENT  12/07/2012    Dr Ara Mcallister OTHER SURGICAL HISTORY  11/2021    MAZE Procedure during CABG and MVR/TVR at [facility]    THYROIDECTOMY N/A 6/23/2022    Right Hemithyroidectomy performed by Erica Dumont MD at [address]  11/2021    TV Repair NOV 2021 at [address] ENDOSCOPY  05/2006     Family History   Problem Relation Age of Onset    Heart Disease Mother    Francisca Coronel Mother     Osteoporosis Mother     Other Father         Did not know her father.  Other Maternal Grandmother         blood clot, no clear hx surrounding    Heart Attack Maternal Grandfather     Heart Attack Maternal Uncle 62    Breast Cancer Maternal Aunt 50    Breast Cancer Maternal Cousin 40     Social History     Tobacco Use    Smoking status: Passive Smoke Exposure - Never Smoker    Smokeless tobacco: Never Used   Substance Use Topics    Alcohol use: No        Subjective:      Review of Systems  Rest of review of systems are negative, except as noted in HPI. Objective:     /70 (Site: Left Lower Arm, Position: Sitting)   Pulse 78   Temp 96.8 °F (36 °C) (Infrared)   Ht 5' 2\" (1.575 m)   Wt 218 lb (98.9 kg)   SpO2 97%   BMI 39.87 kg/m²     Physical Exam       On general physical exam the patient is a pleasant alert older adult female who is in no acute distress. Her voice and her speech pattern are within normal limits for her age and gender. I heard no throat clearing coughing or inspiratory stridor. Her neck was abnormal for the presence of a well-healing wound and some ecchymosis of the upper limb of the wound but no palpable signs of seroma or hematoma. The drain had about 10 cc of trino fluid that was clear. There was no signs of cellulitis about the base of the drain.     I cut the drain suture loose and delivered the drain without significant discomfort to the patient. I placed an antibiotic ointment on the drain wound and covered it with a sterile Band-Aid. The patient tolerated the removal well. Vitals reviewed. No results found. Lab Results   Component Value Date     06/24/2022     06/15/2022     04/04/2022    K 4.1 06/24/2022    K 3.9 06/23/2022    K 4.6 06/15/2022    K 4.5 04/04/2022    K 4.4 03/16/2022     06/24/2022     06/15/2022     04/04/2022    CO2 23 06/24/2022    CO2 28 06/15/2022    CO2 28 04/04/2022    BUN 16 06/24/2022    BUN 19 06/15/2022    BUN 19 04/04/2022    CREATININE 0.6 06/24/2022    CREATININE 0.7 06/15/2022    CREATININE 0.7 04/04/2022    CALCIUM 8.6 06/24/2022    CALCIUM 9.4 06/23/2022    CALCIUM 9.7 06/15/2022    PROT 6.5 06/15/2022    PROT 7.7 11/05/2021    PROT 7.0 10/27/2021    LABALBU 4.5 06/15/2022    LABALBU 4.6 11/05/2021    LABALBU 4.5 10/27/2021    BILITOT 0.3 06/15/2022    BILITOT 0.4 11/05/2021    BILITOT 0.5 10/27/2021    ALKPHOS 90 06/15/2022    ALKPHOS 79 11/05/2021    ALKPHOS 73 10/27/2021    AST 15 06/15/2022    AST 27 11/05/2021    AST 45 10/27/2021    ALT 13 06/15/2022    ALT 34 11/05/2021    ALT 21 10/27/2021       All of the past medical history, past surgical history, family history,social history, allergies and current medications were reviewed with the patient. Assessment & Plan   Diagnoses and all orders for this visit:     Diagnosis Orders   1. 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 I explained all this in detail to the patient and her  who was with us the entire time to their satisfaction and happiness.   I will see her back on an as-needed basis in the future. I gave her a copy of her path report for her records. She and her  reported being pleased with the outcome of their care and be willing to proceed as recommended. Return if symptoms worsen or fail to improve or new ENT problems. **This report has been created using voice recognition software. It may contain minor errors which are inherent in voice recognition technology. **

## 2022-06-29 ENCOUNTER — HOSPITAL ENCOUNTER (OUTPATIENT)
Dept: INFUSION THERAPY | Age: 72
Discharge: HOME | End: 2022-06-29
Payer: MEDICARE

## 2022-06-29 ENCOUNTER — OFFICE VISIT (OUTPATIENT)
Dept: ONCOLOGY | Age: 72
End: 2022-06-29
Payer: MEDICARE

## 2022-06-29 VITALS
RESPIRATION RATE: 18 BRPM | OXYGEN SATURATION: 97 % | HEART RATE: 76 BPM | TEMPERATURE: 98.6 F | DIASTOLIC BLOOD PRESSURE: 67 MMHG | BODY MASS INDEX: 40.27 KG/M2 | WEIGHT: 218.8 LBS | SYSTOLIC BLOOD PRESSURE: 143 MMHG | HEIGHT: 62 IN

## 2022-06-29 VITALS
SYSTOLIC BLOOD PRESSURE: 143 MMHG | RESPIRATION RATE: 18 BRPM | TEMPERATURE: 98.6 F | DIASTOLIC BLOOD PRESSURE: 67 MMHG | HEART RATE: 76 BPM

## 2022-06-29 DIAGNOSIS — D69.6 THROMBOCYTOPENIA (HCC): ICD-10-CM

## 2022-06-29 DIAGNOSIS — D69.6 THROMBOCYTOPENIA (HCC): Primary | ICD-10-CM

## 2022-06-29 LAB
ABSOLUTE IMMATURE GRANULOCYTE: 0.02 THOU/MM3 (ref 0–0.07)
BASINOPHIL, AUTOMATED: 2 % (ref 0–3)
BASOPHILS ABSOLUTE: 0.1 THOU/MM3 (ref 0–0.1)
EOSINOPHILS ABSOLUTE: 0.2 THOU/MM3 (ref 0–0.4)
EOSINOPHILS RELATIVE PERCENT: 5 % (ref 0–4)
HCT VFR BLD CALC: 42.1 % (ref 37–47)
HEMOGLOBIN: 13.9 GM/DL (ref 12–16)
IMMATURE GRANULOCYTES: 0 %
LYMPHOCYTES # BLD: 24 % (ref 15–47)
LYMPHOCYTES ABSOLUTE: 1.3 THOU/MM3 (ref 1–4.8)
MCH RBC QN AUTO: 31.6 PG (ref 26–33)
MCHC RBC AUTO-ENTMCNC: 33 GM/DL (ref 32.2–35.5)
MCV RBC AUTO: 96 FL (ref 81–99)
MONOCYTES ABSOLUTE: 0.3 THOU/MM3 (ref 0.4–1.3)
MONOCYTES: 6 % (ref 0–12)
PDW BLD-RTO: 12.2 % (ref 11.5–14.5)
PLATELET # BLD: 99 THOU/MM3 (ref 130–400)
PMV BLD AUTO: 12.4 FL (ref 9.4–12.4)
RBC # BLD: 4.4 MILL/MM3 (ref 4.2–5.4)
SEG NEUTROPHILS: 64 % (ref 43–75)
SEGMENTED NEUTROPHILS ABSOLUTE COUNT: 3.5 THOU/MM3 (ref 1.8–7.7)
WBC # BLD: 5.4 THOU/MM3 (ref 4.8–10.8)

## 2022-06-29 PROCEDURE — G8427 DOCREV CUR MEDS BY ELIG CLIN: HCPCS | Performed by: PHYSICIAN ASSISTANT

## 2022-06-29 PROCEDURE — 1090F PRES/ABSN URINE INCON ASSESS: CPT | Performed by: PHYSICIAN ASSISTANT

## 2022-06-29 PROCEDURE — G8417 CALC BMI ABV UP PARAM F/U: HCPCS | Performed by: PHYSICIAN ASSISTANT

## 2022-06-29 PROCEDURE — 1036F TOBACCO NON-USER: CPT | Performed by: PHYSICIAN ASSISTANT

## 2022-06-29 PROCEDURE — 1123F ACP DISCUSS/DSCN MKR DOCD: CPT | Performed by: PHYSICIAN ASSISTANT

## 2022-06-29 PROCEDURE — 99211 OFF/OP EST MAY X REQ PHY/QHP: CPT

## 2022-06-29 PROCEDURE — G8400 PT W/DXA NO RESULTS DOC: HCPCS | Performed by: PHYSICIAN ASSISTANT

## 2022-06-29 PROCEDURE — 36415 COLL VENOUS BLD VENIPUNCTURE: CPT

## 2022-06-29 PROCEDURE — 3017F COLORECTAL CA SCREEN DOC REV: CPT | Performed by: PHYSICIAN ASSISTANT

## 2022-06-29 PROCEDURE — 85025 COMPLETE CBC W/AUTO DIFF WBC: CPT

## 2022-06-29 PROCEDURE — 99213 OFFICE O/P EST LOW 20 MIN: CPT | Performed by: PHYSICIAN ASSISTANT

## 2022-06-29 NOTE — PROGRESS NOTES
[facility] [address] [facility] [address], [address] [facility] [address]  [address] [address]  Dept: [phone]  Dept Fax: [phone]: [phone]      Visit Date:6/29/2022     Sabrina Saravia is a 67 y.o. female who presents today for:   Chief Complaint   Patient presents with    Follow-up     Thrombocytopenia        HPI:   Sabrina Saravia is a 67 y.o. female followed for history of thrombocytopenia. The patient has a history of chronic thrombocytopenia which as gradually worsened over the last year. Per chart review, she has had persistent thrombocytopenia since 2014 per EMR with platelets decreasing from 105,000 to 69,000 on 10/23/2020. Her most recent CBC completed on 2/23/2021 showed platelet count 39,544. The patient affirms history of thrombocytopenia, but has not had formal work-up in the past.  She denies drinking alcohol. She denies history of autoimmune or liver dysfunction. She denies history of chronic infection such as hepatitis C or HIV. She denies any recent history of COVID-19 infection. The patient states in June 2020 she had a viral illness that lasted for approximately 7 days. She describes this as GI upset with diarrhea. At that time the patient decided to go gluten-free and has had improvement in symptoms since. She has never been diagnosed with celiac disease she admits to fatigue intermittently. She denies any B type symptoms; no recurrent fever, persistent infection, lymphadenopathy, night sweats, poor appetite, early satiety or unintentional weight loss. States she bruises easily at times but denies any abnormal bleeding. Denies epistaxis, hemoptysis, hematemesis, melena, hematochezia, hematuria or vaginal bleeding. Her past medical history includes hypertension, diabetes, GERD, hyperlipidemia. Interval History 6/29/2022:   The patient is here for follow up evaluation of thrombocytopenia. She is here with her  today.  The patient underwent right hemithyroidectomy on 6/23/22 per Dr. Leeann Foster. Pathology revealed follicular adenoma and following with ENT. She received 1 unit of platelets prior to surgery. Her EBL was less than 50 mL. She reports she has been feeling well post-operatively. She affirms fatigue. Denies any abnormal bleeding; no epistaxis, hemoptysis, hematemesis, melena, hematochezia, hematuria or vaginal bleeding. PMH, SH, and FH:  I reviewed the patients medication list and allergy list as noted on the electronic medical record. The PMH, SH and FH were also reviewed as noted on the EMR. Review of Systems:   Review of Systems   Pertinent review of systems noted in HPI, all other ROS negative. Objective:   Physical Exam   BP (!) 143/67 (Site: Left Upper Arm, Position: Sitting, Cuff Size: Medium Adult)   Pulse 76   Temp 98.6 °F (37 °C) (Oral)   Resp 18   Ht 5' 2\" (1.575 m)   Wt 218 lb 12.8 oz (99.2 kg)   SpO2 97%   BMI 40.02 kg/m²    General appearance: No apparent distress, well developed, and cooperative. HEENT: Pupils equal, round, and reactive to light. Conjunctivae/corneas clear. Neck: Supple, with full range of motion. Trachea midline. Ecchymosis noted to right lateral neck from surgical site. No active bleeding. Respiratory:  Normal respiratory effort. Clear to auscultation bilaterally. No wheezes, rhonchi. On room air. Cardiovascular: Regular rate and rhythm with normal S1/S2   Abdomen: Soft, active bowel sounds. Musculoskeletal: No clubbing, cyanosis or edema bilaterally. Ambulates in office with use of cane. Skin: Skin color, texture, turgor normal.  No visible rashes or lesions. Neurologic:  Neurovascularly intact without any focal sensory/motor deficits.    Psychiatric: Alert and oriented      Imaging Studies and Labs:   CBC:   Lab Results   Component Value Date    WBC 5.4 06/29/2022    HGB 13.9 06/29/2022    HCT 42.1 06/29/2022    MCV 96 06/29/2022    PLT 99 (L) 06/29/2022     BMP:   Lab Results 6/23/22 and had no bleeding complications. Today on 6/29/22 platelet count 24,436. WBC count, Hgb, Hct, MCV within normal limits.       -Will continue to monitor platelet count. Discussed with patient if platelet count would decrease below baseline in future may recommend bone marrow biopsy.    -Patient instructed to monitor for signs of bleeding or increased bruising               -Return to clinic in 4 months              -Labs on RTC          All patient questions answered. Pt voiced understanding. Patient agreed with treatment plan. Follow up as directed. Patient instructed to call for questions or concerns.         Electronically signed by   Bolivar Lam PA-C

## 2022-06-30 ENCOUNTER — TELEPHONE (OUTPATIENT)
Dept: ENT CLINIC | Age: 72
End: 2022-06-30

## 2022-06-30 ENCOUNTER — APPOINTMENT (OUTPATIENT)
Dept: PHARMACY | Age: 72
End: 2022-06-30
Payer: MEDICARE

## 2022-06-30 NOTE — TELEPHONE ENCOUNTER
The entirety of the incision does not have to be covered, but she is more than welcome to put antibiotic ointment on it if she would like. I would mostly focus on a bandage with antibiotic ointment on the small area where the drain was removed a couple of days ago. This should heal with in a couple days (if it has not already). Once the hole from the drain is sealed she does not have to use a bandage or antibiotic ointment. She should let us know if she has any increased swelling in this area, especially if associated with pain. Some pain in the area is not concerning, especially if it resolved today.

## 2022-06-30 NOTE — TELEPHONE ENCOUNTER
Patient called and wanted to know if she should place triple antibiotic ointment on the suture site, or bandage. She stated that she did put a bandage on the site and it caused pain. Patient stated that around 8 pm last night she started to have pain so she took tylenol which she wasn't able to get rid of the pain until 3 am. She stated once she got up this morning she hasn't had any more pain. Patient is s/p right hemithyroidectomy 6/23/22    Please advise.

## 2022-07-01 ENCOUNTER — OFFICE VISIT (OUTPATIENT)
Dept: FAMILY MEDICINE CLINIC | Age: 72
End: 2022-07-01
Payer: MEDICARE

## 2022-07-01 VITALS
OXYGEN SATURATION: 97 % | HEIGHT: 63 IN | WEIGHT: 219 LBS | SYSTOLIC BLOOD PRESSURE: 124 MMHG | RESPIRATION RATE: 12 BRPM | TEMPERATURE: 97.8 F | BODY MASS INDEX: 38.8 KG/M2 | HEART RATE: 74 BPM | DIASTOLIC BLOOD PRESSURE: 64 MMHG

## 2022-07-01 DIAGNOSIS — I25.2 HISTORY OF ST ELEVATION MYOCARDIAL INFARCTION (STEMI): ICD-10-CM

## 2022-07-01 DIAGNOSIS — D34 FOLLICULAR ADENOMA OF THYROID GLAND: Primary | ICD-10-CM

## 2022-07-01 DIAGNOSIS — E11.9 TYPE 2 DIABETES MELLITUS WITHOUT COMPLICATION, WITHOUT LONG-TERM CURRENT USE OF INSULIN (HCC): ICD-10-CM

## 2022-07-01 DIAGNOSIS — I50.20 HEART FAILURE WITH REDUCED EJECTION FRACTION (HCC): ICD-10-CM

## 2022-07-01 DIAGNOSIS — I25.10 ASHD (ARTERIOSCLEROTIC HEART DISEASE): ICD-10-CM

## 2022-07-01 DIAGNOSIS — E89.0 S/P PARTIAL THYROIDECTOMY: ICD-10-CM

## 2022-07-01 DIAGNOSIS — I10 ESSENTIAL HYPERTENSION: ICD-10-CM

## 2022-07-01 DIAGNOSIS — I48.91 ATRIAL FIBRILLATION, UNSPECIFIED TYPE (HCC): ICD-10-CM

## 2022-07-01 PROCEDURE — 99495 TRANSJ CARE MGMT MOD F2F 14D: CPT | Performed by: FAMILY MEDICINE

## 2022-07-01 PROCEDURE — 1111F DSCHRG MED/CURRENT MED MERGE: CPT | Performed by: FAMILY MEDICINE

## 2022-07-01 ASSESSMENT — PATIENT HEALTH QUESTIONNAIRE - PHQ9
SUM OF ALL RESPONSES TO PHQ QUESTIONS 1-9: 0
SUM OF ALL RESPONSES TO PHQ9 QUESTIONS 1 & 2: 0
1. LITTLE INTEREST OR PLEASURE IN DOING THINGS: 0
SUM OF ALL RESPONSES TO PHQ QUESTIONS 1-9: 0
2. FEELING DOWN, DEPRESSED OR HOPELESS: 0

## 2022-07-01 NOTE — PROGRESS NOTES
Chief Complaint   Patient presents with    Follow-Up from Hospital     Partial thyroidectomy       History obtained from the patient. SUBJECTIVE:  Shayla Nicole is a 67 y.o. female that presents today for     Patient admitted to [facility] from 6/26 to 6/24 for planned partial thyroidectomy d/t mass. D/c summary: \"Discharge Assessment and Plan:-   3. Fall, unknown etiology: Pt fell getting off cot. No dizziness or CP. EKG normal. Tele overnight showed no issues. Pt walked to the bathroom with no issues. 2. S/P right hemithyroidectomy: ENT consulted. Drain in place. Incision c/d/i. ENT saw patient prior to DC and gave additional post-op instructions. 3. HFrEF, chronic: Latest echo showed EF 45-50%. Continue Entresto, BB & Lasix. 4. GERD: PPI   5. CAD s/p CABG   6. Atrial Fibrillation s/p MAZE   7. S/P MVR, TVR   8. HLD         Initial H and P and Hospital course:-  Initial H&P \"This is a 65-year-old female who presented to [address] [place] [address] to have a right hemithyroidectomy today by Dr. Benja Mims had a relatively normal surgery and perioperative course.  When patient was getting up to the edge of the bed she fell and hit her cheek on the table in her room.  Patient had drain placed and surgery and ENT checked patient after fall. Edwyna Samantha and incision were intact.  Patient denies dizziness, loss of consciousness, chest pain, shortness of breath.  Patient thinks that her knee gave out. Anjelica Search does not have a history of falls.  During evaluation, patient denies any pain or any issues.  Patient does have a significant cardiac history and has had CABG and multiple valve replacements and maze. \"      6/24: Pt's drain in place with no issues. ENT Okay with DC. \"    Since discharge:  Doing well  Pain controlled  Incision healing well  Already cleared by ENT and will f/u there prn  Path was benign, follicular adenoma. No fevers  Voice stable.      Denies CP, SOB  Back on coumadin, has f/u with coumadin clinic  No change with DM, diet controlled  BPs <140/90      Age/Gender Health Maintenance    Lipid -   Lab Results   Component Value Date    CHOL 188 10/27/2021    CHOL 215 (H) 10/23/2020    CHOL 235 (H) 10/14/2019     Lab Results   Component Value Date    TRIG 163 10/27/2021    TRIG 182 (H) 10/23/2020    TRIG 220 (H) 10/14/2019     Lab Results   Component Value Date    HDL 43 10/27/2021    HDL 42 10/23/2020    HDL 38 (L) 10/14/2019     Lab Results   Component Value Date    LDLCALC 112 10/27/2021    LDLCALC 137 (H) 10/23/2020    LDLCALC 153 (H) 10/14/2019       Colon Cancer Screening - Somewhere within the last 10 years, was not Sepide[facility]. Awaiting records/due, ordered today (APR 2019)/pt declines OCT 2020/APR 2021/OCT 2021  Lung Cancer Screening (Age 54 to [de-identified] with 30 pack year hx, current smoker or quit within past 15 years) - never smoker. Tetanus - to get at pharmacy per medicare rules  Influenza Vaccine - Declines SEPT 2016/SEPT 2017/OCT 2018/OCT 2019/OCT 2020/OCT 2021  Pneumonia Vaccine - Declines SEPT 2016/SEPT 2017/OCT 2018/APR 2019/OCT 2019/OCT 2020/APR 2021  Shingrix - to get at pharmacy per medicare rules    Breast Cancer Screening - NEG APR 2021  Osteoporosis Screening - declines SEPT 2016, wants to discuss again in 1 year.  We discussed today and declines SEPT 2017/OCT 2018/APR 2019/OCT 2019/OCT 2020/APR 2021/OCT 2021    Diabetes Health Maintenance    A1C -   Lab Results   Component Value Date/Time    LABA1C 6.4 04/28/2022 02:44 PM    LABA1C 6.1 10/28/2021 03:04 PM    LABA1C 6.6 04/26/2021 02:11 PM    LABA1C 7.0 10/26/2020 02:32 PM    LABA1C 7.1 10/15/2019 02:15 PM    LABA1C 6.9 04/11/2019 01:41 PM    LABA1C 7.0 10/11/2018 03:27 PM    LABA1C 6.8 04/12/2018 02:04 PM    LABA1C 6.9 09/21/2017 08:40 AM    LABA1C 6.5 11/04/2016 08:10 AM       ACE/ARB - YES hyzaar  Eye - due, pt reminded  Foot - WNL OCT 2021  Microal/Cr - + OCT 2018/APR 2019  NEG OCT 2019    Lab Results   Component Value Date LABMICR 2.20 10/27/2021    LABCREA 175.3 10/27/2021    MACRR 13 10/27/2021     Lab Results   Component Value Date    CREATINEUR 148.36 10/23/2020    MICROALBUR 2.3 (H) 10/23/2020    MALBCR 15.5 10/23/2020         eGFR -   No results found for: GFRESTIMATE  Lab Results   Component Value Date/Time    LABGLOM >90 06/24/2022 04:46 AM     No results found for: CRCLEARANCE  Lab Results   Component Value Date/Time    EGFRNONAA >60 10/23/2020 09:30 AM     Lab Results   Component Value Date/Time    EGFRAA >60 10/23/2020 09:30 AM       Component      Latest Ref Rng & Units 10/14/2019           7:47 AM   EGFR IF NonAfrican American      >59 ml/min/1.73sq.m >60        Statin - statin intolerance/allergy      Current Outpatient Medications   Medication Sig Dispense Refill    ciprofloxacin (CIPRO) 500 MG tablet Take 1 tablet by mouth 2 times daily for 10 days 20 tablet 0    warfarin (COUMADIN) 3 MG tablet Take as directed by [facility] (60 tablets = 30 days) 60 tablet 11    metoprolol succinate (TOPROL XL) 50 MG extended release tablet Take 1 tablet by mouth 2 times daily Take along with 25 mg BID to make 75 mg BID. 180 tablet 3    metoprolol succinate (TOPROL XL) 25 MG extended release tablet Take 1 tablet by mouth 2 times daily Take along with 50 mg BID to make 75 mg BID. 180 tablet 3    furosemide (LASIX) 40 MG tablet Take 1 tablet by mouth daily 90 tablet 3    sacubitril-valsartan (ENTRESTO) 49-51 MG per tablet Take 1 tablet by mouth 2 times daily 180 tablet 3    potassium chloride (KLOR-CON M) 20 MEQ extended release tablet Take 1 tablet by mouth daily 90 tablet 3    cetirizine (ZYRTEC) 10 MG tablet Take 10 mg by mouth daily      omeprazole (PRILOSEC) 20 MG delayed release capsule Take 20 mg by mouth daily      aspirin 81 MG chewable tablet Take 81 mg by mouth daily       No current facility-administered medications for this visit.      No orders of the defined types were placed in this encounter. All medications reviewed and reconciled, including OTC and herbal medications. Updated list given to patient. Patient Active Problem List    Diagnosis Date Noted    Thyroid goiter      Priority: Medium    Follicular tumor of thyroid gland (uncertain if benign or malignant) 06/23/2022     Priority: Medium    Fall from ground level 06/23/2022     Priority: Medium    Anticoagulated on Coumadin 12/06/2021    Encounter for therapeutic drug monitoring 88/26/7338    Follicular neoplasm of thyroid 12/02/2021    History of ST elevation myocardial infarction (STEMI)     ASHD (arteriosclerotic heart disease)     Atrial fibrillation (HCC)     S/P CABG (coronary artery bypass graft)     Heart failure with reduced ejection fraction ([address]     Ischemic cardiomyopathy     S/P Maze operation for atrial fibrillation     S/P MVR (mitral valve repair)     S/P tricuspid valve repair     DM2 (diabetes mellitus, type 2) (HCC)     Statin intolerance     GERD (gastroesophageal reflux disease)     Osteoarthritis     Allergic rhinitis     Angiolipoma of right kidney     Eczema     Hyperlipidemia     Obesity (BMI 30-39. 9)     Post-menopausal     Thrombocytopenia ([address]     Mixed hearing loss 09/17/2013    Meningioma ([facility] 12/26/2012     Noted MRI 2012. Never did f/u MRI. Told she didn't need to. Has 0 sxs. Denies HA, vision changes, lateralizing numbness or weakness.        Hypertension 12/10/2012       Past Medical History:   Diagnosis Date    Allergic rhinitis     Angiolipoma of right kidney     ASHD (arteriosclerotic heart disease)     Atrial fibrillation (HCC)     Celiac disease     DM2 (diabetes mellitus, type 2) (HCC)     does not take medications for or check blood sugars    Eczema     GERD (gastroesophageal reflux disease)     Heart failure with reduced ejection fraction (HCC)     History of blood transfusion 11/2021    s/p Heart surgery    History of ST elevation myocardial dentition is normal for age. Neck: supple and non-tender without mass, no thyromegaly or thyroid nodules, no cervical lymphadenopathy. Neck incision clean, dry and intact. Pulmonary/Chest: clear to auscultation bilaterally- no wheezes, rales or rhonchi, normal air movement, no respiratory distress or retractions. Chest wall incision healing well, clean, dry and intact. Cardiovascular: S1 and S2 auscultated w/ RRR. No murmurs, rubs, clicks, or gallops, distal pulses intact. Abdomen: soft, non-tender, non-distended, bowl sounds physiologic,  no rebound or guarding, no masses or hernias noted. Liver and spleen without enlargement. Extremities: no cyanosis, clubbing of the lower extremities. +2 PT/DP bilaterally. 1+ bilat LE edema. Musculoskeletal: No joint swelling or gross deformity   Skin: warm and dry, no rash or erythema      Recent Labs     06/29/22  1051 06/24/22  0446 06/20/22  0939 06/17/22  1020 06/17/22  1020   WBC 5.4 9.6  --   --  5.8   HGB 13.9 13.1  --   --  14.5   HCT 42.1 40.3  --   --  45.0   MCV 96 97.6  --   --  97.6   PLT 99* 68* 69*   < > 70*    < > = values in this interval not displayed. Lab Results   Component Value Date/Time     06/24/2022 04:46 AM    K 4.1 06/24/2022 04:46 AM     06/24/2022 04:46 AM    CO2 23 06/24/2022 04:46 AM    BUN 16 06/24/2022 04:46 AM    CREATININE 0.6 06/24/2022 04:46 AM    GLUCOSE 142 06/24/2022 04:46 AM    GLUCOSE 146 10/23/2020 09:30 AM    CALCIUM 8.6 06/24/2022 04:46 AM      Lab Results   Component Value Date    TSH 1.760 10/27/2021       Surgical Pathology  Order: [id number]   Status: Final result     Visible to patient: No (not released)     Next appt: Today at 11:20 AM in [address] [place] [address] Jessica, DO)     Dx:  Follicular tumor of thyroid gland (un...     0 Result Notes           Narrative  Performed by: [id number] Pathology      SUGEY PAUL               [id number]   Assoc.                                              Page 1 of 9 [address]   [facility] [place], [place] [address]                                                       PROC: 2022   [facility]                                    RECV: 2022   [address]                                    RPTD: 2022   [place], [place] [address]                       MRN:  [id number]    LOC: [id number]                       ACCT: [de-identified]  SEX: F                       : 1950  AGE: 67 Y                          PATHOLOGY REPORT                       ATTN: MANJINDER TOUSSAINT                       REQ: Rocky Kramer       Copies To:   Manuel Velázquez; MICHELLE PEREZ       Clinical Information: FOLLICULAR TUMOR OR THYROID GLAND (UNCERTAIN IF   BENIGN OR MALIGNANT)     FINAL DIAGNOSIS:   Thyroid, right hemithyroidectomy:     Follicular adenoma, 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     Two benign perithyroidal lymph nodes.     Small focus of parathyroid. ASSESSMENT & PLAN  1. Follicular adenoma of thyroid gland    Healing well  con't routine incision care  F/u ENT prn  Check TSH/BMP 4 wks to monitor thyroid fxn and Ca++ level    Date of Discharge: 2022 (Friday)  Date of interactive contact with pt/caregiver: 2022 (Monday)  Date of face-to-face visit: 2022  Complexity of medical decision making: Moderate    - TSH with Reflex; Future  - Basic Metabolic Panel; Future    2. S/P partial thyroidectomy, right    - TSH with Reflex; Future  - Basic Metabolic Panel; Future    3. ASHD (arteriosclerotic heart disease)    Stable  con't asa, coumadin, Toprol, entresto and lasix  Daily wts  Low salt diet  Coumadin clinic f/u  Cardio f/u  Discussed statin, declines and is aware of risks. Cardio aware she is not taking    4. History of ST elevation myocardial infarction (STEMI)    As above    5. Heart failure with reduced ejection fraction ([address]    As above    6. Atrial fibrillation, unspecified type ([address]    As above    7.  Type 2 diabetes mellitus without complication, without long-term current use of insulin ([address]    Stable  At goal  Diet controlled     8. Essential hypertension    Stable  con't Toprol and entresto    - TSH with Reflex; Future  - Basic Metabolic Panel; Future      DISPOSITION    Return in 4 months (on 10/27/2022) for sooner as needed. Swati released without restrictions. Future Appointments   Date Time Provider Andrei Pedraza   7/7/2022  1:00 PM [facility] [place]   9/13/2022  2:00 PM ORAL Ramírez - CNP [facility] [place]   10/10/2022  3:00 PM Sruthi Howard MD N [facility] - [place]   10/27/2022  3:40 PM Kaia Cruz  [address] [facility] [place]   10/28/2022 11:00 AM Delores Salazar PA-C N Oncology [facility] - [place]     PATIENT COUNSELING    Barriers to learning and self management: none    Discussed use, benefit, and side effects of prescribed medications. Barriers to medication compliance addressed. All patient questions answered. Pt voiced understanding.        Electronically signed by Kaia Cruz DO on 7/1/2022 at 11:40 AM

## 2022-07-05 ENCOUNTER — APPOINTMENT (OUTPATIENT)
Dept: PHARMACY | Age: 72
End: 2022-07-05
Payer: MEDICARE

## 2022-07-05 ENCOUNTER — CARE COORDINATION (OUTPATIENT)
Dept: CASE MANAGEMENT | Age: 72
End: 2022-07-05

## 2022-07-06 ENCOUNTER — CARE COORDINATION (OUTPATIENT)
Dept: CASE MANAGEMENT | Age: 72
End: 2022-07-06

## 2022-07-06 NOTE — CARE COORDINATION
Jos[facility] Miguel [facility] Transitions Follow Up Call    2022    Patient: Tomasz Easley  Patient : 1950   MRN: [id number]  Reason for Admission: R hemithyroidectomy  Discharge Date: 22 RARS: Readmission Risk Score: 10.7 ( )         Spoke with: Swati today and she says she is feeling really good. Drain was removed last week and incision is healing nicely. Denies dysphagia,n/v/d, sob, swelling, dizziness, chest pain, pain in incision. Wt.=219 (same) does log wt. daily at home. Does not have BP monitor nor does she check her BLOOD SUGAR at home- says PCP did not order that. We discussed low sodium diet and she says she is gluten free as well. She agrees to dietician referral. [facility] was [facility] to [facility]. Eating, drinking and sleeping well. Denies problems w/ urination/bowels. PCP visit 22 went well-labs in 4 months. No other concerns voiced at this time. Care Transitions Subsequent and Final Call    Schedule Follow Up Appointment with PCP: Completed  Subsequent and Final Calls  Do you have any ongoing symptoms?: No  Have your medications changed?: No  Do you have any questions related to your medications?: No  Do you currently have any active services?: No  Do you have any needs or concerns that I can assist you with?: No  Identified Barriers: Lack of Education  Care Transitions Interventions     Transportation Support: Declined     Registered Dietician: Completed    Disease Specific Clinic: Completed      Other Interventions:         Care Transitions Follow Up Call    Needs to be reviewed by the provider   Additional needs identified to be addressed with provider: No  none             Method of communication with provider : none      Care Transition Nurse contacted the patient by telephone to follow up after admission on 22. Verified name and  with patient as identifiers. Addressed changes since last contact: none  Discussed follow-up appointments.  If no appointment was previously scheduled, appointment scheduling offered: Yes. Is follow up appointment scheduled within 7 days of discharge? Yes. Advance Care Planning:   Does patient have an Advance Directive: on file. CTN reviewed discharge instructions, medical action plan and red flags with patient and discussed any barriers to care and/or understanding of plan of care after discharge. Discussed appropriate site of care based on symptoms and resources available to patient including: PCP  Specialist. The patient agrees to contact the PCP office for questions related to their healthcare. Patients top risk factors for readmission: lack of knowledge about disease  medical condition-CHF, CAD, DM, GERD, thrombocytopenia, s/p R hemithyroidectomy 6/28/22  Interventions to address risk factors: Scheduled appointment with PCP-10/27/22 and Scheduled appointment with [address] [place] [address] CHF clinic      [address] [address] Chance follow up appointment(s):     CTN provided contact information for future needs. Plan for follow-up call in 7-10 days based on severity of symptoms and risk factors. Plan for next call: symptom management-sob, swelling, wt.?, incision healing, lightheadedness?   referrals-Jacque BR[address]SHA[address]        Follow Up  Future Appointments   Date Time Provider Andrei Pedraza   7/7/2022  1:00 PM Patrice Bennett, [address] [facility] [place]   9/13/2022  2:00 PM [facility] [address]   10/10/2022  3:00 PM [facility]las [facility] [address]   10/27/2022  3:40 PM Ron Reyes DO [facility] [place]   10/28/2022 11:00 AM Delores Gray PA-C N Oncology [facility] - Chris Salcido RN

## 2022-07-07 ENCOUNTER — CARE COORDINATION (OUTPATIENT)
Dept: CARE COORDINATION | Age: 72
End: 2022-07-07

## 2022-07-07 ENCOUNTER — HOSPITAL ENCOUNTER (OUTPATIENT)
Dept: PHARMACY | Age: 72
Setting detail: THERAPIES SERIES
Discharge: HOME | End: 2022-07-07
Payer: MEDICARE

## 2022-07-07 DIAGNOSIS — Z51.81 ENCOUNTER FOR THERAPEUTIC DRUG MONITORING: ICD-10-CM

## 2022-07-07 DIAGNOSIS — Z86.79 S/P MAZE OPERATION FOR ATRIAL FIBRILLATION: ICD-10-CM

## 2022-07-07 DIAGNOSIS — Z79.01 ANTICOAGULATED ON COUMADIN: ICD-10-CM

## 2022-07-07 DIAGNOSIS — Z98.890 S/P MAZE OPERATION FOR ATRIAL FIBRILLATION: ICD-10-CM

## 2022-07-07 DIAGNOSIS — I48.91 ATRIAL FIBRILLATION, UNSPECIFIED TYPE (HCC): Primary | ICD-10-CM

## 2022-07-07 LAB
POC INR: 2.3 (ref 0.8–1.2)
PTH RELATED PEPTIDE: 3.5 PMOL/L (ref 0–3.4)

## 2022-07-07 PROCEDURE — 99211 OFF/OP EST MAY X REQ PHY/QHP: CPT

## 2022-07-07 PROCEDURE — 85610 PROTHROMBIN TIME: CPT

## 2022-07-07 PROCEDURE — 36416 COLLJ CAPILLARY BLOOD SPEC: CPT

## 2022-07-07 RX ORDER — ACETAMINOPHEN 500 MG
1000 TABLET ORAL NIGHTLY PRN
COMMUNITY

## 2022-07-07 NOTE — CARE COORDINATION
Contacted Swati Tran and left voicemail regarding Dietitian referral. Left call back number and will follow up as appropriate.          [facility], [address] [place] [address]

## 2022-07-07 NOTE — PROGRESS NOTES
Medication Management [address]  [phone] (phone)  [phone] (fax)    Ms. Carlos A Bowens is a 67 y.o.  female with history of Afib who presents today for anticoagulation monitoring and adjustment. Had right hemithyroidectomy on 6/23/22. Track board updated to reflect dosing taken. Patient verifies current dosing regimen and tablet strength. No missed or extra doses. Patient denies s/s bleeding/SOB/chest pain -Dossie Brown after surgery and has bruise on right hip that patient states is healing well. Patient states fall was due to foot getting caught on the bedside tray. Also, has bruise on left arm about 2.5 inches in diameter that is dark purple from surgery 06/23. Denies hitting head. Reports swelling in lower legs due to eating hot dogs and high sodium over the 4th of July  No blood in urine or stool. No dietary changes. Received a script for hydrocodone-acetaminophen 5-325 mg post surgery, but states she stopped taking it because it makes her feel \"weird\". Started extra strength Tylenol instead, usually takes 2 tablets at night. States she is currently taking olive leaf,  Zinc, vitamin C, vitamin D- will bring into next appointment so dosages can be added to medication list.  No change in alcohol use or tobacco use. No change in activity level. Patient denies headaches/dizziness/lightheadedness  No vomiting/diarrhea or acute illness. No Procedures scheduled in the future at this time. Patient interview completed and discussed with pharmacist by Rosemary Taylor    Assessment:   Lab Results   Component Value Date    INR 2.30 (H) 07/07/2022    INR 1.00 06/23/2022    INR 2.20 (H) 06/13/2022     INR therapeutic   Recent Labs     07/07/22  1310   INR 2.30*       Plan:  Continue Coumadin 6 mg daily. Recheck INR in 2 week(s). Patient reminded to call the Anticoagulation Clinic with any signs or symptoms of bleeding or with any medication changes. Patient given instructions utilizing the teach back method. After visit summary printed and reviewed with patient. Discharged ambulatory in no apparent distress with .     For Pharmacy Admin Tracking Only     Time Spent (min): 20

## 2022-07-13 ENCOUNTER — CARE COORDINATION (OUTPATIENT)
Dept: CASE MANAGEMENT | Age: 72
End: 2022-07-13

## 2022-07-13 ENCOUNTER — CARE COORDINATION (OUTPATIENT)
Dept: CARE COORDINATION | Age: 72
End: 2022-07-13

## 2022-07-13 NOTE — CARE COORDINATION
José Miguel 45 Transitions Follow Up Call    2022    Patient: Hetty Epley  Patient : 1950   MRN: [id number]  Reason for Admission: fall  Discharge Date: 22 RARS: Readmission Risk Score: 10.7 ( )         Spoke with: Swati today and she says she is feeling goo but tired. Had a long day yesterday, at [facility] w/  for a procedure. Denies dizziness, falls, chest pain,n/v/d, swelling,fever, dysphagia. She says there are 2 little knots along her incision that have been there for a couple weeks-no redness, swelling, pain and she is wondering if she should be concerned. Writer suggests she notify ENT office for further advice. She expressed understanding and says she will call. She did confirm she received a call form the dietician Jacque BRADSHAW And will be calling her back. Her son ordered MOM meals delivered to her house that are gluten free, diabetic, low salt ( 1 meal per day beginning Friday). Wt.=222 (up 3 lbs. ) but denies any swelling, sob. Still not taking BP- has no monitor available. Eating, drinking & sleeping ok. Denies problems w/ urination/bowels. No other concerns voiced at this time.     Care Transitions Subsequent and Final Call    Schedule Follow Up Appointment with PCP: Completed  Subsequent and Final Calls  Do you have any ongoing symptoms?: No  Have your medications changed?: No  Do you have any questions related to your medications?: No  Do you currently have any active services?: No  Do you have any needs or concerns that I can assist you with?: No  Identified Barriers: Lack of Education  Care Transitions Interventions     Transportation Support: Declined     Registered Dietician: Completed    Disease Specific Clinic: Completed      Other Interventions:       Care Transitions Follow Up Call    Needs to be reviewed by the provider   Additional needs identified to be addressed with provider: No  none             Method of communication with provider : none      Care Transition Nurse contacted the patient by telephone to follow up after admission on 22. Verified name and  with patient as identifiers. Addressed changes since last contact: none  Discussed follow-up appointments. If no appointment was previously scheduled, appointment scheduling offered: Yes. Is follow up appointment scheduled within 7 days of discharge? Yes. Advance Care Planning:   Does patient have an Advance Directive: on file. CTN reviewed discharge instructions, medical action plan and red flags with patient and discussed any barriers to care and/or understanding of plan of care after discharge. Discussed appropriate site of care based on symptoms and resources available to patient including: PCP  Specialist. The patient agrees to contact the PCP office for questions related to their healthcare. Patients top risk factors for readmission: lack of knowledge about disease  medical condition-CHF, CAD, GERD, thrombocytopenia, GERD, DM  polypharmacy  Interventions to address risk factors: Scheduled appointment with PCP-10/27/22 and Scheduled appointment with [address] [place] [address] CHF clinic      [id number] Romelia Chance follow up appointment(s):     CTN provided contact information for future needs. Plan for follow-up call in 7-10 days based on severity of symptoms and risk factors.   Plan for next call: symptom management-sob, fall, dizziness, dysphagia, knots along incision, wt.?,  self management-returned call to dietician?, called ENT re: knots along incision          Follow Up  Future Appointments   Date Time Provider Andrei Pedraza   2022  2:20 PM Debra Deras, [facility]8 [facility] [address]   2022  2:00 PM ORAL Ramírez - CNP N SRPX CHF [facility] [address] [place] [address]   10/10/2022  3:00 PM Sruthi Howard MD N SRPX Heart [facility] [address] [place] [address]   10/27/2022  3:40 PM Kaia Cruz,  [address] [facility] [address] [place] [address]   10/28/2022 11:00 AM Delores Salazar PA-C N Oncology [facility] - Thor Conte RN

## 2022-07-13 NOTE — CARE COORDINATION
Patricia Weinstein [facility]  July 13, 2022    Initial Referral Reason: Diabetes and CHF    Patient Care Team:  Alesia Denny DO as PCP - General (Family Medicine)  Alesia Denny DO as PCP - [facility]  Jostin Jeffery MD as Cardiologist (Cardiology)  Adwoa King RN as Care Transitions Nurse  Braxton Bailey RD, LD as Dietitian (Dietitian Registered)    Past Medical History:    Current Outpatient Medications   Medication Sig Dispense Refill    acetaminophen (TYLENOL) 500 MG tablet Take 1,000 mg by mouth nightly as needed for Pain      warfarin (COUMADIN) 3 MG tablet Take as directed by [facility] (60 tablets = 30 days) 60 tablet 11    metoprolol succinate (TOPROL XL) 50 MG extended release tablet Take 1 tablet by mouth 2 times daily Take along with 25 mg BID to make 75 mg BID. 180 tablet 3    metoprolol succinate (TOPROL XL) 25 MG extended release tablet Take 1 tablet by mouth 2 times daily Take along with 50 mg BID to make 75 mg BID. 180 tablet 3    furosemide (LASIX) 40 MG tablet Take 1 tablet by mouth daily 90 tablet 3    sacubitril-valsartan (ENTRESTO) 49-51 MG per tablet Take 1 tablet by mouth 2 times daily 180 tablet 3    potassium chloride (KLOR-CON M) 20 MEQ extended release tablet Take 1 tablet by mouth daily 90 tablet 3    cetirizine (ZYRTEC) 10 MG tablet Take 10 mg by mouth daily      omeprazole (PRILOSEC) 20 MG delayed release capsule Take 20 mg by mouth daily      aspirin 81 MG chewable tablet Take 81 mg by mouth daily       No current facility-administered medications for this visit.        Biochemical Data, Medical Tests and Procedures:    Lab Results   Component Value Date    LABA1C 6.4 (H) 04/28/2022     No results found for: EAG    Lab Results   Component Value Date    CHOL 188 10/27/2021    CHOL 215 (H) 10/23/2020    CHOL 235 (H) 10/14/2019     Lab Results   Component Value Date    TRIG 163 10/27/2021    TRIG 182 (H) 10/23/2020    TRIG 220 (H) 10/14/2019     Lab Results   Component Value Date    HDL 43 10/27/2021    HDL 42 10/23/2020    HDL 38 (L) 10/14/2019     Lab Results   Component Value Date    LDLCALC 112 10/27/2021    LDLCALC 137 (H) 10/23/2020    LDLCALC 153 (H) 10/14/2019       Anthropometric Measurements:    Height: 62.5 inches (158.8 cm)  Weight: 219 lb (99.3 kg)  BMI: 39.42 (obesity class II)  IBW: 112.5 lb (51.1 kg) +-10%  %IBW: 194.7%    AdBW: 155 lb (70.5 kg)    Physical Exam Findings:  Deferred    Nutrition Interview: RD called pt, explained reason for call and role in care. Pt states appetite is \"too good\", typically eats 1 meal/day with snacks. See food recall below. RD explained the importance of watching sodium to prevent body from holding extra fluid. RD explained the nutrition plan for heart failure usually limits the sodium from food and beverages to no more than 2000 mg per day. Discussed avoiding the salt shaker when eating/cooking- pt states she does not use the salt shaker anymore. RD acknowledged this. Explained how sodium is hidden in a lot of foods and the importance of reading food labels. Discussed draining/rinsing canned goods to decrease sodium content. Explained restaurant foods tend to be higher in sodium-discussed eating out in moderation and watching serving sizes. RD reviewed the importance of weighing self daily. Pt states she is weighing self daily and today her weight was 222 lb. Reviewed to call PCP if pt has a 3 lb weight gain in one day or a 5 lb weight gain in 2 days. Pt verbalizes understanding. RD explained the importance of eating 3 balanced meals consistently throughout the day. Explained the components of a balanced meal using MyPlate. Patient states starting this Friday she will be receiving Moms Meals that are gluten free, low sodium and diabetic friendly. Explained the importance of not skipping meals- discussed eating a small balanced snack or supplementing with Glucerna as a meal replacement. Explained the components of a balanced snack include a serving of protein and a serving of CHO. Provided examples such as banana with peanut butter, greek yogurt with fruit, hard boiled egg with fruit, etc. Discussed incorporating more fruits, vegetables, whole grains and lean protein. Pt states she is gluten free. Patient states she does not check her BS daily- RD noted patient's A1C 6.4% as of 4/28/22. Pt has no nutrition related questions or concerns at this time. RD offered to mail educational handouts to pt to reinforce concepts discussed during phone conversation, pt accepted and very appreciative. RD verified address. 24-Hour Diet Recall  Breakfast  Consumed: none    Lunch  Consumed: none    Dinner  Consumed: 4-5 PM Moms Meal     Snack  Consumed: ice cream, sugar free candy, gluten free cookies     Beverages: water and coffee    Exercise: none    Blood sugar checks: pt is not checking BS     Nutrition Diagnosis:  #1 Problem Food and nutrition-related knowledge deficit       Etiology related to lack of prior nutrition related education       Signs/Symptoms as evidenced by referral for nutrition education and food recall    Nutrition Intervention:     Estimated Needs  cardiac diet and diabetic diet providing 1500 kcals to promote wt maintenance (Bisi Meneses based on AdBW for obesity class II). Estimated daily CHO Needs: 206 g (based on 45-65% total calorie intake)  Estimated daily Protein Needs: 56-70 g (based on 0.8-1.0 g/kg based on AdBW)  Estimated daily Fluid Needs: 64 oz. Or per MD    #1 Nutrition Information: Provided patient with Meal Planner DM and CHF, Heart Failure Nutrition Therapy handouts. For reinforcement of concepts discussed during nutrition interview. #2 Nutrition Counseling: Used open-ended questions to assess patients perceived susceptibility and severity of disease state. Discussed potential impact of health threat on patient's lifestyle.  Used open-ended questions to assess patient's perception regarding benefits of and barriers to implementation of nutrition therapy. #3 Nutrition Education: Clearly defined the benefits of nutrition therapy. Summarized and affirmed positive aspects of current nutrition patterns. Provided education regarding value of adherence to cardiac diet and diabetic diet. Discussed ways to establish applying concepts of alternatives and choices regarding implementation of diet. Explored ideas for small, incremental goals to initiate behavior change. Nutrition Monitoring and Evaluation:     Indicator/Goal Criteria   #1 Eat balanced meals consistently throughout the day. Do not skip meals. #1 Focus on eating 3 meals/day and make these meals balanced using the MyPlate [id number]-1/3 plate fruits and/or vegetables, 1/4 plate protein and 1/4 plate starchy carbohydrates with 8 oz glass of low fat milk if desired. #2 Monitor daily sodium intake. Keep sodium from food and beverages to no more than 2000 mg/day. #2 Avoid the salt shaker. Read food labels to help choose lower sodium options (<140 mg of sodium per serving). #3 Monitor daily weights and keep a log. #3 Weigh self daily and notify MD of sudden weight gain. Follow Up: RD will call pt in 3 weeks to follow up and make sure pt received handouts in mail. RD will answer any nutrition related questions at this time.      [address] [facility], [facility] 14

## 2022-07-20 ENCOUNTER — CARE COORDINATION (OUTPATIENT)
Dept: CASE MANAGEMENT | Age: 72
End: 2022-07-20

## 2022-07-20 NOTE — CARE COORDINATION
Care Transitions Outreach Attempt    Call within 2 business days of discharge: Yes   Attempted to reach patient for transitions of care follow up. Unable to reach patient. Patient: Romelia Cheemakeeper Patient : 1950 MRN: [id number]    Last Discharge [facility]       Date Complaint Diagnosis Description Type Department Provider    22  Acute post-operative pain . ..  Admission (Discharged) [facility] [place]                Noted following upcoming appointments from discharge chart review:   [facility] follow up appointment(s):   Future Appointments   Date Time Provider Andrei Pedraza   2022  2:20 PM PHILLY Holcomb [facility] [address]   2022  2:00 PM ORAL Atkins CNP N SRPX CHF [facility]   10/10/2022  3:00 PM Ortiz Morris MD N SRPX Heart [address]   10/27/2022  3:40 PM Inez Tong, [address] [place] [address]   10/28/2022 11:00 AM Delores Angeles PA-C N Oncology [address]     Non-[facility] follow up appointment(s):

## 2022-07-21 ENCOUNTER — HOSPITAL ENCOUNTER (OUTPATIENT)
Dept: PHARMACY | Age: 72
Setting detail: THERAPIES SERIES
Discharge: HOME | End: 2022-07-21
Payer: MEDICARE

## 2022-07-21 ENCOUNTER — CARE COORDINATION (OUTPATIENT)
Dept: CASE MANAGEMENT | Age: 72
End: 2022-07-21

## 2022-07-21 DIAGNOSIS — Z86.79 S/P MAZE OPERATION FOR ATRIAL FIBRILLATION: ICD-10-CM

## 2022-07-21 DIAGNOSIS — Z79.01 ANTICOAGULATED ON COUMADIN: ICD-10-CM

## 2022-07-21 DIAGNOSIS — Z51.81 ENCOUNTER FOR THERAPEUTIC DRUG MONITORING: ICD-10-CM

## 2022-07-21 DIAGNOSIS — I48.0 PAROXYSMAL ATRIAL FIBRILLATION (HCC): Primary | ICD-10-CM

## 2022-07-21 DIAGNOSIS — Z98.890 S/P MAZE OPERATION FOR ATRIAL FIBRILLATION: ICD-10-CM

## 2022-07-21 LAB — POC INR: 2.1 (ref 0.8–1.2)

## 2022-07-21 PROCEDURE — 85610 PROTHROMBIN TIME: CPT

## 2022-07-21 PROCEDURE — 99211 OFF/OP EST MAY X REQ PHY/QHP: CPT

## 2022-07-21 PROCEDURE — 36416 COLLJ CAPILLARY BLOOD SPEC: CPT

## 2022-07-21 NOTE — PROGRESS NOTES
Medication Management [address]  [phone] (phone)  [phone] (fax)    Ms. Kali Brock is a 67 y.o.  female with history of atrial fib. Nimesh Hash procedure, per Dr. Eryn Santamaria referral, who presents today for Warfarin monitoring and adjustment (2 week visit). Patient verifies current dosing regimen and tablet strength. No missed or extra doses. Patient denies bleeding/chest pain. Has usual NORMAN. Has slight swelling of feet, but improving. Has usual easy bruising - noted purple bruise LFA (from hospital stay). No blood in urine or stool. Dietary changes: started getting MOM meals 7/15 (gluten-free, low salt, diabetic). Meals include more vegetables than she had been eating. No changes in medication/OTC agents/herbals. No change in alcohol use or tobacco use. No change in activity level. Patient denies headaches/dizziness/lightheadedness/falls. No vomiting/diarrhea or acute illness. No procedures scheduled in the future at this time. Assessment:   Lab Results   Component Value Date    INR 2.10 (H) 07/21/2022    INR 2.30 (H) 07/07/2022    INR 1.00 06/23/2022     INR therapeutic - goal 2-3. Recent Labs     07/21/22  1433   INR 2.10*        Plan:  POCT INR ordered/performed/result reviewed. Continue Coumadin 6 mg daily PO. Recheck INR in 2-2.5 week(s). Patient reminded to call the Anticoagulation Clinic with any signs or symptoms of bleeding or with any medication changes. Patient given instructions utilizing the teach back method. After visit summary printed and reviewed with patient. Discharged ambulatory in no apparent distress, wearing mask, with .

## 2022-07-29 ENCOUNTER — NURSE ONLY (OUTPATIENT)
Dept: LAB | Age: 72
End: 2022-07-29

## 2022-07-29 DIAGNOSIS — I10 ESSENTIAL HYPERTENSION: ICD-10-CM

## 2022-07-29 DIAGNOSIS — E89.0 S/P PARTIAL THYROIDECTOMY: ICD-10-CM

## 2022-07-29 DIAGNOSIS — D34 FOLLICULAR ADENOMA OF THYROID GLAND: ICD-10-CM

## 2022-07-29 LAB
ANION GAP SERPL CALCULATED.3IONS-SCNC: 11 MEQ/L (ref 8–16)
BUN BLDV-MCNC: 15 MG/DL (ref 7–22)
CALCIUM SERPL-MCNC: 9.1 MG/DL (ref 8.5–10.5)
CHLORIDE BLD-SCNC: 104 MEQ/L (ref 98–111)
CO2: 29 MEQ/L (ref 23–33)
CREAT SERPL-MCNC: 0.9 MG/DL (ref 0.4–1.2)
GFR SERPL CREATININE-BSD FRML MDRD: 61 ML/MIN/1.73M2
GLUCOSE BLD-MCNC: 121 MG/DL (ref 70–108)
POTASSIUM SERPL-SCNC: 4.2 MEQ/L (ref 3.5–5.2)
SODIUM BLD-SCNC: 144 MEQ/L (ref 135–145)
T4 FREE: 1.11 NG/DL (ref 0.93–1.76)
TSH SERPL DL<=0.05 MIU/L-ACNC: 9.86 UIU/ML (ref 0.4–4.2)

## 2022-07-31 DIAGNOSIS — E89.0 POSTOPERATIVE HYPOTHYROIDISM: Primary | ICD-10-CM

## 2022-08-01 ENCOUNTER — TELEPHONE (OUTPATIENT)
Dept: FAMILY MEDICINE CLINIC | Age: 72
End: 2022-08-01

## 2022-08-01 RX ORDER — LEVOTHYROXINE SODIUM 0.03 MG/1
25 TABLET ORAL DAILY
Qty: 30 TABLET | Refills: 2 | Status: SHIPPED | OUTPATIENT
Start: 2022-08-01 | End: 2022-09-14 | Stop reason: SDUPTHER

## 2022-08-01 NOTE — TELEPHONE ENCOUNTER
Let patient know thyroid function is a little underactive, patient will  medication today from [facility], lab work will be mailed patient is aware to have that done in 6 weeks. No questions at this time.

## 2022-08-01 NOTE — TELEPHONE ENCOUNTER
----- Message from Kimberly Nugent DO sent at 7/31/2022  1:03 PM EDT -----  Please let pt know that BMP is WNL  Thyroid fxn shows it's a bit underactive  Would recommend starting synthroid to return her thyroid fxn to normal.   Start 25mcg po daily  Labs in 6 wks, non-fasting is fine. Let me know if questions, thanks!

## 2022-08-03 ENCOUNTER — CARE COORDINATION (OUTPATIENT)
Dept: CARE COORDINATION | Age: 72
End: 2022-08-03

## 2022-08-03 NOTE — CARE COORDINATION
Carlin Hu Tran  8/3/2022    Registered Dietitian Progress Note for Care Coordination    Assessment: Julio Mata is a 67 y.o. female. RD referred for Diabetes and CHF. RD spoke with patient for initial nutrition assessment on 7/13/22. RD called to follow up with pt today 8/3/22. Pt states she received the handouts in the mail. RD discussed previous goals with pt. Discussed the importance of eating balanced meals/snacks consistently throughout the day- pt states this is going \"pretty good. \" Reiterated the importance of not skipping meals. Discussed supplementing with Glucerna as needed- pt states she has not yet bought Glucerna. Reiterated the importance of watching sodium intake daily- pt is not using the salt shaker. Discussed the importance of reading food labels and paying close attention to sodium content per serving. Reviewed the importance of daily weights and when to notify MD of sudden weight gain. Pt states she is weighing herself daily, no weight provided and no sudden weight gain reported per pt. Pt has no nutrition related questions or concerns at this time. No follow up call scheduled at this time. Nutrition Monitoring and Evaluation  Indicator/Goal Criteria Progress   #1 Eat balanced meals consistently throughout the day. Do not skip meals. #1 Focus on eating 3 meals/day and make these meals balanced using the MyPlate [id number]-6/3 plate fruits and/or vegetables, 1/4 plate protein and 1/4 plate starchy carbohydrates with 8 oz glass of low fat milk if desired. #1 Pt is eating more consistently. Pt will focus on making meals balanced using MyPlate. #2  Monitor daily sodium intake. Keep sodium from food and beverages to no more than 2000 mg/day. #2 Avoid the salt shaker. Read food labels to help choose lower sodium options (<140 mg of sodium per serving). #2 Pt is not using the salt shaker. Pt is trying to pay close attention to food labels. #3  Monitor daily weights and keep a log.   #3 Weigh self daily and notify MD of sudden weight gain. #3 Pt is weighing self daily. No weight provided and no sudden weight gain reported per pt. Plan of Care:  RD encouraged pt to keep working toward goals set. RD explained to pt this is final follow up call and provided contact information to pt. Encouraged pt to call RD in future with any nutrition related questions or concerns. Follow Up:    Final follow up call today 8/3/22. RD will continue to follow/assist with patient return call.         [address] [facility], [address] [place] [address]

## 2022-08-04 ENCOUNTER — HOSPITAL ENCOUNTER (OUTPATIENT)
Dept: PHARMACY | Age: 72
Setting detail: THERAPIES SERIES
Discharge: HOME | End: 2022-08-04
Payer: MEDICARE

## 2022-08-04 DIAGNOSIS — I48.0 PAROXYSMAL ATRIAL FIBRILLATION (HCC): Primary | ICD-10-CM

## 2022-08-04 DIAGNOSIS — Z98.890 S/P MAZE OPERATION FOR ATRIAL FIBRILLATION: ICD-10-CM

## 2022-08-04 DIAGNOSIS — Z51.81 ENCOUNTER FOR THERAPEUTIC DRUG MONITORING: ICD-10-CM

## 2022-08-04 DIAGNOSIS — Z86.79 S/P MAZE OPERATION FOR ATRIAL FIBRILLATION: ICD-10-CM

## 2022-08-04 DIAGNOSIS — Z79.01 ANTICOAGULATED ON COUMADIN: ICD-10-CM

## 2022-08-04 LAB — POC INR: 3.2 (ref 0.8–1.2)

## 2022-08-04 PROCEDURE — 36416 COLLJ CAPILLARY BLOOD SPEC: CPT

## 2022-08-04 PROCEDURE — 85610 PROTHROMBIN TIME: CPT

## 2022-08-04 PROCEDURE — 99212 OFFICE O/P EST SF 10 MIN: CPT

## 2022-08-04 NOTE — PROGRESS NOTES
Medication Management [address]  [phone] (phone)  [phone] (fax)    Ms. Saundra Fortune is a 67 y.o.  female with history of  atrial fib., S/P MAZE  who presents today for anticoagulation monitoring and adjustment. Had Right hemithyroidectomy 6/23/22  Patient verifies current dosing regimen and tablet strength. No missed or extra doses. Patient denies s/s bleeding/swelling/SOB/chest pain. Has some bruising on arms  No blood in urine or stool. Dietary changes- started getting MOM meals 7/15  Synthroid 25 mcg daily started 8/1/22  No change in alcohol use or tobacco use. No change in activity level. Patient denies headaches/dizziness/lightheadedness/falls. No vomiting/diarrhea or acute illness. No Procedures scheduled in the future at this time. Assessment:   Lab Results   Component Value Date    INR 3.20 (H) 08/04/2022    INR 2.10 (H) 07/21/2022    INR 2.30 (H) 07/07/2022     INR subtherapeutic   Recent Labs     08/04/22  1450   INR 3.20*       Plan:  Take 3 mg today then decrease Coumadin 3 mg Wed and 6 mg SuMoTuThFSa (7% decrease). Anticipating interaction from starting levothyroxine. Recheck INR in 2 week(s). Patient reminded to call the Anticoagulation Clinic with any signs or symptoms of bleeding or with any medication changes. Patient given instructions utilizing the teach back method. After visit summary printed and reviewed with patient. Discharged ambulatory in no apparent distress with .     For Pharmacy Admin Tracking Only    Intervention Detail: Dose Adjustment: 1, reason: Therapy Optimization  Total # of Interventions Recommended: 1  Total # of Interventions Accepted: 1  Time Spent (min): 20

## 2022-08-09 NOTE — TELEPHONE ENCOUNTER
Received denial for tier exception for Entresto    Left message for patient to call office   Is cost too much for patient?

## 2022-08-10 ENCOUNTER — CARE COORDINATION (OUTPATIENT)
Dept: CARE COORDINATION | Age: 72
End: 2022-08-10

## 2022-08-10 NOTE — TELEPHONE ENCOUNTER
Spoke with    Patient in [facility] can you see if patient qualifies for any patient assistance?

## 2022-08-10 NOTE — CARE COORDINATION
Spoke with Madison Florence and they are going to bill the [facility],  called me back and I explained everything to him.

## 2022-08-10 NOTE — CARE COORDINATION
I was able to get the patient a 1,000 monse through the [employer] and a 500.00 gas card through [employer]. I called and spoke with the patient and she is going to have her  call me back.         [address] [facility]   Medication Assistance  [address] [place] [address] and [employer]    (J) [phone] (A) [phone]

## 2022-08-17 ENCOUNTER — HOSPITAL ENCOUNTER (OUTPATIENT)
Dept: PHARMACY | Age: 72
Setting detail: THERAPIES SERIES
Discharge: HOME | End: 2022-08-17
Payer: MEDICARE

## 2022-08-17 DIAGNOSIS — Z51.81 ENCOUNTER FOR THERAPEUTIC DRUG MONITORING: ICD-10-CM

## 2022-08-17 DIAGNOSIS — Z79.01 ANTICOAGULATED ON COUMADIN: ICD-10-CM

## 2022-08-17 DIAGNOSIS — Z98.890 S/P MAZE OPERATION FOR ATRIAL FIBRILLATION: ICD-10-CM

## 2022-08-17 DIAGNOSIS — Z86.79 S/P MAZE OPERATION FOR ATRIAL FIBRILLATION: ICD-10-CM

## 2022-08-17 DIAGNOSIS — I48.91 ATRIAL FIBRILLATION, UNSPECIFIED TYPE (HCC): Primary | ICD-10-CM

## 2022-08-17 LAB — POC INR: 3 (ref 0.8–1.2)

## 2022-08-17 PROCEDURE — 85610 PROTHROMBIN TIME: CPT

## 2022-08-17 PROCEDURE — 99211 OFF/OP EST MAY X REQ PHY/QHP: CPT

## 2022-08-17 PROCEDURE — 36416 COLLJ CAPILLARY BLOOD SPEC: CPT

## 2022-08-17 NOTE — PROGRESS NOTES
Medication Management [address]  [phone] (phone)  [phone] (fax)    Ms. Grover Albright is a 67 y.o.  female with history of Afib, s/p MAZE who presents today for anticoagulation monitoring and adjustment. Patient verifies current dosing regimen and tablet strength. No missed or extra doses. Patient denies s/s bleeding/bruising/swelling/SOB/chest pain. Patient states she gets some SOB with exertion but this goes away with rest.  No blood in urine or stool. No dietary changes. Patient receiving MOM meals but has remained consistent with vitamin K foods. No changes in medication/OTC agents/Herbals. D  No change in alcohol use or tobacco use. No change in activity level. Patient denies headaches/dizziness/lightheadedness/falls. No vomiting/diarrhea or acute illness. No Procedures scheduled in the future at this time. Assessment:   Lab Results   Component Value Date    INR 3.00 (H) 08/17/2022    INR 3.20 (H) 08/04/2022    INR 2.10 (H) 07/21/2022     INR therapeutic   Recent Labs     08/17/22  1425   INR 3.00*       Plan:  Continue Coumadin 3mg W and 6mg SuMTuThFSa. Recheck INR in 2 week(s). If patient is supratherapeutic at next visit, may consider slight dose reduction due to interaction recent addition of levothyroxine on 8/1/2022. Patient reminded to call the Anticoagulation Clinic with any signs or symptoms of bleeding or with any medication changes. Patient given instructions utilizing the teach back method. After visit summary printed and reviewed with patient. Discharged ambulatory in no apparent distress. For Pharmacy Admin Tracking Only    Time Spent (min): 20    Karey Goldberg A. Joey Majestic, PharmD  8/17/2022 2:40 PM

## 2022-08-23 RX ORDER — OMEPRAZOLE 20 MG/1
CAPSULE, DELAYED RELEASE ORAL
Qty: 90 CAPSULE | Refills: 3 | Status: SHIPPED | OUTPATIENT
Start: 2022-08-23

## 2022-08-31 ENCOUNTER — HOSPITAL ENCOUNTER (OUTPATIENT)
Dept: PHARMACY | Age: 72
Setting detail: THERAPIES SERIES
Discharge: HOME | End: 2022-08-31
Payer: MEDICARE

## 2022-08-31 DIAGNOSIS — Z51.81 ENCOUNTER FOR THERAPEUTIC DRUG MONITORING: ICD-10-CM

## 2022-08-31 DIAGNOSIS — I48.91 ATRIAL FIBRILLATION, UNSPECIFIED TYPE (HCC): Primary | ICD-10-CM

## 2022-08-31 DIAGNOSIS — Z86.79 S/P MAZE OPERATION FOR ATRIAL FIBRILLATION: ICD-10-CM

## 2022-08-31 DIAGNOSIS — Z98.890 S/P MAZE OPERATION FOR ATRIAL FIBRILLATION: ICD-10-CM

## 2022-08-31 DIAGNOSIS — Z79.01 ANTICOAGULATED ON COUMADIN: ICD-10-CM

## 2022-08-31 LAB — POC INR: 2 (ref 0.8–1.2)

## 2022-08-31 PROCEDURE — 99211 OFF/OP EST MAY X REQ PHY/QHP: CPT

## 2022-08-31 PROCEDURE — 36416 COLLJ CAPILLARY BLOOD SPEC: CPT

## 2022-08-31 PROCEDURE — 85610 PROTHROMBIN TIME: CPT

## 2022-08-31 NOTE — PROGRESS NOTES
Medication Management [address]  [phone] (phone)  [phone] (fax)    Ms. Rebekah Alvarado is a 67 y.o.  female with history of  Afib, s/p MAZE   who presents today for anticoagulation monitoring and adjustment. Patient verifies current dosing regimen and tablet strength. No missed or extra doses. Patient has large bruise on left side of her abdomen. States it has been there ~2 weeks. Has begun to lighten up in color but is still deep purple. Denies hitting her abdomen that she remembers, denies pain or other s/sx of bleeding. No blood in urine or stool. Continues to use MOM meals and has been doing well watching her intake of vitamin K foods. No changes in medication/OTC agents/Herbals. No change in alcohol use or tobacco use. No change in activity level. Patient denies headaches/dizziness/lightheadedness/falls. No vomiting/diarrhea or acute illness. No Procedures scheduled in the future at this time. Assessment:   Lab Results   Component Value Date    INR 2.00 (H) 08/31/2022    INR 3.00 (H) 08/17/2022    INR 3.20 (H) 08/04/2022     INR therapeutic   Recent Labs     08/31/22  1502   INR 2.00*       Plan:  Continue Coumadin 3mg W and 6mg SuMTuThFSa. Recheck INR in 3 week(s). Patient reminded to call the Anticoagulation Clinic with any signs or symptoms of bleeding or with any medication changes. Patient given instructions utilizing the teach back method. After visit summary printed and reviewed with patient. Discharged ambulatory in no apparent distress. For Pharmacy Admin Tracking Only    Time Spent (min): 20    Bryant Caputo, Jake  8/31/2022 3:27 PM

## 2022-09-13 ENCOUNTER — NURSE ONLY (OUTPATIENT)
Dept: LAB | Age: 72
End: 2022-09-13

## 2022-09-13 ENCOUNTER — OFFICE VISIT (OUTPATIENT)
Dept: CARDIOLOGY CLINIC | Age: 72
End: 2022-09-13
Payer: MEDICARE

## 2022-09-13 VITALS
DIASTOLIC BLOOD PRESSURE: 60 MMHG | HEART RATE: 85 BPM | SYSTOLIC BLOOD PRESSURE: 122 MMHG | BODY MASS INDEX: 42.36 KG/M2 | HEIGHT: 62 IN | OXYGEN SATURATION: 95 % | WEIGHT: 230.2 LBS

## 2022-09-13 DIAGNOSIS — E89.0 POSTOPERATIVE HYPOTHYROIDISM: ICD-10-CM

## 2022-09-13 DIAGNOSIS — I50.22 CHRONIC SYSTOLIC CONGESTIVE HEART FAILURE, NYHA CLASS 2 (HCC): Primary | ICD-10-CM

## 2022-09-13 LAB
T4 FREE: 1.14 NG/DL (ref 0.93–1.76)
TSH SERPL DL<=0.05 MIU/L-ACNC: 5.32 UIU/ML (ref 0.4–4.2)

## 2022-09-13 PROCEDURE — 1036F TOBACCO NON-USER: CPT | Performed by: NURSE PRACTITIONER

## 2022-09-13 PROCEDURE — 99213 OFFICE O/P EST LOW 20 MIN: CPT | Performed by: NURSE PRACTITIONER

## 2022-09-13 PROCEDURE — 1123F ACP DISCUSS/DSCN MKR DOCD: CPT | Performed by: NURSE PRACTITIONER

## 2022-09-13 PROCEDURE — G8427 DOCREV CUR MEDS BY ELIG CLIN: HCPCS | Performed by: NURSE PRACTITIONER

## 2022-09-13 PROCEDURE — G8400 PT W/DXA NO RESULTS DOC: HCPCS | Performed by: NURSE PRACTITIONER

## 2022-09-13 PROCEDURE — 1090F PRES/ABSN URINE INCON ASSESS: CPT | Performed by: NURSE PRACTITIONER

## 2022-09-13 PROCEDURE — G8417 CALC BMI ABV UP PARAM F/U: HCPCS | Performed by: NURSE PRACTITIONER

## 2022-09-13 PROCEDURE — 3017F COLORECTAL CA SCREEN DOC REV: CPT | Performed by: NURSE PRACTITIONER

## 2022-09-13 RX ORDER — METOLAZONE 5 MG/1
5 TABLET ORAL DAILY
Qty: 2 TABLET | Refills: 0 | Status: SHIPPED | OUTPATIENT
Start: 2022-09-13 | End: 2022-09-15

## 2022-09-13 ASSESSMENT — ENCOUNTER SYMPTOMS
ABDOMINAL PAIN: 0
ABDOMINAL DISTENTION: 0
WHEEZING: 0
COUGH: 0
SHORTNESS OF BREATH: 0

## 2022-09-13 NOTE — PATIENT INSTRUCTIONS
You may receive a survey regarding the care you received during your visit. Your input is valuable to us. We encourage you to complete and return your survey. We hope you will choose us in the future for your healthcare needs. Continue:  Continue current medications  Daily weights and record  Fluid restriction of 2 Liters per day  Limit sodium in diet to around 7994-8035 mg/day  Monitor BP  Activity as tolerated     Call the Heart Failure Clinic for any of the following symptoms: [phone]  Weight gain of 2-3 pounds in 1 day or 5 pounds in 1 week  Increased shortness of breath  Shortness of breath while laying down  Cough  Chest pain  Swelling in feet, ankles or legs  Tenderness or bloating in the abdomen  Fatigue   Decreased appetite or nausea   Confusion      Take metolazone 5mg today and tomorrow   Take an additional potassium w/ the  metolazone  Take metolazone 1 hr apart form Lasix  Start wearing compression socks again    Start taking Aldactone 25mg daily     Blood work in 2 weeks    Continue diet/fluid adherence  Continue daily wts.   F/U w/ Cardiology  F/U in clinic in 6 month

## 2022-09-13 NOTE — PROGRESS NOTES
Heart Failure Clinic       Visit Date: 9/13/2022  Cardiologist:  Dr. Ricardo Bangura  Primary Care Physician: Dr. Alie Smith,     Robbin Ovalle is a 67 y.o. female who presents today for:  Chief Complaint   Patient presents with    Congestive Heart Failure       HPI:   TYPE HF: HFrEF (45-50%, 3/18/22) (25-30%, 1/20/22)    Cause: ischemic  Device: none  HX: HLD, YASHIRA on CPAP, HTN, DM II, Afib w. RVR s/p Maze and LAAC (11/2021), (coumadin) CAD s/p CABG 11/15/21  Dry Wt:  200? (208 on 2/15/22) (212 on 3/30/22) (230 on 9/13/22)    Robbin Ovalle is a 67 y.o. female who presents to the office for a f/u patient visit in the heart failure clinic. Accompanied by self    Concerns today: pt doing well. She is still urinating well on her lasix. She is up 17lbs states that she has been really eating a lot of ice cream this summer. Notes some ankle swelling but states it is no worse than normal.     Visit on 3/30/22:  weight is staying consistant since increasing lasix to 40mg after a phone call into the office, notes improvement of LE swelling. EF improved, life vest returned. Asking about cardiac clearance for up coming thyroid surgery, sees Dr. Ricardo Bangura next month. Visit on 2/15: No major concerns today. Was evaluated by Dr. Sonia Kiser on 2/2/22 and started on Entresto, no lab work ordered since. States she stopped her Lipitor last week d/t back discomfort as a side effect. Hospitalization:      ADMISSION DATE: 11/7/2021   MRN: [id number] DISCHARGE DATE: 11/25/2021     evaluation of de brad ischemic cardiomyopathy in context of AFRVR with ACS. She presented to [facility] ED on 11/5/2021 with severe bilateral acute on chronic hip pain. She was found to be in [facility] with rates in the 160s and ischemic changes diffusely on the ECG. She was hypoxic requiring 5L O2 by NC. She was given IV metoprolol which did not affect her rate much.  She was taken to cath lab for evaluation of these ischemic changes and was discovered to have three vessel CAD. Echo showed LVEF 25-30%. She was managed in the ICU and given IV metoprolol, IV diltiazem, IV esmolol, and IV amiodarone for her atrial fibrillation; with amiodarone she converted to sinus rhythm (not clear if pt ever underwent LAWSON). She was anticoagulated with heparin gtt. CTS was consulted at [facility]. Desire's for CABG evaluation. However given the reduced LV systolic function and possible need for mechanical support in the perioperative period, CTS recommended transfer to a tertiary center for evaluation; she was accepted for transfer. Patient underwent emergent left heart catheterization which found three-vessel coronary artery disease. She had a proximal RCA calcified lesion in which a PTCA was attempted however unsuccessful    11/18/2021: CABG(LAD-LIMA, OM-SVG, RCA-SVG), MVr(#29 Hsu Ring), TVr(Lashawn stitch), MAZE, LAAC(#35 clip)    11/20/2021: AF RVR    Acute Systolic HF - Hemodynamically stable off inotropic support. Diuresis/BB. 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 Outpatient endocrine eval recommended. Noted on DC summary for following.         Activity: ADLs performed w/o limitation, uses cane currently d/t heaviness of life vest. Was SOB walking to office today  Diet: watching sodium and fluid    Patient has:  Chest Pain: no  SOB: no  Orthopnea/PND: no  YASHIRA: not tested  Edema: no  Fatigue: no  Abdominal bloating: no  Cough: no  Appetite: good  Home weight: daily   Home blood pressure: does not check    Past Medical History:   Diagnosis Date    Allergic rhinitis     Angiolipoma of right kidney     ASHD (arteriosclerotic heart disease)     Atrial fibrillation (HCC)     Celiac disease     DM2 (diabetes mellitus, type 2) (HCC)     does not take medications for or check blood sugars    Eczema     GERD (gastroesophageal reflux disease)     Heart failure with reduced ejection fraction ([address]     History of blood transfusion 2021    s/p Heart surgery    History of ST elevation myocardial infarction (STEMI)     Hyperlipidemia     Hypertension     Ischemic cardiomyopathy     Meningioma ([facility] [address] 2012    Noted MRI . Never did f/u MRI. Told she didn't need to. Has 0 sxs. Denies HA, vision changes, lateralizing numbness or weakness. Mixed hearing loss 2013    Obesity (BMI 30-39. 9)     Osteoarthritis     Post-menopausal     S/P CABG (coronary artery bypass graft)     S/P Maze operation for atrial fibrillation     S/P MVR (mitral valve repair)     S/P tricuspid valve repair     Thrombocytopenia [facility])      Past Surgical History:   Procedure Laterality Date     SECTION  1972    CHOLECYSTECTOMY  2004    COLONOSCOPY  2006    CORONARY ARTERY BYPASS GRAFT  [facility]    MITRAL VALVE REPAIR  2021    [facility]    MYRINGOTOMY AND TYMPANOSTOMY TUBE PLACEMENT  2012    Dr Liz Forman    OTHER SURGICAL HISTORY  2021    MAZE Procedure during CABG and MVR/TVR at [facility]    THYROIDECTOMY N/A 2022    Right Hemithyroidectomy performed by Lelo Ortiz MD at [address]  2021    TV Repair 2021 at [address] ENDOSCOPY  2006     Family History   Problem Relation Age of Onset    Heart Disease Mother     Lung Cancer Mother     Osteoporosis Mother     Other Father         Did not know her father.      Other Maternal Grandmother         blood clot, no clear hx surrounding    Heart Attack Maternal Grandfather     Heart Attack Maternal Uncle 62    Breast Cancer Maternal Aunt 48    Breast Cancer Maternal Cousin 40     Social History     Tobacco Use    Smoking status: Passive Smoke Exposure - Never Smoker    Smokeless tobacco: Never   Substance Use Topics    Alcohol use: No     Current Outpatient Medications   Medication Sig Dispense Refill    metOLazone (ZAROXOLYN) 5 MG tablet Take 1 tablet by mouth daily for 2 days 2 tablet 0    omeprazole (PRILOSEC) 20 MG delayed release capsule TAKE 1 CAPSULE EVERY DAY 90 capsule 3    sacubitril-valsartan (ENTRESTO) 49-51 MG per tablet Take 1 tablet by mouth in the morning and 1 tablet before bedtime. 180 tablet 3    levothyroxine (SYNTHROID) 25 MCG tablet Take 1 tablet by mouth in the morning. 30 tablet 2    acetaminophen (TYLENOL) 500 MG tablet Take 1,000 mg by mouth nightly as needed for Pain      warfarin (COUMADIN) 3 MG tablet Take as directed by [facility] (60 tablets = 30 days) 60 tablet 11    metoprolol succinate (TOPROL XL) 50 MG extended release tablet Take 1 tablet by mouth 2 times daily Take along with 25 mg BID to make 75 mg BID. 180 tablet 3    metoprolol succinate (TOPROL XL) 25 MG extended release tablet Take 1 tablet by mouth 2 times daily Take along with 50 mg BID to make 75 mg BID. 180 tablet 3    furosemide (LASIX) 40 MG tablet Take 1 tablet by mouth daily 90 tablet 3    potassium chloride (KLOR-CON M) 20 MEQ extended release tablet Take 1 tablet by mouth daily 90 tablet 3    cetirizine (ZYRTEC) 10 MG tablet Take 10 mg by mouth daily      aspirin 81 MG chewable tablet Take 81 mg by mouth daily       No current facility-administered medications for this visit. Allergies   Allergen Reactions    Keflex [Cephalexin] Other (See Comments)     Tongue Swelling    Norvasc [Amlodipine Besylate] Other (See Comments)     Nasuea, hot flashes    Statins Other (See Comments)     Myalgias all    Zetia [Ezetimibe] Other (See Comments)     Chest pain    Zocor [Simvastatin] Other (See Comments)     Myalgias    Biaxin [Clarithromycin] Nausea Only    Doxycycline Other (See Comments)     GI side effects       SUBJECTIVE:   Review of Systems   Constitutional:  Negative for activity change, appetite change and fatigue. Respiratory:  Negative for cough, shortness of breath and wheezing. Cardiovascular:  Positive for leg swelling. Negative for chest pain and palpitations. Gastrointestinal:  Negative for abdominal distention and abdominal pain. Musculoskeletal:  Negative for gait problem. Neurological:  Negative for weakness, light-headedness and headaches. Psychiatric/Behavioral:  Negative for sleep disturbance. OBJECTIVE:   Today's Vitals:  /60   Pulse 85   Ht 5' 2\" (1.575 m)   Wt 230 lb 3.2 oz (104.4 kg)   SpO2 95%   BMI 42.10 kg/m²     Physical Exam  Vitals reviewed. Constitutional:       General: She is not in acute distress. Appearance: Normal appearance. She is well-developed. She is not diaphoretic. HENT:      Head: Normocephalic and atraumatic. Eyes:      Conjunctiva/sclera: Conjunctivae normal.   Cardiovascular:      Rate and Rhythm: Normal rate and regular rhythm. Heart sounds: Normal heart sounds. No murmur heard. Pulmonary:      Effort: Pulmonary effort is normal. No respiratory distress. Breath sounds: Normal breath sounds. No wheezing, rhonchi or rales. Abdominal:      General: Bowel sounds are normal. There is no distension. Palpations: Abdomen is soft. Tenderness: There is no abdominal tenderness. Musculoskeletal:         General: Normal range of motion. Cervical back: Normal range of motion and neck supple. Right lower leg: Edema (+1) present. Left lower leg: Edema (+1) present. Skin:     General: Skin is warm and dry. Capillary Refill: Capillary refill takes less than 2 seconds. Neurological:      Mental Status: She is alert and oriented to person, place, and time.       Coordination: Coordination normal.   Psychiatric:         Behavior: Behavior normal.       Wt Readings from Last 3 Encounters:   09/13/22 230 lb 3.2 oz (104.4 kg)   07/01/22 219 lb (99.3 kg)   06/29/22 218 lb 12.8 oz (99.2 kg)     BP Readings from Last 3 Encounters:   09/13/22 122/60   07/01/22 124/64   06/29/22 (!) 143/67     Pulse Readings from Last 3 Encounters:   09/13/22 85   07/01/22 74   06/29/22 76     Body mass index is 42.1 kg/m². ECHO:     Summary   Technically difficult examination. Left Ventricular size is Mildly increased . Normal left ventricular wall thickness. There was severe global hypokinesis of the left ventricle. Systolic function was severely reduced. Ejection fraction is visually estimated in the range of 30% to 35%. The left atrium is Mildly dilated. Signature      ----------------------------------------------------------------   Electronically signed by Champ Dennis MD (Interpreting   physician) on 01/21/2022 at 07:16 PM      CONCLUSIONS:   - Technically difficult exam due to post op, bandages/chest tubes/wound,   suboptimal positioning and body habitus. Subcostal images are not obtainable due   to bandages. - Exam indication: S/P CABG, MVr, TVr   - The left ventricle is normal in size. Left ventricular systolic function is   severely decreased. EF = 25 ± 5% (visual est.) Definity contrast used for   endocardial border detection. Left ventricular diastolic function was not   evaluated due to mitral valve surgery. - The right ventricle is normal in size. Right ventricular systolic function is   normal.   - Post mitral valve repair. Hsu Mitral Valve Annuloplasty Ring (size #29). There    is trace mitral valve regurgitation. The peak gradient is 15 mmHg and the mean   gradient is 4 mmHg. - Post tricuspid valve repair. [facility]. There is trace tricuspid valve   regurgitation. The peak gradient is 2 mmHg and the mean gradient is 1 mmHg. - There is a trivial posterior pericardial effusion.  There is a left pleural   effusion.   - Exam was compared with the prior  echocardiographic exam performed on   11/8/2021. s/p CABG, MVr, TVr.     Electronically signed by En Whitfield MD on 11/24/2021 at 2:13:41 PM         CATH/STRESS:        Recommendations   IV beta blockers to reduce heart rate   IV heparin   CTS consultation for surgery   Aspirin   Monitor access site and hemodynamics   Extensive discussion done with patient and family members      Estimated Blood Loss:10 ml. Complications:No complications. Signatures      ----------------------------------------------------------------   Electronically signed by Bettye Shrestha MD (Performing   Physician) on 11/05/2021 at 20:05   ----------------------------------------------------------------        Results reviewed:  BNP: No results found for: BNP  CBC:   Lab Results   Component Value Date/Time    WBC 5.4 06/29/2022 10:51 AM    RBC 4.40 06/29/2022 10:51 AM    RBC 4.57 11/09/2020 02:55 PM    HGB 13.9 06/29/2022 10:51 AM    HCT 42.1 06/29/2022 10:51 AM    PLT 99 06/29/2022 10:51 AM     CMP:    Lab Results   Component Value Date/Time     07/29/2022 10:07 AM    K 4.2 07/29/2022 10:07 AM    K 4.1 06/24/2022 04:46 AM     07/29/2022 10:07 AM    CO2 29 07/29/2022 10:07 AM    BUN 15 07/29/2022 10:07 AM    CREATININE 0.9 07/29/2022 10:07 AM    LABGLOM 61 07/29/2022 10:07 AM    GLUCOSE 121 07/29/2022 10:07 AM    GLUCOSE 146 10/23/2020 09:30 AM    CALCIUM 9.1 07/29/2022 10:07 AM     Hepatic Function Panel:    Lab Results   Component Value Date/Time    ALKPHOS 90 06/15/2022 10:25 AM    ALT 13 06/15/2022 10:25 AM    AST 15 06/15/2022 10:25 AM    PROT 6.5 06/15/2022 10:25 AM    BILITOT 0.3 06/15/2022 10:25 AM    BILIDIR <0.2 11/05/2021 10:40 AM    LABALBU 4.5 06/15/2022 10:25 AM     Magnesium:    Lab Results   Component Value Date/Time    MG 2.1 02/15/2022 02:13 PM     PT/INR:    Lab Results   Component Value Date/Time    INR 2.00 08/31/2022 03:02 PM    INR 1.00 06/23/2022 06:57 AM     Lipids:    Lab Results   Component Value Date/Time    TRIG 163 10/27/2021 07:57 AM    HDL 43 10/27/2021 07:57 AM    LDLCALC 112 10/27/2021 07:57 AM    LABVLDL 36 10/23/2020 09:30 AM       ASSESSMENT AND PLAN:   The patient's condition/symptoms are Stable: No clinical evidence of fluid overload today.  Continue current medical regimen without changes at present time. Diagnosis Orders   1. Chronic systolic congestive heart failure, NYHA class 2 ([facility])  Basic Metabolic Panel        Continue:  GDMT:   ACE/ARB/ARNI - Entresto 49/51 BID   BB - Toprol 75 daily   Diuretic - Lasix 40 daily  AA - aldactone 25 daily  SGLT2 -  none  Vasodilator - none  Other - ASA, coumadin, KCl 20 daily    HFmrEF 45-50%  S/P CABG 11/2021  Stable, worsening lower leg swelling with 17lb weight gain. Will treat w/ metolazone x 2 days. Noncompliant w/ diet at this time. Plan: medication optimization and fluid management. ECHO 2022: mild mitral stenosis, no other concerns. Lab reviewed - stable, K is 4.2. Cr. 0.9 Mag 2.1    Take metolazone 5mg today and tomorrow   Take an additional potassium w/ the  metolazone  Take metolazone 1 hr apart form Lasix  Start wearing compression socks again    Start taking Aldactone 25mg daily     Blood work in 2 weeks    Continue diet/fluid adherence  Continue daily wts. F/U w/ Cardiology  F/U in clinic in 3 months    Tolerating above noted HF meds, no ill side effects noted. Will continue to monitor kidney function and electrolytes. Will optimize as tolerated. Pt is compliant w/ medications. Total visit time of 25 minutes has been spent with patient on education of symptoms, management, medication, and plan of care; as well as review of chart: labs, ECHO, radiology reports, etc.   I personally spent more then 50% of the appt time face to face with the patient. Daily weights  Fluid restriction of 2 Liters per day  Limit sodium in diet to around 0632-8243 mg/day  Monitor BP  Activity as tolerated           Patient was instructed to call the [facility] for any changes in symptoms as noted in AVS.      Return in about 3 months (around 12/13/2022). or sooner if needed     Patient given educational materials - see patient instructions. We discussed the importance of weighing oneself and recording daily.  We also discussed the importance of a low sodium diet, higher sodium foods to avoid and better low sodium food options. Patient verbalizes understanding of plan of care using teach back method, and is agreeable to the treatment plan.        Electronically signed by ORAL Rueda CNP on 9/13/2022 at 2:40 PM

## 2022-09-14 ENCOUNTER — TELEPHONE (OUTPATIENT)
Dept: FAMILY MEDICINE CLINIC | Age: 72
End: 2022-09-14

## 2022-09-14 DIAGNOSIS — E89.0 POSTOPERATIVE HYPOTHYROIDISM: Primary | ICD-10-CM

## 2022-09-14 RX ORDER — LEVOTHYROXINE SODIUM 0.03 MG/1
25 TABLET ORAL DAILY
Qty: 90 TABLET | Refills: 3 | Status: SHIPPED | OUTPATIENT
Start: 2022-09-14

## 2022-09-14 NOTE — TELEPHONE ENCOUNTER
----- Message from Marly Leger DO sent at 9/14/2022  6:41 AM EDT -----  Please let pt know that thyroid fxn looks better  Con't synthroid at 25mcg po daily  Repeat TSH in 3 months  Non-fasting is fine  RF for synthroid sent to pharmacy. , 90 days with 3 RF  Let me know if questions, thanks!

## 2022-09-21 ENCOUNTER — HOSPITAL ENCOUNTER (OUTPATIENT)
Dept: PHARMACY | Age: 72
Setting detail: THERAPIES SERIES
Discharge: HOME | End: 2022-09-21
Payer: MEDICARE

## 2022-09-21 DIAGNOSIS — Z51.81 ENCOUNTER FOR THERAPEUTIC DRUG MONITORING: ICD-10-CM

## 2022-09-21 DIAGNOSIS — I48.91 ATRIAL FIBRILLATION, UNSPECIFIED TYPE (HCC): Primary | ICD-10-CM

## 2022-09-21 DIAGNOSIS — Z98.890 S/P MAZE OPERATION FOR ATRIAL FIBRILLATION: ICD-10-CM

## 2022-09-21 DIAGNOSIS — Z86.79 S/P MAZE OPERATION FOR ATRIAL FIBRILLATION: ICD-10-CM

## 2022-09-21 DIAGNOSIS — Z79.01 ANTICOAGULATED ON COUMADIN: ICD-10-CM

## 2022-09-21 LAB — POC INR: 2 (ref 0.8–1.2)

## 2022-09-21 PROCEDURE — 36416 COLLJ CAPILLARY BLOOD SPEC: CPT

## 2022-09-21 PROCEDURE — 99211 OFF/OP EST MAY X REQ PHY/QHP: CPT

## 2022-09-21 PROCEDURE — 85610 PROTHROMBIN TIME: CPT

## 2022-09-21 NOTE — PROGRESS NOTES
Medication Management [address]  [phone] (phone)  [phone] (fax)    Ms. Robbin Ovalle is a 67 y.o.  female with history of Afib who presents today for anticoagulation monitoring and adjustment. Patient verifies current dosing regimen and tablet strength. No missed or extra doses. Patient denies s/s bleeding/bruising/swelling/SOB/chest pain A few smaller bruises on both arms. The large bruise on her abdomen has resolved. No blood in urine or stool. No dietary changes. Still doing MOM meals. Eating peas and carrots mostly for vegetables  No changes in medication/OTC agents/Herbals. Took metolazone for 2 days, but is finished with it. No change in alcohol use or tobacco use. No change in activity level. Patient denies headaches/dizziness/lightheadedness/falls. No vomiting/diarrhea or acute illness. No Procedures scheduled in the future at this time. Assessment:   Lab Results   Component Value Date    INR 2.00 (H) 09/21/2022    INR 2.00 (H) 08/31/2022    INR 3.00 (H) 08/17/2022     INR therapeutic   Recent Labs     09/21/22  1451   INR 2.00*     Plan:  Continue Coumadin 3 mg Wed and 6 mg MTThFSatSun. Recheck INR in 3 week(s). Patient reminded to call the Anticoagulation Clinic with any signs or symptoms of bleeding or with any medication changes. Patient given instructions utilizing the teach back method. Discussed case with Edson Tovar PharmD    After visit summary printed and reviewed with patient. Discharged ambulatory in no apparent distress.     Felicia Prince RPh  9/21/2022 3:09 PM     For Pharmacy Admin Tracking Only    Time Spent (min): 20

## 2022-09-23 ENCOUNTER — TELEPHONE (OUTPATIENT)
Dept: FAMILY MEDICINE CLINIC | Age: 72
End: 2022-09-23

## 2022-09-23 DIAGNOSIS — E11.9 TYPE 2 DIABETES MELLITUS WITHOUT COMPLICATION, WITHOUT LONG-TERM CURRENT USE OF INSULIN (HCC): Primary | Chronic | ICD-10-CM

## 2022-09-23 NOTE — TELEPHONE ENCOUNTER
-- DO NOT REPLY / DO NOT REPLY ALL --  -- Message is from the [facility]--    COVID-19 Universal Screening: Negative    General Patient Message      Reason for Call:   Please call patient's mom Pratibha to arrange an appointment for Karishma.  [phone]    Caller Information       Type Contact Phone    07/27/2020 05:28 PM Phone (Incoming) PRATIBHA YOUSIF (Mother) [phone]          Alternative phone number: NA    Turnaround time given to caller:   \"This message will be sent to [state Provider's name]. The clinical team will fulfill your request as soon as they review your message when the office opens tomorrow.\"     Pt's  informed and understanding with no further questions    Lab slip mailed      Okay per HIPAA

## 2022-09-23 NOTE — TELEPHONE ENCOUNTER
Pt due for fasting labs prior to next apt on 10/27/2022. Please call to have pt complete this. Thanks! ASSESSMENT & PLAN   Diagnosis Orders   1.  Type 2 diabetes mellitus without complication, without long-term current use of insulin (HCC)  CBC with Auto Differential    Comprehensive Metabolic Panel    Lipid Panel    TSH with Reflex    Microalbumin / Creatinine Urine Ratio        Future Appointments   Date Time Provider Andrei Pedraza   10/10/2022  3:00 PM Arpita Tracey MD [facility] [address]   10/12/2022  3:00 PM PHILLY Xie [address]   10/27/2022  3:40 PM Yvonne Tariq DO [facility]   10/28/2022 11:00 AM Mouna Ledesma Oncology [address]   1/3/2023  2:00 PM ORAL Mitchell - CNP [facility] [address]

## 2022-09-27 ENCOUNTER — NURSE ONLY (OUTPATIENT)
Dept: LAB | Age: 72
End: 2022-09-27

## 2022-09-27 DIAGNOSIS — I50.22 CHRONIC SYSTOLIC CONGESTIVE HEART FAILURE, NYHA CLASS 2 (HCC): ICD-10-CM

## 2022-09-27 LAB
ANION GAP SERPL CALCULATED.3IONS-SCNC: 11 MEQ/L (ref 8–16)
BUN BLDV-MCNC: 10 MG/DL (ref 7–22)
CALCIUM SERPL-MCNC: 9.4 MG/DL (ref 8.5–10.5)
CHLORIDE BLD-SCNC: 101 MEQ/L (ref 98–111)
CO2: 29 MEQ/L (ref 23–33)
CREAT SERPL-MCNC: 0.7 MG/DL (ref 0.4–1.2)
GFR SERPL CREATININE-BSD FRML MDRD: 82 ML/MIN/1.73M2
GLUCOSE BLD-MCNC: 133 MG/DL (ref 70–108)
POTASSIUM SERPL-SCNC: 4.1 MEQ/L (ref 3.5–5.2)
SODIUM BLD-SCNC: 141 MEQ/L (ref 135–145)

## 2022-09-28 ENCOUNTER — TELEPHONE (OUTPATIENT)
Dept: CARDIOLOGY CLINIC | Age: 72
End: 2022-09-28

## 2022-09-28 DIAGNOSIS — I50.22 CHRONIC SYSTOLIC CONGESTIVE HEART FAILURE, NYHA CLASS 2 (HCC): Primary | ICD-10-CM

## 2022-09-28 NOTE — TELEPHONE ENCOUNTER
----- Message from ORAL Chadwick CNP sent at 9/28/2022  5:41 AM EDT -----  Started on Aldactone, labs look good. How is pt doing w/ it, she had both weight gain and swelling at last visit.  Would like to add Jardiance or Verle Dorcas next if ok w/ it

## 2022-09-29 RX ORDER — SPIRONOLACTONE 25 MG/1
25 TABLET ORAL DAILY
Qty: 90 TABLET | Refills: 3 | Status: SHIPPED | OUTPATIENT
Start: 2022-09-29

## 2022-09-29 NOTE — TELEPHONE ENCOUNTER
Called to patient. Notified of labs. Patient was not sure of new medication Aldactone. Only took Metolazone for two days. Looks like Aldactone was never sent to pharmacy. Says feet look good, not as puffy.  Weight down a couple pounds-trying to change diet  Does have issue with cost of medication- checked, is on [employer]

## 2022-09-29 NOTE — TELEPHONE ENCOUNTER
I tried calling her and she did not answer. Tell her so sorry I did not call it in. I was under the impression she was on it when reviewing her labs. Called in aldactone 25 daily. BMP in two weeks.

## 2022-09-30 NOTE — TELEPHONE ENCOUNTER
Patient notified and verbalized understanding. Patient was hesistant on starting another medication, did tell her she can run it through [facility] as well.

## 2022-10-10 ENCOUNTER — OFFICE VISIT (OUTPATIENT)
Dept: CARDIOLOGY CLINIC | Age: 72
End: 2022-10-10
Payer: MEDICARE

## 2022-10-10 VITALS
SYSTOLIC BLOOD PRESSURE: 154 MMHG | DIASTOLIC BLOOD PRESSURE: 87 MMHG | HEIGHT: 62 IN | BODY MASS INDEX: 42.51 KG/M2 | HEART RATE: 91 BPM | WEIGHT: 231 LBS

## 2022-10-10 DIAGNOSIS — Z95.1 HX OF CABG: Primary | ICD-10-CM

## 2022-10-10 DIAGNOSIS — E66.01 OBESITY, CLASS III, BMI 40-49.9 (MORBID OBESITY) (HCC): ICD-10-CM

## 2022-10-10 PROCEDURE — G8427 DOCREV CUR MEDS BY ELIG CLIN: HCPCS | Performed by: INTERNAL MEDICINE

## 2022-10-10 PROCEDURE — 99214 OFFICE O/P EST MOD 30 MIN: CPT | Performed by: INTERNAL MEDICINE

## 2022-10-10 PROCEDURE — G8417 CALC BMI ABV UP PARAM F/U: HCPCS | Performed by: INTERNAL MEDICINE

## 2022-10-10 PROCEDURE — 1090F PRES/ABSN URINE INCON ASSESS: CPT | Performed by: INTERNAL MEDICINE

## 2022-10-10 PROCEDURE — G8400 PT W/DXA NO RESULTS DOC: HCPCS | Performed by: INTERNAL MEDICINE

## 2022-10-10 PROCEDURE — 1123F ACP DISCUSS/DSCN MKR DOCD: CPT | Performed by: INTERNAL MEDICINE

## 2022-10-10 PROCEDURE — 1036F TOBACCO NON-USER: CPT | Performed by: INTERNAL MEDICINE

## 2022-10-10 PROCEDURE — G8484 FLU IMMUNIZE NO ADMIN: HCPCS | Performed by: INTERNAL MEDICINE

## 2022-10-10 PROCEDURE — 3017F COLORECTAL CA SCREEN DOC REV: CPT | Performed by: INTERNAL MEDICINE

## 2022-10-10 NOTE — PROGRESS NOTES
[id number] [facility] [place] [address] [facility] [address]  Dept: [phone]  Dept Fax: [phone]  Loc: [phone]    Visit Date: 10/10/2022    Ms. Oscar Vicente is a 67 y.o. female  who presented for:  Chief Complaint   Patient presents with    6 Month Follow-Up    Coronary Artery Disease    Congestive Heart Failure       HPI:   66 yo F c hx of CAD s/p CABG (LIMA-LAD, SVG -RCA, SVG -OM 11/2021), MVR, (Hsu ring), MAZE, TVR (Lashawn stitch), LAAC, Afib on coumadin at [facility], LVEF 45-50%. She has been doing well. Denies any chest pain, sob, palpitations, lightheadedness, dizziness, orthopnea, PND or pedal edema. States she has been doing all the work at home without issues. Current Outpatient Medications:     spironolactone (ALDACTONE) 25 MG tablet, Take 1 tablet by mouth daily, Disp: 90 tablet, Rfl: 3    levothyroxine (SYNTHROID) 25 MCG tablet, Take 1 tablet by mouth daily, Disp: 90 tablet, Rfl: 3    omeprazole (PRILOSEC) 20 MG delayed release capsule, TAKE 1 CAPSULE EVERY DAY, Disp: 90 capsule, Rfl: 3    sacubitril-valsartan (ENTRESTO) 49-51 MG per tablet, Take 1 tablet by mouth in the morning and 1 tablet before bedtime. , Disp: 180 tablet, Rfl: 3    warfarin (COUMADIN) 3 MG tablet, Take as directed by [facility] (60 tablets = 30 days), Disp: 60 tablet, Rfl: 11    metoprolol succinate (TOPROL XL) 50 MG extended release tablet, Take 1 tablet by mouth 2 times daily Take along with 25 mg BID to make 75 mg BID., Disp: 180 tablet, Rfl: 3    metoprolol succinate (TOPROL XL) 25 MG extended release tablet, Take 1 tablet by mouth 2 times daily Take along with 50 mg BID to make 75 mg BID., Disp: 180 tablet, Rfl: 3    furosemide (LASIX) 40 MG tablet, Take 1 tablet by mouth daily, Disp: 90 tablet, Rfl: 3    potassium chloride (KLOR-CON M) 20 MEQ extended release tablet, Take 1 tablet by mouth daily, Disp: 90 tablet, Rfl: 3 cetirizine (ZYRTEC) 10 MG tablet, Take 10 mg by mouth daily, Disp: , Rfl:     aspirin 81 MG chewable tablet, Take 81 mg by mouth daily, Disp: , Rfl:     metOLazone (ZAROXOLYN) 5 MG tablet, Take 1 tablet by mouth daily for 2 days, Disp: 2 tablet, Rfl: 0    acetaminophen (TYLENOL) 500 MG tablet, Take 1,000 mg by mouth nightly as needed for Pain, Disp: , Rfl:     Past Medical History  Swati  has a past medical history of Allergic rhinitis, Angiolipoma of right kidney, ASHD (arteriosclerotic heart disease), Atrial fibrillation ([address] Celiac disease, DM2 (diabetes mellitus, type 2) ([address] Eczema, GERD (gastroesophageal reflux disease), Heart failure with reduced ejection fraction ([address] History of blood transfusion, History of ST elevation myocardial infarction (STEMI), Hyperlipidemia, Hypertension, Ischemic cardiomyopathy, Meningioma ([address] Mixed hearing loss, Obesity (BMI 30-39.9), Osteoarthritis, Post-menopausal, S/P CABG (coronary artery bypass graft), S/P Maze operation for atrial fibrillation, S/P MVR (mitral valve repair), S/P tricuspid valve repair, and Thrombocytopenia ([address] Social History  Swati  reports that she is a non-smoker but has been exposed to tobacco smoke. She has never used smokeless tobacco. She reports that she does not drink alcohol and does not use drugs. Family History  Swati family history includes Breast Cancer (age of onset: 40) in her maternal cousin; Breast Cancer (age of onset: 50) in her maternal aunt; Heart Attack in her maternal grandfather; Heart Attack (age of onset: 62) in her maternal uncle; Heart Disease in her mother; Otila Gallus in her mother; Osteoporosis in her mother; Other in her father and maternal grandmother.     Past Surgical History   Past Surgical History:   Procedure Laterality Date     SECTION  1972    CHOLECYSTECTOMY      COLONOSCOPY  2006    CORONARY ARTERY BYPASS GRAFT  2021    [facility]    MITRAL VALVE REPAIR  2021    [facility]    MYRINGOTOMY AND TYMPANOSTOMY TUBE PLACEMENT  12/07/2012    Dr Bernal Saliva HISTORY  11/2021    MAZE Procedure during CABG and MVR/TVR at [facility]    THYROIDECTOMY N/A 6/23/2022    Right Hemithyroidectomy performed by Jayne Menard MD at [address]  11/2021    TV Repair NOV 2021 at [facility]  05/2006       Subjective:     REVIEW OF SYSTEMS  Constitutional: denies sweats, chills and fever  HENT: denies  congestion, sinus pressure, sneezing and sore throat. Eyes: denies  pain, discharge, redness and itching. Respiratory: denies apnea, cough  Gastrointestinal: denies blood in stool, constipation, diarrhea   Endocrine: denies cold intolerance, heat intolerance, polydipsia. Genitourinary: denies dysuria, enuresis, flank pain and hematuria. Musculoskeletal: denies arthralgias, joint swelling and neck pain. Neurological: denies numbness and headaches. Psychiatric/Behavioral: denies agitation, confusion, decreased concentration and dysphoric mood    All others reviewed and are negative. Objective:     BP (!) 154/87   Pulse 91   Ht 5' 2\" (1.575 m)   Wt 231 lb (104.8 kg)   BMI 42.25 kg/m²     Wt Readings from Last 3 Encounters:   10/10/22 231 lb (104.8 kg)   09/13/22 230 lb 3.2 oz (104.4 kg)   07/01/22 219 lb (99.3 kg)     BP Readings from Last 3 Encounters:   10/10/22 (!) 154/87   09/13/22 122/60   07/01/22 124/64       PHYSICAL EXAM  Constitutional: Oriented to person, place, and time. Appears well-developed and well-nourished. HENT:   Head: Normocephalic and atraumatic. Eyes: EOM are normal. Pupils are equal, round, and reactive to light. Neck: Normal range of motion. Neck supple. No JVD present. Cardiovascular: Normal rate , normal heart sounds and intact distal pulses. Pulmonary/Chest: Effort normal and breath sounds normal. No respiratory distress. No wheezes. No rales. Abdominal: Soft.  Bowel sounds are normal. No distension. There is no tenderness. Musculoskeletal: Normal range of motion. No edema. Neurological: Alert and oriented to person, place, and time. No cranial nerve deficit. Coordination normal.   Skin: Skin is warm and dry. Psychiatric: Normal mood and affect.        No results found for: CKTOTAL, CKMB, CKMBINDEX    Lab Results   Component Value Date/Time    WBC 5.4 06/29/2022 10:51 AM    RBC 4.40 06/29/2022 10:51 AM    RBC 4.57 11/09/2020 02:55 PM    HGB 13.9 06/29/2022 10:51 AM    HCT 42.1 06/29/2022 10:51 AM    MCV 96 06/29/2022 10:51 AM    MCH 31.6 06/29/2022 10:51 AM    MCHC 33.0 06/29/2022 10:51 AM    RDW 12.2 06/29/2022 10:51 AM    PLT 99 06/29/2022 10:51 AM    MPV 12.4 06/29/2022 10:51 AM       Lab Results   Component Value Date/Time     09/27/2022 03:26 PM    K 4.1 09/27/2022 03:26 PM    K 4.1 06/24/2022 04:46 AM     09/27/2022 03:26 PM    CO2 29 09/27/2022 03:26 PM    BUN 10 09/27/2022 03:26 PM    LABALBU 4.5 06/15/2022 10:25 AM    CREATININE 0.7 09/27/2022 03:26 PM    CALCIUM 9.4 09/27/2022 03:26 PM    LABGLOM 82 09/27/2022 03:26 PM    GLUCOSE 133 09/27/2022 03:26 PM    GLUCOSE 146 10/23/2020 09:30 AM       Lab Results   Component Value Date/Time    ALKPHOS 90 06/15/2022 10:25 AM    ALT 13 06/15/2022 10:25 AM    AST 15 06/15/2022 10:25 AM    PROT 6.5 06/15/2022 10:25 AM    BILITOT 0.3 06/15/2022 10:25 AM    BILIDIR <0.2 11/05/2021 10:40 AM    LABALBU 4.5 06/15/2022 10:25 AM       Lab Results   Component Value Date/Time    MG 2.1 02/15/2022 02:13 PM       Lab Results   Component Value Date    INR 2.00 (H) 09/21/2022    INR 2.00 (H) 08/31/2022    INR 3.00 (H) 08/17/2022         Lab Results   Component Value Date/Time    LABA1C 6.4 04/28/2022 02:44 PM    LABA1C 7.1 10/15/2019 02:15 PM       Lab Results   Component Value Date/Time    TRIG 163 10/27/2021 07:57 AM    HDL 43 10/27/2021 07:57 AM    LDLCALC 112 10/27/2021 07:57 AM    LABVLDL 36 10/23/2020 09:30 AM       Lab Results   Component Value Date/Time    TSH 5.320 09/13/2022 03:03 PM         Testing Reviewed:      I haveindividually reviewed the below cardiac tests    EKG:    ECHO: Results for orders placed during the hospital encounter of 03/16/22    ECHO Complete 2D W Doppler W Color    Narrative  Transthoracic Echocardiography Report (TTE)    Demographics    Patient Name    Rosi Rivers  Gender               Female  G    MR #            [id number]         Race                     Ethnicity    Account #       [de-identified]         Room Number    Accession       [id number]        Date of Study        03/16/2022  Number    Date of Birth   1950        Referring Physician  Gilmer Bauman MD    Age             67 year(s)        [facility]    Interpreting         Delaney Muniz MD  Physician    Procedure    Type of Study    TTE procedure:ECHOCARDIOGRAM COMPLETE 2D W DOPPLER W COLOR. Procedure Date  Date: 03/16/2022 Start: 12:05 PM    Study Location: Echo Lab  Technical Quality: Limited visualization due to body habitus. Indications:Atrial fibrillation and Palpitations. Additional Medical History:Coronary artery disease, CABG, Life vest, STEMI,  MV repair (Hsu ring), TV repair (Hayley Lento) DIabetes, Hypertension,  Hyperlipidemia, CHF, GERD, MAZE, Family history of heart disease    Patient Status: Routine    Height: 63.78 inches Weight: 213.01 pounds BSA: 2 m^2 BMI: 36.81 kg/m^2    BP: 142/78 mmHg    Allergies  - Other allergy:(See Epic). Conclusions    Summary  When this echo is compared with the echo from 7/60/9405, LV systolic  function have improved. Limited TTE to assess LV systolic function. Left ventricle size is normal.  Left ventricular systolic function is mildly reduced. Ejection fraction is visually estimated at 45-50%. Normal left ventricular wall thickness. Mild mitral stenosis (mean gradient 4 mm Hg). The ascending aorta is mildly dilated (3.4 cm).   No evidence of any pericardial effusion. Signature    ----------------------------------------------------------------  Electronically signed by Eduard Mcelroy MD (Interpreting  physician) on 03/18/2022 at 11:42 AM  ----------------------------------------------------------------    Findings    Mitral Valve  Mild mitral stenosis (mean gradient 4 mm Hg). Aortic Valve  Structurally normal aortic valve. Aortic valve appears tricuspid. No evidence of aortic valve stenosis or regurgitation. Tricuspid Valve  Trace tricuspid regurgitation. Left Atrium  Normal size left atrium. Left Ventricle  Left ventricle size is normal.  Left ventricular systolic function is mildly reduced. Ejection fraction is visually estimated at 45-50%. Normal left ventricular wall thickness. Right Ventricle  Normal right ventricular size and function. Pericardial Effusion  No evidence of any pericardial effusion. Aorta / Great Vessels  The ascending aorta is mildly dilated (3.4 cm).     M-Mode/2D Measurements & Calculations    LV Diastolic   LV Systolic Dimension:    AV Cusp Separation: 1.9 cmLA  Dimension: 4.7 3.6 cm                    Dimension: 3.2 cmAO Root  cm             LV Volume Diastolic: 370  Dimension: 3.5 cmLA Area: 17.1  LV FS:23.4 %   ml                        cm^2  LV PW          LV Volume Systolic: 68.1  Diastolic: 0.9 ml  cm             LV EDV/LV EDV Index: 102  Septum         ml/51 m^2LV ESV/LV ESV    RV Diastolic Dimension: 2.4 cm  Diastolic: 0.9 Index: 03.8 ml/27 m^2  cm             EF Calculated: 46.7 %     LA/Aorta: 0.91    LV Length: 7 cm           LA volume/Index: 46 ml /23m^2    LV Area  Diastolic:  46.0 cm^2  LV Area  Systolic: 68.8  cm^2    Doppler Measurements & Calculations    MV Peak E-Wave: 129 cm/s  AV Peak Velocity: 126 LVOT Peak Velocity: 96.4  MV Peak A-Wave: 99 cm/s   cm/s                  cm/s  MV E/A Ratio: 1.3         AV Peak Gradient:     LVOT Peak Gradient: 4 mmHg  MV Peak Gradient: 6. 66    6.35 mmHg  mmHg                                            TV Peak E-Wave: 50.8 cm/s  MV Mean Gradient: 4 mmHg                        TV Peak A-Wave: 54 cm/s  MV Mean Velocity: 92.2  cm/s                                            TV Peak Gradient: 1.03  MV Deceleration Time: 359 IVRT: 92 msec         mmHg  msec                                            TR Velocity:255 cm/s  MV P1/2t: 105 msec                              TR Gradient:26.01 mmHg  MVA by PHT:2.1 cm^2       AV DVI (Vmax):0.77    PV Peak Velocity: 67.1  cm/s  MV E' Septal Velocity:                          PV Peak Gradient: 1.8 mmHg  6.4 cm/s  MV A' Septal Velocity:  8.2 cm/s  MV E' Lateral Velocity:  9.9 cm/s  MV A' Lateral Velocity:  7.7 cm/s  E/E' septal: 20.16  E/E' lateral: 13.03    [id number]      STRESS:    CATH:    Assessment/Plan       Diagnosis Orders   1. Hx of CABG        2. Obesity, Class III, BMI 40-49.9 (morbid obesity) ([address]              CAD s/p CABG x 3, MAZE, TVR, MVR, LAAC  LV EF 45-50%  DM  AFib on coumadin  HTN  HLD     EKG shows SR  On amiodarone  No cardiac symptoms  EF improved to 45-50%  Current euvolemic  Surgical site healing well  On coumadin  Continue Aspirin, statin, losartan and toprol  On lasix,   Labs reviewed  Aspirin, entresto, metoprolol, lasix, coumadin  The patient is asked to make an attempt to improve diet and exercise patterns to aid in medical management of this problem. Advised more plant based nutrition/meditarrean diet   Advised patient to call office or seek immediate medical attention if there is any new onset of  any chest pain, sob, palpitations, lightheadedness, dizziness, orthopnea, PND or pedal edema. All medication side effects were discussed in details. Thank youfor allowing me to participate in the care of this patient.    Please do not hesitate to contact me for any further questions. Return in about 1 year (around 10/10/2023).        Electronically signed by Jesi Bhatia MD [facility]  10/10/2022 at 3:33 PM EDT

## 2022-10-12 ENCOUNTER — APPOINTMENT (OUTPATIENT)
Dept: PHARMACY | Age: 72
End: 2022-10-12
Payer: MEDICARE

## 2022-10-14 ENCOUNTER — NURSE ONLY (OUTPATIENT)
Dept: LAB | Age: 72
End: 2022-10-14

## 2022-10-14 DIAGNOSIS — I50.22 CHRONIC SYSTOLIC CONGESTIVE HEART FAILURE, NYHA CLASS 2 (HCC): ICD-10-CM

## 2022-10-14 LAB
ANION GAP SERPL CALCULATED.3IONS-SCNC: 13 MEQ/L (ref 8–16)
BUN BLDV-MCNC: 16 MG/DL (ref 7–22)
CALCIUM SERPL-MCNC: 9.9 MG/DL (ref 8.5–10.5)
CHLORIDE BLD-SCNC: 98 MEQ/L (ref 98–111)
CO2: 29 MEQ/L (ref 23–33)
CREAT SERPL-MCNC: 0.8 MG/DL (ref 0.4–1.2)
GFR SERPL CREATININE-BSD FRML MDRD: 70 ML/MIN/1.73M2
GLUCOSE BLD-MCNC: 179 MG/DL (ref 70–108)
POTASSIUM SERPL-SCNC: 4.1 MEQ/L (ref 3.5–5.2)
SODIUM BLD-SCNC: 140 MEQ/L (ref 135–145)

## 2022-10-17 NOTE — TELEPHONE ENCOUNTER
ORAL Brandon - CNP  P \[facility]\""[facility] Clinical Staff  Labs reviewed, did she start the aldactone?

## 2022-10-17 NOTE — TELEPHONE ENCOUNTER
Patient notified and verbalized understanding. Did start Aldactone. Just an FYI patient told me:  Had chest pain on Friday and into arm on the left side. Took a couple aspirin and pain went away.  Has not had any since

## 2022-10-19 ENCOUNTER — HOSPITAL ENCOUNTER (OUTPATIENT)
Dept: PHARMACY | Age: 72
Setting detail: THERAPIES SERIES
Discharge: HOME | End: 2022-10-19
Payer: MEDICARE

## 2022-10-19 DIAGNOSIS — Z98.890 S/P MAZE OPERATION FOR ATRIAL FIBRILLATION: ICD-10-CM

## 2022-10-19 DIAGNOSIS — Z51.81 ENCOUNTER FOR THERAPEUTIC DRUG MONITORING: ICD-10-CM

## 2022-10-19 DIAGNOSIS — Z86.79 S/P MAZE OPERATION FOR ATRIAL FIBRILLATION: ICD-10-CM

## 2022-10-19 DIAGNOSIS — I48.91 ATRIAL FIBRILLATION, UNSPECIFIED TYPE (HCC): Primary | ICD-10-CM

## 2022-10-19 DIAGNOSIS — Z79.01 ANTICOAGULATED ON COUMADIN: ICD-10-CM

## 2022-10-19 LAB — POC INR: 3.2 (ref 0.8–1.2)

## 2022-10-19 PROCEDURE — 99212 OFFICE O/P EST SF 10 MIN: CPT

## 2022-10-19 PROCEDURE — 85610 PROTHROMBIN TIME: CPT

## 2022-10-19 PROCEDURE — 36416 COLLJ CAPILLARY BLOOD SPEC: CPT

## 2022-10-19 RX ORDER — POLYETHYLENE GLYCOL 3350 17 G/17G
17 POWDER, FOR SOLUTION ORAL DAILY PRN
COMMUNITY

## 2022-10-19 NOTE — PROGRESS NOTES
Medication Management [address]  [phone] (phone)  [phone] (fax)    Ms. Shanon Alston is a 67 y.o.  female with history of Afib who presents today for anticoagulation monitoring and adjustment. Patient verifies current dosing regimen and tablet strength. No missed or extra doses. Patient denies s/s bleeding/bruising/swelling/chest pain. Patient reports baseline SOB that is unchanged. No blood in urine or stool. No dietary changes. No changes in medication/OTC agents/Herbals. No change in alcohol use or tobacco use. No change in activity level. Patient denies headaches/dizziness/lightheadedness/falls. No vomiting/diarrhea or acute illness. No Procedures scheduled in the future at this time. Assessment:   Lab Results   Component Value Date    INR 3.20 (H) 10/19/2022    INR 2.00 (H) 09/21/2022    INR 2.00 (H) 08/31/2022     INR supratherapeutic   Recent Labs     10/19/22  1426   INR 3.20*     Patient interview completed and discussed with pharmacist by  Arsen Meneses, PharmD Candidate [id number]    Patient is slightly supratherapeutic today. Previously, patient had three consecutive therapeutic INRs while on current regimen. Plan:  Coumadin 1.5 mg x 1 dose today 10/19/22 then continue Coumadin 3 mg W and 6 mg [id number]. Recheck INR in 2 week(s). Patient reminded to call the Anticoagulation Clinic with any signs or symptoms of bleeding or with any medication changes. Patient given instructions utilizing the teach back method. After visit summary printed and reviewed with patient. Discharged ambulatory in no apparent distress.     For Pharmacy Admin Tracking Only    Intervention Detail: Dose Adjustment: 1, reason: Therapy Optimization  Total # of Interventions Recommended: 1  Total # of Interventions Accepted: 1  Time Spent (min): 2352  [facility], Jake, BCPS  10/19/2022  2:50 PM

## 2022-10-26 ENCOUNTER — NURSE ONLY (OUTPATIENT)
Dept: LAB | Age: 72
End: 2022-10-26

## 2022-10-26 DIAGNOSIS — E11.9 TYPE 2 DIABETES MELLITUS WITHOUT COMPLICATION, WITHOUT LONG-TERM CURRENT USE OF INSULIN (HCC): Chronic | ICD-10-CM

## 2022-10-26 PROBLEM — D49.7 FOLLICULAR NEOPLASM OF THYROID: Status: RESOLVED | Noted: 2021-12-02 | Resolved: 2022-10-26
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
## 2022-10-26 PROBLEM — D34 FOLLICULAR ADENOMA OF THYROID GLAND: Status: ACTIVE | Noted: 2022-10-26

## 2022-10-26 PROBLEM — D44.0 FOLLICULAR TUMOR OF THYROID GLAND (UNCERTAIN IF BENIGN OR MALIGNANT): Status: RESOLVED | Noted: 2022-06-23 | Resolved: 2022-10-26

## 2022-10-26 PROBLEM — D34 FOLLICULAR ADENOMA OF THYROID GLAND: Chronic | Status: ACTIVE | Noted: 2022-10-26

## 2022-10-26 PROBLEM — E89.0 S/P PARTIAL THYROIDECTOMY: Chronic | Status: ACTIVE | Noted: 2022-10-26

## 2022-10-26 PROBLEM — E89.0 S/P PARTIAL THYROIDECTOMY: Status: ACTIVE | Noted: 2022-10-26

## 2022-10-26 LAB
ALBUMIN SERPL-MCNC: 4.5 G/DL (ref 3.5–5.1)
ALP BLD-CCNC: 92 U/L (ref 38–126)
ALT SERPL-CCNC: 17 U/L (ref 11–66)
ANION GAP SERPL CALCULATED.3IONS-SCNC: 14 MEQ/L (ref 8–16)
AST SERPL-CCNC: 20 U/L (ref 5–40)
BASOPHILS # BLD: 1.4 %
BASOPHILS ABSOLUTE: 0.1 THOU/MM3 (ref 0–0.1)
BILIRUB SERPL-MCNC: 0.5 MG/DL (ref 0.3–1.2)
BUN BLDV-MCNC: 15 MG/DL (ref 7–22)
CALCIUM SERPL-MCNC: 9.6 MG/DL (ref 8.5–10.5)
CHLORIDE BLD-SCNC: 98 MEQ/L (ref 98–111)
CHOLESTEROL, TOTAL: 311 MG/DL (ref 100–199)
CO2: 27 MEQ/L (ref 23–33)
CREAT SERPL-MCNC: 0.9 MG/DL (ref 0.4–1.2)
CREATININE, URINE: 238.8 MG/DL
EOSINOPHIL # BLD: 6.6 %
EOSINOPHILS ABSOLUTE: 0.4 THOU/MM3 (ref 0–0.4)
ERYTHROCYTE [DISTWIDTH] IN BLOOD BY AUTOMATED COUNT: 13.2 % (ref 11.5–14.5)
ERYTHROCYTE [DISTWIDTH] IN BLOOD BY AUTOMATED COUNT: 46.9 FL (ref 35–45)
GFR SERPL CREATININE-BSD FRML MDRD: > 60 ML/MIN/1.73M2
GLUCOSE BLD-MCNC: 139 MG/DL (ref 70–108)
HCT VFR BLD CALC: 44.7 % (ref 37–47)
HDLC SERPL-MCNC: 44 MG/DL
HEMOGLOBIN: 14.4 GM/DL (ref 12–16)
IMMATURE GRANS (ABS): 0.02 THOU/MM3 (ref 0–0.07)
IMMATURE GRANULOCYTES: 0.3 %
LDL CHOLESTEROL CALCULATED: 224 MG/DL
LYMPHOCYTES # BLD: 20.8 %
LYMPHOCYTES ABSOLUTE: 1.3 THOU/MM3 (ref 1–4.8)
MCH RBC QN AUTO: 31.6 PG (ref 26–33)
MCHC RBC AUTO-ENTMCNC: 32.2 GM/DL (ref 32.2–35.5)
MCV RBC AUTO: 98.2 FL (ref 81–99)
MICROALBUMIN UR-MCNC: 1.51 MG/DL
MICROALBUMIN/CREAT UR-RTO: 6 MG/G (ref 0–30)
MONOCYTES # BLD: 5.5 %
MONOCYTES ABSOLUTE: 0.4 THOU/MM3 (ref 0.4–1.3)
NUCLEATED RED BLOOD CELLS: 0 /100 WBC
PLATELET # BLD: 80 THOU/MM3 (ref 130–400)
PMV BLD AUTO: 15 FL (ref 9.4–12.4)
POTASSIUM SERPL-SCNC: 4.2 MEQ/L (ref 3.5–5.2)
RBC # BLD: 4.55 MILL/MM3 (ref 4.2–5.4)
SEG NEUTROPHILS: 65.4 %
SEGMENTED NEUTROPHILS ABSOLUTE COUNT: 4.2 THOU/MM3 (ref 1.8–7.7)
SODIUM BLD-SCNC: 139 MEQ/L (ref 135–145)
T4 FREE: 1.44 NG/DL (ref 0.93–1.76)
TOTAL PROTEIN: 7 G/DL (ref 6.1–8)
TRIGL SERPL-MCNC: 214 MG/DL (ref 0–199)
TSH SERPL DL<=0.05 MIU/L-ACNC: 5.02 UIU/ML (ref 0.4–4.2)
WBC # BLD: 6.4 THOU/MM3 (ref 4.8–10.8)

## 2022-10-27 ENCOUNTER — OFFICE VISIT (OUTPATIENT)
Dept: FAMILY MEDICINE CLINIC | Age: 72
End: 2022-10-27
Payer: MEDICARE

## 2022-10-27 VITALS
TEMPERATURE: 97.6 F | OXYGEN SATURATION: 95 % | RESPIRATION RATE: 16 BRPM | WEIGHT: 213.4 LBS | DIASTOLIC BLOOD PRESSURE: 86 MMHG | BODY MASS INDEX: 39.27 KG/M2 | SYSTOLIC BLOOD PRESSURE: 138 MMHG | HEIGHT: 62 IN | HEART RATE: 82 BPM

## 2022-10-27 DIAGNOSIS — E11.9 TYPE 2 DIABETES MELLITUS WITHOUT COMPLICATION, WITHOUT LONG-TERM CURRENT USE OF INSULIN (HCC): Chronic | ICD-10-CM

## 2022-10-27 DIAGNOSIS — E78.2 MIXED HYPERLIPIDEMIA: ICD-10-CM

## 2022-10-27 DIAGNOSIS — I25.2 HISTORY OF ST ELEVATION MYOCARDIAL INFARCTION (STEMI): ICD-10-CM

## 2022-10-27 DIAGNOSIS — Z98.890 S/P MAZE OPERATION FOR ATRIAL FIBRILLATION: ICD-10-CM

## 2022-10-27 DIAGNOSIS — Z78.9 STATIN INTOLERANCE: Chronic | ICD-10-CM

## 2022-10-27 DIAGNOSIS — I25.10 ASHD (ARTERIOSCLEROTIC HEART DISEASE): ICD-10-CM

## 2022-10-27 DIAGNOSIS — D69.6 THROMBOCYTOPENIA (HCC): ICD-10-CM

## 2022-10-27 DIAGNOSIS — K21.9 GASTROESOPHAGEAL REFLUX DISEASE, UNSPECIFIED WHETHER ESOPHAGITIS PRESENT: ICD-10-CM

## 2022-10-27 DIAGNOSIS — Z95.1 S/P CABG (CORONARY ARTERY BYPASS GRAFT): ICD-10-CM

## 2022-10-27 DIAGNOSIS — I25.5 ISCHEMIC CARDIOMYOPATHY: ICD-10-CM

## 2022-10-27 DIAGNOSIS — I50.20 HEART FAILURE WITH REDUCED EJECTION FRACTION (HCC): ICD-10-CM

## 2022-10-27 DIAGNOSIS — E89.0 HYPOTHYROIDISM, POSTSURGICAL: Chronic | ICD-10-CM

## 2022-10-27 DIAGNOSIS — E89.0 S/P PARTIAL THYROIDECTOMY: ICD-10-CM

## 2022-10-27 DIAGNOSIS — I48.91 ATRIAL FIBRILLATION, UNSPECIFIED TYPE (HCC): ICD-10-CM

## 2022-10-27 DIAGNOSIS — Z98.890 S/P MVR (MITRAL VALVE REPAIR): ICD-10-CM

## 2022-10-27 DIAGNOSIS — D34 FOLLICULAR ADENOMA OF THYROID GLAND: ICD-10-CM

## 2022-10-27 DIAGNOSIS — D32.9 MENINGIOMA (HCC): ICD-10-CM

## 2022-10-27 DIAGNOSIS — Z98.890 S/P TRICUSPID VALVE REPAIR: ICD-10-CM

## 2022-10-27 DIAGNOSIS — Z00.00 MEDICARE ANNUAL WELLNESS VISIT, SUBSEQUENT: Primary | ICD-10-CM

## 2022-10-27 DIAGNOSIS — Z86.79 S/P MAZE OPERATION FOR ATRIAL FIBRILLATION: ICD-10-CM

## 2022-10-27 DIAGNOSIS — I10 ESSENTIAL HYPERTENSION: ICD-10-CM

## 2022-10-27 LAB — HBA1C MFR BLD: 6.4 % (ref 4.3–5.7)

## 2022-10-27 PROCEDURE — 1123F ACP DISCUSS/DSCN MKR DOCD: CPT | Performed by: FAMILY MEDICINE

## 2022-10-27 PROCEDURE — 3074F SYST BP LT 130 MM HG: CPT | Performed by: FAMILY MEDICINE

## 2022-10-27 PROCEDURE — 3044F HG A1C LEVEL LT 7.0%: CPT | Performed by: FAMILY MEDICINE

## 2022-10-27 PROCEDURE — 3017F COLORECTAL CA SCREEN DOC REV: CPT | Performed by: FAMILY MEDICINE

## 2022-10-27 PROCEDURE — G8484 FLU IMMUNIZE NO ADMIN: HCPCS | Performed by: FAMILY MEDICINE

## 2022-10-27 PROCEDURE — G0439 PPPS, SUBSEQ VISIT: HCPCS | Performed by: FAMILY MEDICINE

## 2022-10-27 PROCEDURE — 3078F DIAST BP <80 MM HG: CPT | Performed by: FAMILY MEDICINE

## 2022-10-27 ASSESSMENT — PATIENT HEALTH QUESTIONNAIRE - PHQ9
SUM OF ALL RESPONSES TO PHQ QUESTIONS 1-9: 0
SUM OF ALL RESPONSES TO PHQ QUESTIONS 1-9: 0
SUM OF ALL RESPONSES TO PHQ9 QUESTIONS 1 & 2: 0
SUM OF ALL RESPONSES TO PHQ QUESTIONS 1-9: 0
2. FEELING DOWN, DEPRESSED OR HOPELESS: 0
1. LITTLE INTEREST OR PLEASURE IN DOING THINGS: 0
SUM OF ALL RESPONSES TO PHQ QUESTIONS 1-9: 0

## 2022-10-27 ASSESSMENT — LIFESTYLE VARIABLES
HOW OFTEN DO YOU HAVE A DRINK CONTAINING ALCOHOL: NEVER
HOW MANY STANDARD DRINKS CONTAINING ALCOHOL DO YOU HAVE ON A TYPICAL DAY: PATIENT DOES NOT DRINK

## 2022-10-27 NOTE — PROGRESS NOTES
Chief Complaint   Patient presents with    Medicare AWV    Follow-up     Chronic issues as noted below       History obtained from the patient. SUBJECTIVE:  Milla Cronin is a 67 y.o. female that presents today for     -CAD/HFrEF/Afib:  STEMI NOV 2021  S/p CABG, MAZE, MVR and TVR at [facility] [place] NOV 2021  Is on ASA, coumadin, Toprol XL, Entresto and Aldactone  Stopped her Lipitor ~4-6 months ago d/t myalgias. Cardio aware. No CP, SOB, orthopnea or PND      -DM2: Diet controlled  A1c stable. -HTN:     HPI:     Taking meds as prescribed ?: yes  Tolerating well ?: yes  Side Effects ?: deneis  BP at home ?: <140/90  Working on TLCS ?: yes  Chest Pain/SOB/Palpitations? Denies    BP Readings from Last 3 Encounters:   10/27/22 138/86   10/10/22 (!) 154/87   09/13/22 122/60       -HLD:  Not taking lipitor anymore d/t myalgias as noted above  Lipids sig worse, +  Pt states diet is good, low fat  She's not sure what changed  Declines resume statin still      -Low platelets PRIOR VISIT: chronic, no clear etiology and mild. Thought maybe d/t naproxen. Labs stable on f/u. Denies fevers, chills, night sweats or wt loss. UPDATE PRIOR VISIT:   Platelets a little lower than before  No bleeding or hematochezia. No petechia   No fevers, chills, night sweats or wt loss     UPDATE PRIOR VISIT:   platelets lower than a year ago  Had ordered f/u labs last year to get done, she never did  No bleeding, bruising or hemarthrosis  Denies fevers, chills or nights sweats      UPDATE TODAY:   Following with heme  Neg w/u  No intervention, per heme, unless Plt <50K  Has f/u and is current  No s/s bleeding      -GERD:     HPI:  Back on omeprazole     Taking meds as prescribed ?: yes  Tolerating well ?: yes  Side Effects ?: denies  Dysphagia ?: denies  Odynophagia ?: denies  Melena ?: denies  Working on TLCS ?: yes        -Meningioma: noted MRI 2012. Never did f/u MRI. Told she didn't need to. Has 0 sxs.  Denies HA, vision changes, lateralizing numbness or weakness.        -Thyroid follicular adenoma/Hypothyroidism:  S/p partial thyroidectomy June 2022 for thyroid mass that ended up being benign follicular adenoma  Now with hypothyroidism, on synthroid  Tolerating well  TSH WNL       -Colon Ca screening:  Due for colo  Wanted to see [facility], not covered  Declines getting done right now d/t covid  No s/s colon dz  Discussed cologuard, still wants to wait. Declines any and all colorectal cancer screening again today      Age/Gender Health Maintenance    Lipid -   Lab Results   Component Value Date    CHOL 311 (H) 10/26/2022    CHOL 188 10/27/2021    CHOL 215 (H) 10/23/2020     Lab Results   Component Value Date    TRIG 214 (H) 10/26/2022    TRIG 163 10/27/2021    TRIG 182 (H) 10/23/2020     Lab Results   Component Value Date    HDL 44 10/26/2022    HDL 43 10/27/2021    HDL 42 10/23/2020     Lab Results   Component Value Date    LDLCALC 224 10/26/2022    LDLCALC 112 10/27/2021    LDLCALC 137 (H) 10/23/2020       Colon Cancer Screening - Somewhere within the last 10 years, was not [facility]. Awaiting records/due, ordered today (APR 2019)/pt declines OCT 2020/APR 2021/OCT 2021/OCT 2022  Lung Cancer Screening - never smoker. Tetanus - to get at pharmacy per medicare rules  Influenza Vaccine - Declines SEPT 2016/SEPT 2017/OCT 2018/OCT 2019/OCT 2020/OCT 2021/OCT 2022  Pneumonia Vaccine - Declines SEPT 2016/SEPT 2017/OCT 2018/APR 2019/OCT 2019/OCT 2020/APR 2021/OCT 2022  Shingrix - to get at pharmacy per medicare rules    Breast Cancer Screening - NEG APR 2021  Osteoporosis Screening - declines SEPT 2016, wants to discuss again in 1 year.  We discussed today and declines SEPT 2017/OCT 2018/APR 2019/OCT 2019/OCT 2020/APR 2021/OCT 2021/OCT 2022    Diabetes Health Maintenance    A1C -   Lab Results   Component Value Date/Time    LABA1C 6.4 10/27/2022 02:11 PM    LABA1C 6.4 04/28/2022 02:44 PM    LABA1C 6.1 10/28/2021 03:04 PM    LABA1C 6.6 04/26/2021 02:11 PM    LABA1C 7.0 10/26/2020 02:32 PM    LABA1C 7.1 10/15/2019 02:15 PM    LABA1C 6.9 04/11/2019 01:41 PM    LABA1C 7.0 10/11/2018 03:27 PM    LABA1C 6.8 04/12/2018 02:04 PM    LABA1C 6.9 09/21/2017 08:40 AM    LABA1C 6.5 11/04/2016 08:10 AM       ACE/ARB - YES hyzaar  Eye - due, pt reminded  Foot - WNL OCT 2022  Microal/Cr - + OCT 2018/APR 2019  NEG OCT 2019    Lab Results   Component Value Date    LABMICR 1.51 10/26/2022    LABCREA 238.8 10/26/2022    MACRR 6 10/26/2022     Lab Results   Component Value Date    CREATINEUR 148.36 10/23/2020    MICROALBUR 2.3 (H) 10/23/2020    MALBCR 15.5 10/23/2020         eGFR -   No results found for: GFRESTIMATE  Lab Results   Component Value Date/Time    LABGLOM >60 10/26/2022 10:14 AM     No results found for: CRCLEARANCE  Lab Results   Component Value Date/Time    EGFRNONAA >60 10/23/2020 09:30 AM     Lab Results   Component Value Date/Time    EGFRAA >60 10/23/2020 09:30 AM       Component      Latest Ref Rng & Units 10/14/2019           7:47 AM   EGFR IF NonAfrican American      >59 ml/min/1.73sq.m >60        Statin - statin intolerance      Current Outpatient Medications   Medication Sig Dispense Refill    polyethylene glycol (GLYCOLAX) 17 g packet Take 17 g by mouth daily as needed for Constipation      spironolactone (ALDACTONE) 25 MG tablet Take 1 tablet by mouth daily 90 tablet 3    levothyroxine (SYNTHROID) 25 MCG tablet Take 1 tablet by mouth daily 90 tablet 3    omeprazole (PRILOSEC) 20 MG delayed release capsule TAKE 1 CAPSULE EVERY DAY 90 capsule 3    sacubitril-valsartan (ENTRESTO) 49-51 MG per tablet Take 1 tablet by mouth in the morning and 1 tablet before bedtime.  180 tablet 3    acetaminophen (TYLENOL) 500 MG tablet Take 1,000 mg by mouth nightly as needed for Pain      warfarin (COUMADIN) 3 MG tablet Take as directed by [facility] (60 tablets = 30 days) 60 tablet 11    metoprolol succinate (TOPROL XL) 50 MG extended release tablet Take 1 tablet by mouth 2 times daily Take along with 25 mg BID to make 75 mg BID. 180 tablet 3    metoprolol succinate (TOPROL XL) 25 MG extended release tablet Take 1 tablet by mouth 2 times daily Take along with 50 mg BID to make 75 mg BID. 180 tablet 3    furosemide (LASIX) 40 MG tablet Take 1 tablet by mouth daily 90 tablet 3    potassium chloride (KLOR-CON M) 20 MEQ extended release tablet Take 1 tablet by mouth daily 90 tablet 3    cetirizine (ZYRTEC) 10 MG tablet Take 10 mg by mouth daily      aspirin 81 MG chewable tablet Take 81 mg by mouth daily      metOLazone (ZAROXOLYN) 5 MG tablet Take 1 tablet by mouth daily for 2 days 2 tablet 0     No current facility-administered medications for this visit. No orders of the defined types were placed in this encounter. All medications reviewed and reconciled, including OTC and herbal medications. Updated list given to patient. Patient Active Problem List    Diagnosis Date Noted    S/P partial thyroidectomy, right 07/48/7363    Follicular adenoma of thyroid gland 10/26/2022    Hypothyroidism, postsurgical     Anticoagulated on Coumadin 12/06/2021    Encounter for therapeutic drug monitoring 12/06/2021    History of ST elevation myocardial infarction (STEMI)     ASHD (arteriosclerotic heart disease)     Atrial fibrillation (HCC)     S/P CABG (coronary artery bypass graft)     Heart failure with reduced ejection fraction (HCC)     Ischemic cardiomyopathy     S/P Maze operation for atrial fibrillation     S/P MVR (mitral valve repair)     S/P tricuspid valve repair     DM2 (diabetes mellitus, type 2) (HCC)     Statin intolerance     GERD (gastroesophageal reflux disease)     Osteoarthritis     Allergic rhinitis     Angiolipoma of right kidney     Eczema     Hyperlipidemia     Obesity (BMI 30-39. 9)     Post-menopausal     Thrombocytopenia ([address]     Mixed hearing loss 09/17/2013    Meningioma ([facility] 12/26/2012     Noted MRI 2012.  Never did f/u MRI. Told she didn't need to. Has 0 sxs. Denies HA, vision changes, lateralizing numbness or weakness. Hypertension 12/10/2012       Past Medical History:   Diagnosis Date    Allergic rhinitis     Angiolipoma of right kidney     ASHD (arteriosclerotic heart disease)     Atrial fibrillation (HCC)     Celiac disease     DM2 (diabetes mellitus, type 2) ([facility]     does not take medications for or check blood sugars    Eczema     Follicular adenoma of thyroid gland 10/26/2022    GERD (gastroesophageal reflux disease)     Heart failure with reduced ejection fraction ([address]     History of blood transfusion 2021    s/p Heart surgery    History of ST elevation myocardial infarction (STEMI)     Hyperlipidemia     Hypertension     Hypothyroidism, postsurgical     Ischemic cardiomyopathy     Meningioma ([facility] 2012    Noted MRI . Never did f/u MRI. Told she didn't need to. Has 0 sxs. Denies HA, vision changes, lateralizing numbness or weakness. Mixed hearing loss 2013    Obesity (BMI 30-39. 9)     Osteoarthritis     Post-menopausal     S/P CABG (coronary artery bypass graft)     S/P Maze operation for atrial fibrillation     S/P MVR (mitral valve repair)     S/P partial thyroidectomy, right 10/26/2022    S/P tricuspid valve repair     Thrombocytopenia [facility])        Past Surgical History:   Procedure Laterality Date     SECTION  1972    CHOLECYSTECTOMY  2004    COLONOSCOPY  2006    CORONARY ARTERY BYPASS GRAFT  2021    [facility]    MITRAL VALVE REPAIR  2021    [facility]    MYRINGOTOMY AND TYMPANOSTOMY TUBE PLACEMENT  2012    Dr Corey Schirmer    OTHER SURGICAL HISTORY  2021    MAZE Procedure during CABG and MVR/TVR at [facility]    THYROIDECTOMY N/A 2022    Right Hemithyroidectomy performed by Víctor Mckay MD at [address]  2021    TV Repair 2021 at [address] ENDOSCOPY  2006       Allergies   Allergen Reactions Keflex [Cephalexin] Other (See Comments)     Tongue Swelling    Norvasc [Amlodipine Besylate] Other (See Comments)     Nasuea, hot flashes    Statins Other (See Comments)     Myalgias all    Zetia [Ezetimibe] Other (See Comments)     Chest pain    Zocor [Simvastatin] Other (See Comments)     Myalgias    Biaxin [Clarithromycin] Nausea Only    Doxycycline Other (See Comments)     GI side effects       Social History     Tobacco Use    Smoking status: Never     Passive exposure: Yes    Smokeless tobacco: Never   Substance Use Topics    Alcohol use: No       Family History   Problem Relation Age of Onset    Heart Disease Mother     Lung Cancer Mother     Osteoporosis Mother     Other Father         Did not know her father. Other Maternal Grandmother         blood clot, no clear hx surrounding    Heart Attack Maternal Grandfather     Heart Attack Maternal Uncle 62    Breast Cancer Maternal Aunt 48    Breast Cancer Maternal Cousin 40         I have reviewed the patient's past medical history, past surgical history, allergies, medications, social and family history and I have made updates where appropriate.       Review of Systems  Positive responses are highlighted in bold    Constitutional:  Fever, Chills, Night Sweats, Fatigue, Unexpected changes in weight  HENT:  Ear pain, Tinnitus, Nosebleeds, Trouble swallowing, Hearing loss, Sore throat  Cardiovascular:  Chest Pain, Palpitations, Orthopnea, Paroxysmal Nocturnal Dyspnea  Respiratory:  Cough, Wheezing, Shortness of breath, Chest tightness, Apnea  Gastrointestinal:  Nausea, Vomiting, Diarrhea, Constipation, Heartburn, Blood in stool  Genitourinary:  Difficulty or painful urination, Flank pain, Change in frequency, Urgency  Skin:  Color change, Rash, Itching, Wound  Musculoskeletal:  Joint pain, Back pain, Gait problems, Joint swelling, Myalgias  Neurological:  Dizziness, Headaches, Presyncope, Numbness, Seizures, Tremors  Endocrine:  Heat Intolerance, Cold Intolerance, Polydipsia, Polyphagia, Polyuria      PHYSICAL EXAM:  Vitals:    10/27/22 1503   BP: 138/86   Site: Right Upper Arm   Position: Sitting   Cuff Size: Large Adult   Pulse: 82   Resp: 16   Temp: 97.6 °F (36.4 °C)   SpO2: 95%   Weight: 213 lb 6.4 oz (96.8 kg)   Height: 5' 2\" (1.575 m)       Body mass index is 39.03 kg/m². VS Reviewed  General Appearance: A&O x 3, No acute distress,well developed and well- nourished  Head: normocephalic and atraumatic  Eyes: pupils equal, round, and reactive to light, extraocular eye movements intact, conjunctivae and eye lids without erythema  ENT: external ear and ear canal clear bilaterally, TMs intact and regular, nose without deformity, nasal mucosa and turbinates normal without polyps, oropharynx normal, dentition is normal for age. Neck: supple and non-tender without mass, no thyromegaly or thyroid nodules, no cervical lymphadenopathy. Neck incision clean, dry and intact. Pulmonary/Chest: clear to auscultation bilaterally- no wheezes, rales or rhonchi, normal air movement, no respiratory distress or retractions. Chest wall incision healing well, clean, dry and intact. Cardiovascular: S1 and S2 auscultated w/ RRR. No murmurs, rubs, clicks, or gallops, distal pulses intact. Abdomen: soft, non-tender, non-distended, bowl sounds physiologic,  no rebound or guarding, no masses or hernias noted. Liver and spleen without enlargement. Extremities: no cyanosis, clubbing of the lower extremities. +2 PT/DP bilaterally. 1+ bilat LE edema. Musculoskeletal: No joint swelling or gross deformity   Skin: warm and dry, no rash or erythema  Foot: Bilat 2+DP/PT bilaterally. Skin warm, dry and intact. Sensation normal throughout to touch and with monofilament.        Recent Labs     10/26/22  1016   WBC 6.4   HGB 14.4   HCT 44.7   MCV 98.2   PLT 80*     Lab Results   Component Value Date/Time     10/26/2022 10:16 AM    K 4.2 10/26/2022 10:16 AM    K 4.1 06/24/2022 04:46 AM    CL 98 10/26/2022 10:16 AM    CO2 27 10/26/2022 10:16 AM    BUN 15 10/26/2022 10:16 AM    CREATININE 0.9 10/26/2022 10:16 AM    GLUCOSE 139 10/26/2022 10:16 AM    GLUCOSE 146 10/23/2020 09:30 AM    CALCIUM 9.6 10/26/2022 10:16 AM      Lab Results   Component Value Date    TSH 5.020 (H) 10/26/2022       ASSESSMENT & PLAN  1. ASHD (arteriosclerotic heart disease)    Stable  con't asa, coumadin, Toprol, entresto and lasix  Daily wts  Low salt diet  Coumadin clinic f/u  Cardio f/u  Discussed statin, declines and is aware of risks. Cardio aware she is not taking    2. History of ST elevation myocardial infarction (STEMI)    As above    3. S/P CABG (coronary artery bypass graft)    As above    4. Heart failure with reduced ejection fraction ([facility]    As above    5. Ischemic cardiomyopathy    As above    6. Atrial fibrillation, unspecified type ([facility]    As above    7. S/P Maze operation for atrial fibrillation    As above    8. S/P MVR (mitral valve repair)    As above    9. S/P tricuspid valve repair    As above    10. Type 2 diabetes mellitus without complication, without long-term current use of insulin (HCC)    Stable  At goal  Diet controlled     -  DIABETES FOOT EXAM  - POCT glycosylated hemoglobin (Hb A1C)    11. Essential hypertension    Stable  con't Toprol and entresto    12. Mixed hyperlipidemia    Uncontrolled  Unclear why so high, as it's never been that high before  Refuses to resume statin  Will have her repeat lipids in 6 wks to see if results are legitimate  Will address treatment based on f/u results  Pt aware of risks of not taking statin    - Lipid Panel; Future    13. Statin intolerance      14. Thrombocytopenia ([address]    Stable  May be mild ITP per heme  They are monitoring  F/u as planned    15. Gastroesophageal reflux disease, unspecified whether esophagitis present    Stable  con't omeprazole.     16. Meningioma ([facility]    Stable over long time  Benign  Monitor for s/s   No sxs  Monitor for sxs  If occurs, MRI. Pt aware    17. Follicular adenoma of thyroid gland    S/p partial thyroidectomy  Benign condition  Con't synthroid TSH WNL    18. S/P partial thyroidectomy, right      19. Hypothyroidism, postsurgical      DISPOSITION    Return in about 6 months (around 4/27/2023) for Follow-up Diabetes, follow-up on chronic medical conditions, sooner as needed. Swati released without restrictions. Future Appointments   Date Time Provider Andrei Pedraza   10/28/2022 10:45 AM STR OUT PT ONC CMA STRZ OP ONC [place] HOD   10/28/2022 11:00 AM Prasanth Puentes [facility] [place]   11/2/2022  3:20 PM PHILLY Lockett [facility] MED MGMT [facility] [place]   1/3/2023  2:00 PM ORAL Bernstein - CNP N SRPX CHF [facility] [place]   4/27/2023  3:20 PM [facility] [place]   10/9/2023  2:15 PM Pérez Luciano MD N SRPX Heart P - [place]     PATIENT COUNSELING    Barriers to learning and self management: none    Discussed use, benefit, and side effects of prescribed medications. Barriers to medication compliance addressed. All patient questions answered. Pt voiced understanding. Electronically signed by Tsering Hinojosa DO on 10/27/2022 at 3:53 PM        Medicare Annual Wellness Visit    Don Ledesma is here for Medicare AWV and Follow-up (Chronic issues as noted below)    Assessment & Plan   Medicare annual wellness visit, subsequent      Recommendations for Preventive Services Due: see orders and patient instructions/AVS.  Recommended screening schedule for the next 5-10 years is provided to the patient in written form: see Patient Instructions/AVS.     Return in about 6 months (around 4/27/2023) for Follow-up Diabetes, follow-up on chronic medical conditions, sooner as needed. Subjective       Patient's complete Health Risk Assessment and screening values have been reviewed and are found in Flowsheets.  The following problems were reviewed today and where indicated follow up appointments were made and/or referrals ordered. Positive Risk Factor Screenings with Interventions:              Health Habits/Nutrition:  Physical Activity: Insufficiently Active    Days of Exercise per Week: 1 day    Minutes of Exercise per Session: 10 min     Have you lost any weight without trying in the past 3 months?: No  Body mass index: (!) 39.03  Have you seen the dentist within the past year?: (!) No  Health Habits/Nutrition Interventions:  Nutritional issues:  educational materials for healthy, well-balanced diet provided  Dental exam overdue:  patient encouraged to make appointment with his/her dentist    Hearing/Vision:  Do you or your family notice any trouble with your hearing that hasn't been managed with hearing aids?: No  Do you have difficulty driving, watching TV, or doing any of your daily activities because of your eyesight?: No  Have you had an eye exam within the past year?: (!) No  No results found. Hearing/Vision Interventions:  Vision concerns:  patient encouraged to make appointment with his/her eye specialist            Objective   Vitals:    10/27/22 1503   BP: 138/86   Site: Right Upper Arm   Position: Sitting   Cuff Size: Large Adult   Pulse: 82   Resp: 16   Temp: 97.6 °F (36.4 °C)   SpO2: 95%   Weight: 213 lb 6.4 oz (96.8 kg)   Height: 5' 2\" (1.575 m)      Body mass index is 39.03 kg/m². Allergies   Allergen Reactions    Keflex [Cephalexin] Other (See Comments)     Tongue Swelling    Norvasc [Amlodipine Besylate] Other (See Comments)     Nasuea, hot flashes    Statins Other (See Comments)     Myalgias all    Zetia [Ezetimibe] Other (See Comments)     Chest pain    Zocor [Simvastatin] Other (See Comments)     Myalgias    Biaxin [Clarithromycin] Nausea Only    Doxycycline Other (See Comments)     GI side effects     Prior to Visit Medications    Medication Sig Taking?  Authorizing Provider   polyethylene glycol (GLYCOLAX) 17 g packet Take 17 g by mouth daily as needed for Constipation Yes Historical Provider, MD   spironolactone (ALDACTONE) 25 MG tablet Take 1 tablet by mouth daily Yes ORAL Diaz CNP   levothyroxine (SYNTHROID) 25 MCG tablet Take 1 tablet by mouth daily Yes Jacky Christy DO   omeprazole (PRILOSEC) 20 MG delayed release capsule TAKE 1 CAPSULE EVERY DAY Yes Jacky Christy DO   sacubitril-valsartan (ENTRESTO) 49-51 MG per tablet Take 1 tablet by mouth in the morning and 1 tablet before bedtime. Yes ORAL Diaz CNP   acetaminophen (TYLENOL) 500 MG tablet Take 1,000 mg by mouth nightly as needed for Pain Yes Historical Provider, MD   warfarin (COUMADIN) 3 MG tablet Take as directed by [facility] (60 tablets = 30 days) Yes Janneth Delacruz MD   metoprolol succinate (TOPROL XL) 50 MG extended release tablet Take 1 tablet by mouth 2 times daily Take along with 25 mg BID to make 75 mg BID. Yes Melvina Montiel MD   metoprolol succinate (TOPROL XL) 25 MG extended release tablet Take 1 tablet by mouth 2 times daily Take along with 50 mg BID to make 75 mg BID.  Yes Melvina Montiel MD   furosemide (LASIX) 40 MG tablet Take 1 tablet by mouth daily Yes ORAL Diaz CNP   potassium chloride (KLOR-CON M) 20 MEQ extended release tablet Take 1 tablet by mouth daily Yes ORAL Diaz CNP   cetirizine (ZYRTEC) 10 MG tablet Take 10 mg by mouth daily Yes Historical Provider, MD   aspirin 81 MG chewable tablet Take 81 mg by mouth daily Yes Historical Provider, MD   metOLazone (ZAROXOLYN) 5 MG tablet Take 1 tablet by mouth daily for 2 days  ORAL Diaz CNP       CareTeam (Including outside providers/suppliers regularly involved in providing care):   Patient Care Team:  Silvestre Ocampo DO as PCP - General (Family Medicine)  Silvestre Ocampo DO as PCP - [facility]  Melvina Montiel MD as Cardiologist (Cardiology)     Reviewed and updated this visit:  Tobacco Allergies  Meds  Problems  Med Hx  Surg Hx  Soc Hx  Fam Hx                 Care plan reviewed with and given to patient.        Electronically signed by Abisai Branch DO on 10/27/2022 at 3:53 PM

## 2022-10-27 NOTE — PATIENT INSTRUCTIONS
LAB INSTRUCTIONS:    Please complete labs in 6 week(s). Please fast for 8 hours prior to lab collection. The clinic will call you within 1 week of collection. If you have not heard from us within that amount of time, please call us at [phone]. Personalized Preventive Plan for Estefany Night - 10/27/2022  Medicare offers a range of preventive health benefits. Some of the tests and screenings are paid in full while other may be subject to a deductible, co-insurance, and/or copay. Some of these benefits include a comprehensive review of your medical history including lifestyle, illnesses that may run in your family, and various assessments and screenings as appropriate. After reviewing your medical record and screening and assessments performed today your provider may have ordered immunizations, labs, imaging, and/or referrals for you. A list of these orders (if applicable) as well as your Preventive Care list are included within your After Visit Summary for your review. Other Preventive Recommendations:    A preventive eye exam performed by an eye specialist is recommended every 1-2 years to screen for glaucoma; cataracts, macular degeneration, and other eye disorders. A preventive dental visit is recommended every 6 months. Try to get at least 150 minutes of exercise per week or 10,000 steps per day on a pedometer . Order or download the FREE \"Exercise & Physical Activity: Your Everyday Guide\" from [employer]. Call 6-886.200.1609 or search [employer] online. You need 8397-3105 mg of calcium and 7359-4413 IU of vitamin D per day. It is possible to meet your calcium requirement with diet alone, but a vitamin D supplement is usually necessary to meet this goal.  When exposed to the sun, use a sunscreen that protects against both UVA and UVB radiation with an SPF of 30 or greater.  Reapply every 2 to 3 hours or after sweating, drying off with a towel, or swimming. Always wear a seat belt when traveling in a car. Always wear a helmet when riding a bicycle or motorcycle.

## 2022-10-28 ENCOUNTER — HOSPITAL ENCOUNTER (OUTPATIENT)
Dept: INFUSION THERAPY | Age: 72
Discharge: HOME | End: 2022-10-28
Payer: MEDICARE

## 2022-10-28 ENCOUNTER — OFFICE VISIT (OUTPATIENT)
Dept: ONCOLOGY | Age: 72
End: 2022-10-28
Payer: MEDICARE

## 2022-10-28 VITALS
HEIGHT: 62 IN | HEART RATE: 86 BPM | SYSTOLIC BLOOD PRESSURE: 156 MMHG | OXYGEN SATURATION: 96 % | RESPIRATION RATE: 16 BRPM | DIASTOLIC BLOOD PRESSURE: 72 MMHG | BODY MASS INDEX: 42.8 KG/M2 | TEMPERATURE: 97.9 F | WEIGHT: 232.6 LBS

## 2022-10-28 VITALS
HEART RATE: 86 BPM | TEMPERATURE: 97.9 F | SYSTOLIC BLOOD PRESSURE: 156 MMHG | DIASTOLIC BLOOD PRESSURE: 72 MMHG | RESPIRATION RATE: 16 BRPM

## 2022-10-28 DIAGNOSIS — D69.6 THROMBOCYTOPENIA (HCC): ICD-10-CM

## 2022-10-28 DIAGNOSIS — D69.6 THROMBOCYTOPENIA (HCC): Primary | ICD-10-CM

## 2022-10-28 PROCEDURE — 3074F SYST BP LT 130 MM HG: CPT | Performed by: PHYSICIAN ASSISTANT

## 2022-10-28 PROCEDURE — G8400 PT W/DXA NO RESULTS DOC: HCPCS | Performed by: PHYSICIAN ASSISTANT

## 2022-10-28 PROCEDURE — 99213 OFFICE O/P EST LOW 20 MIN: CPT | Performed by: PHYSICIAN ASSISTANT

## 2022-10-28 PROCEDURE — G8417 CALC BMI ABV UP PARAM F/U: HCPCS | Performed by: PHYSICIAN ASSISTANT

## 2022-10-28 PROCEDURE — G8484 FLU IMMUNIZE NO ADMIN: HCPCS | Performed by: PHYSICIAN ASSISTANT

## 2022-10-28 PROCEDURE — 1036F TOBACCO NON-USER: CPT | Performed by: PHYSICIAN ASSISTANT

## 2022-10-28 PROCEDURE — 1123F ACP DISCUSS/DSCN MKR DOCD: CPT | Performed by: PHYSICIAN ASSISTANT

## 2022-10-28 PROCEDURE — 1090F PRES/ABSN URINE INCON ASSESS: CPT | Performed by: PHYSICIAN ASSISTANT

## 2022-10-28 PROCEDURE — 3017F COLORECTAL CA SCREEN DOC REV: CPT | Performed by: PHYSICIAN ASSISTANT

## 2022-10-28 PROCEDURE — 99211 OFF/OP EST MAY X REQ PHY/QHP: CPT

## 2022-10-28 PROCEDURE — 3078F DIAST BP <80 MM HG: CPT | Performed by: PHYSICIAN ASSISTANT

## 2022-10-28 PROCEDURE — G8427 DOCREV CUR MEDS BY ELIG CLIN: HCPCS | Performed by: PHYSICIAN ASSISTANT

## 2022-10-28 NOTE — PROGRESS NOTES
[facility], [address] [facility] [address]  [address] [address]  Dept: [phone]  Dept Fax: [phone]: [phone]      Visit Date:10/28/2022     Laina Reynaga is a 67 y.o. female who presents today for:   Chief Complaint   Patient presents with    Other     Thrombocyopenia [facility])        HPI:   Laina Reynaga is a 67 y.o. female followed for history of thrombocytopenia. The patient has a history of chronic thrombocytopenia which as gradually worsened over the last year. Per chart review, she has had persistent thrombocytopenia since 2014 per EMR with platelets decreasing from 105,000 to 69,000 on 10/23/2020. Her most recent CBC completed on 2/23/2021 showed platelet count 43,882. The patient affirms history of thrombocytopenia, but has not had formal work-up in the past.  She denies drinking alcohol. She denies history of autoimmune or liver dysfunction. She denies history of chronic infection such as hepatitis C or HIV. She denies any recent history of COVID-19 infection. The patient states in June 2020 she had a viral illness that lasted for approximately 7 days. She describes this as GI upset with diarrhea. At that time the patient decided to go gluten-free and has had improvement in symptoms since. She has never been diagnosed with celiac disease she admits to fatigue intermittently. She denies any B type symptoms; no recurrent fever, persistent infection, lymphadenopathy, night sweats, poor appetite, early satiety or unintentional weight loss. States she bruises easily at times but denies any abnormal bleeding. Denies epistaxis, hemoptysis, hematemesis, melena, hematochezia, hematuria or vaginal bleeding. Her past medical history includes hypertension, diabetes, GERD, hyperlipidemia. Interval History 10/28/2022:   The patient is here for follow up evaluation of thrombocytopenia. Since her appointment in June 2022 she states she has been feeling well.  She is here with her  today. She denies any changes in her overall health. Denies any abnormal bleeding; no epistaxis, hemoptysis, hematemesis, melena, hematochezia, hematuria or vaginal bleeding. No ED visits or hospitalizations since her last visit. PMH, SH, and FH:  I reviewed the patients medication list and allergy list as noted on the electronic medical record. The PMH, SH and FH were also reviewed as noted on the EMR. Review of Systems:   Review of Systems   Pertinent review of systems noted in HPI, all other ROS negative. Objective:   Physical Exam   BP (!) 156/72 (Site: Left Upper Arm, Position: Sitting)   Pulse 86   Temp 97.9 °F (36.6 °C) (Oral)   Resp 16   Ht 5' 2\" (1.575 m)   Wt 232 lb 9.6 oz (105.5 kg)   SpO2 96%   BMI 42.54 kg/m²    General appearance: No apparent distress, well developed, eldelry and cooperative. HEENT: Pupils equal, round, and reactive to light. Conjunctivae/corneas clear. Neck: Supple, with full range of motion. Trachea midline. Respiratory:  Normal respiratory effort. Clear to auscultation, bilaterally without Rales/Wheezes/Rhonchi. Cardiovascular: Regular rate and rhythm with normal S1/S2   Abdomen: Soft, active bowel sounds. Musculoskeletal: ambulates in office  Skin: Skin color, texture, turgor normal.  No visible rashes or lesions. Neurologic:  Neurovascularly intact without any focal sensory/motor deficits.    Psychiatric: Alert and oriented        Imaging Studies and Labs:   CBC:   Lab Results   Component Value Date    WBC 6.4 10/26/2022    HGB 14.4 10/26/2022    HCT 44.7 10/26/2022    MCV 98.2 10/26/2022    PLT 80 (L) 10/26/2022     BMP:   Lab Results   Component Value Date/Time     10/26/2022 10:16 AM    K 4.2 10/26/2022 10:16 AM    K 4.1 06/24/2022 04:46 AM    CL 98 10/26/2022 10:16 AM    CO2 27 10/26/2022 10:16 AM    BUN 15 10/26/2022 10:16 AM    CREATININE 0.9 10/26/2022 10:16 AM    GLUCOSE 139 10/26/2022 10:16 AM    GLUCOSE 146 10/23/2020 09:30 AM    CALCIUM 9.6 10/26/2022 10:16 AM      LFT:   Lab Results   Component Value Date    ALT 17 10/26/2022    AST 20 10/26/2022    ALKPHOS 92 10/26/2022    BILITOT 0.5 10/26/2022      Found in [place] completed at the [facility]    Ref Range & Units 7 mo ago Comments   Anti-PF4  0.166  Reference Range OD <0.400   % Inhibition  Not calculated  Reference Range <50%   Interpretation (PF4) Negative Negative  No anti-platelet factor 4 IgG antibody is detected by GEOVANY assay. Heparin-induced thrombocytopenia (HIT) is unlikely, but should be excluded   based on clinical factors. Anti-PF4 Review  Test Not Indicated       Flow cytometry completed on 4/4/2022  Component 4/4/22 1604   LEUK/LYMPH PHENOTYPING WB SEE BELOW    Comment: Leuk/Lymph Phenotype, Source                 Blood   Number Of Markers                               26                   marker   Leuk/Lymph Phenotype, Impression          see note   SAMPLE: PERIPHERAL BLOOD     IMPRESSION:   No abnormal myeloid, B cell, T cell, or NK cell population   identified. Assessment and Plan:   1. Thrombocytopenia  Chronic since at least 2014 per EMR with platelet count ranging 80,000 to 110,000. WBC count, Hgb/hct, MCV within normal limits. Vitamin B12/folic acid within normal limits. Denies alcohol use. No history of liver disorder, autoimmune disorder, HIV, Hep C. No immunosuppressive medications. platelet count on 60/38/61 126,000. Flow cytometry completed on 4/4/2022 showed no abnormal myeloid, B cell, T cell or NK cell population identified. Recently platelet count 57,616 on 6/15/22. Citrate tube was collected to rule out clumping and repeat platelet count 07,454. Recently on 10/26/2022:  platelet count 01,191. WBC count, Hgb, Hct, MCV within normal limits. Platelet count at baseline.      -Will continue to monitor platelet count.  Discussed with patient if platelet count would decrease below baseline in future may recommend bone marrow biopsy.    -Patient instructed to monitor for signs of bleeding or increased bruising               -Return to clinic in 6 months              -Labs on RTC        All patient questions answered. Pt voiced understanding. Patient agreed with treatment plan. Follow up as directed. Patient instructed to call for questions or concerns.     Electronically signed by   Quyen Joe PA-C

## 2022-11-02 ENCOUNTER — HOSPITAL ENCOUNTER (OUTPATIENT)
Dept: PHARMACY | Age: 72
Setting detail: THERAPIES SERIES
Discharge: HOME | End: 2022-11-02
Payer: MEDICARE

## 2022-11-02 DIAGNOSIS — Z51.81 ENCOUNTER FOR THERAPEUTIC DRUG MONITORING: ICD-10-CM

## 2022-11-02 DIAGNOSIS — Z86.79 S/P MAZE OPERATION FOR ATRIAL FIBRILLATION: ICD-10-CM

## 2022-11-02 DIAGNOSIS — Z79.01 ANTICOAGULATED ON COUMADIN: ICD-10-CM

## 2022-11-02 DIAGNOSIS — Z98.890 S/P MAZE OPERATION FOR ATRIAL FIBRILLATION: ICD-10-CM

## 2022-11-02 DIAGNOSIS — I48.91 ATRIAL FIBRILLATION, UNSPECIFIED TYPE (HCC): Primary | ICD-10-CM

## 2022-11-02 LAB — POC INR: 2.7 (ref 0.8–1.2)

## 2022-11-02 PROCEDURE — 36416 COLLJ CAPILLARY BLOOD SPEC: CPT | Performed by: PHARMACIST

## 2022-11-02 PROCEDURE — 99211 OFF/OP EST MAY X REQ PHY/QHP: CPT | Performed by: PHARMACIST

## 2022-11-02 PROCEDURE — 85610 PROTHROMBIN TIME: CPT | Performed by: PHARMACIST

## 2022-11-02 NOTE — PROGRESS NOTES
Medication Management [address]  [phone] (phone)  [phone] (fax)    Ms. Shanon Alston is a 67 y.o.  female with history of Afib who presents today for anticoagulation monitoring and adjustment. Patient verifies current dosing regimen and tablet strength. No missed or extra doses. Patient denies s/s bleeding/bruising/swelling/SOB/chest pain  No blood in urine or stool. No dietary changes. No changes in medication/OTC agents/Herbals. No change in alcohol use or tobacco use. No change in activity level. Patient denies headaches/dizziness/lightheadedness/falls. No vomiting/diarrhea or acute illness. No Procedures scheduled in the future at this time. Assessment:   Lab Results   Component Value Date    INR 2.70 (H) 11/02/2022    INR 3.20 (H) 10/19/2022    INR 2.00 (H) 09/21/2022     INR therapeutic   Recent Labs     11/02/22  1533   INR 2.70*      INR Goal 2.0 - 3.0    Plan:  Continue Coumadin 3mg W, 6mg MTThFSS. Recheck INR in 2 week(s). Patient reminded to call the Anticoagulation Clinic with any signs or symptoms of bleeding or with any medication changes. Patient given instructions utilizing the teach back method. Patient interview conducted by student pharmacist, reviewed with Eulogio Maynard 11/02/22 3:38 PM         After visit summary printed and reviewed with patient. Discharged ambulatory in no apparent distress.       For Pharmacy Admin Tracking Only    Time Spent (min): 20

## 2022-11-08 ENCOUNTER — CARE COORDINATION (OUTPATIENT)
Dept: CARE COORDINATION | Age: 72
End: 2022-11-08

## 2022-11-08 ENCOUNTER — TELEPHONE (OUTPATIENT)
Dept: FAMILY MEDICINE CLINIC | Age: 72
End: 2022-11-08

## 2022-11-08 DIAGNOSIS — I48.91 ATRIAL FIBRILLATION, UNSPECIFIED TYPE (HCC): Primary | Chronic | ICD-10-CM

## 2022-11-08 NOTE — CARE COORDINATION
Karla Tilley (spouse) called asking about the assistance with Swati's entresto. I reminded him to use the [employer] each time at the pharmacy until it is depleted. He will call [facility] now to do so.       [address] [facility]   Medication Assistance  [address] [place] [address] [employer]    (V) [phone]  (I) [phone]

## 2022-11-08 NOTE — TELEPHONE ENCOUNTER
Diagnosis Orders   1.  Atrial fibrillation, unspecified type (RUSTca 75.)  [facility] Medication Mgmt (Anticoagulation) - [place]

## 2022-11-08 NOTE — TELEPHONE ENCOUNTER
----- Message from Shivani Wolfe RN sent at 11/8/2022 11:57 AM EST -----  Please renew annual referral for Anticoagulation Monitoring, #[id number]. Thanks, RONEN Ramey RN BSN

## 2022-11-16 ENCOUNTER — HOSPITAL ENCOUNTER (OUTPATIENT)
Dept: PHARMACY | Age: 72
Setting detail: THERAPIES SERIES
Discharge: HOME | End: 2022-11-16
Payer: MEDICARE

## 2022-11-16 DIAGNOSIS — Z51.81 ENCOUNTER FOR THERAPEUTIC DRUG MONITORING: ICD-10-CM

## 2022-11-16 DIAGNOSIS — I48.91 ATRIAL FIBRILLATION, UNSPECIFIED TYPE (HCC): Primary | ICD-10-CM

## 2022-11-16 DIAGNOSIS — Z86.79 S/P MAZE OPERATION FOR ATRIAL FIBRILLATION: ICD-10-CM

## 2022-11-16 DIAGNOSIS — Z79.01 ANTICOAGULATED ON COUMADIN: ICD-10-CM

## 2022-11-16 DIAGNOSIS — Z98.890 S/P MAZE OPERATION FOR ATRIAL FIBRILLATION: ICD-10-CM

## 2022-11-16 LAB — POC INR: 2.2 (ref 0.8–1.2)

## 2022-11-16 PROCEDURE — 99211 OFF/OP EST MAY X REQ PHY/QHP: CPT

## 2022-11-16 PROCEDURE — 85610 PROTHROMBIN TIME: CPT

## 2022-11-16 PROCEDURE — 36416 COLLJ CAPILLARY BLOOD SPEC: CPT

## 2022-11-16 NOTE — PROGRESS NOTES
Medication Management [address]  [phone] (phone)  [phone] (fax)    Ms. Yves Crain is a 67 y.o.  female with history of MAZE procedure/atrial fib. , per Dr. Christopher Soler referral, who presents today for Warfarin monitoring and adjustment (2 week visit - early for today's visit). Patient verifies current dosing regimen and tablet strength. No missed or extra doses. Patient denies swelling/chest pain. Has less bruising. Has usual slight NORMAN. Has seen a little blood on tissue with wiping nose. Reminded of need to keep membrane moist and how to do so. No blood in urine or stool. No dietary changes. No changes in medication/OTC agents/herbals. No change in alcohol use or tobacco use. No change in activity level. Seems to have more energy. Patient denies headaches/dizziness/lightheadedness/falls. No vomiting/diarrhea or acute illness. Been sneezing a lot with this weather - usual for her. Takes Zyrtec. No procedures scheduled in the future at this time. Assessment:   Lab Results   Component Value Date    INR 2.20 (H) 11/16/2022    INR 2.70 (H) 11/02/2022    INR 3.20 (H) 10/19/2022     INR therapeutic - goal 2-3. Recent Labs     11/16/22  1509   INR 2.20*        Plan:  POCT INR ordered/performed/result reviewed. Continue PO Coumadin 3 mg W, 6 mg MTThFSS. Recheck INR in 3 week(s). Patient reminded to call the Anticoagulation Clinic with any signs or symptoms of bleeding or with any medication changes. Patient given instructions utilizing the teach back method. After visit summary printed and reviewed with patient. Discharged ambulatory in no apparent distress, with cane and .

## 2022-12-07 ENCOUNTER — HOSPITAL ENCOUNTER (OUTPATIENT)
Dept: PHARMACY | Age: 72
Setting detail: THERAPIES SERIES
Discharge: HOME | End: 2022-12-07
Payer: MEDICARE

## 2022-12-07 DIAGNOSIS — I48.91 ATRIAL FIBRILLATION, UNSPECIFIED TYPE (HCC): Primary | ICD-10-CM

## 2022-12-07 DIAGNOSIS — Z51.81 ENCOUNTER FOR THERAPEUTIC DRUG MONITORING: ICD-10-CM

## 2022-12-07 DIAGNOSIS — Z79.01 ANTICOAGULATED ON COUMADIN: ICD-10-CM

## 2022-12-07 DIAGNOSIS — Z86.79 S/P MAZE OPERATION FOR ATRIAL FIBRILLATION: ICD-10-CM

## 2022-12-07 DIAGNOSIS — Z98.890 S/P MAZE OPERATION FOR ATRIAL FIBRILLATION: ICD-10-CM

## 2022-12-07 LAB — POC INR: 2.9 (ref 0.8–1.2)

## 2022-12-07 PROCEDURE — 85610 PROTHROMBIN TIME: CPT

## 2022-12-07 PROCEDURE — 36416 COLLJ CAPILLARY BLOOD SPEC: CPT

## 2022-12-07 PROCEDURE — 99211 OFF/OP EST MAY X REQ PHY/QHP: CPT

## 2022-12-07 RX ORDER — TRIAMCINOLONE ACETONIDE 1 MG/G
CREAM TOPICAL PRN
COMMUNITY
Start: 2022-11-14

## 2022-12-07 NOTE — PROGRESS NOTES
Medication Management [address] [place] [address]  [phone] (phone)  [phone] (fax)    Ms. Enrique Yung is a 67 y.o.  female with history of atrial fib. , per Dr. Carey Chen referral, who presents today for Warfarin monitoring and adjustment (3 week visit). Patient verifies current dosing regimen and tablet strength. Uses pill box. No missed or extra doses. Patient denies bleeding/swelling/chest pain. Has usual SOB if walks too far. Noted reddish-tan bruising of lower right thumb joint nearly to wrist.  States was doing [employer] cards with that hand yesterday, then bruising appeared today. Denies pain. No swelling/excessive heat noted. Advised to use ice to help bruising. Advised to call if does not improve. No blood in urine or stool. No dietary changes. No changes in medication/OTC agents/herbals. Wants to start elderberry gummies - OK with Coumadin per Marylene Modest., PharmD. No change in alcohol use or tobacco use. No change in activity level. Patient denies headaches/dizziness/lightheadedness/falls. C/o left wrist arthritis. No vomiting/diarrhea or acute illness. No procedures scheduled in the future at this time. Assessment:   Lab Results   Component Value Date    INR 2.90 (H) 12/07/2022    INR 2.20 (H) 11/16/2022    INR 2.70 (H) 11/02/2022     INR therapeutic - goal 2-3. Recent Labs     12/07/22  1522   INR 2.90*      Plan:  POCT INR ordered/performed/result reviewed. Continue PO Coumadin 3 mg W, 6 mg MTThFSS. Recheck INR in 4 week(s). Patient reminded to call the Anticoagulation Clinic with any signs or symptoms of bleeding or with any medication changes. Patient given instructions utilizing the teach back method. After visit summary printed and reviewed with patient. Discharged ambulatory in no apparent distress, with cane and spouse.

## 2022-12-09 ENCOUNTER — NURSE ONLY (OUTPATIENT)
Dept: LAB | Age: 72
End: 2022-12-09

## 2022-12-09 DIAGNOSIS — E78.2 MIXED HYPERLIPIDEMIA: ICD-10-CM

## 2022-12-09 LAB
CHOLESTEROL, TOTAL: 315 MG/DL (ref 100–199)
HDLC SERPL-MCNC: 43 MG/DL
LDL CHOLESTEROL CALCULATED: 231 MG/DL
TRIGL SERPL-MCNC: 205 MG/DL (ref 0–199)

## 2022-12-12 ENCOUNTER — TELEPHONE (OUTPATIENT)
Dept: FAMILY MEDICINE CLINIC | Age: 72
End: 2022-12-12

## 2022-12-12 DIAGNOSIS — E78.2 MIXED HYPERLIPIDEMIA: Primary | Chronic | ICD-10-CM

## 2022-12-12 DIAGNOSIS — Z95.1 S/P CABG (CORONARY ARTERY BYPASS GRAFT): Chronic | ICD-10-CM

## 2022-12-12 DIAGNOSIS — I25.10 ASHD (ARTERIOSCLEROTIC HEART DISEASE): Chronic | ICD-10-CM

## 2022-12-12 NOTE — TELEPHONE ENCOUNTER
Never mind, I just looked at her allergy list again and Zetia is listed as an allergy, so we can't go that rout. Appears it caused her CP previously. There is a new med, called repatha, that is an injection, that she may be a candidate for, but typically needs to be written by cardiology. Let pt know we'll have cardiology review and see if they feel she'd be a candidate. Staff, can you reach out to cardiology and have them review her lipids and see if they'd want to start her on Repatha or something similar. Let me know, thanks!

## 2022-12-12 NOTE — TELEPHONE ENCOUNTER
----- Message from Burton Eisenmenger, DO sent at 12/11/2022  9:12 AM EST -----  Please let pt know that cholesterol remains very high  Would she be willing to try non-statin medication called Zetia? Let me know, thanks!

## 2022-12-13 ENCOUNTER — TELEPHONE (OUTPATIENT)
Dept: CARDIOLOGY CLINIC | Age: 72
End: 2022-12-13

## 2022-12-13 DIAGNOSIS — I25.10 ASHD (ARTERIOSCLEROTIC HEART DISEASE): ICD-10-CM

## 2022-12-13 DIAGNOSIS — E78.2 MIXED HYPERLIPIDEMIA: ICD-10-CM

## 2022-12-13 DIAGNOSIS — I10 PRIMARY HYPERTENSION: Primary | ICD-10-CM

## 2022-12-13 NOTE — TELEPHONE ENCOUNTER
Spoke to patient. Agreeable to Repatha. Rx printed in separate encounter under Dr Hull Filter, and out for signature along with supporting documentation. Labs ordered and mailed to patient.

## 2022-12-13 NOTE — TELEPHONE ENCOUNTER
Micah Garcia MD Physician Signed      9:52 AM     Agree,  Please prescribe repatha and repeat Lipids in 3 months. thanks        Rx printed and out for signature. Labs ordered and mailed to patient.

## 2022-12-19 ENCOUNTER — TELEPHONE (OUTPATIENT)
Dept: FAMILY MEDICINE CLINIC | Age: 72
End: 2022-12-19

## 2022-12-19 NOTE — TELEPHONE ENCOUNTER
----- Message from Alon Hardy sent at 12/19/2022  3:45 PM EST -----  Subject: Message to Provider    QUESTIONS  Information for Provider? Spoke with patient, she states that she would   like to come into the office to have one of the nurses to show her how to   do an injection. She states that she has not ever had to do this and is   needing help. Please return her call to assist, thank you.   ---------------------------------------------------------------------------  --------------  Arleen Hoover INFO  [phone]; OK to leave message on voicemail  ---------------------------------------------------------------------------  --------------  SCRIPT ANSWERS  Relationship to Patient?  Self

## 2023-01-04 ENCOUNTER — HOSPITAL ENCOUNTER (OUTPATIENT)
Dept: PHARMACY | Age: 73
Setting detail: THERAPIES SERIES
Discharge: HOME | End: 2023-01-04
Payer: MEDICARE

## 2023-01-04 DIAGNOSIS — Z51.81 ENCOUNTER FOR THERAPEUTIC DRUG MONITORING: ICD-10-CM

## 2023-01-04 DIAGNOSIS — I48.91 ATRIAL FIBRILLATION, UNSPECIFIED TYPE (HCC): Primary | ICD-10-CM

## 2023-01-04 DIAGNOSIS — Z79.01 ANTICOAGULATED ON COUMADIN: ICD-10-CM

## 2023-01-04 LAB — POC INR: 3 (ref 0.8–1.2)

## 2023-01-04 PROCEDURE — 99211 OFF/OP EST MAY X REQ PHY/QHP: CPT

## 2023-01-04 PROCEDURE — 85610 PROTHROMBIN TIME: CPT

## 2023-01-04 PROCEDURE — 36416 COLLJ CAPILLARY BLOOD SPEC: CPT

## 2023-01-04 NOTE — PROGRESS NOTES
Medication Management [address]  [phone] (phone)  [phone] (fax)    Ms. Osmani Rowe is a 67 y.o.  female with history of Afib who presents today for anticoagulation monitoring and adjustment. Patient verifies current dosing regimen and tablet strength. No missed or extra doses. Patient denies s/s bleeding/bruising/swelling/SOB/chest pain  No blood in urine or stool. No dietary changes. No changes in medication/OTC agents/Herbals. No change in alcohol use or tobacco use. No change in activity level. Patient denies headaches/dizziness/lightheadedness/falls. No vomiting/diarrhea or acute illness. No Procedures scheduled in the future at this time. Assessment:   Lab Results   Component Value Date    INR 3.00 (H) 01/04/2023    INR 2.90 (H) 12/07/2022    INR 2.20 (H) 11/16/2022     INR therapeutic   Recent Labs     01/04/23  1531   INR 3.00*         Plan:  Continue Coumadin 3 mg W, 6 mg MTThFSaS. Recheck INR in 4 week(s). Patient reminded to call the Anticoagulation Clinic with any signs or symptoms of bleeding or with any medication changes. Patient given instructions utilizing the teach back method. After visit summary printed and reviewed with patient. Discharged ambulatory in no apparent distress.     For Pharmacy Admin Tracking Only    Time Spent (min): 20

## 2023-01-05 ENCOUNTER — CARE COORDINATION (OUTPATIENT)
Dept: CARE COORDINATION | Age: 73
End: 2023-01-05

## 2023-01-05 NOTE — CARE COORDINATION
Camryn the patients daughter called letting me know Ezequiel shea for [employer] is now depleted and needs further assistance. We are going to try to get her assistance through [employer] on her Entresto. I am mailing the patient her portion and faxing  his portion.           [facility]   Medication Assistance  [address] [place] [address], and [employer]    (H) [phone]  (B) [phone]

## 2023-01-16 ENCOUNTER — CARE COORDINATION (OUTPATIENT)
Dept: CARE COORDINATION | Age: 73
End: 2023-01-16

## 2023-01-16 NOTE — CARE COORDINATION
I was able to fax in her completed application for Entresto into the PAP program. I also faxed Yobany a script that will need signed and faxed into [employer].

## 2023-01-16 NOTE — CARE COORDINATION
[facility] patients daughter let me know she was able to drop the patients PAP info off to the heart specialist. I messaged them asking them to upload to her media tab not leaving in a folder as I work remotely.       [address] [facility]   Medication Assistance  [address] [place] [address], and [employer]    (O) [phone] (A) [phone]

## 2023-01-19 ENCOUNTER — CARE COORDINATION (OUTPATIENT)
Dept: CARE COORDINATION | Age: 73
End: 2023-01-19

## 2023-01-19 NOTE — CARE COORDINATION
Camryn (daughter) called about her [employer] [place] application, I let her know she was approved until 12/31/23 and to call me with any questions in the future.       [facility]   Medication Assistance  [address] [place] [address], and [employer]    () [phone] (G) [phone]

## 2023-01-25 ENCOUNTER — TELEPHONE (OUTPATIENT)
Dept: CARDIOLOGY CLINIC | Age: 73
End: 2023-01-25

## 2023-01-25 NOTE — TELEPHONE ENCOUNTER
Pt stated that since starting the evolocumab 140 mg/ML pt is having increased pain in right leg and back, injection site pain, not sleeping well, feeling tired and edgy. Pt was supposed to take next dose today but does not feel comfortable. Pt would like advice or instruction on medication.

## 2023-01-26 ENCOUNTER — OFFICE VISIT (OUTPATIENT)
Dept: CARDIOLOGY CLINIC | Age: 73
End: 2023-01-26
Payer: MEDICARE

## 2023-01-26 VITALS
DIASTOLIC BLOOD PRESSURE: 72 MMHG | WEIGHT: 233.2 LBS | BODY MASS INDEX: 42.65 KG/M2 | HEART RATE: 82 BPM | SYSTOLIC BLOOD PRESSURE: 140 MMHG | OXYGEN SATURATION: 98 %

## 2023-01-26 DIAGNOSIS — E66.01 OBESITY, CLASS III, BMI 40-49.9 (MORBID OBESITY) (HCC): ICD-10-CM

## 2023-01-26 DIAGNOSIS — R60.0 EDEMA OF BOTH LOWER LEGS: ICD-10-CM

## 2023-01-26 DIAGNOSIS — I48.20 CHRONIC ATRIAL FIBRILLATION (HCC): ICD-10-CM

## 2023-01-26 DIAGNOSIS — I25.5 ISCHEMIC CARDIOMYOPATHY: Primary | ICD-10-CM

## 2023-01-26 PROCEDURE — G8400 PT W/DXA NO RESULTS DOC: HCPCS | Performed by: NURSE PRACTITIONER

## 2023-01-26 PROCEDURE — 3077F SYST BP >= 140 MM HG: CPT | Performed by: NURSE PRACTITIONER

## 2023-01-26 PROCEDURE — 3078F DIAST BP <80 MM HG: CPT | Performed by: NURSE PRACTITIONER

## 2023-01-26 PROCEDURE — 3017F COLORECTAL CA SCREEN DOC REV: CPT | Performed by: NURSE PRACTITIONER

## 2023-01-26 PROCEDURE — G8427 DOCREV CUR MEDS BY ELIG CLIN: HCPCS | Performed by: NURSE PRACTITIONER

## 2023-01-26 PROCEDURE — G8484 FLU IMMUNIZE NO ADMIN: HCPCS | Performed by: NURSE PRACTITIONER

## 2023-01-26 PROCEDURE — 1123F ACP DISCUSS/DSCN MKR DOCD: CPT | Performed by: NURSE PRACTITIONER

## 2023-01-26 PROCEDURE — 1036F TOBACCO NON-USER: CPT | Performed by: NURSE PRACTITIONER

## 2023-01-26 PROCEDURE — 99214 OFFICE O/P EST MOD 30 MIN: CPT | Performed by: NURSE PRACTITIONER

## 2023-01-26 PROCEDURE — 1090F PRES/ABSN URINE INCON ASSESS: CPT | Performed by: NURSE PRACTITIONER

## 2023-01-26 PROCEDURE — G8417 CALC BMI ABV UP PARAM F/U: HCPCS | Performed by: NURSE PRACTITIONER

## 2023-01-26 ASSESSMENT — ENCOUNTER SYMPTOMS
COUGH: 0
ABDOMINAL DISTENTION: 0
SHORTNESS OF BREATH: 0
ABDOMINAL PAIN: 0
WHEEZING: 0

## 2023-01-26 NOTE — PROGRESS NOTES
Heart Failure Clinic       Visit Date: 1/26/2023  Cardiologist:  Dr. Rusty Kaur  Primary Care Physician: Dr. Torie Capps,     Desire Jones is a 67 y.o. female who presents today for:  Chief Complaint   Patient presents with    Congestive Heart Failure       HPI:   TYPE HF: HFmrEF (45-50%, 3/18/22) (25-30%, 1/20/22)    Cause: ischemic  Device: none  HX: HLD, YASHIRA on CPAP, HTN, DM II, Afib w. RVR s/p Maze and LAAC (11/2021), (coumadin) CAD s/p CABG 11/15/21  Dry Wt:  200? (208 on 2/15/22) (212 on 3/30/22) (230 on 9/13/22) (233 on 1/26/23)    Desire Jones is a 67 y.o. female who presents to the office for a f/u patient visit in the heart failure clinic. Accompanied by self    Concerns today: last visit she had noted weight gain w/ more LE swelling. We added aldactone and did two days of metolazone. Weight is still up today. She notes that she is urinating well on her Lasix. She denies lower leg swelling, notes SOB when doing a lot of activity (normal for her)     Visit on 9/13/22: pt doing well. She is still urinating well on her lasix. She is up 17lbs states that she has been really eating a lot of ice cream this summer. Notes some ankle swelling but states it is no worse than normal.     Visit on 3/30/22:  weight is staying consistant since increasing lasix to 40mg after a phone call into the office, notes improvement of LE swelling. EF improved, life vest returned. Asking about cardiac clearance for up coming thyroid surgery, sees Dr. Rusty Kaur next month. Visit on 2/15: No major concerns today. Was evaluated by Dr. Aruna Cabello on 2/2/22 and started on Entresto, no lab work ordered since. States she stopped her Lipitor last week d/t back discomfort as a side effect. Hospitalization:      ADMISSION DATE: 11/7/2021   MRN: [id number] DISCHARGE DATE: 11/25/2021     evaluation of de brad ischemic cardiomyopathy in context of AFRVR with ACS.     She presented to [address] [place], [place] [address] on 11/5/2021 with severe bilateral acute on chronic hip pain. She was found to be in [facility] with rates in the 160s and ischemic changes diffusely on the ECG. She was hypoxic requiring 5L O2 by NC. She was given IV metoprolol which did not affect her rate much. She was taken to cath lab for evaluation of these ischemic changes and was discovered to have three vessel CAD. Echo showed LVEF 25-30%. She was managed in the ICU and given IV metoprolol, IV diltiazem, IV esmolol, and IV amiodarone for her atrial fibrillation; with amiodarone she converted to sinus rhythm (not clear if pt ever underwent LAWSON). She was anticoagulated with heparin gtt. CTS was consulted at [facility] for CABG evaluation. However given the reduced LV systolic function and possible need for mechanical support in the perioperative period, CTS recommended transfer to a tertiary center for evaluation; she was accepted for transfer. Patient underwent emergent left heart catheterization which found three-vessel coronary artery disease. She had a proximal RCA calcified lesion in which a PTCA was attempted however unsuccessful    11/18/2021: CABG(LAD-LIMA, OM-SVG, RCA-SVG), MVr(#29 Hsu Ring), TVr(Lashawn stitch), MAZE, LAAC(#35 clip)    11/20/2021: AF RVR    Acute Systolic HF - Hemodynamically stable off inotropic support. Diuresis/BB. 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 Outpatient endocrine eval recommended. Noted on DC summary for following.         Activity: ADLs performed w/o limitation, uses cane currently d/t heaviness of life vest. Was SOB walking to office today  Diet: watching sodium and fluid    Patient has:  Chest Pain: no  SOB: no  Orthopnea/PND: no  YASHIRA: not tested  Edema: no  Fatigue: no  Abdominal bloating: no  Cough: no  Appetite: good      Past Medical History:   Diagnosis Date    Allergic rhinitis     Angiolipoma of right kidney     ASHD (arteriosclerotic heart disease)     Atrial fibrillation ([facility] [address] Celiac disease     DM2 (diabetes mellitus, type 2) ([facility] [address]     does not take medications for or check blood sugars    Eczema     Follicular adenoma of thyroid gland 10/26/2022    GERD (gastroesophageal reflux disease)     Heart failure with reduced ejection fraction ([facility] [address]     History of blood transfusion 2021    s/p Heart surgery    History of ST elevation myocardial infarction (STEMI)     Hyperlipidemia     Hypertension     Hypothyroidism, postsurgical     Ischemic cardiomyopathy     Meningioma ([facility] [address] 2012    Noted MRI . Never did f/u MRI. Told she didn't need to. Has 0 sxs. Denies HA, vision changes, lateralizing numbness or weakness. Mixed hearing loss 2013    Obesity (BMI 30-39. 9)     Osteoarthritis     Post-menopausal     S/P CABG (coronary artery bypass graft)     S/P Maze operation for atrial fibrillation     S/P MVR (mitral valve repair)     S/P partial thyroidectomy, right 10/26/2022    S/P tricuspid valve repair     Thrombocytopenia [facility])      Past Surgical History:   Procedure Laterality Date     SECTION  1972    CHOLECYSTECTOMY  2004    COLONOSCOPY  2006    CORONARY ARTERY BYPASS GRAFT  2021    [facility]    MITRAL VALVE REPAIR  2021    [facility]    MYRINGOTOMY AND TYMPANOSTOMY TUBE PLACEMENT  2012    Dr Ezio Sauer    OTHER SURGICAL HISTORY  2021    MAZE Procedure during CABG and MVR/TVR at [facility]    THYROIDECTOMY N/A 2022    Right Hemithyroidectomy performed by Scharlene Gitelman, MD at [address]  2021    TV Repair 2021 at [address] ENDOSCOPY  2006     Family History   Problem Relation Age of Onset    Heart Disease Mother     Renaee Rinne Mother     Osteoporosis Mother     Other Father         Did not know her father.      Other Maternal Grandmother         blood clot, no clear hx surrounding    Heart Attack Maternal Grandfather     Heart Attack Maternal Uncle 62    Breast Cancer Maternal Aunt 48 Breast Cancer Maternal Cousin 40     Social History     Tobacco Use    Smoking status: Never     Passive exposure: Yes    Smokeless tobacco: Never   Substance Use Topics    Alcohol use: No     Current Outpatient Medications   Medication Sig Dispense Refill    triamcinolone (KENALOG) 0.1 % cream Apply topically as needed Indications: Skin Abnormalities      Misc Natural Products (ELDERBERRY IMMUNE COMPLEX) CHEW Take by mouth See Admin Instructions For immune support      polyethylene glycol (GLYCOLAX) 17 g packet Take 17 g by mouth daily as needed for Constipation      spironolactone (ALDACTONE) 25 MG tablet Take 1 tablet by mouth daily 90 tablet 3    levothyroxine (SYNTHROID) 25 MCG tablet Take 1 tablet by mouth daily 90 tablet 3    omeprazole (PRILOSEC) 20 MG delayed release capsule TAKE 1 CAPSULE EVERY DAY 90 capsule 3    sacubitril-valsartan (ENTRESTO) 49-51 MG per tablet Take 1 tablet by mouth in the morning and 1 tablet before bedtime. 180 tablet 3    acetaminophen (TYLENOL) 500 MG tablet Take 1,000 mg by mouth nightly as needed for Pain      warfarin (COUMADIN) 3 MG tablet Take as directed by [facility] (60 tablets = 30 days) 60 tablet 11    metoprolol succinate (TOPROL XL) 50 MG extended release tablet Take 1 tablet by mouth 2 times daily Take along with 25 mg BID to make 75 mg BID. 180 tablet 3    metoprolol succinate (TOPROL XL) 25 MG extended release tablet Take 1 tablet by mouth 2 times daily Take along with 50 mg BID to make 75 mg BID. 180 tablet 3    furosemide (LASIX) 40 MG tablet Take 1 tablet by mouth daily 90 tablet 3    potassium chloride (KLOR-CON M) 20 MEQ extended release tablet Take 1 tablet by mouth daily 90 tablet 3    cetirizine (ZYRTEC) 10 MG tablet Take 10 mg by mouth daily      aspirin 81 MG chewable tablet Take 81 mg by mouth daily       No current facility-administered medications for this visit.      Allergies   Allergen Reactions    Keflex [Cephalexin] Other (See Comments)     Tongue Swelling    Norvasc [Amlodipine Besylate] Other (See Comments)     Nasuea, hot flashes    Statins Other (See Comments)     Myalgias all    Zetia [Ezetimibe] Other (See Comments)     Chest pain    Zocor [Simvastatin] Other (See Comments)     Myalgias    Biaxin [Clarithromycin] Nausea Only    Doxycycline Other (See Comments)     GI side effects       SUBJECTIVE:   Review of Systems   Constitutional:  Negative for activity change, appetite change and fatigue. Respiratory:  Negative for cough, shortness of breath and wheezing. Cardiovascular:  Positive for leg swelling. Negative for chest pain and palpitations. Gastrointestinal:  Negative for abdominal distention and abdominal pain. Musculoskeletal:  Negative for gait problem. Neurological:  Negative for weakness, light-headedness and headaches. Psychiatric/Behavioral:  Negative for sleep disturbance. OBJECTIVE:   Today's Vitals:  BP (!) 140/72   Pulse 82   Wt 233 lb 3.2 oz (105.8 kg)   SpO2 98%   BMI 42.65 kg/m²     Physical Exam  Vitals reviewed. Constitutional:       General: She is not in acute distress. Appearance: Normal appearance. She is well-developed. She is not diaphoretic. HENT:      Head: Normocephalic and atraumatic. Eyes:      Conjunctiva/sclera: Conjunctivae normal.   Cardiovascular:      Rate and Rhythm: Normal rate and regular rhythm. Heart sounds: Normal heart sounds. No murmur heard. Pulmonary:      Effort: Pulmonary effort is normal. No respiratory distress. Breath sounds: Normal breath sounds. No wheezing, rhonchi or rales. Abdominal:      General: Bowel sounds are normal. There is no distension. Palpations: Abdomen is soft. Tenderness: There is no abdominal tenderness. Musculoskeletal:         General: Normal range of motion. Cervical back: Normal range of motion and neck supple. Right lower leg: Edema (+1) present.       Left lower leg: Edema (+1) present. Skin:     General: Skin is warm and dry. Capillary Refill: Capillary refill takes less than 2 seconds. Neurological:      Mental Status: She is alert and oriented to person, place, and time. Coordination: Coordination normal.   Psychiatric:         Behavior: Behavior normal.       Wt Readings from Last 3 Encounters:   01/26/23 233 lb 3.2 oz (105.8 kg)   10/28/22 232 lb 9.6 oz (105.5 kg)   10/27/22 213 lb 6.4 oz (96.8 kg)     BP Readings from Last 3 Encounters:   01/26/23 (!) 140/72   10/28/22 (!) 156/72   10/28/22 (!) 156/72     Pulse Readings from Last 3 Encounters:   01/26/23 82   10/28/22 86   10/28/22 86     Body mass index is 42.65 kg/m². ECHO:     Summary   Technically difficult examination. Left Ventricular size is Mildly increased . Normal left ventricular wall thickness. There was severe global hypokinesis of the left ventricle. Systolic function was severely reduced. Ejection fraction is visually estimated in the range of 30% to 35%. The left atrium is Mildly dilated. Signature      ----------------------------------------------------------------   Electronically signed by Champ Dennis MD (Interpreting   physician) on 01/21/2022 at 07:16 PM      CONCLUSIONS:   - Technically difficult exam due to post op, bandages/chest tubes/wound,   suboptimal positioning and body habitus. Subcostal images are not obtainable due   to bandages. - Exam indication: S/P CABG, MVr, TVr   - The left ventricle is normal in size. Left ventricular systolic function is   severely decreased. EF = 25 ± 5% (visual est.) Definity contrast used for   endocardial border detection. Left ventricular diastolic function was not   evaluated due to mitral valve surgery. - The right ventricle is normal in size. Right ventricular systolic function is   normal.   - Post mitral valve repair. Hus Mitral Valve Annuloplasty Ring (size #29). There    is trace mitral valve regurgitation.  The peak gradient is 15 mmHg and the mean   gradient is 4 mmHg. - Post tricuspid valve repair. Therese Leeann. There is trace tricuspid valve   regurgitation. The peak gradient is 2 mmHg and the mean gradient is 1 mmHg. - There is a trivial posterior pericardial effusion. There is a left pleural   effusion.   - Exam was compared with the prior  echocardiographic exam performed on   11/8/2021. s/p CABG, MVr, TVr.     Electronically signed by Moses Sears MD on 11/24/2021 at 2:13:41 PM         CATH/STRESS:        Recommendations   IV beta blockers to reduce heart rate   IV heparin   CTS consultation for surgery   Aspirin   Monitor access site and hemodynamics   Extensive discussion done with patient and family members      Estimated Blood Loss:10 ml. Complications:No complications.       Signatures      ----------------------------------------------------------------   Electronically signed by Renzo Olivera MD (Performing   Physician) on 11/05/2021 at 20:05   ----------------------------------------------------------------        Results reviewed:  BNP: No results found for: BNP  CBC:   Lab Results   Component Value Date/Time    WBC 6.4 10/26/2022 10:16 AM    RBC 4.55 10/26/2022 10:16 AM    RBC 4.57 11/09/2020 02:55 PM    HGB 14.4 10/26/2022 10:16 AM    HCT 44.7 10/26/2022 10:16 AM    PLT 80 10/26/2022 10:16 AM     CMP:    Lab Results   Component Value Date/Time     10/26/2022 10:16 AM    K 4.2 10/26/2022 10:16 AM    K 4.1 06/24/2022 04:46 AM    CL 98 10/26/2022 10:16 AM    CO2 27 10/26/2022 10:16 AM    BUN 15 10/26/2022 10:16 AM    CREATININE 0.9 10/26/2022 10:16 AM    LABGLOM >60 10/26/2022 10:14 AM    GLUCOSE 139 10/26/2022 10:16 AM    GLUCOSE 146 10/23/2020 09:30 AM    CALCIUM 9.6 10/26/2022 10:16 AM     Hepatic Function Panel:    Lab Results   Component Value Date/Time    ALKPHOS 92 10/26/2022 10:16 AM    ALT 17 10/26/2022 10:16 AM    AST 20 10/26/2022 10:16 AM    PROT 7.0 10/26/2022 10:16 AM    BILITOT 0.5 10/26/2022 10:16 AM    BILIDIR <0.2 11/05/2021 10:40 AM    LABALBU 4.5 10/26/2022 10:16 AM     Magnesium:    Lab Results   Component Value Date/Time    MG 2.1 02/15/2022 02:13 PM     PT/INR:    Lab Results   Component Value Date/Time    INR 3.00 01/04/2023 03:31 PM    INR 1.00 06/23/2022 06:57 AM     Lipids:    Lab Results   Component Value Date/Time    TRIG 205 12/09/2022 10:37 AM    HDL 43 12/09/2022 10:37 AM    LDLCALC 231 12/09/2022 10:37 AM    LABVLDL 36 10/23/2020 09:30 AM       ASSESSMENT AND PLAN:   The patient's condition/symptoms are Stable: No clinical evidence of fluid overload today. Continue current medical regimen without changes at present time. Diagnosis Orders   1. improved Ischemic cardiomyopathy        2. Obesity, Class III, BMI 40-49.9 (morbid obesity) (HCC)        3. Edema of both lower legs        4. Chronic atrial fibrillation (HCC)          Continue:  GDMT:   ACE/ARB/ARNI - Entresto 49/51 BID   BB - Toprol 75 BID   Diuretic - Lasix 40 daily  AA - aldactone 25 daily  SGLT2 -  none discussed going to wait at this time  Vasodilator - none  Other - ASA, coumadin, KCl 20 daily, synthroid    HFmrEF 45-50%  S/P CABG 11/2021  Afib on coumadin   Stable, continued lower leg swelling. Urinating well. No weight gain or worsening SOB. Doing well over all. Does not want to add medications at this time. Plan: medication optimization and fluid management. ECHO 2022: mild mitral stenosis, no other concerns. Lab reviewed - stable, K is 4.2. Cr. 0.9 Mag 2.1      Continue diet/fluid adherence  Continue daily wts. F/U w/ Cardiology  F/U in clinic in 6 months    Tolerating above noted HF meds, no ill side effects noted. Will continue to monitor kidney function and electrolytes. Will optimize as tolerated. Pt is compliant w/ medications.     Total visit time of 25 minutes has been spent with patient on education of symptoms, management, medication, and plan of care; as well as review of chart: labs, ECHO, radiology reports, etc.   I personally spent more then 50% of the appt time face to face with the patient. Daily weights  Fluid restriction of 2 Liters per day  Limit sodium in diet to around 8234-4393 mg/day  Monitor BP  Activity as tolerated           Patient was instructed to call the [facility] for any changes in symptoms as noted in AVS.      Return in about 6 months (around 7/26/2023). or sooner if needed     Patient given educational materials - see patient instructions. We discussed the importance of weighing oneself and recording daily. We also discussed the importance of a low sodium diet, higher sodium foods to avoid and better low sodium food options. Patient verbalizes understanding of plan of care using teach back method, and is agreeable to the treatment plan.        Electronically signed by Shara Alpers, APRN - CNP on 1/26/2023 at 3:03 PM

## 2023-01-26 NOTE — PATIENT INSTRUCTIONS
You may receive a survey regarding the care you received during your visit.  Your input is valuable to us.  We encourage you to complete and return your survey.  We hope you will choose us in the future for your healthcare needs.    Continue:  Continue current medications  Daily weights and record  Fluid restriction of 2 Liters per day  Limit sodium in diet to around 7756-3157 mg/day  Monitor BP  Activity as tolerated     Call the Heart Failure Clinic for any of the following symptoms: [phone]  Weight gain of 2-3 pounds in 1 day or 5 pounds in 1 week  Increased shortness of breath  Shortness of breath while laying down  Cough  Chest pain  Swelling in feet, ankles or legs  Tenderness or bloating in the abdomen  Fatigue   Decreased appetite or nausea   Confusion        Continue diet/fluid adherence  Continue daily wts.  F/U w/ Cardiology  F/U in clinic in 6 months

## 2023-02-01 ENCOUNTER — HOSPITAL ENCOUNTER (OUTPATIENT)
Dept: PHARMACY | Age: 73
Setting detail: THERAPIES SERIES
Discharge: HOME | End: 2023-02-01
Payer: MEDICARE

## 2023-02-01 DIAGNOSIS — Z79.01 ANTICOAGULATED ON COUMADIN: ICD-10-CM

## 2023-02-01 DIAGNOSIS — Z51.81 ENCOUNTER FOR THERAPEUTIC DRUG MONITORING: ICD-10-CM

## 2023-02-01 DIAGNOSIS — I48.20 CHRONIC ATRIAL FIBRILLATION (HCC): Primary | ICD-10-CM

## 2023-02-01 LAB — POC INR: 2.3 (ref 0.8–1.2)

## 2023-02-01 PROCEDURE — 85610 PROTHROMBIN TIME: CPT

## 2023-02-01 PROCEDURE — 99211 OFF/OP EST MAY X REQ PHY/QHP: CPT

## 2023-02-01 PROCEDURE — 36416 COLLJ CAPILLARY BLOOD SPEC: CPT

## 2023-02-01 NOTE — PROGRESS NOTES
Medication Management [address]  [phone] (phone)  [phone] (fax)    Ms. Shanon Alston is a 67 y.o.  female with history of Afib who presents today for anticoagulation monitoring and adjustment. Patient verifies current dosing regimen and tablet strength. No missed or extra doses. Patient denies s/s bleeding/bruising/swelling/SOB/chest pain  No blood in urine or stool. No dietary changes. Patient was taken off of Repatha, reports it was previously giving her body aches (resolved now). No change in alcohol use or tobacco use. No change in activity level. Patient denies headaches/dizziness/lightheadedness/falls. No vomiting/diarrhea or acute illness. No Procedures scheduled in the future at this time. Assessment:   Lab Results   Component Value Date    INR 2.30 (H) 02/01/2023    INR 3.00 (H) 01/04/2023    INR 2.90 (H) 12/07/2022     INR therapeutic   Recent Labs     02/01/23  1528   INR 2.30*      Goal 2.0-3.0    Reviewed and discussed patient and INR with pharmacist.   Loi Pizano, RolaD Candidate 2023     Plan:  Continue Coumadin 3mg W and 6mg MTThFSS. Recheck INR in 4 week(s). Patient reminded to call the Anticoagulation Clinic with any signs or symptoms of bleeding or with any medication changes. Patient given instructions utilizing the teach back method. After visit summary printed and reviewed with patient. Discharged ambulatory in no apparent distress. Phoebe Nguyen.  Christine Cartagena PharmD  PGY2 Ambulatory Care Pharmacy Resident  2/1/2023 3:46 PM    For Pharmacy Admin Tracking Only    Time Spent (min):  25

## 2023-02-02 RX ORDER — WARFARIN SODIUM 3 MG/1
TABLET ORAL
Qty: 60 TABLET | Refills: 11 | Status: SHIPPED | OUTPATIENT
Start: 2023-02-02

## 2023-02-06 RX ORDER — SACUBITRIL AND VALSARTAN 49; 51 MG/1; MG/1
TABLET, FILM COATED ORAL
Qty: 180 TABLET | Refills: 3 | Status: SHIPPED | OUTPATIENT
Start: 2023-02-06

## 2023-03-01 ENCOUNTER — HOSPITAL ENCOUNTER (OUTPATIENT)
Dept: PHARMACY | Age: 73
Setting detail: THERAPIES SERIES
Discharge: HOME | End: 2023-03-01
Payer: MEDICARE

## 2023-03-01 DIAGNOSIS — I48.20 CHRONIC ATRIAL FIBRILLATION (HCC): Primary | ICD-10-CM

## 2023-03-01 DIAGNOSIS — Z51.81 ENCOUNTER FOR THERAPEUTIC DRUG MONITORING: ICD-10-CM

## 2023-03-01 DIAGNOSIS — Z79.01 ANTICOAGULATED ON COUMADIN: ICD-10-CM

## 2023-03-01 LAB — POC INR: 2 (ref 0.8–1.2)

## 2023-03-01 PROCEDURE — 99211 OFF/OP EST MAY X REQ PHY/QHP: CPT

## 2023-03-01 PROCEDURE — 85610 PROTHROMBIN TIME: CPT

## 2023-03-01 PROCEDURE — 36416 COLLJ CAPILLARY BLOOD SPEC: CPT

## 2023-03-08 ENCOUNTER — NURSE ONLY (OUTPATIENT)
Dept: LAB | Age: 73
End: 2023-03-08

## 2023-03-08 ENCOUNTER — OFFICE VISIT (OUTPATIENT)
Dept: FAMILY MEDICINE CLINIC | Age: 73
End: 2023-03-08
Payer: MEDICARE

## 2023-03-08 VITALS
TEMPERATURE: 97.8 F | WEIGHT: 225 LBS | SYSTOLIC BLOOD PRESSURE: 137 MMHG | RESPIRATION RATE: 16 BRPM | HEART RATE: 68 BPM | DIASTOLIC BLOOD PRESSURE: 80 MMHG | HEIGHT: 62 IN | OXYGEN SATURATION: 96 % | BODY MASS INDEX: 41.41 KG/M2

## 2023-03-08 DIAGNOSIS — S80.12XA HEMATOMA OF LEFT LOWER LEG: Primary | ICD-10-CM

## 2023-03-08 DIAGNOSIS — S80.12XA HEMATOMA OF LEFT LOWER LEG: ICD-10-CM

## 2023-03-08 LAB
BASOPHILS ABSOLUTE: 0.1 THOU/MM3 (ref 0–0.1)
BASOPHILS NFR BLD AUTO: 1 %
DEPRECATED RDW RBC AUTO: 48 FL (ref 35–45)
EOSINOPHIL NFR BLD AUTO: 1.7 %
EOSINOPHILS ABSOLUTE: 0.1 THOU/MM3 (ref 0–0.4)
ERYTHROCYTE [DISTWIDTH] IN BLOOD BY AUTOMATED COUNT: 13.4 % (ref 11.5–14.5)
HCT VFR BLD AUTO: 42.8 % (ref 37–47)
HGB BLD-MCNC: 13.7 GM/DL (ref 12–16)
IMM GRANULOCYTES # BLD AUTO: 0.04 THOU/MM3 (ref 0–0.07)
IMM GRANULOCYTES NFR BLD AUTO: 0.5 %
LYMPHOCYTES ABSOLUTE: 1.3 THOU/MM3 (ref 1–4.8)
LYMPHOCYTES NFR BLD AUTO: 16.3 %
MCH RBC QN AUTO: 31.1 PG (ref 26–33)
MCHC RBC AUTO-ENTMCNC: 32 GM/DL (ref 32.2–35.5)
MCV RBC AUTO: 97.1 FL (ref 81–99)
MONOCYTES ABSOLUTE: 0.4 THOU/MM3 (ref 0.4–1.3)
MONOCYTES NFR BLD AUTO: 5.4 %
NEUTROPHILS NFR BLD AUTO: 75.1 %
NRBC BLD AUTO-RTO: 0 /100 WBC
PLATELET # BLD AUTO: 79 THOU/MM3 (ref 130–400)
PMV BLD AUTO: 14.8 FL (ref 9.4–12.4)
RBC # BLD AUTO: 4.41 MILL/MM3 (ref 4.2–5.4)
SEGMENTED NEUTROPHILS ABSOLUTE COUNT: 6.2 THOU/MM3 (ref 1.8–7.7)
WBC # BLD AUTO: 8.2 THOU/MM3 (ref 4.8–10.8)

## 2023-03-08 PROCEDURE — 1090F PRES/ABSN URINE INCON ASSESS: CPT | Performed by: FAMILY MEDICINE

## 2023-03-08 PROCEDURE — 3079F DIAST BP 80-89 MM HG: CPT | Performed by: FAMILY MEDICINE

## 2023-03-08 PROCEDURE — G8417 CALC BMI ABV UP PARAM F/U: HCPCS | Performed by: FAMILY MEDICINE

## 2023-03-08 PROCEDURE — 99213 OFFICE O/P EST LOW 20 MIN: CPT | Performed by: FAMILY MEDICINE

## 2023-03-08 PROCEDURE — 3017F COLORECTAL CA SCREEN DOC REV: CPT | Performed by: FAMILY MEDICINE

## 2023-03-08 PROCEDURE — G8400 PT W/DXA NO RESULTS DOC: HCPCS | Performed by: FAMILY MEDICINE

## 2023-03-08 PROCEDURE — 1036F TOBACCO NON-USER: CPT | Performed by: FAMILY MEDICINE

## 2023-03-08 PROCEDURE — 1123F ACP DISCUSS/DSCN MKR DOCD: CPT | Performed by: FAMILY MEDICINE

## 2023-03-08 PROCEDURE — G8427 DOCREV CUR MEDS BY ELIG CLIN: HCPCS | Performed by: FAMILY MEDICINE

## 2023-03-08 PROCEDURE — 3075F SYST BP GE 130 - 139MM HG: CPT | Performed by: FAMILY MEDICINE

## 2023-03-08 PROCEDURE — G8484 FLU IMMUNIZE NO ADMIN: HCPCS | Performed by: FAMILY MEDICINE

## 2023-03-08 ASSESSMENT — PATIENT HEALTH QUESTIONNAIRE - PHQ9
SUM OF ALL RESPONSES TO PHQ QUESTIONS 1-9: 0
1. LITTLE INTEREST OR PLEASURE IN DOING THINGS: 0
SUM OF ALL RESPONSES TO PHQ QUESTIONS 1-9: 0
2. FEELING DOWN, DEPRESSED OR HOPELESS: 0
SUM OF ALL RESPONSES TO PHQ9 QUESTIONS 1 & 2: 0

## 2023-03-08 NOTE — PROGRESS NOTES
Chief Complaint   Patient presents with    Leg Injury     Left shin       History obtained from the patient. SUBJECTIVE:  Kaela Alan is a 67 y.o. female that presents today for     -L leg pain:  Fell on step earlier today  Hit L knee and anterior L shin  Did not hit either hard  L knee feels fine  However, has large area of swelling on anterior L shin  Painful to touch, but not painful otherwise. Age/Gender Health Maintenance    Lipid -   Lab Results   Component Value Date    CHOL 315 (H) 12/09/2022    CHOL 311 (H) 10/26/2022    CHOL 188 10/27/2021     Lab Results   Component Value Date    TRIG 205 (H) 12/09/2022    TRIG 214 (H) 10/26/2022    TRIG 163 10/27/2021     Lab Results   Component Value Date    HDL 43 12/09/2022    HDL 44 10/26/2022    HDL 43 10/27/2021     Lab Results   Component Value Date    LDLCALC 231 12/09/2022    LDLCALC 224 10/26/2022    LDLCALC 112 10/27/2021       Colon Cancer Screening - Somewhere within the last 10 years, was not Sep[facility]. Awaiting records/due, ordered today (APR 2019)/pt declines OCT 2020/APR 2021/OCT 2021/OCT 2022  Lung Cancer Screening - never smoker. Tetanus - to get at pharmacy per medicare rules  Influenza Vaccine - Declines SEPT 2016/SEPT 2017/OCT 2018/OCT 2019/OCT 2020/OCT 2021/OCT 2022  Pneumonia Vaccine - Declines SEPT 2016/SEPT 2017/OCT 2018/APR 2019/OCT 2019/OCT 2020/APR 2021/OCT 2022  Shingrix - to get at pharmacy per medicare rules    Breast Cancer Screening - NEG APR 2021  Osteoporosis Screening - declines SEPT 2016, wants to discuss again in 1 year.  We discussed today and declines SEPT 2017/OCT 2018/APR 2019/OCT 2019/OCT 2020/APR 2021/OCT 2021/OCT 2022    Diabetes Health Maintenance    A1C -   Lab Results   Component Value Date/Time    LABA1C 6.4 10/27/2022 02:11 PM    LABA1C 6.4 04/28/2022 02:44 PM    LABA1C 6.1 10/28/2021 03:04 PM    LABA1C 6.6 04/26/2021 02:11 PM    LABA1C 7.0 10/26/2020 02:32 PM    LABA1C 7.1 10/15/2019 02:15 PM    LABA1C 6.9 04/11/2019 01:41 PM    LABA1C 7.0 10/11/2018 03:27 PM    LABA1C 6.8 04/12/2018 02:04 PM    LABA1C 6.9 09/21/2017 08:40 AM    LABA1C 6.5 11/04/2016 08:10 AM       ACE/ARB - YES entresto  Eye - due, pt reminded  Foot - WNL OCT 2022  Microal/Cr - + OCT 2018/APR 2019  NEG OCT 2019    Lab Results   Component Value Date    LABMICR 1.51 10/26/2022    LABCREA 238.8 10/26/2022    MACRR 6 10/26/2022     Lab Results   Component Value Date    CREATINEUR 148.36 10/23/2020    MICROALBUR 2.3 (H) 10/23/2020    MALBCR 15.5 10/23/2020         eGFR -   No results found for: GFRESTIMATE  Lab Results   Component Value Date/Time    LABGLOM >60 10/26/2022 10:14 AM     No results found for: CRCLEARANCE  Lab Results   Component Value Date/Time    EGFRNONAA >60 10/23/2020 09:30 AM     Lab Results   Component Value Date/Time    EGFRAA >60 10/23/2020 09:30 AM       Component      Latest Ref Rng & Units 10/14/2019           7:47 AM   EGFR IF NonAfrican American      >59 ml/min/1.73sq.m >60        Statin - statin intolerance      Current Outpatient Medications   Medication Sig Dispense Refill    ENTRESTO 49-51 MG per tablet TAKE 1 TABLET BY MOUTH TWO TIMES A  tablet 3    warfarin (COUMADIN) 3 MG tablet Take as directed by [facility] (60 tablets = 30 days) 60 tablet 11    triamcinolone (KENALOG) 0.1 % cream Apply topically as needed Indications: Skin Abnormalities      Misc Natural Products (ELDERBERRY IMMUNE COMPLEX) CHEW Take by mouth See Admin Instructions For immune support      polyethylene glycol (GLYCOLAX) 17 g packet Take 17 g by mouth daily as needed for Constipation      spironolactone (ALDACTONE) 25 MG tablet Take 1 tablet by mouth daily 90 tablet 3    levothyroxine (SYNTHROID) 25 MCG tablet Take 1 tablet by mouth daily 90 tablet 3    omeprazole (PRILOSEC) 20 MG delayed release capsule TAKE 1 CAPSULE EVERY DAY 90 capsule 3    metoprolol succinate (TOPROL XL) 50 MG extended release tablet Take 1 tablet by mouth 2 times daily Take along with 25 mg BID to make 75 mg BID. 180 tablet 3    metoprolol succinate (TOPROL XL) 25 MG extended release tablet Take 1 tablet by mouth 2 times daily Take along with 50 mg BID to make 75 mg BID. 180 tablet 3    furosemide (LASIX) 40 MG tablet Take 1 tablet by mouth daily 90 tablet 3    potassium chloride (KLOR-CON M) 20 MEQ extended release tablet Take 1 tablet by mouth daily 90 tablet 3    cetirizine (ZYRTEC) 10 MG tablet Take 10 mg by mouth daily      aspirin 81 MG chewable tablet Take 81 mg by mouth daily       No current facility-administered medications for this visit. No orders of the defined types were placed in this encounter. All medications reviewed and reconciled, including OTC and herbal medications. Updated list given to patient. Patient Active Problem List    Diagnosis Date Noted    S/P partial thyroidectomy, right 85/20/5828    Follicular adenoma of thyroid gland 10/26/2022    Hypothyroidism, postsurgical     Anticoagulated on Coumadin 12/06/2021    Encounter for therapeutic drug monitoring 12/06/2021    History of ST elevation myocardial infarction (STEMI)     ASHD (arteriosclerotic heart disease)     Atrial fibrillation (HCC)     S/P CABG (coronary artery bypass graft)     Heart failure with reduced ejection fraction (HCC)     Ischemic cardiomyopathy     S/P Maze operation for atrial fibrillation     S/P MVR (mitral valve repair)     S/P tricuspid valve repair     DM2 (diabetes mellitus, type 2) (HCC)     Statin intolerance     GERD (gastroesophageal reflux disease)     Osteoarthritis     Allergic rhinitis     Angiolipoma of right kidney     Eczema     Hyperlipidemia     Obesity (BMI 30-39. 9)     Post-menopausal     Thrombocytopenia ([address]     Mixed hearing loss 09/17/2013    Meningioma ([facility] 12/26/2012     Noted MRI 2012. Never did f/u MRI. Told she didn't need to. Has 0 sxs. Denies HA, vision changes, lateralizing numbness or weakness. Hypertension 12/10/2012       Past Medical History:   Diagnosis Date    Allergic rhinitis     Angiolipoma of right kidney     ASHD (arteriosclerotic heart disease)     Atrial fibrillation (HCC)     Celiac disease     DM2 [facility]diabetes mellitus, type 2) [facility]     does not take medications for or check blood sugars    Eczema     Follicular adenoma of thyroid gland 10/26/2022    GERD (gastroesophageal reflux disease)     Heart failure with reduced ejection fraction [facility]     History of blood transfusion 2021    s/p Heart surgery    History of ST elevation myocardial infarction (STEMI)     Hyperlipidemia     Hypertension     Hypothyroidism, postsurgical     Ischemic cardiomyopathy     Meningioma ([facility] 2012    Noted MRI . Never did f/u MRI. Told she didn't need to. Has 0 sxs. Denies HA, vision changes, lateralizing numbness or weakness. Mixed hearing loss 2013    Obesity (BMI 30-39. 9)     Osteoarthritis     Post-menopausal     S/P CABG (coronary artery bypass graft)     S/P Maze operation for atrial fibrillation     S/P MVR (mitral valve repair)     S/P partial thyroidectomy, right 10/26/2022    S/P tricuspid valve repair     Thrombocytopenia [facility])        Past Surgical History:   Procedure Laterality Date     SECTION  1972    CHOLECYSTECTOMY  2004    COLONOSCOPY  2006    CORONARY ARTERY BYPASS GRAFT  2021    [facility]    MITRAL VALVE REPAIR  2021    [facility]    MYRINGOTOMY AND TYMPANOSTOMY TUBE PLACEMENT  2012    Dr Kali Sethi    OTHER SURGICAL HISTORY  2021    MAZE Procedure during CABG and MVR/TVR at [facility]    THYROIDECTOMY N/A 2022    Right Hemithyroidectomy performed by Consuelo Hayward MD at [address]  2021    TV Repair 2021 at [address] END[address]  2006       Allergies   Allergen Reactions    Keflex [Cephalexin] Other (See Comments)     Tongue Swelling    Norvasc [Amlodipine Besylate] Other (See Comments)     Nasuea, hot flashes    Statins Other (See Comments)     Myalgias all    Zetia [Ezetimibe] Other (See Comments)     Chest pain    Zocor [Simvastatin] Other (See Comments)     Myalgias    Biaxin [Clarithromycin] Nausea Only    Doxycycline Other (See Comments)     GI side effects       Social History     Tobacco Use    Smoking status: Never     Passive exposure: Yes    Smokeless tobacco: Never   Substance Use Topics    Alcohol use: No       Family History   Problem Relation Age of Onset    Heart Disease Mother     Lung Cancer Mother     Osteoporosis Mother     Other Father         Did not know her father.     Other Maternal Grandmother         blood clot, no clear hx surrounding    Heart Attack Maternal Grandfather     Heart Attack Maternal Uncle 58    Breast Cancer Maternal Aunt 48    Breast Cancer Maternal Cousin 44         I have reviewed the patient's past medical history, past surgical history, allergies, medications, social and family history and I have made updates where appropriate.      Review of Systems  Positive responses are highlighted in bold    Constitutional:  Fever, Chills, Night Sweats, Fatigue, Unexpected changes in weight  HENT:  Ear pain, Tinnitus, Nosebleeds, Trouble swallowing, Hearing loss, Sore throat  Cardiovascular:  Chest Pain, Palpitations, Orthopnea, Paroxysmal Nocturnal Dyspnea  Respiratory:  Cough, Wheezing, Shortness of breath, Chest tightness, Apnea  Gastrointestinal:  Nausea, Vomiting, Diarrhea, Constipation, Heartburn, Blood in stool  Genitourinary:  Difficulty or painful urination, Flank pain, Change in frequency, Urgency  Skin:  Color change, Rash, Itching, Wound  Musculoskeletal:  Joint pain, Back pain, Gait problems, Joint swelling, Myalgias  Neurological:  Dizziness, Headaches, Presyncope, Numbness, Seizures, Tremors  Endocrine:  Heat Intolerance, Cold Intolerance, Polydipsia, Polyphagia, Polyuria      PHYSICAL EXAM:  Vitals:    03/08/23 1614   BP: 137/80   Pulse: 68   Resp:  16   Temp: 97.8 °F (36.6 °C)   SpO2: 96%   Weight: 225 lb (102.1 kg)   Height: 5' 2\" (1.575 m)     Body mass index is 41.15 kg/m². VS Reviewed  General Appearance: A&O x 3, No acute distress,well developed and well- nourished  Head: normocephalic and atraumatic  Eyes: pupils equal, round, and reactive to light, extraocular eye movements intact, conjunctivae and eye lids without erythema  ENT: external ear and ear canal clear bilaterally, TMs intact and regular, nose without deformity, nasal mucosa and turbinates normal without polyps, oropharynx normal, dentition is normal for age. Neck: supple and non-tender without mass, no thyromegaly or thyroid nodules, no cervical lymphadenopathy. Pulmonary/Chest: clear to auscultation bilaterally- no wheezes, rales or rhonchi, normal air movement, no respiratory distress or retractions. Chest wall incision healing well, clean, dry and intact. Cardiovascular: S1 and S2 auscultated w/ RRR. No murmurs, rubs, clicks, or gallops, distal pulses intact. Abdomen: soft, non-tender, non-distended, bowl sounds physiologic,  no rebound or guarding, no masses or hernias noted. Liver and spleen without enlargement. Extremities: no cyanosis, clubbing of the lower extremities. +2 PT/DP bilaterally. 1+ bilat LE edema. Musculoskeletal: No joint swelling or gross deformity. 4 cm x 4 cm hematoma L anterior shin. No bruising. + TTP. Skin: warm and dry, no rash or erythema      ASSESSMENT & PLAN  1. Hematoma of left lower leg    Hematoma L shin  Exam reassuring  No knee pain  Able to bear weight  Check cbc  Monitor changes  Ice  Elevation  Tylenol. F/u if no better  Reviewed ER precautions, pt understands. - CBC with Auto Differential; Future      DISPOSITION    Return if symptoms worsen or fail to improve. Swati released without restrictions.     Future Appointments   Date Time Provider Andrei Keila   3/29/2023  3:20 PM [facility] [place] 4/27/2023  3:20 PM Jacky Velasquez 52 [facility] [place]   5/9/2023 11:00 AM Pedro Pablo [facility] [place]   6/29/2023  2:30 PM Ivonne Alfred APRN - CNP [facility] [place]   10/9/2023  2:15 PM Rian Rivera MD [facility] [place]     PATIENT COUNSELING    Barriers to learning and self management: none    Discussed use, benefit, and side effects of prescribed medications. Barriers to medication compliance addressed. All patient questions answered. Pt voiced understanding.        Electronically signed by Rajesh Lobo DO on 3/8/2023 at 4:31 PM

## 2023-03-09 ENCOUNTER — TELEPHONE (OUTPATIENT)
Dept: FAMILY MEDICINE CLINIC | Age: 73
End: 2023-03-09

## 2023-03-09 NOTE — TELEPHONE ENCOUNTER
----- Message from Loida Madrigal DO sent at 3/8/2023  9:52 PM EST -----  Please let pt know that cbc looked good  Let me know if questions, thanks!

## 2023-03-14 RX ORDER — FUROSEMIDE 40 MG/1
TABLET ORAL
Qty: 90 TABLET | Refills: 1 | Status: SHIPPED | OUTPATIENT
Start: 2023-03-14

## 2023-03-15 RX ORDER — METOPROLOL SUCCINATE 25 MG/1
TABLET, EXTENDED RELEASE ORAL
Qty: 180 TABLET | Refills: 2 | Status: SHIPPED | OUTPATIENT
Start: 2023-03-15

## 2023-03-15 RX ORDER — METOPROLOL SUCCINATE 50 MG/1
TABLET, EXTENDED RELEASE ORAL
Qty: 180 TABLET | Refills: 2 | Status: SHIPPED | OUTPATIENT
Start: 2023-03-15

## 2023-03-27 ENCOUNTER — TELEPHONE (OUTPATIENT)
Dept: FAMILY MEDICINE CLINIC | Age: 73
End: 2023-03-27

## 2023-03-27 ENCOUNTER — OFFICE VISIT (OUTPATIENT)
Dept: FAMILY MEDICINE CLINIC | Age: 73
End: 2023-03-27
Payer: MEDICARE

## 2023-03-27 VITALS
WEIGHT: 233.8 LBS | BODY MASS INDEX: 41.43 KG/M2 | SYSTOLIC BLOOD PRESSURE: 130 MMHG | HEIGHT: 63 IN | HEART RATE: 68 BPM | TEMPERATURE: 98.2 F | DIASTOLIC BLOOD PRESSURE: 72 MMHG | RESPIRATION RATE: 16 BRPM | OXYGEN SATURATION: 98 %

## 2023-03-27 DIAGNOSIS — I25.10 ASHD (ARTERIOSCLEROTIC HEART DISEASE): ICD-10-CM

## 2023-03-27 DIAGNOSIS — I48.91 ATRIAL FIBRILLATION, UNSPECIFIED TYPE (HCC): ICD-10-CM

## 2023-03-27 DIAGNOSIS — S80.12XA HEMATOMA OF LEFT LOWER LEG: Primary | ICD-10-CM

## 2023-03-27 PROCEDURE — 3075F SYST BP GE 130 - 139MM HG: CPT | Performed by: FAMILY MEDICINE

## 2023-03-27 PROCEDURE — 1090F PRES/ABSN URINE INCON ASSESS: CPT | Performed by: FAMILY MEDICINE

## 2023-03-27 PROCEDURE — G8484 FLU IMMUNIZE NO ADMIN: HCPCS | Performed by: FAMILY MEDICINE

## 2023-03-27 PROCEDURE — G8400 PT W/DXA NO RESULTS DOC: HCPCS | Performed by: FAMILY MEDICINE

## 2023-03-27 PROCEDURE — 1123F ACP DISCUSS/DSCN MKR DOCD: CPT | Performed by: FAMILY MEDICINE

## 2023-03-27 PROCEDURE — 99214 OFFICE O/P EST MOD 30 MIN: CPT | Performed by: FAMILY MEDICINE

## 2023-03-27 PROCEDURE — 1036F TOBACCO NON-USER: CPT | Performed by: FAMILY MEDICINE

## 2023-03-27 PROCEDURE — 3017F COLORECTAL CA SCREEN DOC REV: CPT | Performed by: FAMILY MEDICINE

## 2023-03-27 PROCEDURE — G8417 CALC BMI ABV UP PARAM F/U: HCPCS | Performed by: FAMILY MEDICINE

## 2023-03-27 PROCEDURE — G8427 DOCREV CUR MEDS BY ELIG CLIN: HCPCS | Performed by: FAMILY MEDICINE

## 2023-03-27 PROCEDURE — 3078F DIAST BP <80 MM HG: CPT | Performed by: FAMILY MEDICINE

## 2023-03-27 NOTE — TELEPHONE ENCOUNTER
Patient reports that her left leg is still swollen, painful and irritated and would like to know if she is able to use one of these creams.  Patient stated that even her blankets are irritating her leg and she needs relief   Triple antibiotic ointment, magnilife (back & leg cream), aspercreme or magnesium cream

## 2023-03-27 NOTE — PROGRESS NOTES
[facility]    THYROIDECTOMY N/A 6/23/2022    Right Hemithyroidectomy performed by Abner Mckinnon MD at [address]  11/2021    TV Repair NOV 2021 at [address] ENDOSCOPY  05/2006       Allergies   Allergen Reactions    Keflex [Cephalexin] Other (See Comments)     Tongue Swelling    Norvasc [Amlodipine Besylate] Other (See Comments)     Nasuea, hot flashes    Statins Other (See Comments)     Myalgias all    Zetia [Ezetimibe] Other (See Comments)     Chest pain    Zocor [Simvastatin] Other (See Comments)     Myalgias    Biaxin [Clarithromycin] Nausea Only    Doxycycline Other (See Comments)     GI side effects       Social History     Tobacco Use    Smoking status: Never     Passive exposure: Yes    Smokeless tobacco: Never   Substance Use Topics    Alcohol use: No       Family History   Problem Relation Age of Onset    Heart Disease Mother     Lung Cancer Mother     Osteoporosis Mother     Other Father         Did not know her father. Other Maternal Grandmother         blood clot, no clear hx surrounding    Heart Attack Maternal Grandfather     Heart Attack Maternal Uncle 62    Breast Cancer Maternal Aunt 48    Breast Cancer Maternal Cousin 40         I have reviewed the patient's past medical history, past surgical history, allergies, medications, social and family history and I have made updates where appropriate.       Review of Systems  Positive responses are highlighted in bold    Constitutional:  Fever, Chills, Night Sweats, Fatigue, Unexpected changes in weight  HENT:  Ear pain, Tinnitus, Nosebleeds, Trouble swallowing, Hearing loss, Sore throat  Cardiovascular:  Chest Pain, Palpitations, Orthopnea, Paroxysmal Nocturnal Dyspnea  Respiratory:  Cough, Wheezing, Shortness of breath, Chest tightness, Apnea  Gastrointestinal:  Nausea, Vomiting, Diarrhea, Constipation, Heartburn, Blood in stool  Genitourinary:  Difficulty or painful urination, Flank pain,

## 2023-03-27 NOTE — TELEPHONE ENCOUNTER
Aspercreme or biofreeze would likely work the best  Is she able to come in today for me to reassess the leg?

## 2023-03-29 ENCOUNTER — HOSPITAL ENCOUNTER (OUTPATIENT)
Dept: PHARMACY | Age: 73
Setting detail: THERAPIES SERIES
Discharge: HOME | End: 2023-03-29
Payer: MEDICARE

## 2023-03-29 ENCOUNTER — APPOINTMENT (OUTPATIENT)
Dept: PHARMACY | Age: 73
End: 2023-03-29
Payer: MEDICARE

## 2023-03-29 ENCOUNTER — TELEPHONE (OUTPATIENT)
Dept: FAMILY MEDICINE CLINIC | Age: 73
End: 2023-03-29

## 2023-03-29 DIAGNOSIS — Z51.81 ENCOUNTER FOR THERAPEUTIC DRUG MONITORING: ICD-10-CM

## 2023-03-29 DIAGNOSIS — Z79.01 ANTICOAGULATED ON COUMADIN: ICD-10-CM

## 2023-03-29 DIAGNOSIS — I48.20 CHRONIC ATRIAL FIBRILLATION (HCC): Primary | ICD-10-CM

## 2023-03-29 LAB — POC INR: 2.8 (ref 0.8–1.2)

## 2023-03-29 PROCEDURE — 36416 COLLJ CAPILLARY BLOOD SPEC: CPT

## 2023-03-29 PROCEDURE — 85610 PROTHROMBIN TIME: CPT

## 2023-03-29 PROCEDURE — 99211 OFF/OP EST MAY X REQ PHY/QHP: CPT

## 2023-03-29 NOTE — TELEPHONE ENCOUNTER
Patient has been informed and voiced understanding and states that she is doing much better today and finally was able to get some rest/sleep last night

## 2023-03-29 NOTE — TELEPHONE ENCOUNTER
Please let pt know that she can hold her asa until she see's me back in April  Continue coumadin. Let me know if questions, thanks!

## 2023-03-29 NOTE — PROGRESS NOTES
Medication Management [address]  [phone] (phone)  [phone] (fax)    Ms. Kurt Mota is a 75 y.o.  female with history of Afib who presents today for anticoagulation monitoring and adjustment. Patient verifies current dosing regimen and tablet strength. No missed or extra doses. Patient forgot to take a dose before bed two weeks ago and took dose the following morning. Patient denies s/s bleeding/bruising/swelling/SOB/chest pain. No blood in urine or stool. No dietary changes. No changes in OTC agents/Herbals. PCP held aspirin for the time being. No change in alcohol use or tobacco use. Slight decrease in activity level due to leg injury. Patient denies headaches/dizziness/lightheadedness. Patient suffered fall about three weeks ago and injured her head. No vomiting/diarrhea or acute illness. No Procedures scheduled in the future at this time. Assessment:   Lab Results   Component Value Date    INR 2.80 (H) 03/29/2023    INR 2.00 (H) 03/01/2023    INR 2.30 (H) 02/01/2023     INR therapeutic   Recent Labs     03/29/23  1431   INR 2.80*   INR 2.0-3.0  Patient interview completed and discussed with pharmacist by Shaniqua Fine PharmD Candidate    Plan:  Continue Coumadin 3 mg W, 6 mg [id number]. Recheck INR in 5 week(s). Patient reminded to call the Anticoagulation Clinic with any signs or symptoms of bleeding or with any medication changes. Patient given instructions utilizing the teach back method. After visit summary printed and reviewed with patient. Discharged ambulatory in no apparent distress.     For Pharmacy Admin Tracking Only    Time Spent (min): 20

## 2023-03-29 NOTE — TELEPHONE ENCOUNTER
----- Message from Rajesh Lobo DO sent at 3/28/2023  6:56 AM EDT -----  Can you f/u with cardiology office today? Pt on coumadin and asa. Has a hematoma on her L lower leg that is slow to resolve over the last 3 wks  Would cardio be ok with pt holding asa for a few wks to help with resolution? Let me know, thanks!

## 2023-04-17 ENCOUNTER — TELEPHONE (OUTPATIENT)
Dept: ONCOLOGY | Age: 73
End: 2023-04-17

## 2023-04-17 NOTE — TELEPHONE ENCOUNTER
Patient called and canceled appt for 5/9/23 and stated that she will call back when she able and reschedule. Garfield Adam

## 2023-04-24 ENCOUNTER — TELEPHONE (OUTPATIENT)
Dept: PHARMACY | Age: 73
End: 2023-04-24

## 2023-04-24 NOTE — TELEPHONE ENCOUNTER
Called and spoke with patient. She stated that she wasn't feeling great last week. Advised patient to take 9 mg of Coumadin today and tomorrow. On Wednesday, she is to resume home dose of Coumadin 3 mg on Wednesdays, 6 mg all other days. Patient is to follow-up on 5/10/23.     Brandi Livingston, RolaD, BCPS  4/24/2023  4:41 PM

## 2023-04-25 ENCOUNTER — TELEPHONE (OUTPATIENT)
Dept: PHARMACY | Facility: CLINIC | Age: 73
End: 2023-04-25

## 2023-04-26 NOTE — PROGRESS NOTES
Chief Complaint   Patient presents with    Follow-up     DM2 and chronic issues noted below     History obtained from the patient. SUBJECTIVE:  Evangelina Madrigal is a 75 y.o. female that presents today for     -L leg pain PRIOR VISIT:  Tamea Sprung on step earlier today  Hit L knee and anterior L shin  Did not hit either hard  L knee feels fine  However, has large area of swelling on anterior L shin  Painful to touch, but not painful otherwise. UPDATE LAST VISIT:   Here for f/u on above  Still with swelling in area of hematoma from 3 wks ago  No better, no worse  Only hurts to touch, but painful when bed sheets etc are on it  Still with some legs welling d/t this  Is on coumadin and asa d/t afib and CAD  STEMI NOV 2021  S/p CABG, MAZE, MVR and TVR at [facility] NOV 2021  No CP/SOB  Previous cbc earlier this month stable, w/o anemia    UPDATE TODAY:   Doing much better  Swelling sig improved  Still with area of swelling around hematoma, but non-tender and much smaller  Leg swelling sig improved as well       -CAD/HFrEF/Afib:  STEMI NOV 2021  S/p CABG, MAZE, MVR and TVR at [facility] NOV 2021  Is on ASA, coumadin, Toprol XL, Entresto and Aldactone  Stopped her Lipitor 6+ months ago d/t myalgias. Cardio aware. No CP, SOB, orthopnea or PND      -DM2: Diet controlled  A1c stable. -HTN:     HPI:     Taking meds as prescribed ?: yes  Tolerating well ?: yes  Side Effects ?: deneis  BP at home ?: <140/90  Working on TLCS ?: yes  Chest Pain/SOB/Palpitations? Denies    BP Readings from Last 3 Encounters:   04/27/23 128/72   03/27/23 130/72   03/08/23 137/80       -HLD:  Not taking lipitor anymore d/t myalgias as noted above  Lipids sig worse, +  Pt states diet is good, low fat  She's not sure what changed  Declines resume statin still  Allergic to Zetia as well  Not super interested in Repatha      -Low platelets PRIOR VISIT: chronic, no clear etiology and mild. Thought maybe d/t naproxen. Labs stable on f/u.  Denies

## 2023-04-27 ENCOUNTER — OFFICE VISIT (OUTPATIENT)
Dept: FAMILY MEDICINE CLINIC | Age: 73
End: 2023-04-27
Payer: MEDICARE

## 2023-04-27 VITALS
DIASTOLIC BLOOD PRESSURE: 72 MMHG | TEMPERATURE: 97.9 F | OXYGEN SATURATION: 98 % | BODY MASS INDEX: 41.14 KG/M2 | SYSTOLIC BLOOD PRESSURE: 128 MMHG | HEIGHT: 63 IN | WEIGHT: 232.2 LBS | RESPIRATION RATE: 16 BRPM | HEART RATE: 68 BPM

## 2023-04-27 DIAGNOSIS — Z95.1 S/P CABG (CORONARY ARTERY BYPASS GRAFT): ICD-10-CM

## 2023-04-27 DIAGNOSIS — I50.20 HEART FAILURE WITH REDUCED EJECTION FRACTION (HCC): ICD-10-CM

## 2023-04-27 DIAGNOSIS — Z98.890 S/P MAZE OPERATION FOR ATRIAL FIBRILLATION: ICD-10-CM

## 2023-04-27 DIAGNOSIS — M79.10 MYALGIA: ICD-10-CM

## 2023-04-27 DIAGNOSIS — Z98.890 S/P TRICUSPID VALVE REPAIR: ICD-10-CM

## 2023-04-27 DIAGNOSIS — I25.10 ASHD (ARTERIOSCLEROTIC HEART DISEASE): ICD-10-CM

## 2023-04-27 DIAGNOSIS — Z98.890 S/P MVR (MITRAL VALVE REPAIR): ICD-10-CM

## 2023-04-27 DIAGNOSIS — D34 FOLLICULAR ADENOMA OF THYROID GLAND: ICD-10-CM

## 2023-04-27 DIAGNOSIS — I25.5 ISCHEMIC CARDIOMYOPATHY: ICD-10-CM

## 2023-04-27 DIAGNOSIS — E78.2 MIXED HYPERLIPIDEMIA: ICD-10-CM

## 2023-04-27 DIAGNOSIS — K21.9 GASTROESOPHAGEAL REFLUX DISEASE, UNSPECIFIED WHETHER ESOPHAGITIS PRESENT: ICD-10-CM

## 2023-04-27 DIAGNOSIS — Z86.79 S/P MAZE OPERATION FOR ATRIAL FIBRILLATION: ICD-10-CM

## 2023-04-27 DIAGNOSIS — Z78.9 STATIN INTOLERANCE: ICD-10-CM

## 2023-04-27 DIAGNOSIS — S80.12XA HEMATOMA OF LEFT LOWER LEG: Primary | ICD-10-CM

## 2023-04-27 DIAGNOSIS — E11.9 TYPE 2 DIABETES MELLITUS WITHOUT COMPLICATION, WITHOUT LONG-TERM CURRENT USE OF INSULIN (HCC): ICD-10-CM

## 2023-04-27 DIAGNOSIS — I10 ESSENTIAL HYPERTENSION: ICD-10-CM

## 2023-04-27 DIAGNOSIS — I48.91 ATRIAL FIBRILLATION, UNSPECIFIED TYPE (HCC): ICD-10-CM

## 2023-04-27 DIAGNOSIS — I25.2 HISTORY OF ST ELEVATION MYOCARDIAL INFARCTION (STEMI): ICD-10-CM

## 2023-04-27 DIAGNOSIS — E89.0 HYPOTHYROIDISM, POSTSURGICAL: ICD-10-CM

## 2023-04-27 DIAGNOSIS — D69.6 THROMBOCYTOPENIA (HCC): ICD-10-CM

## 2023-04-27 DIAGNOSIS — D32.9 MENINGIOMA (HCC): ICD-10-CM

## 2023-04-27 DIAGNOSIS — E89.0 S/P PARTIAL THYROIDECTOMY: ICD-10-CM

## 2023-04-27 LAB — HBA1C MFR BLD: 6.3 % (ref 4.3–5.7)

## 2023-04-27 PROCEDURE — 3078F DIAST BP <80 MM HG: CPT | Performed by: FAMILY MEDICINE

## 2023-04-27 PROCEDURE — G8427 DOCREV CUR MEDS BY ELIG CLIN: HCPCS | Performed by: FAMILY MEDICINE

## 2023-04-27 PROCEDURE — 1036F TOBACCO NON-USER: CPT | Performed by: FAMILY MEDICINE

## 2023-04-27 PROCEDURE — 3044F HG A1C LEVEL LT 7.0%: CPT | Performed by: FAMILY MEDICINE

## 2023-04-27 PROCEDURE — 2022F DILAT RTA XM EVC RTNOPTHY: CPT | Performed by: FAMILY MEDICINE

## 2023-04-27 PROCEDURE — G8400 PT W/DXA NO RESULTS DOC: HCPCS | Performed by: FAMILY MEDICINE

## 2023-04-27 PROCEDURE — 99214 OFFICE O/P EST MOD 30 MIN: CPT | Performed by: FAMILY MEDICINE

## 2023-04-27 PROCEDURE — 1123F ACP DISCUSS/DSCN MKR DOCD: CPT | Performed by: FAMILY MEDICINE

## 2023-04-27 PROCEDURE — 1090F PRES/ABSN URINE INCON ASSESS: CPT | Performed by: FAMILY MEDICINE

## 2023-04-27 PROCEDURE — 3017F COLORECTAL CA SCREEN DOC REV: CPT | Performed by: FAMILY MEDICINE

## 2023-04-27 PROCEDURE — 3074F SYST BP LT 130 MM HG: CPT | Performed by: FAMILY MEDICINE

## 2023-04-27 PROCEDURE — G8417 CALC BMI ABV UP PARAM F/U: HCPCS | Performed by: FAMILY MEDICINE

## 2023-05-01 NOTE — TELEPHONE ENCOUNTER
For Pharmacy Admin Tracking Only    Program: [address] in place:  No  Recommendation Provided To: Provider: 1 via Note to Provider  Intervention Accepted By: Provider: 1  Gap Closed?: Yes   Time Spent (min): 15

## 2023-05-08 ENCOUNTER — CARE COORDINATION (OUTPATIENT)
Dept: CARE COORDINATION | Age: 73
End: 2023-05-08

## 2023-05-08 NOTE — CARE COORDINATION
Krissy Parr () called asking about her [address] for her [place] [employer]. I looked and she is good to go until August. He will reach out to me then when she needs another monse.         [address] [facility]   Medication Assistance  [address] [place] [address], and [email or web address]    (g) [phone] (q) [phone]

## 2023-05-10 ENCOUNTER — HOSPITAL ENCOUNTER (OUTPATIENT)
Dept: PHARMACY | Age: 73
Setting detail: THERAPIES SERIES
Discharge: HOME | End: 2023-05-10
Payer: MEDICARE

## 2023-05-10 DIAGNOSIS — I48.20 CHRONIC ATRIAL FIBRILLATION (HCC): Primary | Chronic | ICD-10-CM

## 2023-05-10 DIAGNOSIS — Z79.01 ANTICOAGULATED ON COUMADIN: ICD-10-CM

## 2023-05-10 DIAGNOSIS — Z51.81 ENCOUNTER FOR THERAPEUTIC DRUG MONITORING: ICD-10-CM

## 2023-05-10 LAB — POC INR: 2.3 (ref 0.8–1.2)

## 2023-05-10 PROCEDURE — 99211 OFF/OP EST MAY X REQ PHY/QHP: CPT

## 2023-05-10 PROCEDURE — 36416 COLLJ CAPILLARY BLOOD SPEC: CPT

## 2023-05-10 PROCEDURE — 85610 PROTHROMBIN TIME: CPT

## 2023-05-10 NOTE — PROGRESS NOTES
Medication Management [address]  [phone] (phone)  [phone] (fax)    Ms. Vianey Nieves is a 75 y.o.  female with history of Afib who presents today for anticoagulation monitoring and adjustment. Patient verifies current dosing regimen and tablet strength. No missed or extra doses. Patient denies s/s bleeding/bruising/swelling/SOB/chest pain  No blood in urine or stool. No dietary changes. No changes in medication/OTC agents/Herbals. No change in alcohol use or tobacco use. No change in activity level. Patient denies headaches/dizziness/lightheadedness/falls. No vomiting/diarrhea or acute illness. No Procedures scheduled in the future at this time. Assessment:   Lab Results   Component Value Date    INR 2.30 (H) 05/10/2023    INR 2.80 (H) 03/29/2023    INR 2.00 (H) 03/01/2023     INR therapeutic   Recent Labs     05/10/23  1408   INR 2.30*       Plan:  Continue Coumadin 3mg W and 6mg MTThFSS. Recheck INR in 5 week(s). Patient reminded to call the Anticoagulation Clinic with any signs or symptoms of bleeding or with any medication changes. Patient given instructions utilizing the teach back method. After visit summary printed and reviewed with patient. Discharged ambulatory in no apparent distress.       For Pharmacy Admin Tracking Only    Time Spent (min): 20

## 2023-05-30 RX ORDER — POTASSIUM CHLORIDE 20 MEQ/1
TABLET, EXTENDED RELEASE ORAL
Qty: 90 TABLET | Refills: 3 | Status: SHIPPED | OUTPATIENT
Start: 2023-05-30

## 2023-06-02 DIAGNOSIS — E89.0 POSTOPERATIVE HYPOTHYROIDISM: ICD-10-CM

## 2023-06-02 RX ORDER — POTASSIUM CHLORIDE 20 MEQ/1
20 TABLET, EXTENDED RELEASE ORAL DAILY
Qty: 90 TABLET | Refills: 3 | Status: CANCELLED | OUTPATIENT
Start: 2023-06-02

## 2023-06-02 RX ORDER — LEVOTHYROXINE SODIUM 0.03 MG/1
25 TABLET ORAL DAILY
Qty: 90 TABLET | Refills: 3 | Status: SHIPPED | OUTPATIENT
Start: 2023-06-02

## 2023-06-29 ENCOUNTER — OFFICE VISIT (OUTPATIENT)
Dept: CARDIOLOGY CLINIC | Age: 73
End: 2023-06-29
Payer: MEDICARE

## 2023-06-29 VITALS
SYSTOLIC BLOOD PRESSURE: 122 MMHG | OXYGEN SATURATION: 96 % | DIASTOLIC BLOOD PRESSURE: 72 MMHG | WEIGHT: 233.2 LBS | BODY MASS INDEX: 42.91 KG/M2 | HEART RATE: 70 BPM | HEIGHT: 62 IN

## 2023-06-29 DIAGNOSIS — I48.20 CHRONIC ATRIAL FIBRILLATION (HCC): ICD-10-CM

## 2023-06-29 DIAGNOSIS — I25.5 ISCHEMIC CARDIOMYOPATHY: Primary | ICD-10-CM

## 2023-06-29 DIAGNOSIS — I50.22 CHRONIC SYSTOLIC CONGESTIVE HEART FAILURE, NYHA CLASS 2 (HCC): ICD-10-CM

## 2023-06-29 DIAGNOSIS — R60.0 EDEMA OF BOTH LOWER LEGS: ICD-10-CM

## 2023-06-29 PROCEDURE — 3074F SYST BP LT 130 MM HG: CPT | Performed by: NURSE PRACTITIONER

## 2023-06-29 PROCEDURE — G8417 CALC BMI ABV UP PARAM F/U: HCPCS | Performed by: NURSE PRACTITIONER

## 2023-06-29 PROCEDURE — G8427 DOCREV CUR MEDS BY ELIG CLIN: HCPCS | Performed by: NURSE PRACTITIONER

## 2023-06-29 PROCEDURE — 1090F PRES/ABSN URINE INCON ASSESS: CPT | Performed by: NURSE PRACTITIONER

## 2023-06-29 PROCEDURE — 1036F TOBACCO NON-USER: CPT | Performed by: NURSE PRACTITIONER

## 2023-06-29 PROCEDURE — 3017F COLORECTAL CA SCREEN DOC REV: CPT | Performed by: NURSE PRACTITIONER

## 2023-06-29 PROCEDURE — 99214 OFFICE O/P EST MOD 30 MIN: CPT | Performed by: NURSE PRACTITIONER

## 2023-06-29 PROCEDURE — G8400 PT W/DXA NO RESULTS DOC: HCPCS | Performed by: NURSE PRACTITIONER

## 2023-06-29 PROCEDURE — 3078F DIAST BP <80 MM HG: CPT | Performed by: NURSE PRACTITIONER

## 2023-06-29 PROCEDURE — 1123F ACP DISCUSS/DSCN MKR DOCD: CPT | Performed by: NURSE PRACTITIONER

## 2023-06-29 ASSESSMENT — ENCOUNTER SYMPTOMS
ABDOMINAL DISTENTION: 0
SHORTNESS OF BREATH: 0
ABDOMINAL PAIN: 0
WHEEZING: 0
COUGH: 0

## 2023-07-10 ENCOUNTER — NURSE ONLY (OUTPATIENT)
Dept: LAB | Age: 73
End: 2023-07-10

## 2023-07-10 DIAGNOSIS — I50.22 CHRONIC SYSTOLIC CONGESTIVE HEART FAILURE, NYHA CLASS 2 (HCC): ICD-10-CM

## 2023-07-10 DIAGNOSIS — I25.10 ASHD (ARTERIOSCLEROTIC HEART DISEASE): ICD-10-CM

## 2023-07-10 DIAGNOSIS — Z78.9 STATIN INTOLERANCE: ICD-10-CM

## 2023-07-10 DIAGNOSIS — E11.9 TYPE 2 DIABETES MELLITUS WITHOUT COMPLICATION, WITHOUT LONG-TERM CURRENT USE OF INSULIN (HCC): ICD-10-CM

## 2023-07-10 DIAGNOSIS — M79.10 MYALGIA: ICD-10-CM

## 2023-07-10 DIAGNOSIS — E78.2 MIXED HYPERLIPIDEMIA: ICD-10-CM

## 2023-07-10 LAB
ANION GAP SERPL CALC-SCNC: 14 MEQ/L (ref 8–16)
BUN SERPL-MCNC: 25 MG/DL (ref 7–22)
CALCIUM SERPL-MCNC: 9.9 MG/DL (ref 8.5–10.5)
CHLORIDE SERPL-SCNC: 102 MEQ/L (ref 98–111)
CHOLEST SERPL-MCNC: 271 MG/DL (ref 100–199)
CO2 SERPL-SCNC: 26 MEQ/L (ref 23–33)
CREAT SERPL-MCNC: 1.1 MG/DL (ref 0.4–1.2)
GFR SERPL CREATININE-BSD FRML MDRD: 53 ML/MIN/1.73M2
GLUCOSE SERPL-MCNC: 148 MG/DL (ref 70–108)
HDLC SERPL-MCNC: 41 MG/DL
LDLC SERPL CALC-MCNC: 191 MG/DL
MAGNESIUM SERPL-MCNC: 2 MG/DL (ref 1.6–2.4)
NT-PROBNP SERPL IA-MCNC: 218.7 PG/ML (ref 0–124)
POTASSIUM SERPL-SCNC: 4.5 MEQ/L (ref 3.5–5.2)
SODIUM SERPL-SCNC: 142 MEQ/L (ref 135–145)
TRIGL SERPL-MCNC: 193 MG/DL (ref 0–199)

## 2023-07-11 ENCOUNTER — TELEPHONE (OUTPATIENT)
Dept: FAMILY MEDICINE CLINIC | Age: 73
End: 2023-07-11

## 2023-07-11 NOTE — TELEPHONE ENCOUNTER
----- Message from Nery Hallman sent at 7/11/2023  2:58 PM EDT -----  Subject: Message to Provider    QUESTIONS  Information for Provider?  PT returning call no answer at the office,   return call please   ---------------------------------------------------------------------------  --------------  [address]  [phone]; OK to leave message on voicemail  ---------------------------------------------------------------------------  --------------  SCRIPT ANSWERS  undefined

## 2023-07-11 NOTE — TELEPHONE ENCOUNTER
Patient has been informed and voiced understanding and states hat she does not want to start medications

## 2023-07-11 NOTE — TELEPHONE ENCOUNTER
----- Message from ORAL Bartlett CNP sent at 7/10/2023  7:00 PM EDT -----  Let Stefanie Cha know her  cholesterol is still quite high. Her total was 271, so actually down from 315. Her LDL (bad) was 191, which is down from 231. I know Dr Meghan Teresa had a length discussion regarding cholesterol meds, so i'm not going to reiterate everything all over again. If she does wish to pursue medication, let us know.

## 2023-07-25 ENCOUNTER — HOSPITAL ENCOUNTER (OUTPATIENT)
Dept: WOMENS IMAGING | Age: 73
Discharge: HOME | End: 2023-07-25
Payer: MEDICARE

## 2023-07-25 DIAGNOSIS — Z12.31 VISIT FOR SCREENING MAMMOGRAM: ICD-10-CM

## 2023-07-25 PROCEDURE — 77063 BREAST TOMOSYNTHESIS BI: CPT

## 2023-07-26 ENCOUNTER — TELEPHONE (OUTPATIENT)
Dept: FAMILY MEDICINE CLINIC | Age: 73
End: 2023-07-26

## 2023-07-26 ENCOUNTER — HOSPITAL ENCOUNTER (OUTPATIENT)
Dept: PHARMACY | Age: 73
Setting detail: THERAPIES SERIES
Discharge: HOME | End: 2023-07-26
Payer: MEDICARE

## 2023-07-26 DIAGNOSIS — I48.20 CHRONIC ATRIAL FIBRILLATION (HCC): Primary | Chronic | ICD-10-CM

## 2023-07-26 DIAGNOSIS — Z51.81 ENCOUNTER FOR THERAPEUTIC DRUG MONITORING: ICD-10-CM

## 2023-07-26 DIAGNOSIS — Z79.01 ANTICOAGULATED ON COUMADIN: ICD-10-CM

## 2023-07-26 LAB — POC INR: 2.4 (ref 0.8–1.2)

## 2023-07-26 PROCEDURE — 99211 OFF/OP EST MAY X REQ PHY/QHP: CPT

## 2023-07-26 PROCEDURE — 36416 COLLJ CAPILLARY BLOOD SPEC: CPT

## 2023-07-26 PROCEDURE — 85610 PROTHROMBIN TIME: CPT

## 2023-07-26 NOTE — PROGRESS NOTES
Medication Management [address] [place] [address]  [phone] (phone)  [phone] (fax)    Ms. Evonne Moya is a 75 y.o.  female with history of atrial fib. , per Dr. Courtney Ordonez referral, who presents today for Warfarin monitoring and adjustment (6 week visit). Patient verifies current dosing regimen and tablet strength. No missed or extra doses. Patient denies bleeding/swelling. Has usual NORMAN/easy bruising. Wearing knee-high compression hose bilat. No dietary changes. No changes in medication/OTC agents/herbals. No change in alcohol use or tobacco use. No change in activity level. Patient denies falls. No vomiting/diarrhea or acute illness. No procedures scheduled in the future at this time. Assessment:   Lab Results   Component Value Date    INR 2.40 (H) 07/26/2023    INR 2.60 (H) 06/14/2023    INR 2.30 (H) 05/10/2023     INR therapeutic - goal 2-3. Recent Labs     07/26/23  1350   INR 2.40*        Plan:  POCT INR performed/result reviewed. Continue PO Coumadin 3 mg W, 6 mg MTThFSS. Recheck INR in 6 week(s). Patient reminded to call the Anticoagulation Clinic with any signs or symptoms of bleeding or with any medication changes. Patient given instructions utilizing the teach back method. After visit summary printed and reviewed with patient. Discharged ambulatory in no apparent distress, with cane and .     For Pharmacy Admin Tracking Only    Time Spent (min): 20

## 2023-07-26 NOTE — TELEPHONE ENCOUNTER
----- Message from Cliff Fong DO sent at 7/26/2023  7:12 AM EDT -----  Please let pt know that mammogram is normal. Let me know if questions, thanks!

## 2023-08-09 RX ORDER — SPIRONOLACTONE 25 MG/1
25 TABLET ORAL DAILY
Qty: 90 TABLET | Refills: 3 | Status: SHIPPED | OUTPATIENT
Start: 2023-08-09

## 2023-08-21 NOTE — PROGRESS NOTES
Medication Management [address]   [phone] (phone)  [phone] (fax)    Ms. Nayla Mcdaniels is a 67 y.o.  female with history of atrial fib. , per Dr. Jailene Davis referral, who presents today for Warfarin  monitoring and adjustment (4 week visit). Patient verifies current dosing regimen and tablet strength. No missed or extra doses. Patient denies bleeding/swelling/chest pain. Has usual SOB if overdoes it. Has usual easy bruising; states bruised easily before blood thinners. No blood in urine or stool. No dietary changes, except for small amount of green peppers last night. No changes in medication/OTC agents/herbals. No change in alcohol use or tobacco use. No change in activity level, but hopes to increase as weather improves. Patient denies headaches/dizziness/lightheadedness/falls. No vomiting/diarrhea or acute illness. No procedures scheduled in the future at this time. Assessment:   Lab Results   Component Value Date    INR 2.00 (H) 03/01/2023    INR 2.30 (H) 02/01/2023    INR 3.00 (H) 01/04/2023     INR therapeutic - goal 2-3. Recent Labs     03/01/23  1529   INR 2.00*        Plan:  POCT INR performed/result reviewed. Continue PO Coumadin 3 mg W, 6 mg MTThFSS. Recheck INR in 4 week(s). Patient reminded to call the Anticoagulation Clinic with any signs or symptoms of bleeding or with any medication changes. Patient given instructions utilizing the teach back method. After visit summary printed and reviewed with patient. Discharged ambulatory in no apparent distress, with cane and .     For Pharmacy Admin Tracking Only    Time Spent (min): 20 21-Aug-2023 15:24

## 2023-09-05 RX ORDER — SPIRONOLACTONE 25 MG/1
TABLET ORAL
Qty: 90 TABLET | Refills: 1 | Status: SHIPPED | OUTPATIENT
Start: 2023-09-05

## 2023-09-06 ENCOUNTER — HOSPITAL ENCOUNTER (OUTPATIENT)
Dept: PHARMACY | Age: 73
Setting detail: THERAPIES SERIES
Discharge: HOME | End: 2023-09-06
Payer: MEDICARE

## 2023-09-06 DIAGNOSIS — Z79.01 ANTICOAGULATED ON COUMADIN: ICD-10-CM

## 2023-09-06 DIAGNOSIS — I48.20 CHRONIC ATRIAL FIBRILLATION (HCC): Primary | Chronic | ICD-10-CM

## 2023-09-06 DIAGNOSIS — Z51.81 ENCOUNTER FOR THERAPEUTIC DRUG MONITORING: ICD-10-CM

## 2023-09-06 LAB — POC INR: 2.4 (ref 0.8–1.2)

## 2023-09-06 PROCEDURE — 85610 PROTHROMBIN TIME: CPT

## 2023-09-06 PROCEDURE — 99212 OFFICE O/P EST SF 10 MIN: CPT

## 2023-09-06 PROCEDURE — 36416 COLLJ CAPILLARY BLOOD SPEC: CPT

## 2023-09-06 RX ORDER — WARFARIN SODIUM 3 MG/1
TABLET ORAL
Qty: 180 TABLET | Refills: 2 | Status: SHIPPED | OUTPATIENT
Start: 2023-09-06

## 2023-09-06 RX ORDER — WARFARIN SODIUM 3 MG/1
TABLET ORAL EVERY EVENING
COMMUNITY

## 2023-09-06 NOTE — PROGRESS NOTES
Medication Management [address] [place] [address]  [phone] (phone)  [phone] (fax)    Ms. Kaye Juarez is a 75 y.o.  female with history of atrial fib. , per Dr. Kirstin Rosario referral, who presents today for Warfarin monitoring and adjustment (6 week visit). Patient verifies current dosing regimen and tablet strength. No missed or extra doses. Patient denies bleeding/swelling. Has usual SOB/easy bruising (even before Coumadin). Wears compression socks bilat. No blood in urine or stool. No dietary changes. No changes in medication/OTC agents/herbals. No change in alcohol use or tobacco use. No change in activity level. Patient denies falls. No vomiting/diarrhea or acute illness. Spits up a little phlegm if she accidentally has wheat in a product - not new. No procedures scheduled in the future at this time. Assessment:   Lab Results   Component Value Date    INR 2.40 (H) 09/06/2023    INR 2.40 (H) 07/26/2023    INR 2.60 (H) 06/14/2023     INR therapeutic - goal 2-3. Recent Labs     09/06/23  1414   INR 2.40*        Plan:  POCT INR performed/result reviewed. Continue PO Coumadin 3 mg W, 6 mg MTThFSS. Recheck INR in 6 week(s). Patient reminded to call the Anticoagulation Clinic with any signs or symptoms of bleeding or with any medication changes. Patient given instructions utilizing the teach back method. After visit summary printed and reviewed with patient. Discharged ambulatory in no apparent distress, with cane and .   Clinic pharmacist sent prescription electronically to mail order pharmacy, per patient's request.    For Pharmacy Admin Tracking Only    Intervention Detail: Refill(s) Provided  Total # of Interventions Recommended: 1  Total # of Interventions Accepted: 1  Time Spent (min):  21

## 2023-09-19 ENCOUNTER — TELEPHONE (OUTPATIENT)
Dept: PHARMACY | Age: 73
End: 2023-09-19

## 2023-09-19 NOTE — TELEPHONE ENCOUNTER
Patient called and states that she got up about 1:30 a.m. and had a BM and there was a small amount of blood on the toilet paper when she wiped. She states that she has had a 2nd BM and no blood. She just wanted us to know. Please call the patient regarding any instructions and she said you can leave message on her answering machine.

## 2023-10-05 ENCOUNTER — TELEPHONE (OUTPATIENT)
Dept: FAMILY MEDICINE CLINIC | Age: 73
End: 2023-10-05

## 2023-10-05 DIAGNOSIS — E11.9 TYPE 2 DIABETES MELLITUS WITHOUT COMPLICATION, WITHOUT LONG-TERM CURRENT USE OF INSULIN (HCC): Primary | ICD-10-CM

## 2023-10-05 NOTE — TELEPHONE ENCOUNTER
Pt due for fasting labs prior to next apt on 11/2/2023. Please call to have pt complete this. Thanks! ASSESSMENT & PLAN   Diagnosis Orders   1.  Type 2 diabetes mellitus without complication, without long-term current use of insulin ([address])  CBC with Auto Differential    Comprehensive Metabolic Panel    Lipid Panel    TSH with Reflex    Microalbumin / Creatinine Urine Ratio        Future Appointments   Date Time Provider [address]   10/9/2023  2:15 PM Arlin Hernandez MD N SRPX Heart [facility]   10/18/2023  2:00 PM Thomas Castano RN STR MED MGMT [facility]   11/2/2023  3:20 PM Corina Stern DO [facility]   1/17/2024  2:30 PM ORAL Morocho - CNP N SRPX CHF [facility]

## 2023-10-09 ENCOUNTER — OFFICE VISIT (OUTPATIENT)
Dept: CARDIOLOGY CLINIC | Age: 73
End: 2023-10-09
Payer: MEDICARE

## 2023-10-09 VITALS
BODY MASS INDEX: 42.74 KG/M2 | WEIGHT: 232.25 LBS | HEART RATE: 90 BPM | HEIGHT: 62 IN | SYSTOLIC BLOOD PRESSURE: 120 MMHG | DIASTOLIC BLOOD PRESSURE: 62 MMHG

## 2023-10-09 DIAGNOSIS — I48.20 CHRONIC ATRIAL FIBRILLATION (HCC): ICD-10-CM

## 2023-10-09 DIAGNOSIS — I50.22 CHRONIC SYSTOLIC CONGESTIVE HEART FAILURE, NYHA CLASS 2 (HCC): Primary | ICD-10-CM

## 2023-10-09 PROCEDURE — 1036F TOBACCO NON-USER: CPT | Performed by: INTERNAL MEDICINE

## 2023-10-09 PROCEDURE — G8484 FLU IMMUNIZE NO ADMIN: HCPCS | Performed by: INTERNAL MEDICINE

## 2023-10-09 PROCEDURE — G8400 PT W/DXA NO RESULTS DOC: HCPCS | Performed by: INTERNAL MEDICINE

## 2023-10-09 PROCEDURE — 3078F DIAST BP <80 MM HG: CPT | Performed by: INTERNAL MEDICINE

## 2023-10-09 PROCEDURE — 99214 OFFICE O/P EST MOD 30 MIN: CPT | Performed by: INTERNAL MEDICINE

## 2023-10-09 PROCEDURE — 3074F SYST BP LT 130 MM HG: CPT | Performed by: INTERNAL MEDICINE

## 2023-10-09 PROCEDURE — 1123F ACP DISCUSS/DSCN MKR DOCD: CPT | Performed by: INTERNAL MEDICINE

## 2023-10-09 PROCEDURE — G8417 CALC BMI ABV UP PARAM F/U: HCPCS | Performed by: INTERNAL MEDICINE

## 2023-10-09 PROCEDURE — G8428 CUR MEDS NOT DOCUMENT: HCPCS | Performed by: INTERNAL MEDICINE

## 2023-10-09 PROCEDURE — 93000 ELECTROCARDIOGRAM COMPLETE: CPT | Performed by: INTERNAL MEDICINE

## 2023-10-09 PROCEDURE — 3017F COLORECTAL CA SCREEN DOC REV: CPT | Performed by: INTERNAL MEDICINE

## 2023-10-09 PROCEDURE — 1090F PRES/ABSN URINE INCON ASSESS: CPT | Performed by: INTERNAL MEDICINE

## 2023-10-09 NOTE — PROGRESS NOTES
Pt here for 1 yr check up     EKG done today     Pt continues with sob , swelling wears compression hose,
patient to call office or seek immediate medical attention if there is any new onset of  any chest pain, sob, palpitations, lightheadedness, dizziness, orthopnea, PND or pedal edema. All medication side effects were discussed in details. Thank youfor allowing me to participate in the care of this patient. Please do not hesitate to contact me for any further questions. Return in about 1 year (around 10/9/2024), or if symptoms worsen or fail to improve, for Regular follow up, Review testing.        Electronically signed by Robbin Okeefe MD [facility]  10/9/2023 at 3:33 PM EDT

## 2023-10-16 DIAGNOSIS — I48.91 ATRIAL FIBRILLATION, UNSPECIFIED TYPE (HCC): Primary | ICD-10-CM

## 2023-10-16 RX ORDER — OMEPRAZOLE 20 MG/1
CAPSULE, DELAYED RELEASE ORAL
Qty: 90 CAPSULE | Refills: 3 | Status: SHIPPED | OUTPATIENT
Start: 2023-10-16

## 2023-10-16 RX ORDER — FUROSEMIDE 40 MG/1
TABLET ORAL
Qty: 90 TABLET | Refills: 2 | Status: SHIPPED | OUTPATIENT
Start: 2023-10-16

## 2023-10-16 NOTE — TELEPHONE ENCOUNTER
Future Appointments   Date Time Provider [address]   10/18/2023  2:00 PM Hector Melissa RN [facility]   11/2/2023  3:20 PM Den Sands DO [facility]   1/17/2024  2:30 PM Hedy Boas, APRN - CNP N SRPX CHF [facility]   10/7/2024  2:30 PM STR ECHO RM1 STRZ ECHO [facility]   10/17/2024  2:30 PM Delbert Woodson MD N [address] [facility],  [address]

## 2023-10-18 ENCOUNTER — HOSPITAL ENCOUNTER (OUTPATIENT)
Dept: PHARMACY | Age: 73
Setting detail: THERAPIES SERIES
Discharge: HOME | End: 2023-10-18
Payer: MEDICARE

## 2023-10-18 DIAGNOSIS — Z79.01 ANTICOAGULATED ON COUMADIN: Primary | ICD-10-CM

## 2023-10-18 DIAGNOSIS — Z51.81 ENCOUNTER FOR THERAPEUTIC DRUG MONITORING: ICD-10-CM

## 2023-10-18 DIAGNOSIS — I48.20 CHRONIC ATRIAL FIBRILLATION (HCC): Chronic | ICD-10-CM

## 2023-10-18 LAB — POC INR: 2.9 (ref 0.8–1.2)

## 2023-10-18 PROCEDURE — 99211 OFF/OP EST MAY X REQ PHY/QHP: CPT

## 2023-10-18 PROCEDURE — 85610 PROTHROMBIN TIME: CPT

## 2023-10-18 PROCEDURE — 36416 COLLJ CAPILLARY BLOOD SPEC: CPT

## 2023-10-18 NOTE — PROGRESS NOTES
Medication Management [address] [place] [address]  [phone] (phone)  [phone] (fax)    Ms. Evonne Moya is a 75 y.o.  female with history of atrial fib. , per Dr. Courtney Ordonez referral, who presents today for Warfarin monitoring and adjustment (6 week visit). Patient verifies current dosing regimen and tablet strength. Uses pill box. No missed or extra doses. Patient denies swelling. Has usual SOB/easy bruising. Will sometimes see a little blood on tissue that she puts in nose. No blood in urine or stool. No dietary changes. No changes in medication/OTC agents/herbals. No change in alcohol use or tobacco use. No change in activity level. Patient denies falls. No vomiting/diarrhea or acute illness. No procedures scheduled in the future at this time. Assessment:   Lab Results   Component Value Date    INR 2.90 (H) 10/18/2023    INR 2.40 (H) 09/06/2023    INR 2.40 (H) 07/26/2023     INR therapeutic - goal 2-3. Recent Labs     10/18/23  1407   INR 2.90*        Plan:  POCT INR performed/result reviewed. Continue PO Coumadin 3 mg W, 6 mg MTThFSS. Recheck INR in 6 week(s). Patient reminded to call the Anticoagulation Clinic with any signs or symptoms of bleeding or with any medication changes. Patient given instructions utilizing the teach back method. After visit summary printed and reviewed with patient. Discharged ambulatory in no apparent distress, with cane and .      For Pharmacy Admin Tracking Only    Time Spent (min): 20

## 2023-10-31 RX ORDER — SACUBITRIL AND VALSARTAN 49; 51 MG/1; MG/1
1 TABLET, FILM COATED ORAL 2 TIMES DAILY
Qty: 180 TABLET | Refills: 3 | Status: SHIPPED | OUTPATIENT
Start: 2023-10-31

## 2023-11-01 ENCOUNTER — NURSE ONLY (OUTPATIENT)
Dept: LAB | Age: 73
End: 2023-11-01

## 2023-11-01 DIAGNOSIS — E78.2 MIXED HYPERLIPIDEMIA: ICD-10-CM

## 2023-11-01 DIAGNOSIS — I25.10 ASHD (ARTERIOSCLEROTIC HEART DISEASE): ICD-10-CM

## 2023-11-01 DIAGNOSIS — D69.6 THROMBOCYTOPENIA (HCC): ICD-10-CM

## 2023-11-01 DIAGNOSIS — E11.9 TYPE 2 DIABETES MELLITUS WITHOUT COMPLICATION, WITHOUT LONG-TERM CURRENT USE OF INSULIN (HCC): ICD-10-CM

## 2023-11-01 DIAGNOSIS — I10 PRIMARY HYPERTENSION: ICD-10-CM

## 2023-11-01 LAB
ALBUMIN SERPL BCG-MCNC: 4.4 G/DL (ref 3.5–5.1)
ALP SERPL-CCNC: 78 U/L (ref 38–126)
ALT SERPL W/O P-5'-P-CCNC: 11 U/L (ref 11–66)
ANION GAP SERPL CALC-SCNC: 15 MEQ/L (ref 8–16)
AST SERPL-CCNC: 17 U/L (ref 5–40)
BASOPHILS ABSOLUTE: 0.1 THOU/MM3 (ref 0–0.1)
BASOPHILS NFR BLD AUTO: 1.4 %
BILIRUB SERPL-MCNC: 0.3 MG/DL (ref 0.3–1.2)
BUN SERPL-MCNC: 28 MG/DL (ref 7–22)
CALCIUM SERPL-MCNC: 9.5 MG/DL (ref 8.5–10.5)
CHLORIDE SERPL-SCNC: 98 MEQ/L (ref 98–111)
CHOLEST SERPL-MCNC: 243 MG/DL (ref 100–199)
CO2 SERPL-SCNC: 26 MEQ/L (ref 23–33)
CREAT SERPL-MCNC: 1.1 MG/DL (ref 0.4–1.2)
CREAT UR-MCNC: 155.5 MG/DL
DEPRECATED RDW RBC AUTO: 47.5 FL (ref 35–45)
EOSINOPHIL NFR BLD AUTO: 3.9 %
EOSINOPHILS ABSOLUTE: 0.3 THOU/MM3 (ref 0–0.4)
ERYTHROCYTE [DISTWIDTH] IN BLOOD BY AUTOMATED COUNT: 15.7 % (ref 11.5–14.5)
GFR SERPL CREATININE-BSD FRML MDRD: 53 ML/MIN/1.73M2
GLUCOSE SERPL-MCNC: 141 MG/DL (ref 70–108)
HCT VFR BLD AUTO: 33.2 % (ref 37–47)
HDLC SERPL-MCNC: 38 MG/DL
HGB BLD-MCNC: 10 GM/DL (ref 12–16)
IMM GRANULOCYTES # BLD AUTO: 0.03 THOU/MM3 (ref 0–0.07)
IMM GRANULOCYTES NFR BLD AUTO: 0.4 %
LDLC SERPL CALC-MCNC: 168 MG/DL
LYMPHOCYTES ABSOLUTE: 1.4 THOU/MM3 (ref 1–4.8)
LYMPHOCYTES NFR BLD AUTO: 18.3 %
MCH RBC QN AUTO: 25.4 PG (ref 26–33)
MCHC RBC AUTO-ENTMCNC: 30.1 GM/DL (ref 32.2–35.5)
MCV RBC AUTO: 84.5 FL (ref 81–99)
MICROALBUMIN UR-MCNC: 2.06 MG/DL
MICROALBUMIN/CREAT RATIO PNL UR: 13 MG/G (ref 0–30)
MONOCYTES ABSOLUTE: 0.6 THOU/MM3 (ref 0.4–1.3)
MONOCYTES NFR BLD AUTO: 7.3 %
NEUTROPHILS NFR BLD AUTO: 68.7 %
NRBC BLD AUTO-RTO: 0 /100 WBC
PLATELET # BLD AUTO: 104 THOU/MM3 (ref 130–400)
PMV BLD AUTO: 14.3 FL (ref 9.4–12.4)
POTASSIUM SERPL-SCNC: 4.2 MEQ/L (ref 3.5–5.2)
PROT SERPL-MCNC: 7.4 G/DL (ref 6.1–8)
RBC # BLD AUTO: 3.93 MILL/MM3 (ref 4.2–5.4)
SEGMENTED NEUTROPHILS ABSOLUTE COUNT: 5.3 THOU/MM3 (ref 1.8–7.7)
SODIUM SERPL-SCNC: 139 MEQ/L (ref 135–145)
TRIGL SERPL-MCNC: 186 MG/DL (ref 0–199)
TSH SERPL DL<=0.005 MIU/L-ACNC: 3.3 UIU/ML (ref 0.4–4.2)
WBC # BLD AUTO: 7.7 THOU/MM3 (ref 4.8–10.8)

## 2023-11-02 ENCOUNTER — OFFICE VISIT (OUTPATIENT)
Dept: FAMILY MEDICINE CLINIC | Age: 73
End: 2023-11-02

## 2023-11-02 VITALS
OXYGEN SATURATION: 98 % | BODY MASS INDEX: 42.99 KG/M2 | HEART RATE: 82 BPM | HEIGHT: 62 IN | TEMPERATURE: 97 F | RESPIRATION RATE: 18 BRPM | DIASTOLIC BLOOD PRESSURE: 84 MMHG | WEIGHT: 233.6 LBS | SYSTOLIC BLOOD PRESSURE: 128 MMHG

## 2023-11-02 DIAGNOSIS — Z98.890 S/P MVR (MITRAL VALVE REPAIR): ICD-10-CM

## 2023-11-02 DIAGNOSIS — I25.2 HISTORY OF ST ELEVATION MYOCARDIAL INFARCTION (STEMI): ICD-10-CM

## 2023-11-02 DIAGNOSIS — E89.0 S/P PARTIAL THYROIDECTOMY: ICD-10-CM

## 2023-11-02 DIAGNOSIS — E78.2 MIXED HYPERLIPIDEMIA: ICD-10-CM

## 2023-11-02 DIAGNOSIS — Z86.79 S/P MAZE OPERATION FOR ATRIAL FIBRILLATION: ICD-10-CM

## 2023-11-02 DIAGNOSIS — Z95.1 S/P CABG (CORONARY ARTERY BYPASS GRAFT): ICD-10-CM

## 2023-11-02 DIAGNOSIS — M79.10 MYALGIA: ICD-10-CM

## 2023-11-02 DIAGNOSIS — D34 FOLLICULAR ADENOMA OF THYROID GLAND: ICD-10-CM

## 2023-11-02 DIAGNOSIS — E11.9 TYPE 2 DIABETES MELLITUS WITHOUT COMPLICATION, WITHOUT LONG-TERM CURRENT USE OF INSULIN (HCC): ICD-10-CM

## 2023-11-02 DIAGNOSIS — D64.9 NORMOCYTIC ANEMIA: ICD-10-CM

## 2023-11-02 DIAGNOSIS — I25.5 ISCHEMIC CARDIOMYOPATHY: ICD-10-CM

## 2023-11-02 DIAGNOSIS — I25.10 ASHD (ARTERIOSCLEROTIC HEART DISEASE): ICD-10-CM

## 2023-11-02 DIAGNOSIS — E89.0 HYPOTHYROIDISM, POSTSURGICAL: ICD-10-CM

## 2023-11-02 DIAGNOSIS — D69.6 THROMBOCYTOPENIA (HCC): ICD-10-CM

## 2023-11-02 DIAGNOSIS — Z78.9 STATIN INTOLERANCE: ICD-10-CM

## 2023-11-02 DIAGNOSIS — D32.9 MENINGIOMA (HCC): ICD-10-CM

## 2023-11-02 DIAGNOSIS — Z98.890 S/P TRICUSPID VALVE REPAIR: ICD-10-CM

## 2023-11-02 DIAGNOSIS — Z98.890 S/P MAZE OPERATION FOR ATRIAL FIBRILLATION: ICD-10-CM

## 2023-11-02 DIAGNOSIS — I50.20 HEART FAILURE WITH REDUCED EJECTION FRACTION (HCC): ICD-10-CM

## 2023-11-02 DIAGNOSIS — I48.91 ATRIAL FIBRILLATION, UNSPECIFIED TYPE (HCC): ICD-10-CM

## 2023-11-02 DIAGNOSIS — Z00.00 MEDICARE ANNUAL WELLNESS VISIT, SUBSEQUENT: Primary | ICD-10-CM

## 2023-11-02 DIAGNOSIS — K21.9 GASTROESOPHAGEAL REFLUX DISEASE, UNSPECIFIED WHETHER ESOPHAGITIS PRESENT: ICD-10-CM

## 2023-11-02 DIAGNOSIS — M15.9 PRIMARY OSTEOARTHRITIS INVOLVING MULTIPLE JOINTS: ICD-10-CM

## 2023-11-02 DIAGNOSIS — I10 ESSENTIAL HYPERTENSION: ICD-10-CM

## 2023-11-02 LAB — HBA1C MFR BLD: 7.4 % (ref 4.3–5.7)

## 2023-11-02 SDOH — ECONOMIC STABILITY: FOOD INSECURITY: WITHIN THE PAST 12 MONTHS, YOU WORRIED THAT YOUR FOOD WOULD RUN OUT BEFORE YOU GOT MONEY TO BUY MORE.: NEVER TRUE

## 2023-11-02 SDOH — ECONOMIC STABILITY: INCOME INSECURITY: HOW HARD IS IT FOR YOU TO PAY FOR THE VERY BASICS LIKE FOOD, HOUSING, MEDICAL CARE, AND HEATING?: NOT HARD AT ALL

## 2023-11-02 SDOH — ECONOMIC STABILITY: FOOD INSECURITY: WITHIN THE PAST 12 MONTHS, THE FOOD YOU BOUGHT JUST DIDN'T LAST AND YOU DIDN'T HAVE MONEY TO GET MORE.: NEVER TRUE

## 2023-11-02 ASSESSMENT — LIFESTYLE VARIABLES
HOW MANY STANDARD DRINKS CONTAINING ALCOHOL DO YOU HAVE ON A TYPICAL DAY: PATIENT DOES NOT DRINK
HOW OFTEN DO YOU HAVE A DRINK CONTAINING ALCOHOL: NEVER

## 2023-11-02 ASSESSMENT — PATIENT HEALTH QUESTIONNAIRE - PHQ9
SUM OF ALL RESPONSES TO PHQ QUESTIONS 1-9: 0
SUM OF ALL RESPONSES TO PHQ9 QUESTIONS 1 & 2: 0
SUM OF ALL RESPONSES TO PHQ QUESTIONS 1-9: 0
1. LITTLE INTEREST OR PLEASURE IN DOING THINGS: 0
SUM OF ALL RESPONSES TO PHQ QUESTIONS 1-9: 0
SUM OF ALL RESPONSES TO PHQ QUESTIONS 1-9: 0
2. FEELING DOWN, DEPRESSED OR HOPELESS: 0

## 2023-11-02 NOTE — PATIENT INSTRUCTIONS
LAB INSTRUCTIONS:    Please complete labs within 4 week(s). Please fast for 8 hours prior to lab collection. The clinic will call you within 1 week of collection. If you have not heard from us within that amount of time, please call us at [phone]. Preventing Falls: Care Instructions    Talk to your doctor about the medicines you take. Ask if any of them increase the risk of falls and whether they can be changed or stopped. Try to exercise regularly. It can help improve your strength and balance. This can help lower your risk of falling. Practice fall safety and prevention. Wear low-heeled shoes that fit well and give your feet good support. Talk to your doctor if you have foot problems that make this hard. Carry a cellphone or wear a medical alert device that you can use to call for help. Use stepladders instead of chairs to reach high objects. Don't climb if you're at risk for falls. Ask for help, if needed. Wear the correct eyeglasses, if you need them. Make your home safer. Remove rugs, cords, clutter, and furniture from walkways. Keep your house well lit. Use night-lights in hallways and bathrooms. Install and use sturdy handrails on stairways. Wear nonskid footwear, even inside. Don't walk barefoot or in socks without shoes. Be safe outside. Use handrails, curb cuts, and ramps whenever possible. Keep your hands free by using a shoulder bag or backpack. Try to walk in well-lit areas. Watch out for uneven ground, changes in pavement, and debris. Be careful in the winter. Walk on the grass or gravel when sidewalks are slippery. Use de-icer on steps and walkways. Add non-slip devices to shoes. Put grab bars and nonskid mats in your shower or tub and near the toilet. Try to use a shower chair or bath bench when bathing.    Get into a tub or shower by putting in your weaker leg first. Get out with your strong side first. Have a phone or medical alert device in

## 2023-11-06 RX ORDER — SACUBITRIL AND VALSARTAN 49; 51 MG/1; MG/1
1 TABLET, FILM COATED ORAL 2 TIMES DAILY
Qty: 180 TABLET | Refills: 3 | Status: SHIPPED | OUTPATIENT
Start: 2023-11-06

## 2023-11-16 ENCOUNTER — NURSE ONLY (OUTPATIENT)
Dept: LAB | Age: 73
End: 2023-11-16

## 2023-11-16 DIAGNOSIS — D64.9 NORMOCYTIC ANEMIA: ICD-10-CM

## 2023-11-16 LAB
BASOPHILS ABSOLUTE: 0.1 THOU/MM3 (ref 0–0.1)
BASOPHILS NFR BLD AUTO: 1.1 %
DEPRECATED RDW RBC AUTO: 47.6 FL (ref 35–45)
EOSINOPHIL NFR BLD AUTO: 4.1 %
EOSINOPHILS ABSOLUTE: 0.3 THOU/MM3 (ref 0–0.4)
ERYTHROCYTE [DISTWIDTH] IN BLOOD BY AUTOMATED COUNT: 15.3 % (ref 11.5–14.5)
FERRITIN SERPL IA-MCNC: 22 NG/ML (ref 10–291)
FOLATE SERPL-MCNC: 3.6 NG/ML (ref 4.8–24.2)
HCT VFR BLD AUTO: 34 % (ref 37–47)
HGB BLD-MCNC: 10 GM/DL (ref 12–16)
HGB RETIC QN AUTO: 24.1 PG (ref 28.2–35.7)
IMM GRANULOCYTES # BLD AUTO: 0.04 THOU/MM3 (ref 0–0.07)
IMM GRANULOCYTES NFR BLD AUTO: 0.6 %
IMM RETICS NFR: 27.6 % (ref 3–15.9)
IRON SERPL-MCNC: 33 UG/DL (ref 50–170)
LYMPHOCYTES ABSOLUTE: 1.3 THOU/MM3 (ref 1–4.8)
LYMPHOCYTES NFR BLD AUTO: 20.2 %
MCH RBC QN AUTO: 25 PG (ref 26–33)
MCHC RBC AUTO-ENTMCNC: 29.4 GM/DL (ref 32.2–35.5)
MCV RBC AUTO: 85 FL (ref 81–99)
MONOCYTES ABSOLUTE: 0.5 THOU/MM3 (ref 0.4–1.3)
MONOCYTES NFR BLD AUTO: 7.8 %
NEUTROPHILS NFR BLD AUTO: 66.2 %
NRBC BLD AUTO-RTO: 0 /100 WBC
PLATELET # BLD AUTO: 102 THOU/MM3 (ref 130–400)
PMV BLD AUTO: 14.1 FL (ref 9.4–12.4)
RBC # BLD AUTO: 4 MILL/MM3 (ref 4.2–5.4)
RETICS # AUTO: 69 THOU/MM3 (ref 20–115)
RETICS/RBC NFR AUTO: 1.7 % (ref 0.5–2)
SEGMENTED NEUTROPHILS ABSOLUTE COUNT: 4.4 THOU/MM3 (ref 1.8–7.7)
TIBC SERPL-MCNC: 480 UG/DL (ref 171–450)
VIT B12 SERPL-MCNC: 1141 PG/ML (ref 211–911)
WBC # BLD AUTO: 6.6 THOU/MM3 (ref 4.8–10.8)

## 2023-11-17 ENCOUNTER — TELEPHONE (OUTPATIENT)
Dept: FAMILY MEDICINE CLINIC | Age: 73
End: 2023-11-17

## 2023-11-17 DIAGNOSIS — D50.9 IRON DEFICIENCY ANEMIA, UNSPECIFIED IRON DEFICIENCY ANEMIA TYPE: Primary | ICD-10-CM

## 2023-11-17 DIAGNOSIS — E53.8 FOLIC ACID DEFICIENCY: ICD-10-CM

## 2023-11-17 RX ORDER — FERROUS SULFATE 325(65) MG
325 TABLET ORAL 2 TIMES DAILY WITH MEALS
Qty: 180 TABLET | Refills: 1 | Status: SHIPPED | OUTPATIENT
Start: 2023-11-17

## 2023-11-17 RX ORDER — UREA 10 %
800 LOTION (ML) TOPICAL DAILY
Qty: 30 TABLET | Refills: 3 | Status: SHIPPED | OUTPATIENT
Start: 2023-11-17

## 2023-11-17 RX ORDER — ASCORBIC ACID 500 MG
500 TABLET ORAL DAILY
Qty: 30 TABLET | Refills: 3 | Status: SHIPPED | OUTPATIENT
Start: 2023-11-17

## 2023-11-17 NOTE — TELEPHONE ENCOUNTER
Diagnosis Orders   1.  Iron deficiency anemia, unspecified iron deficiency anemia type  AFL (Anise Asp) - Sarah Alfred MD, Gastroenterology, [facility]

## 2023-11-17 NOTE — TELEPHONE ENCOUNTER
----- Message from Arsenio Malave DO sent at 11/16/2023  8:19 PM EST -----  Please let pt know that labs are back. Appears her anemia from a combination of low iron and low folic acid. Rx for both iron supplement and folic and sent to pharmacy. Repeat labs in 6 wks, fasting. In addition, because her iron is low, current guidelines recommend we have her see a GI physician to discuss an upper and lower scope to ensure she's not bleeding from her GI tract. I would recommend this. If ok, will place referral, let me know. Let me know if questions, thanks!

## 2023-11-17 NOTE — TELEPHONE ENCOUNTER
Called patient and informed of lab results. Informed patient supplements were sent to [facility]. Patient verbalized understanding,    Is okay seeing a GI specialist.  States okay with whoever Dr Vu Bruno recommends. Please place referral.    Lab order mailed to patient with instructions on when and how to complete.

## 2023-11-29 ENCOUNTER — HOSPITAL ENCOUNTER (OUTPATIENT)
Dept: PHARMACY | Age: 73
Setting detail: THERAPIES SERIES
Discharge: HOME | End: 2023-11-29
Payer: MEDICARE

## 2023-11-29 DIAGNOSIS — Z79.01 ANTICOAGULATED ON COUMADIN: ICD-10-CM

## 2023-11-29 DIAGNOSIS — Z51.81 ENCOUNTER FOR THERAPEUTIC DRUG MONITORING: ICD-10-CM

## 2023-11-29 DIAGNOSIS — I48.20 CHRONIC ATRIAL FIBRILLATION (HCC): Primary | Chronic | ICD-10-CM

## 2023-11-29 LAB — POC INR: 2.5 (ref 0.8–1.2)

## 2023-11-29 PROCEDURE — 85610 PROTHROMBIN TIME: CPT

## 2023-11-29 PROCEDURE — 99211 OFF/OP EST MAY X REQ PHY/QHP: CPT

## 2023-11-29 PROCEDURE — 36416 COLLJ CAPILLARY BLOOD SPEC: CPT

## 2023-11-29 NOTE — PROGRESS NOTES
Medication Management [address] [place] [address]  [phone] (phone)  [phone] (fax)    Ms. Dav Amor is a 75 y.o.  female with history of atrial fib. , per Dr. Peterson Gramajo referral, who presents today for Warfarin monitoring and adjustment (6 week visit). Patient verifies current dosing regimen and tablet strength. No missed or extra doses. Patient denies swelling (wears knee-high support socks bilat. ). Typically sees a little blood on tissue when she inserts it in nostril. Has usual NORMAN. Has usual easy bruising - states bruised easily before Coumadin. No blood in urine or stool. Stool now black from iron supplement. No dietary changes, except for more pickles lately. Unsure, may have had a little cranberry sauce last week. Changes in medication/OTC agents/herbals: now on iron and folic acid, plus prescription Vitamin C (states she's also continued OTC Vitamin C - advised that it was apparently removed from list when prescription sent). No change in alcohol use or tobacco use. No change in activity level. Patient denies falls. No vomiting/diarrhea or acute illness. No procedures scheduled in the future at this time. Assessment:   Lab Results   Component Value Date    INR 2.50 (H) 11/29/2023    INR 2.90 (H) 10/18/2023    INR 2.40 (H) 09/06/2023     INR therapeutic - goal 2-3. Recent Labs     11/29/23  1422   INR 2.50*        Plan:  POCT INR performed/result reviewed. Continue PO Coumadin 3 mg W, 6 mg MTThFSS. Recheck INR in 6 week(s). Patient reminded to call the Anticoagulation Clinic with any signs or symptoms of bleeding or with any medication changes. Patient given instructions utilizing the teach back method. After visit summary printed and reviewed with patient. Discharged ambulatory in no apparent distress, with four-wheeled walker and .     For Pharmacy Admin Tracking Only    Time Spent (min): 20

## 2023-12-12 DIAGNOSIS — E53.8 FOLIC ACID DEFICIENCY: ICD-10-CM

## 2023-12-12 DIAGNOSIS — D50.9 IRON DEFICIENCY ANEMIA, UNSPECIFIED IRON DEFICIENCY ANEMIA TYPE: ICD-10-CM

## 2023-12-12 LAB
ANISOCYTOSIS BLD QL SMEAR: PRESENT
BASO STIPL BLD QL SMEAR: ABNORMAL
BASOPHILS ABSOLUTE: 0.1 THOU/MM3 (ref 0–0.1)
BASOPHILS NFR BLD AUTO: 1.1 %
DEPRECATED RDW RBC AUTO: 71.1 FL (ref 35–45)
ELLIPTOCYTES: ABNORMAL
EOSINOPHIL NFR BLD AUTO: 3.4 %
EOSINOPHILS ABSOLUTE: 0.2 THOU/MM3 (ref 0–0.4)
ERYTHROCYTE [DISTWIDTH] IN BLOOD BY AUTOMATED COUNT: 22.5 % (ref 11.5–14.5)
FERRITIN SERPL IA-MCNC: 89 NG/ML (ref 10–291)
FOLATE SERPL-MCNC: > 20 NG/ML (ref 4.8–24.2)
HCT VFR BLD AUTO: 38.8 % (ref 37–47)
HGB BLD-MCNC: 11.9 GM/DL (ref 12–16)
IMM GRANULOCYTES # BLD AUTO: 0.02 THOU/MM3 (ref 0–0.07)
IMM GRANULOCYTES NFR BLD AUTO: 0.3 %
IRON SERPL-MCNC: 81 UG/DL (ref 50–170)
LYMPHOCYTES ABSOLUTE: 1.2 THOU/MM3 (ref 1–4.8)
LYMPHOCYTES NFR BLD AUTO: 19.1 %
MCH RBC QN AUTO: 27.7 PG (ref 26–33)
MCHC RBC AUTO-ENTMCNC: 30.7 GM/DL (ref 32.2–35.5)
MCV RBC AUTO: 90.2 FL (ref 81–99)
MONOCYTES ABSOLUTE: 0.4 THOU/MM3 (ref 0.4–1.3)
MONOCYTES NFR BLD AUTO: 5.7 %
NEUTROPHILS NFR BLD AUTO: 70.4 %
NRBC BLD AUTO-RTO: 0 /100 WBC
PLATELET # BLD AUTO: 91 THOU/MM3 (ref 130–400)
PLATELET BLD QL SMEAR: ABNORMAL
PMV BLD AUTO: 15 FL (ref 9.4–12.4)
RBC # BLD AUTO: 4.3 MILL/MM3 (ref 4.2–5.4)
SCAN OF BLOOD SMEAR: NORMAL
SEGMENTED NEUTROPHILS ABSOLUTE COUNT: 4.5 THOU/MM3 (ref 1.8–7.7)
TIBC SERPL-MCNC: 397 UG/DL (ref 171–450)
WBC # BLD AUTO: 6.4 THOU/MM3 (ref 4.8–10.8)

## 2023-12-13 ENCOUNTER — TELEPHONE (OUTPATIENT)
Dept: FAMILY MEDICINE CLINIC | Age: 73
End: 2023-12-13

## 2023-12-13 DIAGNOSIS — D50.9 IRON DEFICIENCY ANEMIA, UNSPECIFIED IRON DEFICIENCY ANEMIA TYPE: Primary | ICD-10-CM

## 2023-12-13 DIAGNOSIS — D64.9 NORMOCYTIC ANEMIA: ICD-10-CM

## 2023-12-13 NOTE — TELEPHONE ENCOUNTER
----- Message from Bj Correa DO sent at 12/13/2023  6:52 AM EST -----  Please let pt know that labs are improving  Con't iron/folic acid  Recommend make apt with Sepideh's office, consult placed 11/17  Labs again in 6 wks, fasting  Let me know if questions, thanks!

## 2024-01-08 ENCOUNTER — CARE COORDINATION (OUTPATIENT)
Dept: CARE COORDINATION | Age: 74
End: 2024-01-08

## 2024-01-08 NOTE — CARE COORDINATION
Spoke with Alley and she is needing help to see if her mothers application on [employer] is approved or not. I will call the company and check on the status for her.

## 2024-01-08 NOTE — CARE COORDINATION
Attempted to call back Alley (daughter), no answer or machine. Will try again later.        Rae Cardoza [facility]  CC   Medication Assistance  [employer] [place] [employer]    (P) [phone]  (F) [phone]

## 2024-01-09 NOTE — CARE COORDINATION
Spoke with [employer] and they are missing the providers portion of her application on [employer]. I faxed that to Yobany's office asking them to fill this out for the patient. Once I get this back I will fax to the company. I attempted to call the patient back but no answer or machine.

## 2024-01-10 ENCOUNTER — HOSPITAL ENCOUNTER (OUTPATIENT)
Dept: PHARMACY | Age: 74
Setting detail: THERAPIES SERIES
Discharge: HOME | End: 2024-01-10
Payer: MEDICARE

## 2024-01-10 DIAGNOSIS — Z51.81 ENCOUNTER FOR THERAPEUTIC DRUG MONITORING: ICD-10-CM

## 2024-01-10 DIAGNOSIS — Z79.01 ANTICOAGULATED ON COUMADIN: ICD-10-CM

## 2024-01-10 DIAGNOSIS — I48.20 CHRONIC ATRIAL FIBRILLATION (HCC): Primary | Chronic | ICD-10-CM

## 2024-01-10 LAB — POC INR: 2.6 (ref 0.8–1.2)

## 2024-01-10 PROCEDURE — 99211 OFF/OP EST MAY X REQ PHY/QHP: CPT

## 2024-01-10 PROCEDURE — 85610 PROTHROMBIN TIME: CPT

## 2024-01-10 PROCEDURE — 36416 COLLJ CAPILLARY BLOOD SPEC: CPT

## 2024-01-15 ENCOUNTER — CARE COORDINATION (OUTPATIENT)
Dept: CARE COORDINATION | Age: 74
End: 2024-01-15

## 2024-01-15 NOTE — CARE COORDINATION
I was able to fax [employer] the providers portion of the application that they were missing for her assistance on [employer]rest[employer]. Once I hear back from the company I will let the patient know.          Rae Cardoza [facility]  CC   Medication Assistance  [place], and [place] [facility]    (P) [phone]  (F) [phone]

## 2024-01-16 ENCOUNTER — CARE COORDINATION (OUTPATIENT)
Dept: CARE COORDINATION | Age: 74
End: 2024-01-16

## 2024-01-16 ENCOUNTER — TELEPHONE (OUTPATIENT)
Dept: CARDIOLOGY CLINIC | Age: 74
End: 2024-01-16

## 2024-01-16 NOTE — TELEPHONE ENCOUNTER
Received fax pt was approved for PAP. Called and informed pt of approval. Pt has contact information to order medication. Letter attached to encounter.

## 2024-01-16 NOTE — CARE COORDINATION
Patient approved into the PAP program for her Entrest[employer]. I called to let Alley know and to call me with any questions this year.        Rae Cardoza [facility]  CC   Medication Assistance  [place], and [employer]    (P) [phone]  (F) [phone]

## 2024-01-17 ENCOUNTER — OFFICE VISIT (OUTPATIENT)
Dept: CARDIOLOGY CLINIC | Age: 74
End: 2024-01-17
Payer: MEDICARE

## 2024-01-17 VITALS
HEIGHT: 62 IN | SYSTOLIC BLOOD PRESSURE: 130 MMHG | DIASTOLIC BLOOD PRESSURE: 62 MMHG | OXYGEN SATURATION: 96 % | BODY MASS INDEX: 43.1 KG/M2 | WEIGHT: 234.2 LBS | HEART RATE: 76 BPM

## 2024-01-17 DIAGNOSIS — I48.20 CHRONIC ATRIAL FIBRILLATION (HCC): ICD-10-CM

## 2024-01-17 DIAGNOSIS — R60.0 EDEMA OF BOTH LOWER LEGS: ICD-10-CM

## 2024-01-17 DIAGNOSIS — I25.5 ISCHEMIC CARDIOMYOPATHY: ICD-10-CM

## 2024-01-17 DIAGNOSIS — I50.22 CHRONIC SYSTOLIC CONGESTIVE HEART FAILURE, NYHA CLASS 2 (HCC): Primary | ICD-10-CM

## 2024-01-17 PROCEDURE — G8417 CALC BMI ABV UP PARAM F/U: HCPCS | Performed by: NURSE PRACTITIONER

## 2024-01-17 PROCEDURE — G8484 FLU IMMUNIZE NO ADMIN: HCPCS | Performed by: NURSE PRACTITIONER

## 2024-01-17 PROCEDURE — G8427 DOCREV CUR MEDS BY ELIG CLIN: HCPCS | Performed by: NURSE PRACTITIONER

## 2024-01-17 PROCEDURE — 1090F PRES/ABSN URINE INCON ASSESS: CPT | Performed by: NURSE PRACTITIONER

## 2024-01-17 PROCEDURE — 3017F COLORECTAL CA SCREEN DOC REV: CPT | Performed by: NURSE PRACTITIONER

## 2024-01-17 PROCEDURE — 1036F TOBACCO NON-USER: CPT | Performed by: NURSE PRACTITIONER

## 2024-01-17 PROCEDURE — G8400 PT W/DXA NO RESULTS DOC: HCPCS | Performed by: NURSE PRACTITIONER

## 2024-01-17 PROCEDURE — 3078F DIAST BP <80 MM HG: CPT | Performed by: NURSE PRACTITIONER

## 2024-01-17 PROCEDURE — 3075F SYST BP GE 130 - 139MM HG: CPT | Performed by: NURSE PRACTITIONER

## 2024-01-17 PROCEDURE — 1123F ACP DISCUSS/DSCN MKR DOCD: CPT | Performed by: NURSE PRACTITIONER

## 2024-01-17 PROCEDURE — 99214 OFFICE O/P EST MOD 30 MIN: CPT | Performed by: NURSE PRACTITIONER

## 2024-01-17 ASSESSMENT — ENCOUNTER SYMPTOMS
COUGH: 0
ABDOMINAL DISTENTION: 0
SHORTNESS OF BREATH: 0
WHEEZING: 0
ABDOMINAL PAIN: 0

## 2024-01-17 NOTE — PROGRESS NOTES
Heart Failure Clinic       Visit Date: 1/17/2024  Cardiologist:  Dr. Haywood  Primary Care Physician: Jacky Real,     Swati Tran is a 73 y.o. female who presents today for:  Chief Complaint   Patient presents with    6 Month Follow-Up    Congestive Heart Failure       HPI:   TYPE HF: HFimpEF (45-50%, 3/18/22) (25-30%, 1/20/22)    Cause: improved ischemic cardiomyopathy   Device: none  HX: HLD, YASHIRA on CPAP, HTN, DM II, Afib w. RVR s/p Maze and LAAC (11/2021), (coumadin) CAD s/p CABG 11/15/21  Dry Wt:  200? (208 on 2/15/22) (212 on 3/30/22) (230 on 9/13/22) (233 on 1/26/23) (233 on 6/29/23) (234 on 1/17/24)    Swati Tran is a 73 y.o. female who presents to the office for a f/u patient visit in the heart failure clinic.  Accompanied by self    Concerns today: 6 month f/u. Avtar is stable, no hospitalizations. Urinating well on her Lasix. SOB at baseline - long distances. Denies leg swelling, bloating, orthopnea. Following her low Na/fluid diet. He states that she did struggle over the holidays    Visit on 6/29/23: here today for her 6 month f/u. Weight is unchanged. No hospitalizations for CHF since 2021. Urinating well on her lasix. SOB at baseline, no bloating, or worsening lower leg swelling. No orthopnea. Following her diet.     Visit on 1/26/23: last visit she had noted weight gain w/ more LE swelling. We added aldactone and did two days of metolazone. Weight is still up today. She notes that she is urinating well on her Lasix. She denies lower leg swelling, notes SOB when doing a lot of activity (normal for her)             Hospitalization:      ADMISSION DATE: 11/7/2021   MRN: [id number] DISCHARGE DATE: 11/25/2021     evaluation of de brad ischemic cardiomyopathy in context of AFRVR with ACS.    She presented to [facility]'s ED on 11/5/2021 with severe bilateral acute on chronic hip pain. She was found to be in AFRVR with rates in the 160s and ischemic changes diffusely on the

## 2024-01-17 NOTE — PATIENT INSTRUCTIONS
You may receive a survey regarding the care you received during your visit.  Your input is valuable to us.  We encourage you to complete and return your survey.  We hope you will choose us in the future for your healthcare needs.    Your nurses today were Kari.  Office hours:   Mon-Thurs 8-4:30  Friday 8-12  Phone: [phone]    Continue:  Continue current medications  Daily weights and record  Fluid restriction of 2 Liters per day  Limit sodium in diet to around 4694-6953 mg/day  Monitor BP  Activity as tolerated     Call the Heart Failure Clinic for any of the following symptoms:   Weight gain of 2-3 pounds in 1 day or 5 pounds in 1 week  Increased shortness of breath  Shortness of breath while laying down  Cough  Chest pain  Swelling in feet, ankles or legs  Bloating in abdomen  Fatigue

## 2024-01-19 ENCOUNTER — CARE COORDINATION (OUTPATIENT)
Dept: CARE COORDINATION | Age: 74
End: 2024-01-19

## 2024-01-19 NOTE — CARE COORDINATION
Alley called letting me know she is trying to get the form for [employer] sent to me. This will allow her to call for her mothers [employer]. She will work on this and than email it to me.        Rae Cardoza [facility]  CC   Medication Assistance  [employer] [place] [employer]    (P) [phone]  (F) [phone]

## 2024-01-24 ENCOUNTER — CARE COORDINATION (OUTPATIENT)
Dept: CARE COORDINATION | Age: 74
End: 2024-01-24

## 2024-01-24 NOTE — CARE COORDINATION
Alley called asking about adding her to her mothers application for [employer]. I never got that part of the juan j back from them. She was able to email it to me while I was on the phone. I faxed that to [employer]. I will call [employer] to check on the shipment for her.          Rae Cardoza [facility]  CC   Medication Assistance  [place], and [place] [facility]    (P) [phone]  (F) [phone]

## 2024-02-06 PROBLEM — D50.9 IRON DEFICIENCY ANEMIA: Status: ACTIVE | Noted: 2024-02-06

## 2024-02-06 NOTE — PATIENT INSTRUCTIONS
LAB INSTRUCTIONS:    Please complete labs IN 4 week(s).    Non-fasting is ok    The clinic will call you within 1 week of collection. If you have not heard from us within that amount of time, please call us at [phone].

## 2024-02-06 NOTE — PROGRESS NOTES
pupils equal, round, and reactive to light, extraocular eye movements intact, conjunctivae and eye lids without erythema  ENT: external ear and ear canal clear bilaterally, TMs intact and regular, nose without deformity, nasal mucosa and turbinates normal without polyps, oropharynx normal, dentition is normal for age.   Neck: supple and non-tender without mass, no thyromegaly or thyroid nodules, no cervical lymphadenopathy.   Pulmonary/Chest: clear to auscultation bilaterally- no wheezes, rales or rhonchi, normal air movement, no respiratory distress or retractions. Chest wall incision healing well, clean, dry and intact.   Cardiovascular: S1 and S2 auscultated w/ RRR. No murmurs, rubs, clicks, or gallops, distal pulses intact.  Abdomen: soft, non-tender, non-distended, bowl sounds physiologic,  no rebound or guarding, no masses or hernias noted. Liver and spleen without enlargement.   Extremities: no cyanosis, clubbing of the lower extremities. +2 PT/DP bilaterally. 1+ bilat LE edema.   Musculoskeletal: No joint swelling or gross deformity.   Skin: warm and dry, no rash or erythema      Results for POC orders placed in visit on 02/07/24   POCT glycosylated hemoglobin (Hb A1C)   Result Value Ref Range    Hemoglobin A1C POC 5.6 4.3 - 5.7 %     Lab Results   Component Value Date    WBC 7.0 02/06/2024    HGB 10.1 (L) 02/06/2024    HCT 32.3 (L) 02/06/2024    .5 (H) 02/06/2024     (L) 02/06/2024         Lab Results   Component Value Date    IRON 60 02/06/2024    TIBC 419 02/06/2024    FERRITIN 61 02/06/2024     Lab Results   Component Value Date    [id number] 913 (H) 02/06/2024     Lab Results   Component Value Date    FOLATE > 20.0 02/06/2024       ASSESSMENT & PLAN  1. Iron deficiency anemia, unspecified iron deficiency anemia type    Had been improving  Anemia a bit worse again  However, iron stores, b12 and folic acid levels are WNL  No s/s bleeding    Plan:  GI consult placed, again, for scoping

## 2024-02-07 ENCOUNTER — TELEPHONE (OUTPATIENT)
Dept: PHARMACY | Facility: CLINIC | Age: 74
End: 2024-02-07

## 2024-02-07 ENCOUNTER — ENROLLMENT (OUTPATIENT)
Dept: PHARMACY | Facility: CLINIC | Age: 74
End: 2024-02-07

## 2024-02-07 ENCOUNTER — OFFICE VISIT (OUTPATIENT)
Dept: FAMILY MEDICINE CLINIC | Age: 74
End: 2024-02-07
Payer: MEDICARE

## 2024-02-07 VITALS
TEMPERATURE: 97.7 F | SYSTOLIC BLOOD PRESSURE: 130 MMHG | RESPIRATION RATE: 18 BRPM | DIASTOLIC BLOOD PRESSURE: 78 MMHG | OXYGEN SATURATION: 96 % | WEIGHT: 229.2 LBS | HEART RATE: 82 BPM | BODY MASS INDEX: 42.18 KG/M2 | HEIGHT: 62 IN

## 2024-02-07 DIAGNOSIS — E78.2 MIXED HYPERLIPIDEMIA: ICD-10-CM

## 2024-02-07 DIAGNOSIS — M79.10 MYALGIA DUE TO STATIN: ICD-10-CM

## 2024-02-07 DIAGNOSIS — I50.20 HEART FAILURE WITH REDUCED EJECTION FRACTION (HCC): ICD-10-CM

## 2024-02-07 DIAGNOSIS — D69.6 THROMBOCYTOPENIA (HCC): ICD-10-CM

## 2024-02-07 DIAGNOSIS — Z98.890 S/P MAZE OPERATION FOR ATRIAL FIBRILLATION: ICD-10-CM

## 2024-02-07 DIAGNOSIS — L30.9 DERMATITIS: ICD-10-CM

## 2024-02-07 DIAGNOSIS — T46.6X5A MYALGIA DUE TO STATIN: ICD-10-CM

## 2024-02-07 DIAGNOSIS — Z98.890 S/P MVR (MITRAL VALVE REPAIR): ICD-10-CM

## 2024-02-07 DIAGNOSIS — I10 ESSENTIAL HYPERTENSION: ICD-10-CM

## 2024-02-07 DIAGNOSIS — D34 FOLLICULAR ADENOMA OF THYROID GLAND: ICD-10-CM

## 2024-02-07 DIAGNOSIS — D50.9 IRON DEFICIENCY ANEMIA, UNSPECIFIED IRON DEFICIENCY ANEMIA TYPE: Primary | ICD-10-CM

## 2024-02-07 DIAGNOSIS — K21.9 GASTROESOPHAGEAL REFLUX DISEASE, UNSPECIFIED WHETHER ESOPHAGITIS PRESENT: ICD-10-CM

## 2024-02-07 DIAGNOSIS — Z86.79 S/P MAZE OPERATION FOR ATRIAL FIBRILLATION: ICD-10-CM

## 2024-02-07 DIAGNOSIS — I25.10 ASHD (ARTERIOSCLEROTIC HEART DISEASE): ICD-10-CM

## 2024-02-07 DIAGNOSIS — D32.9 MENINGIOMA (HCC): ICD-10-CM

## 2024-02-07 DIAGNOSIS — Z95.1 S/P CABG (CORONARY ARTERY BYPASS GRAFT): ICD-10-CM

## 2024-02-07 DIAGNOSIS — E89.0 HYPOTHYROIDISM, POSTSURGICAL: ICD-10-CM

## 2024-02-07 DIAGNOSIS — E11.9 TYPE 2 DIABETES MELLITUS WITHOUT COMPLICATION, WITHOUT LONG-TERM CURRENT USE OF INSULIN (HCC): ICD-10-CM

## 2024-02-07 DIAGNOSIS — M79.10 MYALGIA: ICD-10-CM

## 2024-02-07 DIAGNOSIS — I48.91 ATRIAL FIBRILLATION, UNSPECIFIED TYPE (HCC): ICD-10-CM

## 2024-02-07 DIAGNOSIS — E89.0 S/P PARTIAL THYROIDECTOMY: ICD-10-CM

## 2024-02-07 DIAGNOSIS — I25.5 ISCHEMIC CARDIOMYOPATHY: ICD-10-CM

## 2024-02-07 DIAGNOSIS — Z98.890 S/P TRICUSPID VALVE REPAIR: ICD-10-CM

## 2024-02-07 DIAGNOSIS — I25.2 HISTORY OF ST ELEVATION MYOCARDIAL INFARCTION (STEMI): ICD-10-CM

## 2024-02-07 LAB — HBA1C MFR BLD: 5.6 % (ref 4.3–5.7)

## 2024-02-07 PROCEDURE — 3075F SYST BP GE 130 - 139MM HG: CPT | Performed by: FAMILY MEDICINE

## 2024-02-07 PROCEDURE — G8427 DOCREV CUR MEDS BY ELIG CLIN: HCPCS | Performed by: FAMILY MEDICINE

## 2024-02-07 PROCEDURE — 99214 OFFICE O/P EST MOD 30 MIN: CPT | Performed by: FAMILY MEDICINE

## 2024-02-07 PROCEDURE — 2022F DILAT RTA XM EVC RTNOPTHY: CPT | Performed by: FAMILY MEDICINE

## 2024-02-07 PROCEDURE — 1036F TOBACCO NON-USER: CPT | Performed by: FAMILY MEDICINE

## 2024-02-07 PROCEDURE — G8400 PT W/DXA NO RESULTS DOC: HCPCS | Performed by: FAMILY MEDICINE

## 2024-02-07 PROCEDURE — G8417 CALC BMI ABV UP PARAM F/U: HCPCS | Performed by: FAMILY MEDICINE

## 2024-02-07 PROCEDURE — 3044F HG A1C LEVEL LT 7.0%: CPT | Performed by: FAMILY MEDICINE

## 2024-02-07 PROCEDURE — G8484 FLU IMMUNIZE NO ADMIN: HCPCS | Performed by: FAMILY MEDICINE

## 2024-02-07 PROCEDURE — 1123F ACP DISCUSS/DSCN MKR DOCD: CPT | Performed by: FAMILY MEDICINE

## 2024-02-07 PROCEDURE — 1090F PRES/ABSN URINE INCON ASSESS: CPT | Performed by: FAMILY MEDICINE

## 2024-02-07 PROCEDURE — 3017F COLORECTAL CA SCREEN DOC REV: CPT | Performed by: FAMILY MEDICINE

## 2024-02-07 PROCEDURE — 3078F DIAST BP <80 MM HG: CPT | Performed by: FAMILY MEDICINE

## 2024-02-07 RX ORDER — TRIAMCINOLONE ACETONIDE 1 MG/G
CREAM TOPICAL
Qty: 3 EACH | Refills: 3 | Status: SHIPPED | OUTPATIENT
Start: 2024-02-07

## 2024-02-07 ASSESSMENT — PATIENT HEALTH QUESTIONNAIRE - PHQ9
SUM OF ALL RESPONSES TO PHQ QUESTIONS 1-9: 0
SUM OF ALL RESPONSES TO PHQ QUESTIONS 1-9: 0
2. FEELING DOWN, DEPRESSED OR HOPELESS: 0
SUM OF ALL RESPONSES TO PHQ9 QUESTIONS 1 & 2: 0
SUM OF ALL RESPONSES TO PHQ QUESTIONS 1-9: 0
1. LITTLE INTEREST OR PLEASURE IN DOING THINGS: 0
SUM OF ALL RESPONSES TO PHQ QUESTIONS 1-9: 0

## 2024-02-07 NOTE — TELEPHONE ENCOUNTER
Jacky Christy DO ,    Please submit the following CMS allowable diagnoses within 2/7/24 visit encounter to exclude from care gap for 2024 and close any further statin therapy reminders to you or patient this year if appropriate:  Myalgia (M79.1) OR Myalgia due to statin (M79.10, T46.6X5A)     Swati Tran has an appointment with you 2/7/24. Patient was identified statin use in persons with cardiovascular disease care gap per [employer].  Unfortunately, each year a DX code approved by CMS must be entered into an Office visit to exclude from this care gap.    Per chart review:  patient has allergy listed to statins and zetia    Please let me know if you have any questions!     Nathalia Thomas, PharmD, [facility] Clinical Pharmacist  Department, toll free: [phone], option 1   ========================================================      POPULATION HEALTH CLINICAL PHARMACY: STATIN THERAPY REVIEW  Identified statin use in persons with cardiovascular disease care gap per [employer].     Future Appointments   Date Time Provider Department Center   2/7/2024  3:00 PM Jacky Christy DO [facility] [place]   2/21/2024  2:00 PM Sonia Anthony RN STR MED MGMT [facility] [place]   7/17/2024  2:00 PM Isidra Garcia APRN - CNP N [facility] CHF [facility] - [place]   10/7/2024  2:30 PM STR ECHO 1 STR[facility] JUDY STR Rad/Card   10/17/2024  2:30 PM Hung Haywood MD N [facility] Heart [facility] - [place]     ASSESSMENT of Statin in Persons with Cardiovascular Disease Care Gap    Swati Tran  has been identified as having a diagnosis for clinical ASCVD or event (e.g., inpatient hospitalization for MI, CABG, PCI or other revascularization procedures) in the measurement year and not currently filling a moderate or high intensity statin.   Patients included in this care gap are males age 21-75 and females age 40-75.     Currently Prescribed a statin: no  Per chart review: Patient has allergy listed to statins- see below    Note from 11/2/23 office

## 2024-02-12 NOTE — TELEPHONE ENCOUNTER
For Pharmacy Admin Tracking Only  Program: [facility]  CPA in place:  No  Recommendation Provided To: Provider: 1 via Note to Provider  Intervention Accepted By: Provider: 1  Gap Closed?: Yes   Time Spent (min): 15

## 2024-02-21 ENCOUNTER — HOSPITAL ENCOUNTER (OUTPATIENT)
Dept: PHARMACY | Age: 74
Setting detail: THERAPIES SERIES
Discharge: HOME | End: 2024-02-21
Payer: MEDICARE

## 2024-02-21 DIAGNOSIS — I48.20 CHRONIC ATRIAL FIBRILLATION (HCC): Primary | Chronic | ICD-10-CM

## 2024-02-21 DIAGNOSIS — Z51.81 ENCOUNTER FOR THERAPEUTIC DRUG MONITORING: ICD-10-CM

## 2024-02-21 DIAGNOSIS — Z79.01 ANTICOAGULATED ON COUMADIN: ICD-10-CM

## 2024-02-21 LAB — POC INR: 2.7 (ref 0.8–1.2)

## 2024-02-21 PROCEDURE — 99211 OFF/OP EST MAY X REQ PHY/QHP: CPT

## 2024-02-21 PROCEDURE — 36416 COLLJ CAPILLARY BLOOD SPEC: CPT

## 2024-02-21 PROCEDURE — 85610 PROTHROMBIN TIME: CPT

## 2024-02-21 NOTE — PROGRESS NOTES
Medication Management Clinic  [facility]  Anticoagulation Clinic  [phone] (phone)  [phone] (fax)    Ms. Swati Tran is a 73 y.o.  female with history of atrial fib., per Dr. Christy's referral, who presents today for Warfarin monitoring and adjustment (6 week visit).    Patient verifies current dosing regimen and tablet strength.  No missed or extra doses.  Patient denies bruising/swelling.  Has usual slight SOB. Typically sees a little blood on tissue from nose (when cleaning it).  No blood in urine or stool. Stool normally black from iron supplement.  No dietary changes.  No changes in medication/OTC agents/herbals. Recently tried sleeping pill from [employer], but stopped due to foot pain.  No change in alcohol use or tobacco use.  No change in activity level.  Patient denies falls.  No vomiting/diarrhea or acute illness.   No procedures scheduled in the future at this time.    Assessment:   Lab Results   Component Value Date    INR 2.70 (H) 02/21/2024    INR 2.60 (H) 01/10/2024    INR 2.50 (H) 11/29/2023     INR therapeutic - goal 2-3.  Recent Labs     02/21/24  1408   INR 2.70*        Plan:  POCT INR performed/result reviewed.  Continue PO Coumadin 3 mg W, 6 mg MTThFSS.  Recheck INR in 6 week(s).  Patient reminded to call the Anticoagulation Clinic with any signs or symptoms of bleeding or with any medication changes.  Patient given instructions utilizing the teach back method.       After visit summary printed and reviewed with patient.      Discharged ambulatory in no apparent distress, with four-wheeled walker and .    For Pharmacy Admin Tracking Only    Time Spent (min): 20

## 2024-03-12 ENCOUNTER — TELEPHONE (OUTPATIENT)
Dept: PHARMACY | Age: 74
End: 2024-03-12

## 2024-03-12 NOTE — TELEPHONE ENCOUNTER
Patient called to ask about two new supplements she was considering taking and to ask about a new soup ingredient.     Patient reported wanting to try Hema Plex (pictured below) and was discussing the switch with her PCP. Patient educated that this supplement does contain spinach, broccoli, chlorophyll, and green tea leaves which could potentially have a small impact on her INR by contributing a small amount of vitamin K. Patient stated that she will decide if she wants to start it before her next clinic appointment on 04/03 and will not start it until after her appointment with the coumadin clinic.    Patient also reported that she changed her zinc 50 mg tablet to a zinc 50 mg gummy since the gummies were on sale. Told patient that this is okay.     Patient also had a question on if inulin in her canned soup was the same as insulin. Patient educated that inulin is a type of vegetable starchy substance like fiber, it won't affect her INR, and that it is not a medication, nor is it insulin.      Enedina Richardson, PharmD  PGY1 Resident      For Pharmacy Admin Tracking Only    Time Spent (min): 10

## 2024-04-03 ENCOUNTER — HOSPITAL ENCOUNTER (OUTPATIENT)
Dept: PHARMACY | Age: 74
Setting detail: THERAPIES SERIES
Discharge: HOME | End: 2024-04-03
Payer: MEDICARE

## 2024-04-03 DIAGNOSIS — Z51.81 ENCOUNTER FOR THERAPEUTIC DRUG MONITORING: ICD-10-CM

## 2024-04-03 DIAGNOSIS — Z79.01 ANTICOAGULATED ON COUMADIN: ICD-10-CM

## 2024-04-03 DIAGNOSIS — I48.20 CHRONIC ATRIAL FIBRILLATION (HCC): Primary | Chronic | ICD-10-CM

## 2024-04-03 LAB — POC INR: 3.3 (ref 0.8–1.2)

## 2024-04-03 PROCEDURE — 85610 PROTHROMBIN TIME: CPT

## 2024-04-03 PROCEDURE — 36416 COLLJ CAPILLARY BLOOD SPEC: CPT

## 2024-04-03 PROCEDURE — 99212 OFFICE O/P EST SF 10 MIN: CPT

## 2024-04-03 RX ORDER — ZINC GLUCONATE 50 MG
50 TABLET ORAL DAILY
COMMUNITY

## 2024-04-03 NOTE — PROGRESS NOTES
Medication Management Clinic  [facility]  Anticoagulation Clinic  [phone] (phone)  [phone] (fax)    Ms. Swati Tran is a 74 y.o.  female with history of atrial fib., per Dr. Christy's referral, who presents today for Warfarin monitoring and adjustment (6 week visit).    Patient verifies current dosing regimen and tablet strength.  No missed or extra doses.  Patient denies bleeding/swelling.  Has usual easy bruising.  Has usual SOB if overdoes it.  No blood in urine or stool. Sometimes stool is very dark from iron supplement.  No dietary changes.  No changes in medication/OTC agents/herbals. Back to Zinc tablet, not gummy.  Decided not to start HemaPlex supplement.  No change in alcohol use or tobacco use.  No change in activity level.  Patient denies falls.  No vomiting/diarrhea or acute illness.   No procedures scheduled in the future at this time.    Assessment:     Lab Results   Component Value Date    INR 3.30 (H) 04/03/2024    INR 2.70 (H) 02/21/2024    INR 2.60 (H) 01/10/2024     INR supratherapeutic - goal 2-3.  Recent Labs     04/03/24  1406   INR 3.30*      Plan:  POCT INR performed/result reviewed.  1.5 mg today, W (per policy), then continue PO Coumadin 3 mg W, 6 mg MTThFSS.  Recheck INR in 3 week(s), per policy.  Patient reminded to call the Anticoagulation Clinic with any signs or symptoms of bleeding or with any medication changes.  Patient given instructions utilizing the teach back method.     After visit summary printed and reviewed with patient.    Advised extra caution.  Discharged ambulatory in no apparent distress, with four-wheeled walker and .     For Pharmacy Admin Tracking Only    Intervention Detail: Dose Adjustment: 1, reason: Therapy De-escalation  Total # of Interventions Recommended: 1  Total # of Interventions Accepted: 1  Time Spent (min): 20

## 2024-04-04 ENCOUNTER — COMMUNITY OUTREACH (OUTPATIENT)
Dept: FAMILY MEDICINE CLINIC | Age: 74
End: 2024-04-04

## 2024-04-04 NOTE — PROGRESS NOTES
Patient's HM shows they are overdue for Colorectal Screening and Dexa.   Care Everywhere and  files searched.  No results to attach to order nor HM updated.

## 2024-04-24 ENCOUNTER — APPOINTMENT (OUTPATIENT)
Dept: PHARMACY | Age: 74
End: 2024-04-24
Payer: MEDICARE

## 2024-04-24 DIAGNOSIS — Z51.81 ENCOUNTER FOR THERAPEUTIC DRUG MONITORING: ICD-10-CM

## 2024-04-24 DIAGNOSIS — Z79.01 ANTICOAGULATED ON COUMADIN: Primary | ICD-10-CM

## 2024-04-24 DIAGNOSIS — I48.20 CHRONIC ATRIAL FIBRILLATION (HCC): Chronic | ICD-10-CM

## 2024-04-24 LAB — POC INR: 3 (ref 0.8–1.2)

## 2024-04-24 PROCEDURE — 36416 COLLJ CAPILLARY BLOOD SPEC: CPT

## 2024-04-24 PROCEDURE — 99211 OFF/OP EST MAY X REQ PHY/QHP: CPT

## 2024-04-24 PROCEDURE — 85610 PROTHROMBIN TIME: CPT

## 2024-04-24 NOTE — PROGRESS NOTES
Medication Management Clinic  [facility]  Anticoagulation Clinic  [phone] (phone)  [phone] (fax)    Ms. Swati Tran is a 74 y.o.  female with history of Afib who presents today for anticoagulation monitoring and adjustment.    Patient verifies current dosing regimen and tablet strength.   No missed or extra doses.  Patient denies s/s bleeding/bruising/swelling/SOB.  No blood in urine or stool.  No dietary changes. Did have some sauerkraut on Sunday 4/21, denies usual consumption of VitK foods.   No changes in medication/OTC agents/Herbals.  No change in alcohol use or tobacco use.  No change in activity level.  Patient denies falls.  No vomiting/diarrhea or acute illness.   No Procedures scheduled in the future at this time.    Clinic visits are not covered anymore. Will be filling out paperwork Lola has given her.     Assessment:   Goal 2.0-3.0    Lab Results   Component Value Date    INR 3.00 (H) 04/24/2024    INR 3.30 (H) 04/03/2024    INR 2.70 (H) 02/21/2024     INR therapeutic   Recent Labs     04/24/24  1414   INR 3.00*        Patient interview completed and discussed with pharmacist by Mihai Garber, 2024 ONU PharmD Candidate.     Plan:  Continue Coumadin 3 mg W, 6 mg MTThFSaSu.  Recheck INR in 6 week(s).  Patient reminded to call the Anticoagulation Clinic with any signs or symptoms of bleeding or with any medication changes.  Patient given instructions utilizing the teach back method.    After visit summary printed and reviewed with patient.      Discharged ambulatory in no apparent distress.    For Pharmacy Admin Tracking Only    Time Spent (min): 20

## 2024-05-06 ENCOUNTER — NURSE ONLY (OUTPATIENT)
Dept: LAB | Age: 74
End: 2024-05-06

## 2024-05-06 DIAGNOSIS — D50.9 IRON DEFICIENCY ANEMIA, UNSPECIFIED IRON DEFICIENCY ANEMIA TYPE: ICD-10-CM

## 2024-05-06 LAB
BASOPHILS ABSOLUTE: 0.1 THOU/MM3 (ref 0–0.1)
BASOPHILS NFR BLD AUTO: 1.2 %
DEPRECATED RDW RBC AUTO: 47.3 FL (ref 35–45)
EOSINOPHIL NFR BLD AUTO: 2.8 %
EOSINOPHILS ABSOLUTE: 0.2 THOU/MM3 (ref 0–0.4)
ERYTHROCYTE [DISTWIDTH] IN BLOOD BY AUTOMATED COUNT: 12.7 % (ref 11.5–14.5)
HCT VFR BLD AUTO: 42 % (ref 37–47)
HGB BLD-MCNC: 13.5 GM/DL (ref 12–16)
IMM GRANULOCYTES # BLD AUTO: 0.03 THOU/MM3 (ref 0–0.07)
IMM GRANULOCYTES NFR BLD AUTO: 0.4 %
LYMPHOCYTES ABSOLUTE: 0.9 THOU/MM3 (ref 1–4.8)
LYMPHOCYTES NFR BLD AUTO: 13.6 %
MCH RBC QN AUTO: 32.6 PG (ref 26–33)
MCHC RBC AUTO-ENTMCNC: 32.1 GM/DL (ref 32.2–35.5)
MCV RBC AUTO: 101.4 FL (ref 81–99)
MONOCYTES ABSOLUTE: 0.4 THOU/MM3 (ref 0.4–1.3)
MONOCYTES NFR BLD AUTO: 6.1 %
NEUTROPHILS ABSOLUTE: 5.2 THOU/MM3 (ref 1.8–7.7)
NEUTROPHILS NFR BLD AUTO: 75.9 %
NRBC BLD AUTO-RTO: 0 /100 WBC
PLATELET # BLD AUTO: 70 THOU/MM3 (ref 130–400)
PLATELET BLD QL SMEAR: ABNORMAL
PMV BLD AUTO: 14.6 FL (ref 9.4–12.4)
RBC # BLD AUTO: 4.14 MILL/MM3 (ref 4.2–5.4)
SCAN OF BLOOD SMEAR: NORMAL
WBC # BLD AUTO: 6.9 THOU/MM3 (ref 4.8–10.8)

## 2024-05-07 ENCOUNTER — OFFICE VISIT (OUTPATIENT)
Dept: FAMILY MEDICINE CLINIC | Age: 74
End: 2024-05-07
Payer: MEDICARE

## 2024-05-07 VITALS
DIASTOLIC BLOOD PRESSURE: 74 MMHG | BODY MASS INDEX: 42.76 KG/M2 | WEIGHT: 232.4 LBS | HEIGHT: 62 IN | OXYGEN SATURATION: 98 % | TEMPERATURE: 97.7 F | HEART RATE: 71 BPM | RESPIRATION RATE: 18 BRPM | SYSTOLIC BLOOD PRESSURE: 130 MMHG

## 2024-05-07 DIAGNOSIS — Z95.1 S/P CABG (CORONARY ARTERY BYPASS GRAFT): Chronic | ICD-10-CM

## 2024-05-07 DIAGNOSIS — I25.2 HISTORY OF ST ELEVATION MYOCARDIAL INFARCTION (STEMI): Chronic | ICD-10-CM

## 2024-05-07 DIAGNOSIS — M79.10 MYALGIA DUE TO STATIN: ICD-10-CM

## 2024-05-07 DIAGNOSIS — Z86.79 S/P MAZE OPERATION FOR ATRIAL FIBRILLATION: Chronic | ICD-10-CM

## 2024-05-07 DIAGNOSIS — E89.0 HYPOTHYROIDISM, POSTSURGICAL: Chronic | ICD-10-CM

## 2024-05-07 DIAGNOSIS — D69.6 THROMBOCYTOPENIA (HCC): Chronic | ICD-10-CM

## 2024-05-07 DIAGNOSIS — E11.9 TYPE 2 DIABETES MELLITUS WITHOUT COMPLICATION, WITHOUT LONG-TERM CURRENT USE OF INSULIN (HCC): ICD-10-CM

## 2024-05-07 DIAGNOSIS — L30.9 DERMATITIS: ICD-10-CM

## 2024-05-07 DIAGNOSIS — I25.5 ISCHEMIC CARDIOMYOPATHY: Chronic | ICD-10-CM

## 2024-05-07 DIAGNOSIS — D50.9 IRON DEFICIENCY ANEMIA, UNSPECIFIED IRON DEFICIENCY ANEMIA TYPE: Primary | ICD-10-CM

## 2024-05-07 DIAGNOSIS — E78.2 MIXED HYPERLIPIDEMIA: ICD-10-CM

## 2024-05-07 DIAGNOSIS — I25.10 ASHD (ARTERIOSCLEROTIC HEART DISEASE): Chronic | ICD-10-CM

## 2024-05-07 DIAGNOSIS — Z98.890 S/P TRICUSPID VALVE REPAIR: Chronic | ICD-10-CM

## 2024-05-07 DIAGNOSIS — T46.6X5A MYALGIA DUE TO STATIN: ICD-10-CM

## 2024-05-07 DIAGNOSIS — D50.9 IRON DEFICIENCY ANEMIA, UNSPECIFIED IRON DEFICIENCY ANEMIA TYPE: ICD-10-CM

## 2024-05-07 DIAGNOSIS — K21.9 GASTROESOPHAGEAL REFLUX DISEASE, UNSPECIFIED WHETHER ESOPHAGITIS PRESENT: Chronic | ICD-10-CM

## 2024-05-07 DIAGNOSIS — Z98.890 S/P MAZE OPERATION FOR ATRIAL FIBRILLATION: Chronic | ICD-10-CM

## 2024-05-07 DIAGNOSIS — E89.0 S/P PARTIAL THYROIDECTOMY: Chronic | ICD-10-CM

## 2024-05-07 DIAGNOSIS — I10 ESSENTIAL HYPERTENSION: ICD-10-CM

## 2024-05-07 DIAGNOSIS — I48.91 ATRIAL FIBRILLATION, UNSPECIFIED TYPE (HCC): ICD-10-CM

## 2024-05-07 DIAGNOSIS — D32.9 MENINGIOMA (HCC): Chronic | ICD-10-CM

## 2024-05-07 DIAGNOSIS — D34 FOLLICULAR ADENOMA OF THYROID GLAND: Chronic | ICD-10-CM

## 2024-05-07 DIAGNOSIS — Z98.890 S/P MVR (MITRAL VALVE REPAIR): Chronic | ICD-10-CM

## 2024-05-07 DIAGNOSIS — Z12.11 SCREENING FOR COLON CANCER: ICD-10-CM

## 2024-05-07 DIAGNOSIS — I50.20 HEART FAILURE WITH REDUCED EJECTION FRACTION (HCC): Chronic | ICD-10-CM

## 2024-05-07 LAB — HBA1C MFR BLD: 6.5 % (ref 4.3–5.7)

## 2024-05-07 PROCEDURE — G8400 PT W/DXA NO RESULTS DOC: HCPCS | Performed by: FAMILY MEDICINE

## 2024-05-07 PROCEDURE — 3044F HG A1C LEVEL LT 7.0%: CPT | Performed by: FAMILY MEDICINE

## 2024-05-07 PROCEDURE — 3078F DIAST BP <80 MM HG: CPT | Performed by: FAMILY MEDICINE

## 2024-05-07 PROCEDURE — 99214 OFFICE O/P EST MOD 30 MIN: CPT | Performed by: FAMILY MEDICINE

## 2024-05-07 PROCEDURE — 3075F SYST BP GE 130 - 139MM HG: CPT | Performed by: FAMILY MEDICINE

## 2024-05-07 PROCEDURE — 2022F DILAT RTA XM EVC RTNOPTHY: CPT | Performed by: FAMILY MEDICINE

## 2024-05-07 PROCEDURE — G8417 CALC BMI ABV UP PARAM F/U: HCPCS | Performed by: FAMILY MEDICINE

## 2024-05-07 PROCEDURE — 1036F TOBACCO NON-USER: CPT | Performed by: FAMILY MEDICINE

## 2024-05-07 PROCEDURE — 1123F ACP DISCUSS/DSCN MKR DOCD: CPT | Performed by: FAMILY MEDICINE

## 2024-05-07 PROCEDURE — 3017F COLORECTAL CA SCREEN DOC REV: CPT | Performed by: FAMILY MEDICINE

## 2024-05-07 PROCEDURE — G8427 DOCREV CUR MEDS BY ELIG CLIN: HCPCS | Performed by: FAMILY MEDICINE

## 2024-05-07 PROCEDURE — 1090F PRES/ABSN URINE INCON ASSESS: CPT | Performed by: FAMILY MEDICINE

## 2024-05-07 RX ORDER — FERROUS SULFATE 325(65) MG
325 TABLET ORAL 2 TIMES DAILY WITH MEALS
Qty: 180 TABLET | Refills: 1 | Status: SHIPPED | OUTPATIENT
Start: 2024-05-07

## 2024-05-07 RX ORDER — TRIAMCINOLONE ACETONIDE 1 MG/G
CREAM TOPICAL
Qty: 3 EACH | Refills: 3 | Status: SHIPPED | OUTPATIENT
Start: 2024-05-07

## 2024-05-07 NOTE — PATIENT INSTRUCTIONS
LAB INSTRUCTIONS:    Please complete labs IN 3 month (s).    Please fast for 8 hours prior to lab collection.    The clinic will call you within 1 week of collection. If you have not heard from us within that amount of time, please call us at [phone].

## 2024-05-07 NOTE — TELEPHONE ENCOUNTER
Patient in office today requesting refill for medication sent to confirmed pharmacy     Medication pended

## 2024-05-13 RX ORDER — WARFARIN SODIUM 3 MG/1
TABLET ORAL
Qty: 180 TABLET | Refills: 3 | Status: SHIPPED | OUTPATIENT
Start: 2024-05-13

## 2024-05-16 DIAGNOSIS — E89.0 POSTOPERATIVE HYPOTHYROIDISM: ICD-10-CM

## 2024-05-16 RX ORDER — POTASSIUM CHLORIDE 20 MEQ/1
TABLET, EXTENDED RELEASE ORAL
Qty: 90 TABLET | Refills: 3 | Status: SHIPPED | OUTPATIENT
Start: 2024-05-16

## 2024-05-16 RX ORDER — LEVOTHYROXINE SODIUM 0.03 MG/1
25 TABLET ORAL DAILY
Qty: 90 TABLET | Refills: 3 | Status: SHIPPED | OUTPATIENT
Start: 2024-05-16

## 2024-05-16 RX ORDER — SPIRONOLACTONE 25 MG/1
25 TABLET ORAL DAILY
Qty: 90 TABLET | Refills: 1 | Status: SHIPPED | OUTPATIENT
Start: 2024-05-16

## 2024-05-16 NOTE — TELEPHONE ENCOUNTER
Recent Visits  Date Type Provider Dept   05/07/24 Office Visit Jacky Christy, DO [facility]   02/07/24 Office Visit Jacky Christy, DO [facility]   11/02/23 Office Visit Jacky Christy, DO [facility]   04/27/23 Office Visit Jacky Christy, DO [facility]   03/27/23 Office Visit Jacky Christy, DO [facility]   03/08/23 Office Visit Jacky Christy, DO [facility]   Showing recent visits within past 540 days with a meds authorizing provider and meeting all other requirements  Future Appointments  No visits were found meeting these conditions.  Showing future appointments within next 150 days with a meds authorizing provider and meeting all other requirements

## 2024-05-21 NOTE — PATIENT INSTRUCTIONS
1.  Return to clinic in 3 mos to see [address] [place] [address] with labs:  CBC pt's bp 92/52

## 2024-06-05 ENCOUNTER — HOSPITAL ENCOUNTER (OUTPATIENT)
Dept: PHARMACY | Age: 74
Setting detail: THERAPIES SERIES
Discharge: HOME | End: 2024-06-05
Payer: MEDICARE

## 2024-06-05 DIAGNOSIS — Z79.01 ANTICOAGULATED ON COUMADIN: ICD-10-CM

## 2024-06-05 DIAGNOSIS — Z51.81 ENCOUNTER FOR THERAPEUTIC DRUG MONITORING: ICD-10-CM

## 2024-06-05 DIAGNOSIS — I48.20 CHRONIC ATRIAL FIBRILLATION (HCC): Primary | Chronic | ICD-10-CM

## 2024-06-05 LAB — POC INR: 3 (ref 0.8–1.2)

## 2024-06-05 PROCEDURE — 85610 PROTHROMBIN TIME: CPT | Performed by: PHARMACIST

## 2024-06-05 PROCEDURE — 99211 OFF/OP EST MAY X REQ PHY/QHP: CPT | Performed by: PHARMACIST

## 2024-06-05 PROCEDURE — 36416 COLLJ CAPILLARY BLOOD SPEC: CPT | Performed by: PHARMACIST

## 2024-06-05 NOTE — PROGRESS NOTES
Medication Management Clinic  [facility]  Anticoagulation Clinic  [phone] (phone)  [phone] (fax)    Ms. Swati Tran is a 74 y.o.  female with history of Afib who presents today for anticoagulation monitoring and adjustment.    Patient verifies current dosing regimen and tablet strength.  No missed or extra doses.  Patient denies s/s bleeding/bruising/swelling/SOB  No blood in urine or stool.  No dietary changes.  No changes in medication/OTC agents/Herbals.  No change in alcohol use or tobacco use.  No change in activity level.  Patient denies falls.  No vomiting/diarrhea or acute illness.   No Procedures scheduled in the future at this time.    Assessment:   Lab Results   Component Value Date    INR 3.00 (H) 06/05/2024    INR 3.00 (H) 04/24/2024    INR 3.30 (H) 04/03/2024     INR therapeutic   Recent Labs     06/05/24  1412   INR 3.00*   .  POCT: 3.0    Plan:  Continue Coumadin 3 mg W, 6 mg MTThFSaSu.  Recheck INR in 6 week(s).  Patient reminded to call the Anticoagulation Clinic with any signs or symptoms of bleeding or with any medication changes.  Patient given instructions utilizing the teach back method.    After visit summary printed and reviewed with patient.      Discharged ambulatory in no apparent distress.    For Pharmacy Admin Tracking Only    Time Spent (min): 20

## 2024-07-16 ENCOUNTER — HOSPITAL ENCOUNTER (OUTPATIENT)
Dept: PHARMACY | Age: 74
Setting detail: THERAPIES SERIES
Discharge: HOME | End: 2024-07-16
Payer: MEDICARE

## 2024-07-16 DIAGNOSIS — Z51.81 ENCOUNTER FOR THERAPEUTIC DRUG MONITORING: ICD-10-CM

## 2024-07-16 DIAGNOSIS — Z79.01 ANTICOAGULATED ON COUMADIN: ICD-10-CM

## 2024-07-16 DIAGNOSIS — I48.20 CHRONIC ATRIAL FIBRILLATION (HCC): Primary | Chronic | ICD-10-CM

## 2024-07-16 LAB — POC INR: 3.7 (ref 0.8–1.2)

## 2024-07-16 PROCEDURE — 36416 COLLJ CAPILLARY BLOOD SPEC: CPT | Performed by: PHARMACIST

## 2024-07-16 PROCEDURE — 99212 OFFICE O/P EST SF 10 MIN: CPT | Performed by: PHARMACIST

## 2024-07-16 PROCEDURE — 85610 PROTHROMBIN TIME: CPT | Performed by: PHARMACIST

## 2024-07-16 NOTE — PROGRESS NOTES
Medication Management Clinic  [facility]  Anticoagulation Clinic  [phone] (phone)  [phone] (fax)    Ms. Swati Tran is a 74 y.o.  female with history of Afib who presents today for anticoagulation monitoring and adjustment.    Patient verifies current dosing regimen and tablet strength.  She thinks she only took 3mg on 7/1 or so.  Had a pill left in her pill box that week.   Patient denies s/s bleeding/bruising/swelling/SOB  No blood in urine or stool.  No dietary changes.  No changes in medication/OTC agents/Herbals.  No change in alcohol use or tobacco use.  No change in activity level.  Patient denies falls.  No vomiting/diarrhea or acute illness.   No Procedures scheduled in the future at this time.    Assessment:   Lab Results   Component Value Date    INR 3.70 (H) 07/16/2024    INR 3.00 (H) 06/05/2024    INR 3.00 (H) 04/24/2024     INR supratherapeutic   Recent Labs     07/16/24  1418   INR 3.70*         Plan:  Hold Coumadin today, then continue Coumadin 3mg W and 6mg all other days.  Recheck INR in 2 week(s).  Patient reminded to call the Anticoagulation Clinic with any signs or symptoms of bleeding or with any medication changes.  Patient given instructions utilizing the teach back method.        After visit summary printed and reviewed with patient.      Discharged ambulatory in no apparent distress.    For Pharmacy Admin Tracking Only    Intervention Detail: Dose Adjustment: 1, reason: Therapy De-escalation  Total # of Interventions Recommended: 1  Total # of Interventions Accepted: 1  Time Spent (min): 20

## 2024-07-17 ENCOUNTER — OFFICE VISIT (OUTPATIENT)
Dept: CARDIOLOGY CLINIC | Age: 74
End: 2024-07-17
Payer: MEDICARE

## 2024-07-17 VITALS
BODY MASS INDEX: 42.72 KG/M2 | HEIGHT: 62 IN | DIASTOLIC BLOOD PRESSURE: 68 MMHG | SYSTOLIC BLOOD PRESSURE: 130 MMHG | OXYGEN SATURATION: 95 % | HEART RATE: 72 BPM | WEIGHT: 232.13 LBS

## 2024-07-17 DIAGNOSIS — Z91.89 AT RISK FOR FLUID VOLUME OVERLOAD: ICD-10-CM

## 2024-07-17 DIAGNOSIS — I50.22 CHRONIC SYSTOLIC CONGESTIVE HEART FAILURE, NYHA CLASS 2 (HCC): Primary | ICD-10-CM

## 2024-07-17 DIAGNOSIS — I25.5 ISCHEMIC CARDIOMYOPATHY: ICD-10-CM

## 2024-07-17 DIAGNOSIS — I48.20 CHRONIC ATRIAL FIBRILLATION (HCC): ICD-10-CM

## 2024-07-17 PROCEDURE — 3017F COLORECTAL CA SCREEN DOC REV: CPT | Performed by: NURSE PRACTITIONER

## 2024-07-17 PROCEDURE — G8400 PT W/DXA NO RESULTS DOC: HCPCS | Performed by: NURSE PRACTITIONER

## 2024-07-17 PROCEDURE — 1090F PRES/ABSN URINE INCON ASSESS: CPT | Performed by: NURSE PRACTITIONER

## 2024-07-17 PROCEDURE — G8417 CALC BMI ABV UP PARAM F/U: HCPCS | Performed by: NURSE PRACTITIONER

## 2024-07-17 PROCEDURE — G8427 DOCREV CUR MEDS BY ELIG CLIN: HCPCS | Performed by: NURSE PRACTITIONER

## 2024-07-17 PROCEDURE — 1123F ACP DISCUSS/DSCN MKR DOCD: CPT | Performed by: NURSE PRACTITIONER

## 2024-07-17 PROCEDURE — 99214 OFFICE O/P EST MOD 30 MIN: CPT | Performed by: NURSE PRACTITIONER

## 2024-07-17 PROCEDURE — 3075F SYST BP GE 130 - 139MM HG: CPT | Performed by: NURSE PRACTITIONER

## 2024-07-17 PROCEDURE — 3078F DIAST BP <80 MM HG: CPT | Performed by: NURSE PRACTITIONER

## 2024-07-17 PROCEDURE — 1036F TOBACCO NON-USER: CPT | Performed by: NURSE PRACTITIONER

## 2024-07-17 ASSESSMENT — ENCOUNTER SYMPTOMS
WHEEZING: 0
ABDOMINAL PAIN: 0
COUGH: 0
SHORTNESS OF BREATH: 0
ABDOMINAL DISTENTION: 0

## 2024-07-17 NOTE — PROGRESS NOTES
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 Never did f/u MRI. Told she didn't need to. Has 0 sxs. Denies HA, vision changes, lateralizing numbness or weakness.     Mixed hearing loss 2013    Obesity (BMI 30-39.9)     Osteoarthritis     Post-menopausal     S/P CABG (coronary artery bypass graft)     S/P Maze operation for atrial fibrillation     S/P MVR (mitral valve repair)     S/P partial thyroidectomy, right 10/26/2022    S/P tricuspid valve repair     Thrombocytopenia (HCC)      Past Surgical History:   Procedure Laterality Date     SECTION  1972    CHOLECYSTECTOMY  2004    COLONOSCOPY  2006    CORONARY ARTERY BYPASS GRAFT  2021    [facility]    MITRAL VALVE REPAIR  2021    [facility]    MYRINGOTOMY AND TYMPANOSTOMY TUBE PLACEMENT  2012    Dr Petersen    OTHER SURGICAL HISTORY  2021    MAZE Procedure during CABG and MVR/TVR at [facility]    THYROIDECTOMY N/A 2022    Right Hemithyroidectomy performed by Thom Doherty MD at [facility] OR    TRICUSPID VALVE SURGERY  2021    TV Repair 2021 at [facility]    TUBAL LIGATION      UPPER GASTROINTESTINAL ENDOSCOPY  2006     Family History   Problem Relation Age of Onset    Heart Disease Mother     Lung Cancer Mother     Osteoporosis Mother     Other Father         Did not know her father.     Other Maternal Grandmother         blood clot, no clear hx surrounding    Heart Attack Maternal Grandfather     Heart Attack Maternal Uncle 58    Breast Cancer Maternal Aunt 48

## 2024-07-17 NOTE — PATIENT INSTRUCTIONS
You may receive a survey regarding the care you received during your visit.  Your input is valuable to us.  We encourage you to complete and return your survey.  We hope you will choose us in the future for your healthcare needs.    Your nurses today were Kari.  Office hours:   Mon-Thurs 8-4:30  Friday 8-12  Phone: [phone]    Continue:  Continue current medications  Daily weights and record  Fluid restriction of 2 Liters per day  Limit sodium in diet to around 8741-5980 mg/day  Monitor BP  Activity as tolerated     Call the Heart Failure Clinic for any of the following symptoms:   Weight gain of -3 pounds in 1 day or 5 pounds in 1 week  Increased shortness of breath  Shortness of breath while laying down  Cough  Chest pain  Swelling in feet, ankles or legs  Bloating in abdomen  Fatigue      No medication changes today     Routine blood work within the month    Continue diet/fluid adherence  Continue daily wts.  F/U w/ Cardiology  F/U in clinic in 1 yr

## 2024-07-25 RX ORDER — FUROSEMIDE 40 MG/1
TABLET ORAL
Qty: 90 TABLET | Refills: 3 | Status: SHIPPED | OUTPATIENT
Start: 2024-07-25

## 2024-07-30 ENCOUNTER — HOSPITAL ENCOUNTER (OUTPATIENT)
Dept: PHARMACY | Age: 74
Setting detail: THERAPIES SERIES
Discharge: HOME | End: 2024-07-30
Payer: MEDICARE

## 2024-07-30 DIAGNOSIS — Z79.01 ANTICOAGULATED ON COUMADIN: ICD-10-CM

## 2024-07-30 DIAGNOSIS — I48.20 CHRONIC ATRIAL FIBRILLATION (HCC): Primary | Chronic | ICD-10-CM

## 2024-07-30 DIAGNOSIS — Z51.81 ENCOUNTER FOR THERAPEUTIC DRUG MONITORING: ICD-10-CM

## 2024-07-30 LAB — POC INR: 3.2 (ref 0.8–1.2)

## 2024-07-30 PROCEDURE — 36416 COLLJ CAPILLARY BLOOD SPEC: CPT

## 2024-07-30 PROCEDURE — 99211 OFF/OP EST MAY X REQ PHY/QHP: CPT

## 2024-07-30 PROCEDURE — 85610 PROTHROMBIN TIME: CPT

## 2024-07-30 NOTE — PROGRESS NOTES
Medication Management Clinic  [facility]  Anticoagulation Clinic  [phone] (phone)  [phone] (fax)    Ms. Swati Tran is a 74 y.o.  female with history of atrial fib., per Dr. Christy's referral, who presents today for Warfarin monitoring and adjustment (2 week visit).    Patient verifies current dosing regimen and tablet strength.  No missed (except as ordered last visit) or extra doses.  Patient denies bleeding.  Bruising has improved.  Has usual NORMAN.  Has usual swelling of lower legs.  No blood in urine or stool. Stool normally black from iron supplement.  No dietary changes, except for a small muffin that had zucchini in it 1-2 weeks ago. Reminded that does have Vitamin K, but especially the rind.  No changes in medication/OTC agents/herbals.  No change in alcohol use or tobacco use.  No change in activity level.  Patient denies falls.  No diarrhea or acute illness. Vomited once after cookies that weren't gluten-free - not near time of pills.  No procedures scheduled in the future at this time.    Assessment:     Lab Results   Component Value Date    INR 3.20 (H) 07/30/2024    INR 3.70 (H) 07/16/2024    INR 3.00 (H) 06/05/2024     INR supratherapeutic - goal 2-3.  Recent Labs     07/30/24  1419   INR 3.20*      Plan:  POCT INR performed/result reviewed.  3 mg today, T, then decrease PO Coumadin to 3 mg MF, 6 mg TWThSS (from 3 mg W, 6 mg MTThFSS=7.7% decrease).  Recheck INR in 2-3 week(s).  (Report given - orders entered by RONEN Jade RP., PharmD.)  Patient reminded to call the Anticoagulation Clinic with any signs or symptoms of bleeding or with any medication changes.  Patient given instructions utilizing the teach back method.       After visit summary printed and reviewed with patient.    Advised extra caution.  Discharged ambulatory in no apparent distress, with four-wheeled walker and .     For Pharmacy Admin Tracking Only    Intervention Detail: Dose Adjustment: 1,

## 2024-08-20 ENCOUNTER — ANTI-COAG VISIT (OUTPATIENT)
Age: 74
End: 2024-08-20
Payer: MEDICARE

## 2024-08-20 DIAGNOSIS — I48.20 CHRONIC ATRIAL FIBRILLATION (HCC): Primary | Chronic | ICD-10-CM

## 2024-08-20 DIAGNOSIS — Z79.01 ANTICOAGULATED ON COUMADIN: ICD-10-CM

## 2024-08-20 DIAGNOSIS — Z51.81 ENCOUNTER FOR THERAPEUTIC DRUG MONITORING: ICD-10-CM

## 2024-08-20 LAB — POC INR: 3.3 (ref 0.8–1.2)

## 2024-08-20 PROCEDURE — 99212 OFFICE O/P EST SF 10 MIN: CPT

## 2024-08-20 PROCEDURE — 85610 PROTHROMBIN TIME: CPT

## 2024-08-20 NOTE — PROGRESS NOTES
Medication Management Clinic  [facility]  Anticoagulation Clinic  [phone] (phone)  [phone] (fax)    Ms. Swati Tran is a 74 y.o.  female with history of atrial fib., per Dr. Christy's referral, who presents today for Warfarin monitoring and adjustment (3 week visit after decreasing dose by 7.7%).    Patient verifies current tablet strength.  Followed printed instructions for dose.  No missed or extra doses.  Patient denies bleeding/swelling.  Has usual SOB. Thinks she's bruising easier.  No blood in urine or stool. Stool usually black from iron supplement.  No dietary changes. Denies having Squirt/Fresca (has grapefruit juice).  No changes in medication/OTC agents/herbals. States she's about to run out of prescription iron - doesn't think she has refill left (appears to in computer). Had bottle of Hema-Plex with her that she took before the prescription.  Wants to know if Ok to take that; advised her doctor probably wants her to stay with prescription iron (clinic pharmacist concurs, but expects no Coumadin interaction with the Hema-Plex). Advised to call doctor's office if truly has no refill left.  No change in alcohol use or tobacco use.  No change in activity level.  Patient denies falls.  No vomiting/diarrhea or acute illness.   No procedures scheduled in the future at this time.    Assessment:   Lab Results   Component Value Date    INR 3.30 (H) 08/20/2024    INR 3.20 (H) 07/30/2024    INR 3.70 (H) 07/16/2024     INR supratherapeutic - goal 2-3.  Recent Labs     08/20/24  1400   INR 3.30*        Plan:  POCT INR performed/result reviewed.  3 mg today, T, then decrease PO Coumadin to 3 mg MWF, 6 mg TThSS (from 3 mg MF, 6 mg TWThSS=8.3% decrease).  Recheck INR in 2 week(s). (Report given - orders entered by RONEN Jade [facility]., PharmD.)  Patient reminded to call the Anticoagulation Clinic with any signs or symptoms of bleeding or with any medication changes.  Patient given

## 2024-08-28 ENCOUNTER — LAB (OUTPATIENT)
Dept: LAB | Age: 74
End: 2024-08-28

## 2024-08-28 DIAGNOSIS — D50.9 IRON DEFICIENCY ANEMIA, UNSPECIFIED IRON DEFICIENCY ANEMIA TYPE: Primary | ICD-10-CM

## 2024-08-28 DIAGNOSIS — D50.9 IRON DEFICIENCY ANEMIA, UNSPECIFIED IRON DEFICIENCY ANEMIA TYPE: ICD-10-CM

## 2024-08-28 DIAGNOSIS — I50.22 CHRONIC SYSTOLIC CONGESTIVE HEART FAILURE, NYHA CLASS 2 (HCC): ICD-10-CM

## 2024-08-28 LAB
ANION GAP SERPL CALC-SCNC: 17 MEQ/L (ref 8–16)
BASOPHILS ABSOLUTE: 0.1 THOU/MM3 (ref 0–0.1)
BASOPHILS NFR BLD AUTO: 1 %
BUN SERPL-MCNC: 45 MG/DL (ref 7–22)
CALCIUM SERPL-MCNC: 9.6 MG/DL (ref 8.5–10.5)
CHLORIDE SERPL-SCNC: 102 MEQ/L (ref 98–111)
CO2 SERPL-SCNC: 23 MEQ/L (ref 23–33)
CREAT SERPL-MCNC: 1.3 MG/DL (ref 0.4–1.2)
DEPRECATED RDW RBC AUTO: 46.9 FL (ref 35–45)
EOSINOPHIL NFR BLD AUTO: 1.6 %
EOSINOPHILS ABSOLUTE: 0.1 THOU/MM3 (ref 0–0.4)
ERYTHROCYTE [DISTWIDTH] IN BLOOD BY AUTOMATED COUNT: 13.2 % (ref 11.5–14.5)
FERRITIN SERPL IA-MCNC: 178 NG/ML (ref 10–291)
GFR SERPL CREATININE-BSD FRML MDRD: 43 ML/MIN/1.73M2
GLUCOSE SERPL-MCNC: 120 MG/DL (ref 70–108)
HCT VFR BLD AUTO: 42.7 % (ref 37–47)
HGB BLD-MCNC: 14.2 GM/DL (ref 12–16)
IMM GRANULOCYTES # BLD AUTO: 0.04 THOU/MM3 (ref 0–0.07)
IMM GRANULOCYTES NFR BLD AUTO: 0.5 %
IRON SERPL-MCNC: 119 UG/DL (ref 50–170)
LYMPHOCYTES ABSOLUTE: 1.3 THOU/MM3 (ref 1–4.8)
LYMPHOCYTES NFR BLD AUTO: 16.3 %
MAGNESIUM SERPL-MCNC: 1.9 MG/DL (ref 1.6–2.4)
MCH RBC QN AUTO: 32.2 PG (ref 26–33)
MCHC RBC AUTO-ENTMCNC: 33.3 GM/DL (ref 32.2–35.5)
MCV RBC AUTO: 96.8 FL (ref 81–99)
MONOCYTES ABSOLUTE: 0.5 THOU/MM3 (ref 0.4–1.3)
MONOCYTES NFR BLD AUTO: 5.8 %
NEUTROPHILS ABSOLUTE: 6.1 THOU/MM3 (ref 1.8–7.7)
NEUTROPHILS NFR BLD AUTO: 74.8 %
NRBC BLD AUTO-RTO: 0 /100 WBC
NT-PROBNP SERPL IA-MCNC: 273.1 PG/ML (ref 0–449)
PLATELET # BLD AUTO: 70 THOU/MM3 (ref 130–400)
PLATELET BLD QL SMEAR: ABNORMAL
PMV BLD AUTO: 14.7 FL (ref 9.4–12.4)
POTASSIUM SERPL-SCNC: 4.5 MEQ/L (ref 3.5–5.2)
RBC # BLD AUTO: 4.41 MILL/MM3 (ref 4.2–5.4)
SCAN OF BLOOD SMEAR: NORMAL
SODIUM SERPL-SCNC: 142 MEQ/L (ref 135–145)
TIBC SERPL-MCNC: 367 UG/DL (ref 171–450)
WBC # BLD AUTO: 8.2 THOU/MM3 (ref 4.8–10.8)

## 2024-08-29 ENCOUNTER — TELEPHONE (OUTPATIENT)
Dept: FAMILY MEDICINE CLINIC | Age: 74
End: 2024-08-29

## 2024-08-29 NOTE — TELEPHONE ENCOUNTER
Pt's  informed of lab results . Pt voiced understanding with no further questions at this time.     Lab slip mailed to pt   Okay per [employer]

## 2024-08-29 NOTE — TELEPHONE ENCOUNTER
----- Message from Dr. Jacky Christy, DO sent at 8/28/2024  6:36 PM EDT -----  Please let pt know that iron labs remain stable  Ok to d/c oral iron  Repeat labs again in 3 months, fasting to ensure stability  Let me know if questions, thanks!

## 2024-09-03 ENCOUNTER — ANTI-COAG VISIT (OUTPATIENT)
Age: 74
End: 2024-09-03
Payer: MEDICARE

## 2024-09-03 LAB — POC INR: 2 (ref 0.8–1.2)

## 2024-09-03 PROCEDURE — 99211 OFF/OP EST MAY X REQ PHY/QHP: CPT | Performed by: PHARMACIST

## 2024-09-03 PROCEDURE — 85610 PROTHROMBIN TIME: CPT | Performed by: PHARMACIST

## 2024-09-03 NOTE — PROGRESS NOTES
Medication Management Clinic  [facility]  Anticoagulation Clinic  [phone] (phone)  [phone] (fax)    Ms. Swati Tran is a 74 y.o.  female with history of Afib who presents today for anticoagulation monitoring and adjustment.    Patient verifies current dosing regimen and tablet strength.  No missed or extra doses.  Patient denies s/s bleeding/bruising/swelling/SOB Bruise on her right hand but is looking better.   No blood in urine or stool.  No dietary changes.  Changes in medication/OTC agents/Herbals. No longer takes Iron - removed from list.   No change in alcohol use or tobacco use.  No change in activity level.  Patient denies falls.  No vomiting/diarrhea or acute illness.   No Procedures scheduled in the future at this time.    Assessment:   Lab Results   Component Value Date    INR 2.00 (H) 09/03/2024    INR 3.30 (H) 08/20/2024    INR 3.20 (H) 07/30/2024     INR therapeutic   Recent Labs     09/03/24  1359   INR 2.00*   1st therapeutic INR following 2 dose decreases 7/30 and 8/20    Plan:  Continue Coumadin 3mg MWF 6mg TuThSS.  Recheck INR in 3 week(s).  Patient reminded to call the Anticoagulation Clinic with any signs or symptoms of bleeding or with any medication changes.  Patient given instructions utilizing the teach back method.    After visit summary printed and reviewed with patient.      Discharged ambulatory in no apparent distress.    For Pharmacy Admin Tracking Only    Intervention Detail:   Total # of Interventions Recommended: 0  Total # of Interventions Accepted: 0  Time Spent (min): 20

## 2024-09-24 ENCOUNTER — ANTI-COAG VISIT (OUTPATIENT)
Age: 74
End: 2024-09-24
Payer: MEDICARE

## 2024-09-24 DIAGNOSIS — Z79.01 ANTICOAGULATED ON COUMADIN: ICD-10-CM

## 2024-09-24 DIAGNOSIS — Z51.81 ENCOUNTER FOR THERAPEUTIC DRUG MONITORING: ICD-10-CM

## 2024-09-24 DIAGNOSIS — I48.20 CHRONIC ATRIAL FIBRILLATION (HCC): Primary | Chronic | ICD-10-CM

## 2024-09-24 LAB — POC INR: 2.1 (ref 0.8–1.2)

## 2024-09-24 PROCEDURE — 85610 PROTHROMBIN TIME: CPT

## 2024-09-24 PROCEDURE — 99211 OFF/OP EST MAY X REQ PHY/QHP: CPT

## 2024-10-01 ENCOUNTER — TELEPHONE (OUTPATIENT)
Dept: FAMILY MEDICINE CLINIC | Age: 74
End: 2024-10-01

## 2024-10-01 DIAGNOSIS — I48.20 CHRONIC ATRIAL FIBRILLATION (HCC): Primary | Chronic | ICD-10-CM

## 2024-10-01 NOTE — TELEPHONE ENCOUNTER
Diagnosis Orders   1. Chronic atrial fibrillation (HCC)  [facility] Medication Mgmt (Anticoagulation)

## 2024-10-01 NOTE — TELEPHONE ENCOUNTER
----- Message from Sonia Anthony sent at 10/1/2024  8:48 AM EDT -----  Please renew annual referral for Anticoagulation Monitoring, #[id number].  Thanks, RONEN Anthony, RN, BSN  Coumadin Clinic

## 2024-10-06 DIAGNOSIS — K21.9 GASTROESOPHAGEAL REFLUX DISEASE, UNSPECIFIED WHETHER ESOPHAGITIS PRESENT: Primary | ICD-10-CM

## 2024-10-07 ENCOUNTER — HOSPITAL ENCOUNTER (OUTPATIENT)
Age: 74
Discharge: HOME | End: 2024-10-09
Attending: INTERNAL MEDICINE
Payer: MEDICARE

## 2024-10-07 VITALS
BODY MASS INDEX: 42.33 KG/M2 | WEIGHT: 230 LBS | HEIGHT: 62 IN | SYSTOLIC BLOOD PRESSURE: 132 MMHG | DIASTOLIC BLOOD PRESSURE: 64 MMHG

## 2024-10-07 DIAGNOSIS — I48.20 CHRONIC ATRIAL FIBRILLATION (HCC): ICD-10-CM

## 2024-10-07 DIAGNOSIS — I50.22 CHRONIC SYSTOLIC CONGESTIVE HEART FAILURE, NYHA CLASS 2 (HCC): ICD-10-CM

## 2024-10-07 LAB
ECHO AV CUSP MM: 2 CM
ECHO AV PEAK GRADIENT: 6 MMHG
ECHO AV PEAK VELOCITY: 1.2 M/S
ECHO AV VELOCITY RATIO: 0.75
ECHO BSA: 2.14 M2
ECHO LA AREA 4C: 15.4 CM2
ECHO LA DIAMETER INDEX: 1.97 CM/M2
ECHO LA DIAMETER: 4 CM
ECHO LA MAJOR AXIS: 5.1 CM
ECHO LA VOL MOD A4C: 38 ML (ref 22–52)
ECHO LA VOLUME INDEX MOD A4C: 19 ML/M2 (ref 16–34)
ECHO LV E' LATERAL VELOCITY: 5.9 CM/S
ECHO LV E' SEPTAL VELOCITY: 5.2 CM/S
ECHO LV EJECTION FRACTION BIPLANE: 64 % (ref 55–100)
ECHO LV FRACTIONAL SHORTENING: 35 % (ref 28–44)
ECHO LV INTERNAL DIMENSION DIASTOLE INDEX: 1.97 CM/M2
ECHO LV INTERNAL DIMENSION DIASTOLIC: 4 CM (ref 3.9–5.3)
ECHO LV INTERNAL DIMENSION SYSTOLIC INDEX: 1.28 CM/M2
ECHO LV INTERNAL DIMENSION SYSTOLIC: 2.6 CM
ECHO LV IVSD: 1 CM (ref 0.6–0.9)
ECHO LV MASS 2D: 127.1 G (ref 67–162)
ECHO LV MASS INDEX 2D: 62.6 G/M2 (ref 43–95)
ECHO LV POSTERIOR WALL DIASTOLIC: 1 CM (ref 0.6–0.9)
ECHO LV RELATIVE WALL THICKNESS RATIO: 0.5
ECHO LVOT PEAK GRADIENT: 3 MMHG
ECHO LVOT PEAK VELOCITY: 0.9 M/S
ECHO MV A VELOCITY: 1.1 M/S
ECHO MV E DECELERATION TIME (DT): 266 MS
ECHO MV E VELOCITY: 1.17 M/S
ECHO MV E/A RATIO: 1.06
ECHO MV E/E' LATERAL: 19.83
ECHO MV E/E' RATIO (AVERAGED): 21.17
ECHO MV E/E' SEPTAL: 22.5
ECHO MV MAX VELOCITY: 1.2 M/S
ECHO MV MEAN GRADIENT: 3 MMHG
ECHO MV MEAN VELOCITY: 0.9 M/S
ECHO MV PEAK GRADIENT: 6 MMHG
ECHO MV VTI: 33.6 CM
ECHO RV INTERNAL DIMENSION: 2.8 CM
ECHO TV E WAVE: 0.6 M/S
ECHO TV MEAN GRADIENT: 3 MMHG
ECHO TV MEAN VELOCITY: 0.8 M/S
ECHO TV PEAK GRADIENT: 5 MMHG
ECHO TV VTI: 34.9 CM

## 2024-10-07 PROCEDURE — 93306 TTE W/DOPPLER COMPLETE: CPT

## 2024-10-07 RX ORDER — SPIRONOLACTONE 25 MG/1
25 TABLET ORAL DAILY
Qty: 90 TABLET | Refills: 3 | Status: SHIPPED | OUTPATIENT
Start: 2024-10-07

## 2024-10-07 RX ORDER — METOPROLOL SUCCINATE 25 MG/1
TABLET, EXTENDED RELEASE ORAL
Qty: 180 TABLET | Refills: 3 | Status: SHIPPED | OUTPATIENT
Start: 2024-10-07

## 2024-10-07 RX ORDER — METOPROLOL SUCCINATE 50 MG/1
TABLET, EXTENDED RELEASE ORAL
Qty: 180 TABLET | Refills: 3 | Status: SHIPPED | OUTPATIENT
Start: 2024-10-07

## 2024-10-07 NOTE — TELEPHONE ENCOUNTER
Recent Visits  Date Type Provider Dept   05/07/24 Office Visit Jacky Christy, DO [facility]   02/07/24 Office Visit Jacky Christy, DO [facility]   11/02/23 Office Visit Jacky Christy, DO [facility]   04/27/23 Office Visit Jacky Christy, DO [facility]   Showing recent visits within past 540 days with a meds authorizing provider and meeting all other requirements  Future Appointments  Date Type Provider Dept   11/13/24 Appointment Jacky Christy, DO [facility]   Showing future appointments within next 150 days with a meds authorizing provider and meeting all other requirements

## 2024-10-17 ENCOUNTER — ANTI-COAG VISIT (OUTPATIENT)
Age: 74
End: 2024-10-17
Payer: MEDICARE

## 2024-10-17 ENCOUNTER — OFFICE VISIT (OUTPATIENT)
Dept: CARDIOLOGY CLINIC | Age: 74
End: 2024-10-17
Payer: MEDICARE

## 2024-10-17 ENCOUNTER — TELEPHONE (OUTPATIENT)
Dept: FAMILY MEDICINE CLINIC | Age: 74
End: 2024-10-17

## 2024-10-17 VITALS
SYSTOLIC BLOOD PRESSURE: 156 MMHG | WEIGHT: 236 LBS | HEART RATE: 75 BPM | BODY MASS INDEX: 43.43 KG/M2 | DIASTOLIC BLOOD PRESSURE: 82 MMHG | HEIGHT: 62 IN

## 2024-10-17 DIAGNOSIS — Z79.01 ANTICOAGULATED ON COUMADIN: ICD-10-CM

## 2024-10-17 DIAGNOSIS — R06.02 SOB (SHORTNESS OF BREATH): Primary | ICD-10-CM

## 2024-10-17 DIAGNOSIS — E11.9 TYPE 2 DIABETES MELLITUS WITHOUT COMPLICATION, WITHOUT LONG-TERM CURRENT USE OF INSULIN (HCC): Primary | ICD-10-CM

## 2024-10-17 DIAGNOSIS — D50.9 IRON DEFICIENCY ANEMIA, UNSPECIFIED IRON DEFICIENCY ANEMIA TYPE: ICD-10-CM

## 2024-10-17 DIAGNOSIS — Z51.81 ENCOUNTER FOR THERAPEUTIC DRUG MONITORING: ICD-10-CM

## 2024-10-17 DIAGNOSIS — I48.20 CHRONIC ATRIAL FIBRILLATION (HCC): Primary | Chronic | ICD-10-CM

## 2024-10-17 LAB — POC INR: 2.2 (ref 0.8–1.2)

## 2024-10-17 PROCEDURE — 99211 OFF/OP EST MAY X REQ PHY/QHP: CPT

## 2024-10-17 PROCEDURE — 85610 PROTHROMBIN TIME: CPT

## 2024-10-17 PROCEDURE — G8427 DOCREV CUR MEDS BY ELIG CLIN: HCPCS | Performed by: INTERNAL MEDICINE

## 2024-10-17 PROCEDURE — G8400 PT W/DXA NO RESULTS DOC: HCPCS | Performed by: INTERNAL MEDICINE

## 2024-10-17 PROCEDURE — 99214 OFFICE O/P EST MOD 30 MIN: CPT | Performed by: INTERNAL MEDICINE

## 2024-10-17 PROCEDURE — G8417 CALC BMI ABV UP PARAM F/U: HCPCS | Performed by: INTERNAL MEDICINE

## 2024-10-17 PROCEDURE — 3079F DIAST BP 80-89 MM HG: CPT | Performed by: INTERNAL MEDICINE

## 2024-10-17 PROCEDURE — 93000 ELECTROCARDIOGRAM COMPLETE: CPT | Performed by: INTERNAL MEDICINE

## 2024-10-17 PROCEDURE — 1036F TOBACCO NON-USER: CPT | Performed by: INTERNAL MEDICINE

## 2024-10-17 PROCEDURE — 1090F PRES/ABSN URINE INCON ASSESS: CPT | Performed by: INTERNAL MEDICINE

## 2024-10-17 PROCEDURE — 3017F COLORECTAL CA SCREEN DOC REV: CPT | Performed by: INTERNAL MEDICINE

## 2024-10-17 PROCEDURE — 3077F SYST BP >= 140 MM HG: CPT | Performed by: INTERNAL MEDICINE

## 2024-10-17 PROCEDURE — G8484 FLU IMMUNIZE NO ADMIN: HCPCS | Performed by: INTERNAL MEDICINE

## 2024-10-17 PROCEDURE — 1123F ACP DISCUSS/DSCN MKR DOCD: CPT | Performed by: INTERNAL MEDICINE

## 2024-10-17 NOTE — TELEPHONE ENCOUNTER
Patient's  (kamaljit) ok per hipaa has been informed and voiced understanding    Lab slip mailed to patient

## 2024-10-17 NOTE — TELEPHONE ENCOUNTER
Pt due for fasting labs prior to next apt on 11/13/2024. Please call to have pt complete this. Thanks!    ASSESSMENT & PLAN   Diagnosis Orders   1. Type 2 diabetes mellitus without complication, without long-term current use of insulin (HCC)  CBC with Auto Differential    Comprehensive Metabolic Panel    Lipid Panel    TSH with Reflex    Ferritin    Iron    Iron Binding Capacity    Microalbumin / Creatinine Urine Ratio      2. Iron deficiency anemia, unspecified iron deficiency anemia type  CBC with Auto Differential    Comprehensive Metabolic Panel    Lipid Panel    TSH with Reflex    Ferritin    Iron    Iron Binding Capacity    Microalbumin / Creatinine Urine Ratio        Future Appointments   Date Time Provider Department Center   10/17/2024  2:00 PM Nathalia Thomas RPH [facility] - [place]   10/17/2024  2:30 PM Hung Haywood MD N [facility] - [place]   11/13/2024  3:00 PM Jacky Christy, DO [facility]   7/17/2025  2:00 PM Isidra Garcia, ORAL - CNP N [facility] CH[facility] - [place]

## 2024-10-17 NOTE — PROGRESS NOTES
Medication Management Clinic  [facility]  Anticoagulation Clinic  [phone] (phone)  [phone] (fax)    Ms. Swati Tran is a 74 y.o.  female with history of Afib who presents today for anticoagulation monitoring and adjustment.    Patient verifies current dosing regimen and tablet strength.  No missed or extra doses.  Patient denies s/s bleeding/bruising/swelling/SOB  No blood in urine or stool.  No dietary changes.  No changes in medication/OTC agents/Herbals.  No change in alcohol use or tobacco use.  No change in activity level.  Patient denies falls.  No vomiting/diarrhea or acute illness.   No Procedures scheduled in the future at this time.    Assessment:   Lab Results   Component Value Date    INR 2.20 (H) 10/17/2024    INR 2.10 (H) 09/24/2024    INR 2.00 (H) 09/03/2024     INR therapeutic   Recent Labs     10/17/24  1354   INR 2.20*     Plan:  Continue Coumadin 3 mg MoWeFr and 6 mg SuTuThSa.  Recheck INR in 5 week(s).  Patient reminded to call the Anticoagulation Clinic with any signs or symptoms of bleeding or with any medication changes.  Patient given instructions utilizing the teach back method.    After visit summary printed and reviewed with patient.      Discharged ambulatory in no apparent distress.    For Pharmacy Admin Tracking Only  Time Spent (min): 15

## 2024-10-17 NOTE — PROGRESS NOTES
Pt C/O SOB, some fatigue       Pt denies CP, Headache, dizziness, heart palpitations, swelling  
this echo is compared with the echo from 1/20/2022, LV systolic  function have improved.  Limited TTE to assess LV systolic function.  Left ventricle size is normal.  Left ventricular systolic function is mildly reduced.  Ejection fraction is visually estimated at 45-50%.  Normal left ventricular wall thickness.  Mild mitral stenosis (mean gradient 4 mm Hg).  The ascending aorta is mildly dilated (3.4 cm).  No evidence of any pericardial effusion.    Signature    ----------------------------------------------------------------  Electronically signed by Teri Jackson MD (Interpreting  physician) on 03/18/2022 at 11:42 AM  ----------------------------------------------------------------    Findings    Mitral Valve  Mild mitral stenosis (mean gradient 4 mm Hg).    Aortic Valve  Structurally normal aortic valve.  Aortic valve appears tricuspid.  No evidence of aortic valve stenosis or regurgitation.    Tricuspid Valve  Trace tricuspid regurgitation.    Left Atrium  Normal size left atrium.    Left Ventricle  Left ventricle size is normal.  Left ventricular systolic function is mildly reduced.  Ejection fraction is visually estimated at 45-50%.  Normal left ventricular wall thickness.    Right Ventricle  Normal right ventricular size and function.    Pericardial Effusion  No evidence of any pericardial effusion.    Aorta / Great Vessels  The ascending aorta is mildly dilated (3.4 cm).    M-Mode/2D Measurements & Calculations    LV Diastolic   LV Systolic Dimension:    AV Cusp Separation: 1.9 cmLA  Dimension: 4.7 3.6 cm                    Dimension: 3.2 cmAO Root  cm             LV Volume Diastolic: 102  Dimension: 3.5 cmLA Area: 17.1  LV FS:23.4 %   ml                        cm^2  LV PW          LV Volume Systolic: 54.4  Diastolic: 0.9 ml  cm             LV EDV/LV EDV Index: 102  Septum         ml/51 m^2LV ESV/LV ESV    RV Diastolic Dimension: 2.4 cm  Diastolic: 0.9 Index: 54.4 ml/27 m^2  cm             EF Calculated:

## 2024-11-08 ENCOUNTER — TELEPHONE (OUTPATIENT)
Age: 74
End: 2024-11-08

## 2024-11-08 NOTE — TELEPHONE ENCOUNTER
Received a fax from [employer] patient assistance requesting a copy of the Medicare Extra Help denial letter.  I phoned the patient and spoke with her  stating their daughter is the person who is helping them with this process. I spoke to the daughter and I explained that [employer] is requesting for the patient to apply to the Extra Help program.  If the patient gets denied, then our office will need a copy of this denial and then we can resubmit to the patient assistance program for the Entresto.  Daughter will call back if she is denied.

## 2024-11-12 ENCOUNTER — LAB (OUTPATIENT)
Dept: LAB | Age: 74
End: 2024-11-12

## 2024-11-12 DIAGNOSIS — D50.9 IRON DEFICIENCY ANEMIA, UNSPECIFIED IRON DEFICIENCY ANEMIA TYPE: ICD-10-CM

## 2024-11-12 DIAGNOSIS — E11.9 TYPE 2 DIABETES MELLITUS WITHOUT COMPLICATION, WITHOUT LONG-TERM CURRENT USE OF INSULIN (HCC): ICD-10-CM

## 2024-11-12 LAB
ALBUMIN SERPL BCG-MCNC: 4 G/DL (ref 3.5–5.1)
ALP SERPL-CCNC: 67 U/L (ref 38–126)
ALT SERPL W/O P-5'-P-CCNC: 22 U/L (ref 11–66)
ANION GAP SERPL CALC-SCNC: 15 MEQ/L (ref 8–16)
AST SERPL-CCNC: 25 U/L (ref 5–40)
BASOPHILS ABSOLUTE: 0.1 THOU/MM3 (ref 0–0.1)
BASOPHILS NFR BLD AUTO: 1.1 %
BILIRUB SERPL-MCNC: 0.4 MG/DL (ref 0.3–1.2)
BUN SERPL-MCNC: 29 MG/DL (ref 7–22)
CALCIUM SERPL-MCNC: 9.5 MG/DL (ref 8.5–10.5)
CHLORIDE SERPL-SCNC: 105 MEQ/L (ref 98–111)
CHOLEST SERPL-MCNC: 224 MG/DL (ref 100–199)
CO2 SERPL-SCNC: 25 MEQ/L (ref 23–33)
CREAT SERPL-MCNC: 1.1 MG/DL (ref 0.4–1.2)
DEPRECATED RDW RBC AUTO: 48.8 FL (ref 35–45)
EOSINOPHIL NFR BLD AUTO: 4.5 %
EOSINOPHILS ABSOLUTE: 0.3 THOU/MM3 (ref 0–0.4)
ERYTHROCYTE [DISTWIDTH] IN BLOOD BY AUTOMATED COUNT: 13 % (ref 11.5–14.5)
FERRITIN SERPL IA-MCNC: 246 NG/ML (ref 10–291)
GFR SERPL CREATININE-BSD FRML MDRD: 52 ML/MIN/1.73M2
GLUCOSE SERPL-MCNC: 111 MG/DL (ref 70–108)
HCT VFR BLD AUTO: 39.2 % (ref 37–47)
HDLC SERPL-MCNC: 37 MG/DL
HGB BLD-MCNC: 12.6 GM/DL (ref 12–16)
IMM GRANULOCYTES # BLD AUTO: 0.04 THOU/MM3 (ref 0–0.07)
IMM GRANULOCYTES NFR BLD AUTO: 0.6 %
IRON SERPL-MCNC: 89 UG/DL (ref 50–170)
LDLC SERPL CALC-MCNC: 151 MG/DL
LYMPHOCYTES ABSOLUTE: 1.1 THOU/MM3 (ref 1–4.8)
LYMPHOCYTES NFR BLD AUTO: 17.4 %
MCH RBC QN AUTO: 32.6 PG (ref 26–33)
MCHC RBC AUTO-ENTMCNC: 32.1 GM/DL (ref 32.2–35.5)
MCV RBC AUTO: 101.3 FL (ref 81–99)
MONOCYTES ABSOLUTE: 0.3 THOU/MM3 (ref 0.4–1.3)
MONOCYTES NFR BLD AUTO: 4.8 %
NEUTROPHILS ABSOLUTE: 4.4 THOU/MM3 (ref 1.8–7.7)
NEUTROPHILS NFR BLD AUTO: 71.6 %
NRBC BLD AUTO-RTO: 0 /100 WBC
PLATELET # BLD AUTO: 107 THOU/MM3 (ref 130–400)
PMV BLD AUTO: 14 FL (ref 9.4–12.4)
POTASSIUM SERPL-SCNC: 4.7 MEQ/L (ref 3.5–5.2)
PROT SERPL-MCNC: 7.3 G/DL (ref 6.1–8)
RBC # BLD AUTO: 3.87 MILL/MM3 (ref 4.2–5.4)
SODIUM SERPL-SCNC: 145 MEQ/L (ref 135–145)
TIBC SERPL-MCNC: 332 UG/DL (ref 171–450)
TRIGL SERPL-MCNC: 178 MG/DL (ref 0–199)
TSH SERPL DL<=0.005 MIU/L-ACNC: 1.38 UIU/ML (ref 0.4–4.2)
WBC # BLD AUTO: 6.2 THOU/MM3 (ref 4.8–10.8)

## 2024-11-12 NOTE — PROGRESS NOTES
PM    LABA1C 7.0 10/11/2018 03:27 PM    LABA1C 6.8 04/12/2018 02:04 PM    LABA1C 6.9 09/21/2017 08:40 AM    LABA1C 6.5 11/04/2016 08:10 AM     Lab Results   Component Value Date    WBC 6.2 11/12/2024    HGB 12.6 11/12/2024    HCT 39.2 11/12/2024    .3 (H) 11/12/2024     (L) 11/12/2024         Lab Results   Component Value Date    IRON 89 11/12/2024    TIBC 332 11/12/2024    FERRITIN 246 11/12/2024     Lab Results   Component Value Date    [id number] 913 (H) 02/06/2024     Lab Results   Component Value Date    FOLATE > 20.0 02/06/2024       ASSESSMENT & PLAN  1. Iron deficiency anemia, unspecified iron deficiency anemia type    Improved  Iron stores remain stable off iron, ok to con't off  Long discussion on need for GI eval re: new onset JAZ to r/o GI blood loss, including cancer  Pt declines this and aware we could be missing a malignancy    2. ASHD (arteriosclerotic heart disease)    Stable  con't asa, coumadin, Toprol, entresto and lasix  Daily wts  Low salt diet  Coumadin clinic f/u  Cardio f/u  Discussed statin, declines and is aware of risks. Cardio aware she is not taking.   Allergic to Zetia  Doesn't want to consider Repatha    3. History of ST elevation myocardial infarction (STEMI)    As above    4. S/P CABG (coronary artery bypass graft)    As above    5. Heart failure with reduced ejection fraction (HCC)    As above    6. Ischemic cardiomyopathy    As above    7. Atrial fibrillation, unspecified type (HCC)    As above    8. S/P Maze operation for atrial fibrillation    As above     9. S/P MVR (mitral valve repair)    As above     10. S/P tricuspid valve repair    As above     11. Type 2 diabetes mellitus without complication, without long-term current use of insulin (HCC)    Stable   At goal  Con't diet changes    -  DIABETES FOOT EXAM  - POCT glycosylated hemoglobin (Hb A1C)  - Microalbumin / Creatinine Urine Ratio; Future    12. Essential hypertension    Stable  con't Toprol and

## 2024-11-13 ENCOUNTER — OFFICE VISIT (OUTPATIENT)
Dept: FAMILY MEDICINE CLINIC | Age: 74
End: 2024-11-13

## 2024-11-13 VITALS
WEIGHT: 228.6 LBS | HEIGHT: 62 IN | OXYGEN SATURATION: 99 % | HEART RATE: 70 BPM | SYSTOLIC BLOOD PRESSURE: 136 MMHG | RESPIRATION RATE: 18 BRPM | BODY MASS INDEX: 42.07 KG/M2 | DIASTOLIC BLOOD PRESSURE: 86 MMHG | TEMPERATURE: 97.7 F

## 2024-11-13 DIAGNOSIS — Z86.79 S/P MAZE OPERATION FOR ATRIAL FIBRILLATION: Chronic | ICD-10-CM

## 2024-11-13 DIAGNOSIS — K21.9 GASTROESOPHAGEAL REFLUX DISEASE, UNSPECIFIED WHETHER ESOPHAGITIS PRESENT: ICD-10-CM

## 2024-11-13 DIAGNOSIS — I48.91 ATRIAL FIBRILLATION, UNSPECIFIED TYPE (HCC): ICD-10-CM

## 2024-11-13 DIAGNOSIS — Z00.00 MEDICARE ANNUAL WELLNESS VISIT, SUBSEQUENT: Primary | ICD-10-CM

## 2024-11-13 DIAGNOSIS — Z95.1 S/P CABG (CORONARY ARTERY BYPASS GRAFT): Chronic | ICD-10-CM

## 2024-11-13 DIAGNOSIS — I25.5 ISCHEMIC CARDIOMYOPATHY: Chronic | ICD-10-CM

## 2024-11-13 DIAGNOSIS — I50.20 HEART FAILURE WITH REDUCED EJECTION FRACTION (HCC): Chronic | ICD-10-CM

## 2024-11-13 DIAGNOSIS — Z98.890 S/P MVR (MITRAL VALVE REPAIR): Chronic | ICD-10-CM

## 2024-11-13 DIAGNOSIS — D32.9 MENINGIOMA (HCC): Chronic | ICD-10-CM

## 2024-11-13 DIAGNOSIS — Z98.890 S/P TRICUSPID VALVE REPAIR: Chronic | ICD-10-CM

## 2024-11-13 DIAGNOSIS — D50.9 IRON DEFICIENCY ANEMIA, UNSPECIFIED IRON DEFICIENCY ANEMIA TYPE: ICD-10-CM

## 2024-11-13 DIAGNOSIS — E89.0 HYPOTHYROIDISM, POSTSURGICAL: ICD-10-CM

## 2024-11-13 DIAGNOSIS — Z98.890 S/P MAZE OPERATION FOR ATRIAL FIBRILLATION: Chronic | ICD-10-CM

## 2024-11-13 DIAGNOSIS — T46.6X5A MYALGIA DUE TO STATIN: ICD-10-CM

## 2024-11-13 DIAGNOSIS — Z12.11 SCREENING FOR COLON CANCER: ICD-10-CM

## 2024-11-13 DIAGNOSIS — I25.10 ASHD (ARTERIOSCLEROTIC HEART DISEASE): Chronic | ICD-10-CM

## 2024-11-13 DIAGNOSIS — I25.2 HISTORY OF ST ELEVATION MYOCARDIAL INFARCTION (STEMI): Chronic | ICD-10-CM

## 2024-11-13 DIAGNOSIS — D34 FOLLICULAR ADENOMA OF THYROID GLAND: Chronic | ICD-10-CM

## 2024-11-13 DIAGNOSIS — M79.10 MYALGIA DUE TO STATIN: ICD-10-CM

## 2024-11-13 DIAGNOSIS — E11.9 TYPE 2 DIABETES MELLITUS WITHOUT COMPLICATION, WITHOUT LONG-TERM CURRENT USE OF INSULIN (HCC): ICD-10-CM

## 2024-11-13 DIAGNOSIS — I10 ESSENTIAL HYPERTENSION: ICD-10-CM

## 2024-11-13 DIAGNOSIS — E89.0 S/P PARTIAL THYROIDECTOMY: Chronic | ICD-10-CM

## 2024-11-13 DIAGNOSIS — D69.6 THROMBOCYTOPENIA (HCC): Chronic | ICD-10-CM

## 2024-11-13 DIAGNOSIS — E78.2 MIXED HYPERLIPIDEMIA: ICD-10-CM

## 2024-11-13 LAB
CREAT UR-MCNC: 297.5 MG/DL
HBA1C MFR BLD: 6.4 % (ref 4.3–5.7)
MICROALBUMIN UR-MCNC: 14.56 MG/DL
MICROALBUMIN/CREAT RATIO PNL UR: 49 MG/G (ref 0–30)

## 2024-11-13 SDOH — ECONOMIC STABILITY: FOOD INSECURITY: WITHIN THE PAST 12 MONTHS, YOU WORRIED THAT YOUR FOOD WOULD RUN OUT BEFORE YOU GOT MONEY TO BUY MORE.: NEVER TRUE

## 2024-11-13 SDOH — ECONOMIC STABILITY: FOOD INSECURITY: WITHIN THE PAST 12 MONTHS, THE FOOD YOU BOUGHT JUST DIDN'T LAST AND YOU DIDN'T HAVE MONEY TO GET MORE.: NEVER TRUE

## 2024-11-13 SDOH — ECONOMIC STABILITY: INCOME INSECURITY: HOW HARD IS IT FOR YOU TO PAY FOR THE VERY BASICS LIKE FOOD, HOUSING, MEDICAL CARE, AND HEATING?: NOT HARD AT ALL

## 2024-11-13 ASSESSMENT — PATIENT HEALTH QUESTIONNAIRE - PHQ9
SUM OF ALL RESPONSES TO PHQ QUESTIONS 1-9: 0
SUM OF ALL RESPONSES TO PHQ9 QUESTIONS 1 & 2: 0
SUM OF ALL RESPONSES TO PHQ QUESTIONS 1-9: 0
2. FEELING DOWN, DEPRESSED OR HOPELESS: NOT AT ALL
1. LITTLE INTEREST OR PLEASURE IN DOING THINGS: NOT AT ALL

## 2024-11-13 ASSESSMENT — LIFESTYLE VARIABLES: HOW OFTEN DO YOU HAVE A DRINK CONTAINING ALCOHOL: NEVER

## 2024-11-13 NOTE — PATIENT INSTRUCTIONS
secondhand smoke too.     Stay at a weight that's healthy for you. Talk to your doctor if you need help losing weight.     Try to get 7 to 9 hours of sleep each night.     Limit alcohol to 2 drinks a day for men and 1 drink a day for women. Too much alcohol can cause health problems.     Manage other health problems such as diabetes, high blood pressure, and high cholesterol. If you think you may have a problem with alcohol or drug use, talk to your doctor.   Medicines    Take your medicines exactly as prescribed. Call your doctor if you think you are having a problem with your medicine.     If your doctor recommends aspirin, take the amount directed each day. Make sure you take aspirin and not another kind of pain reliever, such as acetaminophen (Tylenol).   When should you call for help?   Call 911 if you have symptoms of a heart attack. These may include:    Chest pain or pressure, or a strange feeling in the chest.     Sweating.     Shortness of breath.     Pain, pressure, or a strange feeling in the back, neck, jaw, or upper belly or in one or both shoulders or arms.     Lightheadedness or sudden weakness.     A fast or irregular heartbeat.   After you call 911, the  may tell you to chew 1 adult-strength or 2 to 4 low-dose aspirin. Wait for an ambulance. Do not try to drive yourself.  Watch closely for changes in your health, and be sure to contact your doctor if you have any problems.  Where can you learn more?  Go to https://www.[employer].net/patientEd and enter F075 to learn more about \"A Healthy Heart: Care Instructions.\"  Current as of: June 24, 2023  Content Version: 14.2  © 2024 [employer].   Care instructions adapted under license by [employer]. If you have questions about a medical condition or this instruction, always ask your healthcare professional. [employer] disclaims any warranty or liability for your use of this information.      Personalized Preventive Plan

## 2024-11-14 ENCOUNTER — TELEPHONE (OUTPATIENT)
Dept: FAMILY MEDICINE CLINIC | Age: 74
End: 2024-11-14

## 2024-11-14 NOTE — TELEPHONE ENCOUNTER
----- Message from Dr. Jacky Christy, DO sent at 11/14/2024  6:59 AM EST -----  Please let pt know that urine shows small amnt of protein  We will just monitor this  Let me know if questions, thanks!  
Pt informed of results . Pt voiced understanding with no further questions at this time.     
No

## 2024-11-21 ENCOUNTER — ANTI-COAG VISIT (OUTPATIENT)
Age: 74
End: 2024-11-21
Payer: MEDICARE

## 2024-11-21 DIAGNOSIS — Z79.01 ANTICOAGULATED ON COUMADIN: ICD-10-CM

## 2024-11-21 DIAGNOSIS — I48.20 CHRONIC ATRIAL FIBRILLATION (HCC): Primary | Chronic | ICD-10-CM

## 2024-11-21 DIAGNOSIS — Z51.81 ENCOUNTER FOR THERAPEUTIC DRUG MONITORING: ICD-10-CM

## 2024-11-21 LAB — POC INR: 2.2 (ref 0.8–1.2)

## 2024-11-21 PROCEDURE — 85610 PROTHROMBIN TIME: CPT

## 2024-11-21 PROCEDURE — 99211 OFF/OP EST MAY X REQ PHY/QHP: CPT

## 2024-11-21 NOTE — PROGRESS NOTES
Medication Management Clinic  [facility]  Anticoagulation Clinic  [phone] (phone)  [phone] (fax)    Ms. Swati Tran is a 74 y.o.  female with history of atrial fib., per Dr. Christy's referral, who presents today for Warfarin monitoring and adjustment (5 week visit).    Patient verifies current dosing regimen and tablet strength.  No extra doses. Missed a 3 mg dose shortly after last visit - didn't know what day; reminded to call this clinic for instructions next business day if dose missed or extra taken.  Patient denies bleeding/bruising/swelling/SOB.  No blood in urine or stool.  No dietary changes, except may be having a cranberry relish made with cranberry gel 11/28, Thanksgiving.  No changes in medication/OTC agents/herbals.  No change in alcohol use or tobacco use.  Change in activity level: states was in bed 3 weeks with \"pneumonia,\" but no antibiotic - just took her usual olive leaf supplement (added back on to list - never stopped). Has been back to usual activity 7-10 days. Wants to start Reganol/Oreganol (?) drops for immune support.   Patient denies falls.  No diarrhea or acute illness.  Had vomited clear phlegm when ill.  Not near time of Coumadin.  No procedures scheduled in the future at this time.    Assessment:   Lab Results   Component Value Date    INR 2.20 (H) 11/21/2024    INR 2.20 (H) 10/17/2024    INR 2.10 (H) 09/24/2024     INR therapeutic - goal 2-3.  Recent Labs     11/21/24  1415   INR 2.20*        Plan:  POCT INR performed/result reviewed.  Continue PO Coumadin 3 mg MWF, 6 mg TThSS.  Recheck INR in 5 week(s).  Patient reminded to call the Anticoagulation Clinic with any signs or symptoms of bleeding or with any medication changes.  Patient given instructions utilizing the teach back method.     Will check with clinic pharmacist when available regarding above supplement she wants to start.  After visit summary printed and reviewed with patient.

## 2024-12-17 ENCOUNTER — CARE COORDINATION (OUTPATIENT)
Dept: CARE COORDINATION | Age: 74
End: 2024-12-17

## 2024-12-17 NOTE — CARE COORDINATION
Alley reached out to me in regards to her mothers application for [employer]. The Heart Spec. Office has been working on it. They need the denial letter from Medicaid. I suggested Alley takes the office the original letter they got that says she won't be eligible.        Rae Cardoza [facility]  CC   Medication Assistance  [place], and [employer]    (P) [phone]  (F) [phone]

## 2024-12-26 ENCOUNTER — ANTI-COAG VISIT (OUTPATIENT)
Age: 74
End: 2024-12-26
Payer: MEDICARE

## 2024-12-26 DIAGNOSIS — Z51.81 ENCOUNTER FOR THERAPEUTIC DRUG MONITORING: ICD-10-CM

## 2024-12-26 DIAGNOSIS — I48.20 CHRONIC ATRIAL FIBRILLATION (HCC): Primary | Chronic | ICD-10-CM

## 2024-12-26 DIAGNOSIS — Z79.01 ANTICOAGULATED ON COUMADIN: ICD-10-CM

## 2024-12-26 LAB — POC INR: 2.1 (ref 0.8–1.2)

## 2024-12-26 PROCEDURE — 85610 PROTHROMBIN TIME: CPT

## 2024-12-26 PROCEDURE — 99211 OFF/OP EST MAY X REQ PHY/QHP: CPT

## 2024-12-30 ENCOUNTER — CARE COORDINATION (OUTPATIENT)
Dept: CARE COORDINATION | Age: 74
End: 2024-12-30

## 2024-12-30 NOTE — CARE COORDINATION
Alley called regarding her mothers enrollment into the PAP through [employer]. The Heart Specialist at [facility] was helping with this application. I called [facility] and confirmed she is enrolled for 2025. I called Alley to let her know.        Rae Cardoza [facility]  CC   Medication Assistance  [place] and [place]    (P) [phone]  (F) [phone]

## 2025-01-08 NOTE — CARE COORDINATION
Daughter called stating she gets her Entresto through the PAP program. I had looked at my later notes and realized we did get her approved.  I told her to not use the [facility] for the [employer] since she is already getting it for free through the PAP program. I told her to just let it , I told her to call me at the new year to get back into the program.        [facility]   Medication Assistance  [address] [place] [address], and [employer]    Z) [phone] (F) [phone] Render In Bullet Format When Appropriate: No Consent: The patient's verbal consent was obtained including but not limited to risks of crusting, scabbing, blistering, scarring, darker or lighter pigmentary change, recurrence, incomplete removal and infection. Duration Of Freeze Thaw-Cycle (Seconds): 10 Number Of Freeze-Thaw Cycles: 2 freeze-thaw cycles Post-Care Instructions: I reviewed with the patient in detail post-care instructions. Patient is to wear sunprotection, and avoid picking at any of the treated lesions. Pt may apply Vaseline to crusted or scabbing areas. Total Number Of Aks Treated: 9 Detail Level: Zone

## 2025-01-16 ENCOUNTER — TELEPHONE (OUTPATIENT)
Age: 75
End: 2025-01-16

## 2025-01-16 NOTE — TELEPHONE ENCOUNTER
Swati called and reports she accidentally took 3 mg Tuesday instead of 6 mg-- that the tablet must have gotten stuck in her pill box because she noticed last night that Tuesday had an one Coumadin tablet left in it.  Instructed Swati to cut the tablet in half, take half an extra tablet today and half the extra tablet tomorrow on top of her normal Coumadin dose then back to normal regimen of 3 mg MWF, 6 mg TThSS.  She repeated to show understanding.

## 2025-01-30 ENCOUNTER — ANTI-COAG VISIT (OUTPATIENT)
Age: 75
End: 2025-01-30
Payer: MEDICARE

## 2025-01-30 DIAGNOSIS — I48.20 CHRONIC ATRIAL FIBRILLATION (HCC): Primary | Chronic | ICD-10-CM

## 2025-01-30 DIAGNOSIS — Z79.01 ANTICOAGULATED ON COUMADIN: ICD-10-CM

## 2025-01-30 DIAGNOSIS — Z51.81 ENCOUNTER FOR THERAPEUTIC DRUG MONITORING: ICD-10-CM

## 2025-01-30 LAB — POC INR: 2.1 (ref 0.8–1.2)

## 2025-01-30 PROCEDURE — 99211 OFF/OP EST MAY X REQ PHY/QHP: CPT

## 2025-01-30 PROCEDURE — 85610 PROTHROMBIN TIME: CPT

## 2025-01-30 NOTE — PROGRESS NOTES
Medication Management Clinic  [facility]  Anticoagulation Clinic  [phone] (phone)  [phone] (fax)    Ms. Swati Tran is a 74 y.o.  female with history of Afib who presents today for anticoagulation monitoring and adjustment (5 week visit).    Patient verifies current dosing regimen and tablet strength.  No missed or extra doses.  Patient denies s/s bleeding/bruising/swelling/SOB  No blood in urine or stool.  No dietary changes.  Oregannol drops removed from medication list, patient reports stopping use in November.   No change in alcohol use or tobacco use.  No change in activity level.  Patient denies falls.  No vomiting/diarrhea or acute illness.   No Procedures scheduled in the future at this time.    Assessment:   Lab Results   Component Value Date    INR 2.10 (H) 01/30/2025    INR 2.10 (H) 12/26/2024    INR 2.20 (H) 11/21/2024     INR therapeutic   Recent Labs     01/30/25  1408   INR 2.10*        Plan:  Continue Coumadin 3 mg every Mon, Wed, Fri; 6 mg TThSS.  Recheck INR in 5 week(s).  Patient reminded to call the Anticoagulation Clinic with any signs or symptoms of bleeding or with any medication changes.  Patient given instructions utilizing the teach back method.       After visit summary printed and reviewed with patient.      Discharged ambulatory in no apparent distress.    For Pharmacy Admin Tracking Only    Time Spent (min): 20    Steffanie Kuhn, PharmD  1/30/2025 at 2:14 PM

## 2025-02-22 DIAGNOSIS — E89.0 POSTOPERATIVE HYPOTHYROIDISM: ICD-10-CM

## 2025-02-22 RX ORDER — WARFARIN SODIUM 3 MG/1
TABLET ORAL
Qty: 180 TABLET | Refills: 3 | OUTPATIENT
Start: 2025-02-22

## 2025-02-24 RX ORDER — LEVOTHYROXINE SODIUM 25 UG/1
25 TABLET ORAL DAILY
Qty: 90 TABLET | Refills: 3 | OUTPATIENT
Start: 2025-02-24

## 2025-02-24 RX ORDER — POTASSIUM CHLORIDE 1500 MG/1
TABLET, EXTENDED RELEASE ORAL
Qty: 90 TABLET | Refills: 3 | Status: SHIPPED | OUTPATIENT
Start: 2025-02-24

## 2025-03-04 DIAGNOSIS — E89.0 POSTOPERATIVE HYPOTHYROIDISM: ICD-10-CM

## 2025-03-04 RX ORDER — LEVOTHYROXINE SODIUM 25 UG/1
25 TABLET ORAL DAILY
Qty: 90 TABLET | Refills: 3 | Status: SHIPPED | OUTPATIENT
Start: 2025-03-04

## 2025-03-04 NOTE — TELEPHONE ENCOUNTER
Recent Visits  Date Type Provider Dept   11/13/24 Office Visit Jacky Christy, DO [facility]   05/07/24 Office Visit Jacky Christy, DO [facility]   02/07/24 Office Visit Jacky Christy, DO [facility]   11/02/23 Office Visit Jacky Christy, DO [facility]   Showing recent visits within past 540 days with a meds authorizing provider and meeting all other requirements  Future Appointments  Date Type Provider Dept   05/14/25 Appointment Jacky Christy, DO [facility]   Showing future appointments within next 150 days with a meds authorizing provider and meeting all other requirements

## 2025-03-06 ENCOUNTER — ANTI-COAG VISIT (OUTPATIENT)
Age: 75
End: 2025-03-06
Payer: MEDICARE

## 2025-03-06 DIAGNOSIS — I48.20 CHRONIC ATRIAL FIBRILLATION (HCC): Primary | Chronic | ICD-10-CM

## 2025-03-06 DIAGNOSIS — Z51.81 ENCOUNTER FOR THERAPEUTIC DRUG MONITORING: ICD-10-CM

## 2025-03-06 DIAGNOSIS — Z79.01 ANTICOAGULATED ON COUMADIN: ICD-10-CM

## 2025-03-06 LAB — POC INR: 2.8 (ref 0.8–1.2)

## 2025-03-06 PROCEDURE — 85610 PROTHROMBIN TIME: CPT

## 2025-03-06 PROCEDURE — 99212 OFFICE O/P EST SF 10 MIN: CPT

## 2025-03-06 RX ORDER — WARFARIN SODIUM 3 MG/1
TABLET ORAL
Qty: 180 TABLET | Refills: 3 | Status: SHIPPED | OUTPATIENT
Start: 2025-03-06

## 2025-03-06 NOTE — PROGRESS NOTES
Medication Management Clinic  [facility]  Anticoagulation Clinic  [phone] (phone)  [phone] (fax)    Ms. Swati Tran is a 74 y.o.  female with history of Afib who presents today for anticoagulation monitoring and adjustment.    Patient verifies current dosing regimen and tablet strength.  No missed or extra doses.  Patient denies s/s bleeding/bruising/swelling/SOB  No blood in urine or stool.  No dietary changes.  No changes in medication/OTC agents/Herbals.  No change in alcohol use or tobacco use.  No change in activity level.  Patient denies falls.  No vomiting/diarrhea or acute illness.   No Procedures scheduled in the future at this time.      Assessment:   Lab Results   Component Value Date    INR 2.80 (H) 03/06/2025    INR 2.10 (H) 01/30/2025    INR 2.10 (H) 12/26/2024     INR therapeutic   Recent Labs     03/06/25  1404   INR 2.80*       Plan:  Continue Coumadin 3 mg MoWeFr and 6 mg SuTuThSa.  Recheck INR in 6 week(s).  Patient reminded to call the Anticoagulation Clinic with any signs or symptoms of bleeding or with any medication changes.  Patient given instructions utilizing the teach back method.    Refill sent per CPA  After visit summary printed and reviewed with patient.      Discharged ambulatory in no apparent distress.    For Pharmacy Admin Tracking Only    Intervention Detail: Refill(s) Provided  Total # of Interventions Recommended: 1  Total # of Interventions Accepted: 1  Time Spent (min): 15

## 2025-04-16 ENCOUNTER — TELEPHONE (OUTPATIENT)
Dept: FAMILY MEDICINE CLINIC | Age: 75
End: 2025-04-16

## 2025-04-16 DIAGNOSIS — D50.9 IRON DEFICIENCY ANEMIA, UNSPECIFIED IRON DEFICIENCY ANEMIA TYPE: Primary | ICD-10-CM

## 2025-04-17 ENCOUNTER — ANTI-COAG VISIT (OUTPATIENT)
Age: 75
End: 2025-04-17
Payer: MEDICARE

## 2025-04-17 DIAGNOSIS — I48.20 CHRONIC ATRIAL FIBRILLATION (HCC): Primary | Chronic | ICD-10-CM

## 2025-04-17 DIAGNOSIS — Z79.01 ANTICOAGULATED ON COUMADIN: ICD-10-CM

## 2025-04-17 DIAGNOSIS — Z51.81 ENCOUNTER FOR THERAPEUTIC DRUG MONITORING: ICD-10-CM

## 2025-04-17 LAB — POC INR: 2.9 (ref 0.8–1.2)

## 2025-04-17 PROCEDURE — 85610 PROTHROMBIN TIME: CPT

## 2025-04-17 PROCEDURE — 99211 OFF/OP EST MAY X REQ PHY/QHP: CPT

## 2025-04-17 NOTE — TELEPHONE ENCOUNTER
Pt due for fasting labs prior to next apt on 5/14/2025. Please call to have pt complete this. Thanks!    ASSESSMENT & PLAN   Diagnosis Orders   1. Iron deficiency anemia, unspecified iron deficiency anemia type  CBC with Auto Differential    Ferritin    Iron    Iron Binding Capacity        Future Appointments   Date Time Provider Department Center   4/17/2025  2:00 PM Nathalia Thomas RPH [facility] - [place]   5/14/2025  3:00 PM Jacky Christy, DO [facility] DE[facility]   7/17/2025  2:00 PM Isidra Garcia, APRN - CNP [facility] - [place]   10/16/2025  2:15 PM Hung Haywood MD [facility] - [place]     
Pt informed of labs needing done. Pt voiced understanding with no further questions at this time.       Lab slip in the mail.   
Dizziness

## 2025-04-17 NOTE — PROGRESS NOTES
Medication Management Clinic  [facility]  Anticoagulation Clinic  [phone] (phone)  [phone] (fax)    Ms. Swati Tran is a 75 y.o.  female with history of Afib who presents today for anticoagulation monitoring and adjustment.    Patient verifies current dosing regimen and tablet strength.  No missed or extra doses.  Patient denies s/s bleeding/bruising/swelling/SOB  No blood in urine or stool.  No dietary changes.  No changes in medication/OTC agents/Herbals.  No change in alcohol use or tobacco use.  No change in activity level.  Patient denies falls.  No vomiting/diarrhea or acute illness.   No Procedures scheduled in the future at this time.      Assessment:   Lab Results   Component Value Date    INR 2.90 (H) 04/17/2025    INR 2.80 (H) 03/06/2025    INR 2.10 (H) 01/30/2025     INR therapeutic   Recent Labs     04/17/25  1413   INR 2.90*     INR goal: 2.0-3.0    Plan:  Continue Coumadin 3 mg MoWeFr and 6 mg SuTuThSa.  Recheck INR in 6 weeks (requested 5/22 date).  Patient reminded to call the Anticoagulation Clinic with any signs or symptoms of bleeding or with any medication changes.  Patient given instructions utilizing the teach back method.    After visit summary printed and reviewed with patient.      Discharged ambulatory in no apparent distress.    For Pharmacy Admin Tracking Only  Time Spent (min): 15

## 2025-05-03 DIAGNOSIS — L30.9 DERMATITIS: ICD-10-CM

## 2025-05-05 RX ORDER — TRIAMCINOLONE ACETONIDE 1 MG/G
CREAM TOPICAL
Qty: 240 G | Refills: 3 | Status: SHIPPED | OUTPATIENT
Start: 2025-05-05

## 2025-05-05 NOTE — TELEPHONE ENCOUNTER
Recent Visits  Date Type Provider Dept   11/13/24 Office Visit Jacky Christy, DO [facility]   05/07/24 Office Visit Jacky Christy, DO [facility]   02/07/24 Office Visit Jacky Christy, DO [facility]   Showing recent visits within past 540 days with a meds authorizing provider and meeting all other requirements  Future Appointments  Date Type Provider Dept   05/14/25 Appointment Jacky Christy, DO [facility]   Showing future appointments within next 150 days with a meds authorizing provider and meeting all other requirements

## 2025-05-09 RX ORDER — FUROSEMIDE 40 MG/1
40 TABLET ORAL DAILY
Qty: 90 TABLET | Refills: 0 | Status: SHIPPED | OUTPATIENT
Start: 2025-05-09

## 2025-05-13 ENCOUNTER — LAB (OUTPATIENT)
Dept: LAB | Age: 75
End: 2025-05-13

## 2025-05-13 DIAGNOSIS — D50.9 IRON DEFICIENCY ANEMIA, UNSPECIFIED IRON DEFICIENCY ANEMIA TYPE: ICD-10-CM

## 2025-05-13 LAB
BASOPHILS ABSOLUTE: 0.1 THOU/MM3 (ref 0–0.1)
BASOPHILS NFR BLD AUTO: 1.3 %
DEPRECATED RDW RBC AUTO: 49.1 FL (ref 35–45)
EOSINOPHIL NFR BLD AUTO: 4 %
EOSINOPHILS ABSOLUTE: 0.3 THOU/MM3 (ref 0–0.4)
ERYTHROCYTE [DISTWIDTH] IN BLOOD BY AUTOMATED COUNT: 13.8 % (ref 11.5–14.5)
FERRITIN SERPL IA-MCNC: 49 NG/ML (ref 13–150)
HCT VFR BLD AUTO: 40.6 % (ref 37–47)
HGB BLD-MCNC: 13.1 GM/DL (ref 12–16)
IMM GRANULOCYTES # BLD AUTO: 0.04 THOU/MM3 (ref 0–0.07)
IMM GRANULOCYTES NFR BLD AUTO: 0.5 %
IRON SATN MFR SERPL: 17 % (ref 20–50)
IRON SERPL-MCNC: 72 UG/DL (ref 37–145)
LYMPHOCYTES ABSOLUTE: 1.1 THOU/MM3 (ref 1–4.8)
LYMPHOCYTES NFR BLD AUTO: 14.3 %
MCH RBC QN AUTO: 31.5 PG (ref 26–33)
MCHC RBC AUTO-ENTMCNC: 32.3 GM/DL (ref 32.2–35.5)
MCV RBC AUTO: 97.6 FL (ref 81–99)
MONOCYTES ABSOLUTE: 0.4 THOU/MM3 (ref 0.4–1.3)
MONOCYTES NFR BLD AUTO: 5.1 %
NEUTROPHILS ABSOLUTE: 5.8 THOU/MM3 (ref 1.8–7.7)
NEUTROPHILS NFR BLD AUTO: 74.8 %
NRBC BLD AUTO-RTO: 0 /100 WBC
PLATELET # BLD AUTO: 65 THOU/MM3 (ref 130–400)
PLATELET BLD QL SMEAR: ABNORMAL
PMV BLD AUTO: ABNORMAL FL (ref 9.4–12.4)
RBC # BLD AUTO: 4.16 MILL/MM3 (ref 4.2–5.4)
SCAN OF BLOOD SMEAR: NORMAL
TIBC SERPL-MCNC: 432 UG/DL (ref 171–450)
WBC # BLD AUTO: 7.8 THOU/MM3 (ref 4.8–10.8)

## 2025-05-13 NOTE — PROGRESS NOTES
Chief Complaint   Patient presents with    Medicare AW     History obtained from the patient.    SUBJECTIVE:  Swati Tran is a 75 y.o. female that presents today for     -Anemia PRIOR VISIT:  Noted on routine labs  Is on coumadin  Denies bleeding, melena or hematochezia.     UPDATE PRIOR VISIT:   W/u c/w JAZ  Started on iron/Vit C  Labs were better in DEC, but anemia worse again  However, iron, b12 and folic acid stores are WNL yet  Referred to GI but didn't go, needs a new referral  Is still taking iron  Denies bleeding, melena or hematochezia.   Is willing to see GI    UPDATE PRIOR VISIT:   Here for f/u  On iron and Vit C yet  Iron stores WNL  Anemia resolved  Was referred to GI 3 months ago to discuss scopes  Ended up not going  Declines seeing at this point  Denies bleeding, melena or hematochezia.   Aware of risks.      UPDATE LAST VISIT:   Doing well  Off iron for several months  Iron labs done yesterday remain WNL  Cbc WNL   Was referred to GI 9 months ago to discuss scopes  Ended up not going  Declines seeing at this point  Denies bleeding, melena or hematochezia.   Aware of risks.      UPDATE TODAY:   Remains off iron  Anemia and iron stores remain improved.   Was referred to GI after anemia found, to discuss scopes  Ended up not going  Declines seeing at this point  Denies bleeding, melena or hematochezia.   Aware of risks.         -CAD/HFrEF/Afib:  STEMI NOV 2021  S/p CABG, MAZE, MVR and TVR at [facility] NOV 2021  Is on coumadin, Toprol XL, Entresto and Aldactone  Stopped her Lipitor d/t myalgias. Cardio aware.   No CP, SOB, orthopnea or PND      -DM2:  Diet controlled  A1c 6.8  Activity level stable    Lab Results   Component Value Date/Time    LABA1C 6.8 05/14/2025 01:30 PM    LABA1C 6.4 11/13/2024 01:27 PM    LABA1C 6.5 05/07/2024 01:43 PM    LABA1C 5.6 02/07/2024 01:35 PM    LABA1C 7.4 11/02/2023 01:54 PM    LABA1C 6.3 04/27/2023 02:16 PM    LABA1C 7.1 10/15/2019 02:15 PM    LABA1C 6.9

## 2025-05-14 ENCOUNTER — RESULTS FOLLOW-UP (OUTPATIENT)
Dept: FAMILY MEDICINE CLINIC | Age: 75
End: 2025-05-14

## 2025-05-14 ENCOUNTER — OFFICE VISIT (OUTPATIENT)
Dept: FAMILY MEDICINE CLINIC | Age: 75
End: 2025-05-14

## 2025-05-14 VITALS
WEIGHT: 235.8 LBS | BODY MASS INDEX: 43.39 KG/M2 | SYSTOLIC BLOOD PRESSURE: 126 MMHG | RESPIRATION RATE: 16 BRPM | HEART RATE: 69 BPM | HEIGHT: 62 IN | TEMPERATURE: 97.7 F | OXYGEN SATURATION: 96 % | DIASTOLIC BLOOD PRESSURE: 72 MMHG

## 2025-05-14 DIAGNOSIS — E78.2 MIXED HYPERLIPIDEMIA: ICD-10-CM

## 2025-05-14 DIAGNOSIS — K21.9 GASTROESOPHAGEAL REFLUX DISEASE, UNSPECIFIED WHETHER ESOPHAGITIS PRESENT: ICD-10-CM

## 2025-05-14 DIAGNOSIS — Z95.1 S/P CABG (CORONARY ARTERY BYPASS GRAFT): Chronic | ICD-10-CM

## 2025-05-14 DIAGNOSIS — E11.9 TYPE 2 DIABETES MELLITUS WITHOUT COMPLICATION, WITHOUT LONG-TERM CURRENT USE OF INSULIN (HCC): ICD-10-CM

## 2025-05-14 DIAGNOSIS — M79.10 MYALGIA DUE TO STATIN: ICD-10-CM

## 2025-05-14 DIAGNOSIS — I50.20 HEART FAILURE WITH REDUCED EJECTION FRACTION (HCC): Chronic | ICD-10-CM

## 2025-05-14 DIAGNOSIS — Z98.890 S/P TRICUSPID VALVE REPAIR: Chronic | ICD-10-CM

## 2025-05-14 DIAGNOSIS — Z86.79 S/P MAZE OPERATION FOR ATRIAL FIBRILLATION: Chronic | ICD-10-CM

## 2025-05-14 DIAGNOSIS — T46.6X5A MYALGIA DUE TO STATIN: ICD-10-CM

## 2025-05-14 DIAGNOSIS — I25.2 HISTORY OF ST ELEVATION MYOCARDIAL INFARCTION (STEMI): Chronic | ICD-10-CM

## 2025-05-14 DIAGNOSIS — Z98.890 S/P MAZE OPERATION FOR ATRIAL FIBRILLATION: Chronic | ICD-10-CM

## 2025-05-14 DIAGNOSIS — I48.91 ATRIAL FIBRILLATION, UNSPECIFIED TYPE (HCC): ICD-10-CM

## 2025-05-14 DIAGNOSIS — Z00.00 MEDICARE ANNUAL WELLNESS VISIT, SUBSEQUENT: Primary | ICD-10-CM

## 2025-05-14 DIAGNOSIS — I10 ESSENTIAL HYPERTENSION: ICD-10-CM

## 2025-05-14 DIAGNOSIS — E89.0 S/P PARTIAL THYROIDECTOMY: Chronic | ICD-10-CM

## 2025-05-14 DIAGNOSIS — D34 FOLLICULAR ADENOMA OF THYROID GLAND: Chronic | ICD-10-CM

## 2025-05-14 DIAGNOSIS — Z98.890 S/P MVR (MITRAL VALVE REPAIR): Chronic | ICD-10-CM

## 2025-05-14 DIAGNOSIS — D69.6 THROMBOCYTOPENIA: Chronic | ICD-10-CM

## 2025-05-14 DIAGNOSIS — Z12.11 SCREENING FOR COLON CANCER: ICD-10-CM

## 2025-05-14 DIAGNOSIS — I25.10 ASHD (ARTERIOSCLEROTIC HEART DISEASE): Chronic | ICD-10-CM

## 2025-05-14 DIAGNOSIS — I25.5 ISCHEMIC CARDIOMYOPATHY: Chronic | ICD-10-CM

## 2025-05-14 DIAGNOSIS — D50.9 IRON DEFICIENCY ANEMIA, UNSPECIFIED IRON DEFICIENCY ANEMIA TYPE: ICD-10-CM

## 2025-05-14 DIAGNOSIS — D32.9 MENINGIOMA (HCC): Chronic | ICD-10-CM

## 2025-05-14 DIAGNOSIS — E89.0 HYPOTHYROIDISM, POSTSURGICAL: Chronic | ICD-10-CM

## 2025-05-14 LAB — HBA1C MFR BLD: 6.8 % (ref 4.3–5.7)

## 2025-05-14 SDOH — ECONOMIC STABILITY: FOOD INSECURITY: WITHIN THE PAST 12 MONTHS, THE FOOD YOU BOUGHT JUST DIDN'T LAST AND YOU DIDN'T HAVE MONEY TO GET MORE.: NEVER TRUE

## 2025-05-14 SDOH — ECONOMIC STABILITY: FOOD INSECURITY: WITHIN THE PAST 12 MONTHS, YOU WORRIED THAT YOUR FOOD WOULD RUN OUT BEFORE YOU GOT MONEY TO BUY MORE.: NEVER TRUE

## 2025-05-14 ASSESSMENT — PATIENT HEALTH QUESTIONNAIRE - PHQ9
SUM OF ALL RESPONSES TO PHQ QUESTIONS 1-9: 0
SUM OF ALL RESPONSES TO PHQ QUESTIONS 1-9: 0
1. LITTLE INTEREST OR PLEASURE IN DOING THINGS: NOT AT ALL
2. FEELING DOWN, DEPRESSED OR HOPELESS: NOT AT ALL
SUM OF ALL RESPONSES TO PHQ QUESTIONS 1-9: 0
SUM OF ALL RESPONSES TO PHQ QUESTIONS 1-9: 0

## 2025-05-14 NOTE — PATIENT INSTRUCTIONS
Learning About Being Active as an Older Adult  Why is being active important as you get older?     Being active is one of the best things you can do for your health. And it's never too late to start. Being active--or getting active, if you aren't already--has definite benefits. It can:  Give you more energy,  Keep your mind sharp.  Improve balance to reduce your risk of falls.  Help you manage chronic illness with fewer medicines.  No matter how old you are, how fit you are, or what health problems you have, there is a form of activity that will work for you. And the more physical activity you can do, the better your overall health will be.  What kinds of activity can help you stay healthy?  Being more active will make your daily activities easier. Physical activity includes planned exercise and things you do in daily life. There are four types of activity:  Aerobic.  Doing aerobic activity makes your heart and lungs strong.  Includes walking, dancing, and gardening.  Aim for at least 2½ hours spread throughout the week.  It improves your energy and can help you sleep better.  Muscle-strengthening.  This type of activity can help maintain muscle and strengthen bones.  Includes climbing stairs, using resistance bands, and lifting or carrying heavy loads.  Aim for at least twice a week.  It can help protect the knees and other joints.  Stretching.  Stretching gives you better range of motion in joints and muscles.  Includes upper arm stretches, calf stretches, and gentle yoga.  Aim for at least twice a week, preferably after your muscles are warmed up from other activities.  It can help you function better in daily life.  Balancing.  This helps you stay coordinated and have good posture.  Includes heel-to-toe walking, wolf chi, and certain types of yoga.  Aim for at least 3 days a week.  It can reduce your risk of falling.  Even if you have a hard time meeting the recommendations, it's better to be more active

## 2025-05-19 ENCOUNTER — TELEPHONE (OUTPATIENT)
Dept: FAMILY MEDICINE CLINIC | Age: 75
End: 2025-05-19

## 2025-05-19 NOTE — TELEPHONE ENCOUNTER
Patients daughter called in asking if theres a blood transfusion that patient could do. States patient isn't doing anything, cancelling most appointments, daughter knows that she declined GI. Was wondering why a blood transfusion couldn't be done.

## 2025-05-19 NOTE — TELEPHONE ENCOUNTER
Pt daughter informed of information . Pt daughter voiced understanding with no further questions at this time.

## 2025-05-19 NOTE — TELEPHONE ENCOUNTER
Because she doesn't have an anemia. She has no indication for a blood transfusion.     Lab Results   Component Value Date    WBC 7.8 05/13/2025    HGB 13.1 05/13/2025    HCT 40.6 05/13/2025    MCV 97.6 05/13/2025    PLT 65 (L) 05/13/2025

## 2025-05-22 ENCOUNTER — APPOINTMENT (OUTPATIENT)
Age: 75
End: 2025-05-22
Payer: MEDICARE

## 2025-05-28 ENCOUNTER — LAB (OUTPATIENT)
Dept: LAB | Age: 75
End: 2025-05-28

## 2025-05-28 DIAGNOSIS — D69.6 THROMBOCYTOPENIA: Chronic | ICD-10-CM

## 2025-05-28 LAB
BASOPHILS ABSOLUTE: 0.1 THOU/MM3 (ref 0–0.1)
BASOPHILS NFR BLD AUTO: 1.3 %
DEPRECATED RDW RBC AUTO: 50.1 FL (ref 35–45)
EOSINOPHIL NFR BLD AUTO: 5.5 %
EOSINOPHILS ABSOLUTE: 0.6 THOU/MM3 (ref 0–0.4)
ERYTHROCYTE [DISTWIDTH] IN BLOOD BY AUTOMATED COUNT: 13.7 % (ref 11.5–14.5)
HCT VFR BLD AUTO: 38.5 % (ref 37–47)
HGB BLD-MCNC: 12.3 GM/DL (ref 12–16)
IMM GRANULOCYTES # BLD AUTO: 0.03 THOU/MM3 (ref 0–0.07)
IMM GRANULOCYTES NFR BLD AUTO: 0.3 %
LYMPHOCYTES ABSOLUTE: 1.5 THOU/MM3 (ref 1–4.8)
LYMPHOCYTES NFR BLD AUTO: 15.2 %
MCH RBC QN AUTO: 31.8 PG (ref 26–33)
MCHC RBC AUTO-ENTMCNC: 31.9 GM/DL (ref 32.2–35.5)
MCV RBC AUTO: 99.5 FL (ref 81–99)
MONOCYTES ABSOLUTE: 0.6 THOU/MM3 (ref 0.4–1.3)
MONOCYTES NFR BLD AUTO: 6.3 %
NEUTROPHILS ABSOLUTE: 7.1 THOU/MM3 (ref 1.8–7.7)
NEUTROPHILS NFR BLD AUTO: 71.4 %
NRBC BLD AUTO-RTO: 0 /100 WBC
PLATELET # BLD AUTO: 71 THOU/MM3 (ref 130–400)
PMV BLD AUTO: 15 FL (ref 9.4–12.4)
RBC # BLD AUTO: 3.87 MILL/MM3 (ref 4.2–5.4)
WBC # BLD AUTO: 10 THOU/MM3 (ref 4.8–10.8)

## 2025-05-29 ENCOUNTER — RESULTS FOLLOW-UP (OUTPATIENT)
Dept: FAMILY MEDICINE CLINIC | Age: 75
End: 2025-05-29

## 2025-05-29 ENCOUNTER — ANTI-COAG VISIT (OUTPATIENT)
Age: 75
End: 2025-05-29
Payer: MEDICARE

## 2025-05-29 DIAGNOSIS — Z51.81 ENCOUNTER FOR THERAPEUTIC DRUG MONITORING: ICD-10-CM

## 2025-05-29 DIAGNOSIS — D69.6 THROMBOCYTOPENIA: Primary | ICD-10-CM

## 2025-05-29 DIAGNOSIS — Z79.01 ANTICOAGULATED ON COUMADIN: ICD-10-CM

## 2025-05-29 DIAGNOSIS — I48.20 CHRONIC ATRIAL FIBRILLATION (HCC): Primary | Chronic | ICD-10-CM

## 2025-05-29 LAB — POC INR: 3.4 (ref 0.8–1.2)

## 2025-05-29 PROCEDURE — 85610 PROTHROMBIN TIME: CPT

## 2025-05-29 PROCEDURE — 99212 OFFICE O/P EST SF 10 MIN: CPT

## 2025-05-29 NOTE — PROGRESS NOTES
Medication Management Clinic  [facility]  Anticoagulation Clinic  [phone] (phone)  [phone] (fax)    Ms. Swati Tran is a 75 y.o.  female with history of atrial fib., per Dr. Christy's referral, who presents today for Warfarin monitoring and adjustment (1 week late for 5 week visit).    Patient verifies current dosing regimen and tablet strength.  No missed or extra doses.  Patient denies bleeding/bruising.  Has usual SOB. Has some swelling of ankles.  States she's gained weight. Recently saw PCP.  No blood in urine or stool.  No dietary changes.  No changes in medication/OTC agents/herbals.  No change in alcohol use or tobacco use.  No change in activity level.  Patient denies falls.  No vomiting/diarrhea or acute illness. Taking Zyrtec for seasonal allergies.  No procedures scheduled in the future at this time.      Assessment:       INR goal: 2.0-3.0  Lab Results   Component Value Date    INR 3.40 (H) 05/29/2025    INR 2.90 (H) 04/17/2025    INR 2.80 (H) 03/06/2025     INR supratherapeutic   Recent Labs     05/29/25  1528   INR 3.40*      Plan:  POCT INR performed/result reviewed.  3 mg today, Th, 1.5 mg tomorrow, then continue PO Coumadin 3 mg MWF, 6 mg TThSS.  Recheck INR in 2 week(s), per policy.  Patient reminded to call the Anticoagulation Clinic with any signs or symptoms of bleeding or with any medication changes.  Patient given instructions utilizing the teach back method.     After visit summary printed and reviewed with patient.    Advised extra caution.  Discharged ambulatory in no apparent distress, with four-wheeled walker and daughter.   in hallway.    For Pharmacy Admin Tracking Only    Intervention Detail: Dose Adjustment: 1, reason: Therapy De-escalation  Total # of Interventions Recommended: 1  Total # of Interventions Accepted: 1  Time Spent (min): 20

## 2025-05-29 NOTE — TELEPHONE ENCOUNTER
Pt informed of lab results. Pt voiced understanding with no further questions at this time.     Lab slip in the mail with instructions.

## 2025-06-12 ENCOUNTER — TELEPHONE (OUTPATIENT)
Dept: CARDIOLOGY CLINIC | Age: 75
End: 2025-06-12

## 2025-06-12 ENCOUNTER — ANTI-COAG VISIT (OUTPATIENT)
Age: 75
End: 2025-06-12
Payer: MEDICARE

## 2025-06-12 DIAGNOSIS — Z79.01 ANTICOAGULATED ON COUMADIN: ICD-10-CM

## 2025-06-12 DIAGNOSIS — I48.20 CHRONIC ATRIAL FIBRILLATION (HCC): Primary | Chronic | ICD-10-CM

## 2025-06-12 DIAGNOSIS — Z51.81 ENCOUNTER FOR THERAPEUTIC DRUG MONITORING: ICD-10-CM

## 2025-06-12 LAB — POC INR: 2.1 (ref 0.8–1.2)

## 2025-06-12 PROCEDURE — 85610 PROTHROMBIN TIME: CPT

## 2025-06-12 PROCEDURE — 99211 OFF/OP EST MAY X REQ PHY/QHP: CPT

## 2025-06-12 NOTE — TELEPHONE ENCOUNTER
Spoke to Nisha.  She did not take lt night pills or this morning.  Advised to go ahead and take meds now.  Then again at 1200 midnight-then back on schedule tomorrow.  She usually doesn't take morning meds until late morning. She is up late at night.

## 2025-06-12 NOTE — TELEPHONE ENCOUNTER
The Pt's daughter called stating her mother forgot to talk her \"Entresto\" Medication last last. She is concerned. She would like call back to see what she should do next.

## 2025-06-12 NOTE — TELEPHONE ENCOUNTER
Daughter is calling back and states pt is wanting to know if she should take the medication now.  Please advise pt at [phone]

## 2025-06-12 NOTE — PROGRESS NOTES
Medication Management Clinic  [facility]  Anticoagulation Clinic  [phone] (phone)  [phone] (fax)    Ms. Swati Tran is a 75 y.o.  female with history of Afib who presents today for anticoagulation monitoring and adjustment.    Patient verifies current dosing regimen and tablet strength.  Missed dose yesterday on 6/11  Patient denies s/s bleeding/bruising/swelling/SOB  No blood in urine or stool.  No dietary changes.  No changes in medication/OTC agents/Herbals.  No change in alcohol use or tobacco use.  No change in activity level.  Patient denies falls.  No vomiting/diarrhea or acute illness.   No Procedures scheduled in the future at this time.    Assessment:   Lab Results   Component Value Date    INR 2.10 (H) 06/12/2025    INR 3.40 (H) 05/29/2025    INR 2.90 (H) 04/17/2025     INR therapeutic   Recent Labs     06/12/25  1528   INR 2.10*     Patient presents with therapeutic INR following missed dose yesterday. Since due for higher dose (6 mg) today and INR therapeutic will not alter regimen today.       INR goal: 2.0-3.0    Plan:  Continue Coumadin 3 mg MWF and 6 mg TThSS.  Recheck INR in 2 week(s).  Patient reminded to call the Anticoagulation Clinic with any signs or symptoms of bleeding or with any medication changes.  Patient given instructions utilizing the teach back method.     After visit summary printed and reviewed with patient.      Discharged ambulatory in no apparent distress.    For Pharmacy Admin Tracking Only    Intervention Detail:   Total # of Interventions Recommended: 0  Total # of Interventions Accepted: 0  Time Spent (min): 20    Kami Davenport, Jake 6/12/2025 3:43 PM

## 2025-06-26 ENCOUNTER — ANTI-COAG VISIT (OUTPATIENT)
Age: 75
End: 2025-06-26
Payer: MEDICARE

## 2025-06-26 DIAGNOSIS — I48.20 CHRONIC ATRIAL FIBRILLATION (HCC): Primary | Chronic | ICD-10-CM

## 2025-06-26 DIAGNOSIS — Z51.81 ENCOUNTER FOR THERAPEUTIC DRUG MONITORING: ICD-10-CM

## 2025-06-26 DIAGNOSIS — Z79.01 ANTICOAGULATED ON COUMADIN: ICD-10-CM

## 2025-06-26 LAB — POC INR: 2.7 (ref 0.8–1.2)

## 2025-06-26 NOTE — PROGRESS NOTES
Medication Management Clinic  [facility]  Anticoagulation Clinic  [phone] (phone)  [phone] (fax)    Ms. Swati Tran is a 75 y.o.  female with history of Afib who presents today for anticoagulation monitoring and adjustment.    Patient verifies current dosing regimen and tablet strength.  No missed or extra doses.  Patient denies s/s bleeding/bruising/swelling/SOB  No blood in urine or stool.  No dietary changes.  No changes in medication/OTC agents/Herbals.  No change in alcohol use or tobacco use.  No change in activity level.  Patient denies falls.  No vomiting/diarrhea or acute illness.   No Procedures scheduled in the future at this time.      Assessment:   Lab Results   Component Value Date    INR 2.70 (H) 06/26/2025    INR 2.10 (H) 06/12/2025    INR 3.40 (H) 05/29/2025     INR therapeutic   Recent Labs     06/26/25  1526   INR 2.70*     INR goal: 2.0-3.0    Plan:  Continue Coumadin 3 mg MoWeFr and 6 mg SuTuThSa.  Recheck INR in 4 week(s).  Patient reminded to call the Anticoagulation Clinic with any signs or symptoms of bleeding or with any medication changes.  Patient given instructions utilizing the teach back method.    After visit summary printed and reviewed with patient.      Discharged ambulatory in no apparent distress.    For Pharmacy Admin Tracking Only  Time Spent (min): 15

## 2025-07-14 NOTE — TELEPHONE ENCOUNTER
----- Message from Maggy Caputo DO sent at 4/15/2021  9:09 AM EDT -----  Please let pt know that mammogram is normal. Let me know if questions, thanks! Report given to AI Selby at [facility].

## 2025-07-17 ENCOUNTER — OFFICE VISIT (OUTPATIENT)
Dept: CARDIOLOGY CLINIC | Age: 75
End: 2025-07-17
Payer: MEDICARE

## 2025-07-17 VITALS
SYSTOLIC BLOOD PRESSURE: 120 MMHG | OXYGEN SATURATION: 99 % | WEIGHT: 236 LBS | HEART RATE: 91 BPM | BODY MASS INDEX: 43.43 KG/M2 | HEIGHT: 62 IN | DIASTOLIC BLOOD PRESSURE: 70 MMHG

## 2025-07-17 DIAGNOSIS — K21.9 GASTROESOPHAGEAL REFLUX DISEASE, UNSPECIFIED WHETHER ESOPHAGITIS PRESENT: ICD-10-CM

## 2025-07-17 DIAGNOSIS — I50.22 CHF (CONGESTIVE HEART FAILURE), NYHA CLASS II, CHRONIC, SYSTOLIC (HCC): Primary | ICD-10-CM

## 2025-07-17 PROCEDURE — 99214 OFFICE O/P EST MOD 30 MIN: CPT | Performed by: NURSE PRACTITIONER

## 2025-07-17 PROCEDURE — 1123F ACP DISCUSS/DSCN MKR DOCD: CPT | Performed by: NURSE PRACTITIONER

## 2025-07-17 PROCEDURE — 1036F TOBACCO NON-USER: CPT | Performed by: NURSE PRACTITIONER

## 2025-07-17 PROCEDURE — 3074F SYST BP LT 130 MM HG: CPT | Performed by: NURSE PRACTITIONER

## 2025-07-17 PROCEDURE — G8427 DOCREV CUR MEDS BY ELIG CLIN: HCPCS | Performed by: NURSE PRACTITIONER

## 2025-07-17 PROCEDURE — G8417 CALC BMI ABV UP PARAM F/U: HCPCS | Performed by: NURSE PRACTITIONER

## 2025-07-17 PROCEDURE — 1090F PRES/ABSN URINE INCON ASSESS: CPT | Performed by: NURSE PRACTITIONER

## 2025-07-17 PROCEDURE — 3078F DIAST BP <80 MM HG: CPT | Performed by: NURSE PRACTITIONER

## 2025-07-17 PROCEDURE — G8400 PT W/DXA NO RESULTS DOC: HCPCS | Performed by: NURSE PRACTITIONER

## 2025-07-17 PROCEDURE — 1159F MED LIST DOCD IN RCRD: CPT | Performed by: NURSE PRACTITIONER

## 2025-07-17 RX ORDER — FUROSEMIDE 40 MG/1
40 TABLET ORAL DAILY
Qty: 90 TABLET | Refills: 3 | Status: SHIPPED | OUTPATIENT
Start: 2025-07-17

## 2025-07-17 RX ORDER — SPIRONOLACTONE 25 MG/1
25 TABLET ORAL DAILY
Qty: 90 TABLET | Refills: 0 | Status: SHIPPED | OUTPATIENT
Start: 2025-07-17

## 2025-07-17 RX ORDER — METOPROLOL SUCCINATE 25 MG/1
TABLET, EXTENDED RELEASE ORAL
Qty: 180 TABLET | Refills: 0 | Status: SHIPPED | OUTPATIENT
Start: 2025-07-17

## 2025-07-17 RX ORDER — OMEPRAZOLE 20 MG/1
20 CAPSULE, DELAYED RELEASE ORAL DAILY
Qty: 90 CAPSULE | Refills: 3 | Status: SHIPPED | OUTPATIENT
Start: 2025-07-17

## 2025-07-17 RX ORDER — METOPROLOL SUCCINATE 50 MG/1
TABLET, EXTENDED RELEASE ORAL
Qty: 180 TABLET | Refills: 0 | Status: SHIPPED | OUTPATIENT
Start: 2025-07-17

## 2025-07-17 ASSESSMENT — ENCOUNTER SYMPTOMS
COUGH: 0
WHEEZING: 0
ABDOMINAL PAIN: 0
SHORTNESS OF BREATH: 0
ABDOMINAL DISTENTION: 0

## 2025-07-17 NOTE — PROGRESS NOTES
Heart Failure Clinic       Visit Date: 7/17/2025  Cardiologist:  Dr. Haywood  Primary Care Physician: Jacky Real DO    Swati Tran is a 75 y.o. female who presents today for:  Chief Complaint   Patient presents with    Check-Up     CHF    Congestive Heart Failure       HPI:   TYPE HF: HFimrEF 60-65% on 10/7/24 (45-50%, 3/18/22) (25-30%, 1/20/22)    Cause: improved ischemic cardiomyopathy   Device: none  HX: HLD, YASHIRA on CPAP, HTN, DM II, Afib w. RVR s/p Maze and LAAC (11/2021), (coumadin) CAD s/p CABG 11/15/21  Dry Wt:  200? (208 on 2/15/22) (212 on 3/30/22) (230 on 9/13/22) (233 on 1/26/23) (233 on 6/29/23) (234 on 1/17/24) (234 on 7/17/24) 236# on 7/17/25    Swati Tran is a 75 y.o. female who presents to the office for a f/u patient visit in the heart failure clinic.  Accompanied by self    Concerns today: no concerns, she is at baseline.  She has SOB outside in the heat. She had to stop a few times she states to cool off, denies SOB.   Urinating well. Tries to adhere to low salt diet and fluid intake.  Encouraged daily weights and blood pressures at home.  Does not keep track currently     Patient has:  Chest Pain: no  SOB: chronic with long distance  Orthopnea/PND: no, sleeps in bed with one pillow. YASHIRA: not tested  Edema: trace with mild pitting   Fatigue: no  Abdominal bloating: no  Cough: no  Appetite: good    ADLS: preforms ADLS without limitations or  increased SOB,  uses wheeled walker.   Diet:  continues to try to adhere to a low salt diet and 2-3 bottles of water day with coffee     Visit on 7/17/24:   Weight is stable. Urinating well on her Lasix. No hospitalizations      Visit on 1/17/24: 6 month f/u. Avtar is stable, no hospitalizations. Urinating well on her Lasix. SOB at baseline - long distances. Denies leg swelling, bloating, orthopnea. Following her low Na/fluid diet. He states that she did struggle over the holidays    Visit on 6/29/23: here today for her 6 month

## 2025-07-17 NOTE — PATIENT INSTRUCTIONS
You may receive a survey regarding the care you received during your visit.  Your input is valuable to us.  We encourage you to complete and return your survey.  We hope you will choose us in the future for your healthcare needs.    Your nurses today were Anca Foss and Stephany.  Office hours:   Mon-Thurs 8-4:30  Friday 8-12  Phone: [phone]    Continue:  Continue current medications  Daily weights and record  Fluid restriction of 2 Liters per day  Limit sodium in diet to around 1647-7205 mg/day  Monitor BP    Call the Heart Failure Clinic for any of the following symptoms:   Weight gain of 3 pounds in 1 day or 5 pounds in 1 week  Increased shortness of breath  Shortness of breath while laying down  Chest pain  Swelling in feet, ankles or legs  Bloating in abdomen  Fatigue     Take 40 mg lasix 2 times a day (must be 6 hours apart) for 3-5 days   Take the potassium twice a day for 3-5 days   Labs in one week  Continue diet/fluid adherence  Continue daily wts.  F/U w/ Cardiology  F/U in clinic in 1 yr

## 2025-07-17 NOTE — TELEPHONE ENCOUNTER
Recent Visits  Date Type Provider Dept   05/14/25 Office Visit Jacky Christy, DO [facility]   11/13/24 Office Visit Jacky Christy, DO [facility]   05/07/24 Office Visit Jacky Christy, DO [facility]   02/07/24 Office Visit Jacky Christy, DO [facility]   Showing recent visits within past 540 days with a meds authorizing provider and meeting all other requirements  Future Appointments  Date Type Provider Dept   11/18/25 Appointment Jacky Christy, DO [facility]   Showing future appointments within next 150 days with a meds authorizing provider and meeting all other requirements

## 2025-07-24 ENCOUNTER — ANTI-COAG VISIT (OUTPATIENT)
Age: 75
End: 2025-07-24
Payer: MEDICARE

## 2025-07-24 DIAGNOSIS — I48.20 CHRONIC ATRIAL FIBRILLATION (HCC): Primary | Chronic | ICD-10-CM

## 2025-07-24 DIAGNOSIS — Z79.01 ANTICOAGULATED ON COUMADIN: ICD-10-CM

## 2025-07-24 DIAGNOSIS — Z51.81 ENCOUNTER FOR THERAPEUTIC DRUG MONITORING: ICD-10-CM

## 2025-07-24 LAB — POC INR: 2 (ref 0.8–1.2)

## 2025-07-24 PROCEDURE — 99211 OFF/OP EST MAY X REQ PHY/QHP: CPT

## 2025-07-24 PROCEDURE — 85610 PROTHROMBIN TIME: CPT

## 2025-07-24 NOTE — PROGRESS NOTES
Medication Management Clinic  [facility]  Anticoagulation Clinic  [phone] (phone)  [phone] (fax)    Ms. Swati Tran is a 75 y.o.  female with history of Afib who presents today for anticoagulation monitoring and adjustment.    Patient verifies current dosing regimen and tablet strength.  Missed Coumadin dose last Friday (6 days ago)  Patient denies s/s bleeding/bruising/swelling/SOB  No blood in urine or stool.  No dietary changes.  No changes in medication/OTC agents/Herbals.  No change in alcohol use or tobacco use.  No change in activity level.  Patient denies falls.  No vomiting/diarrhea or acute illness.   No Procedures scheduled in the future at this time.      Assessment:   Lab Results   Component Value Date    INR 2.00 (H) 07/24/2025    INR 2.70 (H) 06/26/2025    INR 2.10 (H) 06/12/2025     INR therapeutic   Recent Labs     07/24/25  1528   INR 2.00*     INR goal: 2.0-3.0    Plan:  Continue Coumadin 3 mg MoWeFr and 6 mg SuTuThSa.  Recheck INR in 5 week(s).  Patient reminded to call the Anticoagulation Clinic with any signs or symptoms of bleeding or with any medication changes.  Patient given instructions utilizing the teach back method.    After visit summary printed and reviewed with patient.      Discharged ambulatory in no apparent distress with daughter.    For Pharmacy Admin Tracking Only    Time Spent (min): 15

## 2025-07-30 ENCOUNTER — LAB (OUTPATIENT)
Dept: LAB | Age: 75
End: 2025-07-30

## 2025-07-30 DIAGNOSIS — D69.6 THROMBOCYTOPENIA: ICD-10-CM

## 2025-07-30 DIAGNOSIS — I50.22 CHF (CONGESTIVE HEART FAILURE), NYHA CLASS II, CHRONIC, SYSTOLIC (HCC): ICD-10-CM

## 2025-07-30 LAB
ANION GAP SERPL CALC-SCNC: 15 MEQ/L (ref 8–16)
BASOPHILS ABSOLUTE: 0.1 THOU/MM3 (ref 0–0.1)
BASOPHILS NFR BLD AUTO: 1.2 %
BUN SERPL-MCNC: 46 MG/DL (ref 8–23)
CALCIUM SERPL-MCNC: 9.7 MG/DL (ref 8.8–10.2)
CHLORIDE SERPL-SCNC: 102 MEQ/L (ref 98–111)
CO2 SERPL-SCNC: 22 MEQ/L (ref 22–29)
CREAT SERPL-MCNC: 1.5 MG/DL (ref 0.5–0.9)
DEPRECATED RDW RBC AUTO: 49.4 FL (ref 35–45)
EOSINOPHIL NFR BLD AUTO: 2.9 %
EOSINOPHILS ABSOLUTE: 0.2 THOU/MM3 (ref 0–0.4)
ERYTHROCYTE [DISTWIDTH] IN BLOOD BY AUTOMATED COUNT: 13.9 % (ref 11.5–14.5)
GFR SERPL CREATININE-BSD FRML MDRD: 36 ML/MIN/1.73M2
GLUCOSE SERPL-MCNC: 121 MG/DL (ref 74–109)
HCT VFR BLD AUTO: 37.9 % (ref 37–47)
HGB BLD-MCNC: 12.2 GM/DL (ref 12–16)
IMM GRANULOCYTES # BLD AUTO: 0.04 THOU/MM3 (ref 0–0.07)
IMM GRANULOCYTES NFR BLD AUTO: 0.5 %
LYMPHOCYTES ABSOLUTE: 1.1 THOU/MM3 (ref 1–4.8)
LYMPHOCYTES NFR BLD AUTO: 14.3 %
MAGNESIUM SERPL-MCNC: 2.3 MG/DL (ref 1.6–2.6)
MCH RBC QN AUTO: 31.1 PG (ref 26–33)
MCHC RBC AUTO-ENTMCNC: 32.2 GM/DL (ref 32.2–35.5)
MCV RBC AUTO: 96.7 FL (ref 81–99)
MONOCYTES ABSOLUTE: 0.4 THOU/MM3 (ref 0.4–1.3)
MONOCYTES NFR BLD AUTO: 5.1 %
NEUTROPHILS ABSOLUTE: 5.9 THOU/MM3 (ref 1.8–7.7)
NEUTROPHILS NFR BLD AUTO: 76 %
NRBC BLD AUTO-RTO: 0 /100 WBC
NT-PROBNP SERPL IA-MCNC: 207 PG/ML (ref 0–449)
PLATELET # BLD AUTO: 64 THOU/MM3 (ref 130–400)
PMV BLD AUTO: 14.6 FL (ref 9.4–12.4)
POTASSIUM SERPL-SCNC: 5 MEQ/L (ref 3.5–5.2)
RBC # BLD AUTO: 3.92 MILL/MM3 (ref 4.2–5.4)
SODIUM SERPL-SCNC: 139 MEQ/L (ref 135–145)
WBC # BLD AUTO: 7.8 THOU/MM3 (ref 4.8–10.8)

## 2025-07-31 ENCOUNTER — RESULTS FOLLOW-UP (OUTPATIENT)
Dept: FAMILY MEDICINE CLINIC | Age: 75
End: 2025-07-31

## 2025-07-31 ENCOUNTER — RESULTS FOLLOW-UP (OUTPATIENT)
Dept: CARDIOLOGY CLINIC | Age: 75
End: 2025-07-31

## 2025-07-31 DIAGNOSIS — N17.9 AKI (ACUTE KIDNEY INJURY): Primary | ICD-10-CM

## 2025-07-31 DIAGNOSIS — D69.6 THROMBOCYTOPENIA: Primary | Chronic | ICD-10-CM

## 2025-07-31 NOTE — TELEPHONE ENCOUNTER
----- Message from Dr. Jacky Christy, DO sent at 7/31/2025  7:36 AM EDT -----  Please let pt know that platelets stable at 65K.  Would like to repeat CBC again in 2 wks, non-fasting.  Let me know if questions, thanks!

## 2025-08-28 ENCOUNTER — ANTI-COAG VISIT (OUTPATIENT)
Age: 75
End: 2025-08-28
Payer: MEDICARE

## 2025-08-28 DIAGNOSIS — Z79.01 ANTICOAGULATED ON COUMADIN: ICD-10-CM

## 2025-08-28 DIAGNOSIS — Z51.81 ENCOUNTER FOR THERAPEUTIC DRUG MONITORING: ICD-10-CM

## 2025-08-28 DIAGNOSIS — I48.20 CHRONIC ATRIAL FIBRILLATION (HCC): Primary | Chronic | ICD-10-CM

## 2025-08-28 LAB
POC INR: 4.9 (ref 0.8–1.2)
POC INR: 5 (ref 0.8–1.2)

## 2025-08-28 PROCEDURE — 85610 PROTHROMBIN TIME: CPT

## 2025-08-28 PROCEDURE — 99211 OFF/OP EST MAY X REQ PHY/QHP: CPT

## (undated) DEVICE — SPONGE,PEANUT,XRAY,ST,SM,3/8",5/CARD: Brand: MEDLINE INDUSTRIES, INC.

## (undated) DEVICE — DRAIN SURG 19FR 0.25IN SIL RND W/ TRCR INDIC DOT RADPQ FULL

## (undated) DEVICE — APPLIER CLP L9.375IN APER 2.1MM CLS L3.8MM 20 SM TI CLP

## (undated) DEVICE — CODMAN® SURGICAL PATTIES 1/2" X 1/2" (1.27CM X 1.27CM): Brand: CODMAN®

## (undated) DEVICE — SUTURE MCRYL + SZ 3-0 L27IN ABSRB UD PS1 L24MM 3/8 CIR REV MCP936H

## (undated) DEVICE — GLOVE SURG SZ 75 L12IN FNGR THK94MIL TRNSLUC YEL LTX

## (undated) DEVICE — APPLIER LIG CLP M L11IN TI STR RNG HNDL FOR 20 CLP DISP

## (undated) DEVICE — PROBE 8225101 5PK STD PRASS FL TIP ROHS

## (undated) DEVICE — PENCIL SMK EVAC ALL IN 1 DSGN ENH VISIBILITY IMPROVED AIR

## (undated) DEVICE — Device

## (undated) DEVICE — SUTURE MCRYL SZ 3-0 L27IN ABSRB UD L26MM SH 1/2 CIR Y416H

## (undated) DEVICE — CORD,CAUTERY,BIPOLAR,STERILE: Brand: MEDLINE

## (undated) DEVICE — 3M™ IOBAN™ 2 ANTIMICROBIAL INCISE DRAPE 6650EZ: Brand: IOBAN™ 2

## (undated) DEVICE — DISSECTOR ENDOSCP L21CM TIP CURVATURE 40DEG FN CRV JAW VES

## (undated) DEVICE — SUTURE PERMAHAND SZ 4-0 L18IN NONABSORBABLE BLK SILK BRAID A183H

## (undated) DEVICE — BREAST HERNIA PACK: Brand: MEDLINE INDUSTRIES, INC.

## (undated) DEVICE — 3M™ STERI-DRAPE™ INSTRUMENT POUCH 1018: Brand: STERI-DRAPE™

## (undated) DEVICE — AGENT HEMSTAT W2XL4IN OXIDIZED REGENERATED CELOS ABSRB SFT

## (undated) DEVICE — DRAIN,WOUND,15FR,3/16,FULL-FLUTED: Brand: MEDLINE

## (undated) DEVICE — EVACUATOR SURG 100CC SIL BLB SUCT RESVR FOR CLS WND DRNGE